# Patient Record
Sex: FEMALE | Race: WHITE | NOT HISPANIC OR LATINO | Employment: UNEMPLOYED | URBAN - NONMETROPOLITAN AREA
[De-identification: names, ages, dates, MRNs, and addresses within clinical notes are randomized per-mention and may not be internally consistent; named-entity substitution may affect disease eponyms.]

---

## 2017-01-07 ENCOUNTER — COMMUNICATION - GICH (OUTPATIENT)
Dept: FAMILY MEDICINE | Facility: OTHER | Age: 47
End: 2017-01-07

## 2017-01-07 DIAGNOSIS — J45.20 MILD INTERMITTENT ASTHMA, UNCOMPLICATED: ICD-10-CM

## 2017-01-11 ENCOUNTER — OFFICE VISIT - GICH (OUTPATIENT)
Dept: FAMILY MEDICINE | Facility: OTHER | Age: 47
End: 2017-01-11

## 2017-01-11 ENCOUNTER — HISTORY (OUTPATIENT)
Dept: FAMILY MEDICINE | Facility: OTHER | Age: 47
End: 2017-01-11

## 2017-01-11 DIAGNOSIS — J02.0 STREPTOCOCCAL PHARYNGITIS: ICD-10-CM

## 2017-01-11 DIAGNOSIS — J32.0 CHRONIC MAXILLARY SINUSITIS: ICD-10-CM

## 2017-01-11 DIAGNOSIS — B37.9 CANDIDIASIS: ICD-10-CM

## 2017-01-11 DIAGNOSIS — J02.9 ACUTE PHARYNGITIS: ICD-10-CM

## 2017-01-11 DIAGNOSIS — T36.95XA ADVERSE EFFECT OF SYSTEMIC ANTIBIOTIC: ICD-10-CM

## 2017-01-11 LAB — STREP A ANTIGEN - HISTORICAL: POSITIVE

## 2017-01-12 ENCOUNTER — AMBULATORY - GICH (OUTPATIENT)
Dept: FAMILY MEDICINE | Facility: OTHER | Age: 47
End: 2017-01-12

## 2017-01-23 ENCOUNTER — AMBULATORY - GICH (OUTPATIENT)
Dept: FAMILY MEDICINE | Facility: OTHER | Age: 47
End: 2017-01-23

## 2017-01-23 DIAGNOSIS — M79.605 PAIN OF LEFT LEG: ICD-10-CM

## 2017-01-23 DIAGNOSIS — G25.81 RESTLESS LEGS SYNDROME: ICD-10-CM

## 2017-01-25 ENCOUNTER — COMMUNICATION - GICH (OUTPATIENT)
Dept: FAMILY MEDICINE | Facility: OTHER | Age: 47
End: 2017-01-25

## 2017-01-25 DIAGNOSIS — J45.20 MILD INTERMITTENT ASTHMA, UNCOMPLICATED: ICD-10-CM

## 2017-02-20 ENCOUNTER — COMMUNICATION - GICH (OUTPATIENT)
Dept: FAMILY MEDICINE | Facility: OTHER | Age: 47
End: 2017-02-20

## 2017-02-20 DIAGNOSIS — F32.89 OTHER SPECIFIED DEPRESSIVE EPISODES: ICD-10-CM

## 2017-02-26 ENCOUNTER — COMMUNICATION - GICH (OUTPATIENT)
Dept: FAMILY MEDICINE | Facility: OTHER | Age: 47
End: 2017-02-26

## 2017-02-26 DIAGNOSIS — G25.81 RESTLESS LEGS SYNDROME: ICD-10-CM

## 2017-02-27 ENCOUNTER — COMMUNICATION - GICH (OUTPATIENT)
Dept: FAMILY MEDICINE | Facility: OTHER | Age: 47
End: 2017-02-27

## 2017-02-27 DIAGNOSIS — J45.20 MILD INTERMITTENT ASTHMA, UNCOMPLICATED: ICD-10-CM

## 2017-03-29 ENCOUNTER — OFFICE VISIT - GICH (OUTPATIENT)
Dept: FAMILY MEDICINE | Facility: OTHER | Age: 47
End: 2017-03-29

## 2017-03-29 ENCOUNTER — HISTORY (OUTPATIENT)
Dept: FAMILY MEDICINE | Facility: OTHER | Age: 47
End: 2017-03-29

## 2017-03-29 DIAGNOSIS — R69 ILLNESS: ICD-10-CM

## 2017-03-29 DIAGNOSIS — J45.40 MODERATE PERSISTENT ASTHMA, UNCOMPLICATED: ICD-10-CM

## 2017-03-29 ASSESSMENT — PATIENT HEALTH QUESTIONNAIRE - PHQ9: SUM OF ALL RESPONSES TO PHQ QUESTIONS 1-9: 6

## 2017-04-04 ENCOUNTER — OFFICE VISIT - GICH (OUTPATIENT)
Dept: FAMILY MEDICINE | Facility: OTHER | Age: 47
End: 2017-04-04

## 2017-04-04 ENCOUNTER — HISTORY (OUTPATIENT)
Dept: FAMILY MEDICINE | Facility: OTHER | Age: 47
End: 2017-04-04

## 2017-04-04 DIAGNOSIS — R06.83 SNORING: ICD-10-CM

## 2017-04-04 DIAGNOSIS — S76.012A STRAIN OF MUSCLE, FASCIA AND TENDON OF LEFT HIP, INITIAL ENCOUNTER: ICD-10-CM

## 2017-04-04 DIAGNOSIS — R53.83 OTHER FATIGUE: ICD-10-CM

## 2017-04-07 ENCOUNTER — COMMUNICATION - GICH (OUTPATIENT)
Dept: FAMILY MEDICINE | Facility: OTHER | Age: 47
End: 2017-04-07

## 2017-04-07 DIAGNOSIS — J45.20 MILD INTERMITTENT ASTHMA, UNCOMPLICATED: ICD-10-CM

## 2017-05-10 ENCOUNTER — COMMUNICATION - GICH (OUTPATIENT)
Dept: FAMILY MEDICINE | Facility: OTHER | Age: 47
End: 2017-05-10

## 2017-05-10 DIAGNOSIS — G25.81 RESTLESS LEGS SYNDROME: ICD-10-CM

## 2017-06-14 ENCOUNTER — COMMUNICATION - GICH (OUTPATIENT)
Dept: FAMILY MEDICINE | Facility: OTHER | Age: 47
End: 2017-06-14

## 2017-06-14 DIAGNOSIS — J45.20 MILD INTERMITTENT ASTHMA, UNCOMPLICATED: ICD-10-CM

## 2017-07-01 ENCOUNTER — HOSPITAL ENCOUNTER (OUTPATIENT)
Dept: RADIOLOGY | Facility: OTHER | Age: 47
End: 2017-07-01
Attending: FAMILY MEDICINE

## 2017-07-01 ENCOUNTER — HISTORY (OUTPATIENT)
Dept: FAMILY MEDICINE | Facility: OTHER | Age: 47
End: 2017-07-01

## 2017-07-01 ENCOUNTER — OFFICE VISIT - GICH (OUTPATIENT)
Dept: FAMILY MEDICINE | Facility: OTHER | Age: 47
End: 2017-07-01

## 2017-07-01 DIAGNOSIS — R10.9 ABDOMINAL PAIN: ICD-10-CM

## 2017-07-01 LAB
A/G RATIO - HISTORICAL: 1.4 (ref 1–2)
ABSOLUTE BASOPHILS - HISTORICAL: 0 THOU/CU MM
ABSOLUTE EOSINOPHILS - HISTORICAL: 0.1 THOU/CU MM
ABSOLUTE IMMATURE GRANULOCYTES(METAS,MYELOS,PROS) - HISTORICAL: 0 THOU/CU MM
ABSOLUTE LYMPHOCYTES - HISTORICAL: 1.2 THOU/CU MM (ref 0.9–2.9)
ABSOLUTE MONOCYTES - HISTORICAL: 0.7 THOU/CU MM
ABSOLUTE NEUTROPHILS - HISTORICAL: 6.2 THOU/CU MM (ref 1.7–7)
ALBUMIN SERPL-MCNC: 4.2 G/DL (ref 3.5–5.7)
ALP SERPL-CCNC: 104 IU/L (ref 34–104)
ALT (SGPT) - HISTORICAL: 83 IU/L (ref 7–52)
ANION GAP - HISTORICAL: 9 (ref 5–18)
AST SERPL-CCNC: 49 IU/L (ref 13–39)
BACTERIA URINE: NORMAL BACTERIA/HPF
BASOPHILS # BLD AUTO: 0.4 %
BILIRUB SERPL-MCNC: 0.4 MG/DL (ref 0.3–1)
BILIRUB UR QL: ABNORMAL
BUN SERPL-MCNC: 12 MG/DL (ref 7–25)
BUN/CREAT RATIO - HISTORICAL: 12
CALCIUM SERPL-MCNC: 9.1 MG/DL (ref 8.6–10.3)
CHLORIDE SERPLBLD-SCNC: 103 MMOL/L (ref 98–107)
CLARITY, URINE: CLEAR CLARITY
CO2 SERPL-SCNC: 22 MMOL/L (ref 21–31)
COLOR UR: YELLOW COLOR
CREAT SERPL-MCNC: 1.04 MG/DL (ref 0.7–1.3)
D-DIMER, QUANTITATIVE NG/ML - HISTORICAL: <200 NG/ML
EOSINOPHIL NFR BLD AUTO: 0.7 %
EPITHELIAL CELLS: NORMAL EPI/HPF
ERYTHROCYTE [DISTWIDTH] IN BLOOD BY AUTOMATED COUNT: 12.4 % (ref 11.5–15.5)
GFR IF NOT AFRICAN AMERICAN - HISTORICAL: 57 ML/MIN/1.73M2
GLOBULIN - HISTORICAL: 3 G/DL (ref 2–3.7)
GLUCOSE SERPL-MCNC: 97 MG/DL (ref 70–105)
GLUCOSE URINE: NEGATIVE MG/DL
HCT VFR BLD AUTO: 43.4 % (ref 33–51)
HEMOGLOBIN: 15.4 G/DL (ref 12–16)
IMMATURE GRANULOCYTES(METAS,MYELOS,PROS) - HISTORICAL: 0.5 %
KETONES UR QL: NEGATIVE MG/DL
LEUKOCYTE ESTERASE URINE: NEGATIVE
LIPASE SERPL-CCNC: 10.9 IU/L (ref 11–82)
LYMPHOCYTES NFR BLD AUTO: 14.3 % (ref 20–44)
MCH RBC QN AUTO: 30.9 PG (ref 26–34)
MCHC RBC AUTO-ENTMCNC: 35.5 G/DL (ref 32–36)
MCV RBC AUTO: 87 FL (ref 80–100)
MONOCYTES NFR BLD AUTO: 8.6 %
NEUTROPHILS NFR BLD AUTO: 75.5 % (ref 42–72)
NITRITE UR QL STRIP: NEGATIVE
OCCULT BLOOD,URINE - HISTORICAL: ABNORMAL
PH UR: 5 [PH]
PLATELET # BLD AUTO: 223 THOU/CU MM (ref 140–440)
PMV BLD: 9.1 FL (ref 6.5–11)
POTASSIUM SERPL-SCNC: 3.9 MMOL/L (ref 3.5–5.1)
PROT SERPL-MCNC: 7.2 G/DL (ref 6.4–8.9)
PROTEIN QUALITATIVE,URINE - HISTORICAL: ABNORMAL MG/DL
RBC - HISTORICAL: NORMAL /HPF
RED BLOOD COUNT - HISTORICAL: 4.98 MIL/CU MM (ref 4–5.2)
SODIUM SERPL-SCNC: 134 MMOL/L (ref 133–143)
SP GR UR STRIP: >=1.03
UROBILINOGEN,QUALITATIVE - HISTORICAL: NORMAL EU/DL
WBC - HISTORICAL: NORMAL /HPF
WHITE BLOOD COUNT - HISTORICAL: 8.3 THOU/CU MM (ref 4.5–11)

## 2017-09-06 ENCOUNTER — COMMUNICATION - GICH (OUTPATIENT)
Dept: FAMILY MEDICINE | Facility: OTHER | Age: 47
End: 2017-09-06

## 2017-09-06 DIAGNOSIS — F32.89 OTHER SPECIFIED DEPRESSIVE EPISODES: ICD-10-CM

## 2017-09-10 ENCOUNTER — COMMUNICATION - GICH (OUTPATIENT)
Dept: FAMILY MEDICINE | Facility: OTHER | Age: 47
End: 2017-09-10

## 2017-09-10 DIAGNOSIS — J45.20 MILD INTERMITTENT ASTHMA, UNCOMPLICATED: ICD-10-CM

## 2017-11-21 ENCOUNTER — HISTORY (OUTPATIENT)
Dept: FAMILY MEDICINE | Facility: OTHER | Age: 47
End: 2017-11-21

## 2017-11-21 ENCOUNTER — OFFICE VISIT - GICH (OUTPATIENT)
Dept: FAMILY MEDICINE | Facility: OTHER | Age: 47
End: 2017-11-21

## 2017-11-21 DIAGNOSIS — T36.95XA ADVERSE EFFECT OF SYSTEMIC ANTIBIOTIC: ICD-10-CM

## 2017-11-21 DIAGNOSIS — R74.8 ABNORMAL LEVELS OF OTHER SERUM ENZYMES: ICD-10-CM

## 2017-11-21 DIAGNOSIS — Z13.29 ENCOUNTER FOR SCREENING FOR OTHER SUSPECTED ENDOCRINE DISORDER: ICD-10-CM

## 2017-11-21 DIAGNOSIS — J32.9 CHRONIC SINUSITIS: ICD-10-CM

## 2017-11-21 DIAGNOSIS — J45.40 MODERATE PERSISTENT ASTHMA, UNCOMPLICATED: ICD-10-CM

## 2017-11-21 DIAGNOSIS — Z13.220 ENCOUNTER FOR SCREENING FOR LIPOID DISORDERS: ICD-10-CM

## 2017-11-21 DIAGNOSIS — F32.89 OTHER SPECIFIED DEPRESSIVE EPISODES: ICD-10-CM

## 2017-11-21 DIAGNOSIS — J45.20 MILD INTERMITTENT ASTHMA, UNCOMPLICATED: ICD-10-CM

## 2017-11-21 DIAGNOSIS — B37.9 CANDIDIASIS: ICD-10-CM

## 2017-11-21 LAB
A/G RATIO - HISTORICAL: 1.6 (ref 1–2)
ALBUMIN SERPL-MCNC: 3.9 G/DL (ref 3.5–5.7)
ALP SERPL-CCNC: 72 IU/L (ref 34–104)
ALT (SGPT) - HISTORICAL: 20 IU/L (ref 7–52)
ANION GAP - HISTORICAL: 7 (ref 5–18)
AST SERPL-CCNC: 16 IU/L (ref 13–39)
BILIRUB SERPL-MCNC: 0.3 MG/DL (ref 0.3–1)
BUN SERPL-MCNC: 10 MG/DL (ref 7–25)
BUN/CREAT RATIO - HISTORICAL: 12
CALCIUM SERPL-MCNC: 8.7 MG/DL (ref 8.6–10.3)
CHLORIDE SERPLBLD-SCNC: 108 MMOL/L (ref 98–107)
CHOL/HDL RATIO - HISTORICAL: 4.38
CHOLESTEROL TOTAL: 175 MG/DL
CO2 SERPL-SCNC: 22 MMOL/L (ref 21–31)
CREAT SERPL-MCNC: 0.85 MG/DL (ref 0.7–1.3)
GFR IF NOT AFRICAN AMERICAN - HISTORICAL: >60 ML/MIN/1.73M2
GLOBULIN - HISTORICAL: 2.5 G/DL (ref 2–3.7)
GLUCOSE SERPL-MCNC: 73 MG/DL (ref 70–105)
HDLC SERPL-MCNC: 40 MG/DL (ref 23–92)
LDLC SERPL CALC-MCNC: 106 MG/DL
NON-HDL CHOLESTEROL - HISTORICAL: 135 MG/DL
POTASSIUM SERPL-SCNC: 3.9 MMOL/L (ref 3.5–5.1)
PROT SERPL-MCNC: 6.4 G/DL (ref 6.4–8.9)
PROVIDER ORDERDED STATUS - HISTORICAL: ABNORMAL
SODIUM SERPL-SCNC: 137 MMOL/L (ref 133–143)
TRIGL SERPL-MCNC: 144 MG/DL
TSH - HISTORICAL: 1.34 UIU/ML (ref 0.34–5.6)

## 2017-11-22 ENCOUNTER — COMMUNICATION - GICH (OUTPATIENT)
Dept: FAMILY MEDICINE | Facility: OTHER | Age: 47
End: 2017-11-22

## 2017-11-29 ENCOUNTER — COMMUNICATION - GICH (OUTPATIENT)
Dept: FAMILY MEDICINE | Facility: OTHER | Age: 47
End: 2017-11-29

## 2017-12-06 ENCOUNTER — COMMUNICATION - GICH (OUTPATIENT)
Dept: FAMILY MEDICINE | Facility: OTHER | Age: 47
End: 2017-12-06

## 2017-12-11 ENCOUNTER — COMMUNICATION - GICH (OUTPATIENT)
Dept: FAMILY MEDICINE | Facility: OTHER | Age: 47
End: 2017-12-11

## 2017-12-21 ENCOUNTER — COMMUNICATION - GICH (OUTPATIENT)
Dept: FAMILY MEDICINE | Facility: OTHER | Age: 47
End: 2017-12-21

## 2017-12-21 DIAGNOSIS — J45.20 MILD INTERMITTENT ASTHMA, UNCOMPLICATED: ICD-10-CM

## 2017-12-28 NOTE — TELEPHONE ENCOUNTER
Patient Information     Patient Name MRN Sex Cielo Rob [id number] Female 1970      Telephone Encounter by Corin Irizarry RN at 2017  2:27 PM     Author:  Corin Irizarry RN Service:  (none) Author Type:  NURS- Registered Nurse     Filed:  2017  2:32 PM Encounter Date:  9/10/2017 Status:  Signed     :  Corin Irizarry RN (NURS- Registered Nurse)            Bronchodilator Inhalers   Office visit in the past 12 months.  Last visit with GILDA ROY was on: 2017 in [facility]  17 OV with Dr. Zazueta addresses this medication  Next visit with GILDA ROY is on: No future appointment listed with this provider  Next visit with Family Practice is on: No future appointment listed in this department  Max refills 12 months from last office visit.  Prescription refilled per RN Medication Refill Policy.................... Corin Irizarry RN ....................  2017   2:29 PM

## 2017-12-28 NOTE — TELEPHONE ENCOUNTER
Patient Information     Patient Name MRN Sex Cielo Rob [id number] Female 1970      Telephone Encounter by Maria Guadalupe Mendez at 2017  3:36 PM     Author:  Maria Guadalupe Mendez Service:  (none) Author Type:  (none)     Filed:  2017  3:38 PM Encounter Date:  2017 Status:  Signed     :  Maria Guadalupe Mendez from [facility] states that Cielo is filling the ventolin (3 inhalers a month) since August which is a lot of for a rescue inhaler. Margot Benavides, DO to be aware of this and change if necessary.   Maria Guadalupe Mendez LPN............................... 2017 3:37 PM

## 2017-12-28 NOTE — TELEPHONE ENCOUNTER
Patient Information     Patient Name MRN Cielo Doherty [id number] Female 1970      Telephone Encounter by Alesha Reynaga at 2017  3:11 PM     Author:  Alesha Reynaga Service:  (none) Author Type:  (none)     Filed:  2017  3:12 PM Encounter Date:  2017 Status:  Signed     :  Alesha Reynaga            Patient states that she was in last week for bronchitis/ sinus infection.  Patient states that she thinks she started a bladder infection and she wants to know if the antibiotic she was put on would cover this or if she needs to come in for this.  Alesha Reynaga LPN........................2017  3:12 PM

## 2017-12-28 NOTE — TELEPHONE ENCOUNTER
Patient Information     Patient Name MRN Sex Cielo Rob [id number] Female 1970      Telephone Encounter by Valencia Roy DO at 2017  4:28 PM     Author:  Valencia Roy DO Service:  (none) Author Type:  PHYS- Osteopathic     Filed:  2017  4:28 PM Encounter Date:  2017 Status:  Signed     :  Valencia Roy DO (PHYS- Osteopathic)            We increased her Advair at last appointment; goal should be ~3 inhalers should last at least 3 months now.  VALENCIA ROY DO

## 2017-12-28 NOTE — PROGRESS NOTES
"Patient Information     Patient Name MRN Sex Cielo Rob [id number] Female 1970      Progress Notes by Arabella Chan MD at 2017  8:30 AM     Author:  Arabella Chan MD Service:  (none) Author Type:  Physician     Filed:  2017  1:32 PM Encounter Date:  2017 Status:  Signed     :  Arabella Chan MD (Physician)            SUBJECTIVE:    Cielo Bahena is a 46 y.o. female who presents about pain in right flank/back area that started the night of 2017 and had a fever to 102 with pleuritic discomfort at times. No cough, dysuria or hematuria. Rates the pain at 7/10 and has been taking tylenol with last dose about 6-8 hours ago. Temp has not been that high again.No association with eating and normal bowel movements.No previous episodes of this type of pain. Still has her gallbladder and has not had fatty food intolerance. No longer drinks alcohol.   Somewhat difficult to localize the pain and seems \"under ribs\".     HPI    No Known Allergies,   Current Outpatient Prescriptions:      amitriptyline (ELAVIL) 10 mg tablet, TAKE 2 TABLETS BY MOUTH AT BEDTIME., Disp: 60 tablet, Rfl: 5     fluticasone-salmeterol (ADVAIR DISKUS) 100-50 mcg/dose diskus inhaler, Inhale 1 Puff by mouth 2 times daily., Disp: 3 Inhaler, Rfl: 4     LORazepam (ATIVAN) 0.5 mg tab, Take 1 tablet by mouth every 8 hours if needed for Anxiety., Disp: 30 tablet, Rfl: 5     montelukast (SINGULAIR) 10 mg tablet, TAKE ONE TABLET BY MOUTH NIGHTLY AT BEDTIME, Disp: 90 tablet, Rfl: 2     pramipexole (MIRAPEX) 0.125 mg tablet, Take 1 tablet by mouth at bedtime if needed., Disp: 90 tablet, Rfl: 2     pramipexole (MIRAPEX) 0.25 mg tablet, TAKE ONE TABLET BY MOUTH NIGHTLY AT BEDTIME, Disp: 90 tablet, Rfl: 2     rx EPINEPHrine (EPIPEN) 0.3 mg/0.3 mL injection ([facility] MED), Inject 0.3 mg intramuscular one time if needed for Allergic Reaction., Disp: 0.6 mL, Rfl: 3     tiotropium (SPIRIVA) 18 mcg inhalation capsule, Inhale " "18 mcg by mouth once daily., Disp: 90 capsule, Rfl: 4     VENTOLIN HFA 90 mcg/actuation inhaler, INHALE 2 PUFFS BY MOUTH EVERY 4 HOURS IF NEEDED., Disp: 3 Inhaler, Rfl: 1  Medications have been reviewed by me and are current to the best of my knowledge and ability. and   Patient Active Problem List       Diagnosis  Date Noted     Cystocele with rectocele       Genuine stress incontinence, female       Family history of colon cancer  01/22/2015     RLS (restless legs syndrome)  06/20/2014     HIP PAIN  04/18/2014     GERD (gastroesophageal reflux disease)  10/25/2012     BREAST HYPERTROPHY  03/14/2012     Moderate persistent asthma without complication       Asthma, generally well controlled, history of respiratory arrest requiring   brief mechanical ventilation.          ANEMIA       mild          DEPRESSION       INSOMNIA         REVIEW OF SYSTEMS:  ROS    OBJECTIVE:  /80  Pulse 100  Temp 98.6  F (37  C) (Tympanic)  Ht 1.638 m (5' 4.5\")  Wt 83.1 kg (183 lb 3.2 oz)  LMP 02/11/2015  BMI 30.96 kg/m2  Results for orders placed or performed in visit on 07/01/17      D-DIMER      Result  Value Ref Range    D-DIMER, QUANTITATIVE  <200 >199 - 230 ng/mL   COMPLETE METABOLIC PANEL      Result  Value Ref Range    SODIUM 134 133 - 143 mmol/L    POTASSIUM 3.9 3.5 - 5.1 mmol/L    CHLORIDE 103 98 - 107 mmol/L    CO2,TOTAL 22 21 - 31 mmol/L    ANION GAP 9 5 - 18                    GLUCOSE 97 70 - 105 mg/dL    CALCIUM 9.1 8.6 - 10.3 mg/dL    BUN 12 7 - 25 mg/dL    CREATININE 1.04 0.70 - 1.30 mg/dL    BUN/CREAT RATIO           12                    GFR if African American >60 >60 ml/min/1.73m2    GFR if not  57 (L) >60 ml/min/1.73m2    ALBUMIN 4.2 3.5 - 5.7 g/dL    PROTEIN,TOTAL 7.2 6.4 - 8.9 g/dL    GLOBULIN                  3.0 2.0 - 3.7 g/dL    A/G RATIO 1.4 1.0 - 2.0                    BILIRUBIN,TOTAL 0.4 0.3 - 1.0 mg/dL    ALK PHOSPHATASE 104 34 - 104 IU/L    ALT (SGPT) 83 (H) 7 - 52 IU/L    AST " (SGOT) 49 (H) 13 - 39 IU/L   LIPASE      Result  Value Ref Range    LIPASE 10.9 (L) 11.0 - 82.0 IU/L   CBC WITH AUTO DIFFERENTIAL      Result  Value Ref Range    WHITE BLOOD COUNT         8.3 4.5 - 11.0 thou/cu mm    RED BLOOD COUNT           4.98 4.00 - 5.20 mil/cu mm    HEMOGLOBIN                15.4 12.0 - 16.0 g/dL    HEMATOCRIT                43.4 33.0 - 51.0 %    MCV                       87 80 - 100 fL    MCH                       30.9 26.0 - 34.0 pg    MCHC                      35.5 32.0 - 36.0 g/dL    RDW                       12.4 11.5 - 15.5 %    PLATELET COUNT            223 140 - 440 thou/cu mm    MPV                       9.1 6.5 - 11.0 fL    NEUTROPHILS               75.5 (H) 42.0 - 72.0 %    LYMPHOCYTES               14.3 (L) 20.0 - 44.0 %    MONOCYTES                 8.6 <12.0 %    EOSINOPHILS               0.7 <8.0 %    BASOPHILS                 0.4 <3.0 %    IMMATURE GRANULOCYTES(METAS,MYELOS,PROS) 0.5 %    ABSOLUTE NEUTROPHILS      6.2 1.7 - 7.0 thou/cu mm    ABSOLUTE LYMPHOCYTES      1.2 0.9 - 2.9 thou/cu mm    ABSOLUTE MONOCYTES        0.7 <0.9 thou/cu mm    ABSOLUTE EOSINOPHILS      0.1 <0.5 thou/cu mm    ABSOLUTE BASOPHILS        0.0 <0.3 thou/cu mm    ABSOLUTE IMMATURE GRANULOCYTES(METAS,MYELOS,PROS) 0.0 <=0.3 thou/cu mm   URINALYSIS W REFLEX MICROSCOPIC IF POSITIVE      Result  Value Ref Range    COLOR                     Yellow Yellow Color    CLARITY                   Clear Clear Clarity    SPECIFIC GRAVITY,URINE    >=1.030 (A) 1.010, 1.015, 1.020, 1.025                    PH,URINE                  5.0 (A) 6.0, 7.0, 8.0, 5.5, 6.5, 7.5, 8.5                    UROBILINOGEN,QUALITATIVE  Normal Normal EU/dl    PROTEIN, URINE Trace (A) Negative mg/dL    GLUCOSE, URINE Negative Negative mg/dL    KETONES,URINE             Negative Negative mg/dL    BILIRUBIN,URINE           Abnormal (A) Negative                    OCCULT BLOOD,URINE        Moderate (A) Negative                    NITRITE                    Negative Negative                    LEUKOCYTE ESTERASE        Negative Negative                   URINALYSIS MICROSCOPIC      Result  Value Ref Range    RBC 0-2 0-2, None Seen /HPF    WBC None Seen 0-2, 3-5, None Seen /HPF    BACTERIA                  Rare None Seen, Rare, Occasional, Few Bacteria/HPF    EPITHELIAL CELLS          Few None Seen, Few Epi/HPF     I have personally reviewed the labs listed above.    EXAM:   Physical Exam   Constitutional: She is well-developed, well-nourished, and in no distress. No distress.   Cardiovascular: Normal rate and regular rhythm.    Abdominal: Soft. Bowel sounds are normal. She exhibits no distension and no mass. There is tenderness. There is no rebound and no guarding.   Tender in right upper quadrant at area of gallbladder   Musculoskeletal:   Ribs do not palpate as tender.    Skin: No rash noted.   Nursing note and vitals reviewed.    Results for orders placed or performed in visit on 07/01/17      D-DIMER      Result  Value Ref Range    D-DIMER, QUANTITATIVE  <200 >199 - 230 ng/mL   COMPLETE METABOLIC PANEL      Result  Value Ref Range    SODIUM 134 133 - 143 mmol/L    POTASSIUM 3.9 3.5 - 5.1 mmol/L    CHLORIDE 103 98 - 107 mmol/L    CO2,TOTAL 22 21 - 31 mmol/L    ANION GAP 9 5 - 18                    GLUCOSE 97 70 - 105 mg/dL    CALCIUM 9.1 8.6 - 10.3 mg/dL    BUN 12 7 - 25 mg/dL    CREATININE 1.04 0.70 - 1.30 mg/dL    BUN/CREAT RATIO           12                    GFR if African American >60 >60 ml/min/1.73m2    GFR if not  57 (L) >60 ml/min/1.73m2    ALBUMIN 4.2 3.5 - 5.7 g/dL    PROTEIN,TOTAL 7.2 6.4 - 8.9 g/dL    GLOBULIN                  3.0 2.0 - 3.7 g/dL    A/G RATIO 1.4 1.0 - 2.0                    BILIRUBIN,TOTAL 0.4 0.3 - 1.0 mg/dL    ALK PHOSPHATASE 104 34 - 104 IU/L    ALT (SGPT) 83 (H) 7 - 52 IU/L    AST (SGOT) 49 (H) 13 - 39 IU/L   LIPASE      Result  Value Ref Range    LIPASE 10.9 (L) 11.0 - 82.0 IU/L   CBC WITH AUTO  DIFFERENTIAL      Result  Value Ref Range    WHITE BLOOD COUNT         8.3 4.5 - 11.0 thou/cu mm    RED BLOOD COUNT           4.98 4.00 - 5.20 mil/cu mm    HEMOGLOBIN                15.4 12.0 - 16.0 g/dL    HEMATOCRIT                43.4 33.0 - 51.0 %    MCV                       87 80 - 100 fL    MCH                       30.9 26.0 - 34.0 pg    MCHC                      35.5 32.0 - 36.0 g/dL    RDW                       12.4 11.5 - 15.5 %    PLATELET COUNT            223 140 - 440 thou/cu mm    MPV                       9.1 6.5 - 11.0 fL    NEUTROPHILS               75.5 (H) 42.0 - 72.0 %    LYMPHOCYTES               14.3 (L) 20.0 - 44.0 %    MONOCYTES                 8.6 <12.0 %    EOSINOPHILS               0.7 <8.0 %    BASOPHILS                 0.4 <3.0 %    IMMATURE GRANULOCYTES(METAS,MYELOS,PROS) 0.5 %    ABSOLUTE NEUTROPHILS      6.2 1.7 - 7.0 thou/cu mm    ABSOLUTE LYMPHOCYTES      1.2 0.9 - 2.9 thou/cu mm    ABSOLUTE MONOCYTES        0.7 <0.9 thou/cu mm    ABSOLUTE EOSINOPHILS      0.1 <0.5 thou/cu mm    ABSOLUTE BASOPHILS        0.0 <0.3 thou/cu mm    ABSOLUTE IMMATURE GRANULOCYTES(METAS,MYELOS,PROS) 0.0 <=0.3 thou/cu mm   URINALYSIS W REFLEX MICROSCOPIC IF POSITIVE      Result  Value Ref Range    COLOR                     Yellow Yellow Color    CLARITY                   Clear Clear Clarity    SPECIFIC GRAVITY,URINE    >=1.030 (A) 1.010, 1.015, 1.020, 1.025                    PH,URINE                  5.0 (A) 6.0, 7.0, 8.0, 5.5, 6.5, 7.5, 8.5                    UROBILINOGEN,QUALITATIVE  Normal Normal EU/dl    PROTEIN, URINE Trace (A) Negative mg/dL    GLUCOSE, URINE Negative Negative mg/dL    KETONES,URINE             Negative Negative mg/dL    BILIRUBIN,URINE           Abnormal (A) Negative                    OCCULT BLOOD,URINE        Moderate (A) Negative                    NITRITE                   Negative Negative                    LEUKOCYTE ESTERASE        Negative Negative                    URINALYSIS MICROSCOPIC      Result  Value Ref Range    RBC 0-2 0-2, None Seen /HPF    WBC None Seen 0-2, 3-5, None Seen /HPF    BACTERIA                  Rare None Seen, Rare, Occasional, Few Bacteria/HPF    EPITHELIAL CELLS          Few None Seen, Few Epi/HPF   Results   US ABDOMEN LIMITED (Accession [id number]) (Order [id number])      Original Order Diagnosis: Right sided abdominal pain [R10.9 (ICD-10-CM)]        Reading Provider(s)   Shun Dooley MD   PACS Images   Show images for US ABDOMEN LIMITED   Results         PROCEDURES: US ABDOMEN LIMITED     HISTORY: Female, age 46 years, Right sided abdominal pain     TECHNIQUE: Ultrasound of the upper abdomen was performed.     COMPARISON: None.      FINDINGS:     LIVER: Normal.     GALLBLADDER: Normal.     Common bile duct diameter: 4.5 mm.     ABDOMINAL AORTA AND IVC: The visualized portions of the abdominal aorta are unremarkable.     PANCREAS:The visualized portions of the pancreas are normal.     RIGHT KIDNEY: The right kidney is normal. The right kidney measures centimeters in length.     OTHER: There is no free fluid in the upper abdomen.     IMPRESSION: Normal examination.     Electronically Signed By: Shun Dooley M.D. on 7/1/2017 10:58 AM       I have personally reviewed the labs and US listed above.      ASSESSMENT/PLAN:    ICD-10-CM    1. Right sided abdominal pain R10.9 CBC WITH DIFFERENTIAL      D-DIMER      COMPLETE METABOLIC PANEL      LIPASE      CBC WITH DIFFERENTIAL      D-DIMER      COMPLETE METABOLIC PANEL      LIPASE      CBC WITH AUTO DIFFERENTIAL      URINALYSIS W REFLEX MICROSCOPIC IF POSITIVE      URINALYSIS W REFLEX MICROSCOPIC IF POSITIVE      URINALYSIS MICROSCOPIC      URINALYSIS MICROSCOPIC      US ABDOMEN LIMITED        Plan:    Mild elevation of LFTs but not that high and discussed.   All other labs and US reassuring.   Follow up with PCP if this is persistent or recurrent.  Arabella Chan MD  1:32 PM 7/1/2017

## 2017-12-28 NOTE — TELEPHONE ENCOUNTER
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 Corin Irizarry RN ....................  2017   10:55 AM

## 2017-12-28 NOTE — TELEPHONE ENCOUNTER
Patient Information     Patient Name MRN Sex Cielo Rob [id number] Female 1970      Telephone Encounter by Valencia Roy DO at 2017  3:18 PM     Author:  Valencia Roy DO Service:  (none) Author Type:  PHYS- Osteopathic     Filed:  2017  3:19 PM Encounter Date:  2017 Status:  Signed     :  Valencia Roy DO (PHYS- Osteopathic)            The antibiotic she is on would cover a lot of the more common causes of bladder infections.  If symptoms persist, would want to culture her urine to monitor sensitivities of medications.  Or possibly yeast infection - mimicking symptoms of a bladder infection.  VALENCIA ROY DO

## 2017-12-28 NOTE — PROGRESS NOTES
Patient Information     Patient Name MRN Cielo Doherty [id number] Female 1970      Progress Notes by Valencia Benavides DO at 2017  4:15 PM     Author:  Valencia Benavides DO Service:  (none) Author Type:  PHYS- Osteopathic     Filed:  2017  7:02 AM Encounter Date:  2017 Status:  Signed     :  Valencia Benavides DO (PHYS- Osteopathic)            SUBJECTIVE:  Cielo Bahena is a 46 y.o. female who presents for cough x 3 weeks.    HPI  Cielo is here for concerns of 3 weeks - ongoing cough, sputum production.  When it started she had fevers, facial pain, headache, but now only the cough/sputum persists.  Has been working without significant difficulties.  Does notice fatigue occasionally.  Has tried OTC cough/cold medicines without relief.     She also needs refills of her medications.  1. Asthma. She has been using her albuterol much more often than she should be, she states.  She has quit using her Advair, and states that is why.  She doesn't like having to go rinse her mouth out after each use.  Doesn't use her Spiriva.  Is taking her Singulair however.  + nighttime symptoms occasionally.  Uses inhaler almost daily.  2. She is also decreased her use of mirapex to 0.25mg nightly; along with her amitriptyline 10mg daily (decreased from 20mg daily). This helps significantly with sleep and mood.      No Known Allergies,   Current Outpatient Prescriptions on File Prior to Visit       Medication  Sig Dispense Refill     fluticasone-salmeterol (ADVAIR DISKUS) 100-50 mcg/dose diskus inhaler Inhale 1 Puff by mouth 2 times daily. 3 Inhaler 4     LORazepam (ATIVAN) 0.5 mg tab Take 1 tablet by mouth every 8 hours if needed for Anxiety. 30 tablet 5     pramipexole (MIRAPEX) 0.25 mg tablet TAKE ONE TABLET BY MOUTH NIGHTLY AT BEDTIME 90 tablet 2     rx EPINEPHrine (EPIPEN) 0.3 mg/0.3 mL injection ([facility] MED) Inject 0.3 mg intramuscular one time if needed for Allergic Reaction. 0.6 mL 3  "    No current facility-administered medications on file prior to visit.     and   Past Medical History:     Diagnosis  Date     Anemia, unspecified      Asthma     generally well controlled, history of respiratory arrest requiring brief mechanical ventilation.      Breast mass 2009    Soft breast mass lump, right breast       Depression 2006    Depression.  PHQ-9 score .      Family history of colon cancer 2015     Hx of abnormal Pap smear     abnormal Pap smear prior to , subsequent yearly Pap smears have been normal      Hx of pregnancy 2011    , 1 SAB      Insomnia, unspecified      Pain in joint, pelvic region and thigh      Pelvic somatic dysfunction 2013     Restless legs syndrome (RLS)        REVIEW OF SYSTEMS:  Review of Systems   All other systems reviewed and are negative.      OBJECTIVE:  /80  Pulse 79  Temp 98.2  F (36.8  C) (Tympanic)  Ht 1.638 m (5' 4.5\")  Wt 82.6 kg (182 lb)  LMP 2015  SpO2 98%  BMI 30.76 kg/m2    EXAM:   Physical Exam   Constitutional: She is well-developed, well-nourished, and in no distress.   HENT:   Head: Normocephalic and atraumatic.   Right Ear: External ear normal.   Left Ear: External ear normal.   Cardiovascular: Normal rate and normal heart sounds.    Pulmonary/Chest: Effort normal. She has wheezes.   Psychiatric: Mood and affect normal.   Nursing note and vitals reviewed.      ASSESSMENT/PLAN:    ICD-10-CM    1. Moderate persistent asthma without complication J45.40 fluticasone-salmeterol (ADVAIR DISKUS) 250-50 mcg/Dose diskus inhaler   2. 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 Sinusitis, unspecified chronicity, unspecified location J32.9 amoxicillin-clavulanate 875-125 mg tablet (AUGMENTIN)   4. Mild intermittent asthma without complication J45.20 albuterol HFA (VENTOLIN HFA) 90 mcg/actuation inhaler      montelukast (SINGULAIR) 10 mg tablet   5. Elevated liver enzymes R74.8 COMPLETE " METABOLIC PANEL      COMPLETE METABOLIC PANEL   6. Lipid screening Z13.220 LIPID PANEL      LIPID PANEL   7. Thyroid disorder screening Z13.29 TSH      TSH   8. Antibiotic-induced yeast infection B37.9 fluconazole (DIFLUCAN) 150 mg tablet     T36.95XA         Plan:   1. Asthma, moderate persistent.  Refilled her Advair at higher dose and encouraged her to use it BID.  This will decrease dependence on her asthma.  If not helpful; consider PFTs.  2. Depression, chronic, improved since last visit as she has lowered doses of Mirapex and Amitriptyline on her own.  Continue with lower doses - these were refilled today.  3. Sinusitis, subacute.  Rx for augmentin x 14 days.  Other supportive cares reviewed - including nasal sprays and better control of asthma symptoms.  4. See #1 - symptoms more consistent with mod persistent at this time.  5. Elevated liver enzymes - at acute visit for illness/fever.  Likely related to that episode, however will recheck today.  If remains elevated, will add other liver tests; and obtain RUQ US for liver appearance.  If normal; nothing further planned.  6. Lipid screening today with collected labs.  7. Thyroid testing added to above labs.  8.  Cielo relates an antibiotic associated yeast infection frequently - therefore doses of Diflucan sent to pharmacy.  Use up to 2 prn and if persist, recommended to be seen.    Follow up in 3-4 months for efficacy with Advair increase; sooner prn.

## 2017-12-28 NOTE — TELEPHONE ENCOUNTER
Patient Information     Patient Name MRN Sex Cielo Rob [id number] Female 1970      Telephone Encounter by Corin Irizarry RN at 2017  2:45 PM     Author:  Corin Irizarry RN Service:  (none) Author Type:  NURS- Registered Nurse     Filed:  2017  3:08 PM Encounter Date:  2017 Status:  Signed     :  Corin Irizarry RN (NURS- Registered Nurse)             Bronchodilator Inhalers   Office visit in the past 12 months.  Last visit with GILDA ROY was on: 2017 in [facility]  Next visit with GILDA ROY is on: No future appointment listed with this provider  Next visit with Family Practice is on: No future appointment listed in this department  Max refills 12 months from last office visit.  Unable to complete prescription refill per RN Medication Refill Policy.................... Corin Irizarry RN ....................  2017   3:04 PM

## 2017-12-28 NOTE — TELEPHONE ENCOUNTER
Patient Information     Patient Name MRN Sex Cielo Rob [id number] Female 1970      Telephone Encounter by Maria Guadalupe Mendez at 2017  4:36 PM     Author:  Maria Guadalupe Mendez Service:  (none) Author Type:  (none)     Filed:  2017  4:36 PM Encounter Date:  2017 Status:  Signed     :  Maria Guadalupe Mendez at [employer] [facility] DO eliz Romero.  Maria Guadalupe Mendez LPN............................... 2017 4:36 PM

## 2017-12-28 NOTE — TELEPHONE ENCOUNTER
Patient Information     Patient Name MRN Sex Cielo Rob [id number] Female 1970      Telephone Encounter by Radha Jerome at 2017  2:59 PM     Author:  Radha Jerome Service:  (none) Author Type:  (none)     Filed:  2017  3:00 PM Encounter Date:  2017 Status:  Signed     :  Radha Jeorme            Patient has questions regarding an antibiotic that's she is currently on.    Radha Jerome ....................  2017   2:59 PM

## 2017-12-29 NOTE — PATIENT INSTRUCTIONS
Patient Information     Patient Name MRN Sex Cielo Rob [id number] Female 1970      Patient Instructions by Arabella Chan MD at 2017  8:30 AM     Author:  Arabella Chan MD Service:  (none) Author Type:  Physician     Filed:  2017 11:05 AM Encounter Date:  2017 Status:  Signed     :  Arabella Chan MD (Physician)            Follow up with primary care is not improving.

## 2017-12-30 NOTE — NURSING NOTE
Patient Information     Patient Name MRN Sex Cielo Rob [id number] Female 1970      Nursing Note by Kimberly Uriarte at 2017  4:15 PM     Author:  Kimberly Uriarte Service:  (none) Author Type:  (none)     Filed:  2017  4:30 PM Encounter Date:  2017 Status:  Signed     :  Kimberly Uriarte            Patient has had a cough for 3 weeks .  Kimberly Uriarte LPN ....................2017  4:23 PM

## 2017-12-30 NOTE — NURSING NOTE
Patient Information     Patient Name MRN Sex Cielo Rob [id number] Female 1970      Nursing Note by Iman Do at 2017  8:30 AM     Author:  Iman Do Service:  (none) Author Type:  (none)     Filed:  2017  8:53 AM Encounter Date:  2017 Status:  Signed     :  Iman Do            Patient presents to the clinic for fever and right side pain under ribs that started 2 days ago. Highest the fever got was 102 degrees. Patient reports the pain seem to have moved to the right side of her back.  Iman BHAT, JULIANA.......2017..8:46 AM

## 2018-01-02 NOTE — TELEPHONE ENCOUNTER
Patient Information     Patient Name MRN Sex Cielo Rob [id number] Female 1970      Telephone Encounter by Corin Irizarry RN at 2017  8:47 AM     Author:  Corin Irizarry RN Service:  (none) Author Type:  NURS- Registered Nurse     Filed:  2017  8:49 AM Encounter Date:  2017 Status:  Signed     :  Corin Irizarry RN (NURS- Registered Nurse)            Refill request inappropriate. Too soon. Filled 3/9/16 90 x 4. Pharmacy alerted. Unable to complete prescription refill per RN Medication Refill Policy.................... Corin Irizarry RN ....................  2017   8:48 AM

## 2018-01-02 NOTE — PROGRESS NOTES
Patient Information     Patient Name MRN Sex Cielo Rob [id number] Female 1970      Progress Notes by Valencia eBnavides DO at 2017  8:15 AM     Author:  Valencia Benavides DO Service:  (none) Author Type:  PHYS- Osteopathic     Filed:  2017  8:44 AM Encounter Date:  2017 Status:  Signed     :  Valencia Benavides DO (PHYS- Osteopathic)            SUBJECTIVE:  Cielo Bahena is a 46 y.o. female who presents for sore throat, fever.    HPI  Cielo has had a sore throat and fever of up to 100.8 for the last 3 days.  Son had strep throat and was treated.  Her symptoms started a few days after that.  Trying to stay away from him; however she thinks he will drink her water or share her food when she is not looking.  Still has some cough and facial pressure from her last sinus infection at the beginning of the month as well; but better than what it was.  Hasn't tried much OTC products.    No Known Allergies,   Current Outpatient Prescriptions on File Prior to Visit       Medication  Sig Dispense Refill     albuterol HFA (VENTOLIN HFA) 90 mcg/actuation inhaler Inhale 2 Puffs by mouth every 4 hours if needed. 3 Inhaler 0     amitriptyline (ELAVIL) 10 mg tablet Take 2 tablets by mouth at bedtime. 60 tablet 5     fluticasone-salmeterol (ADVAIR DISKUS) 100-50 mcg/dose diskus inhaler Inhale 1 Puff by mouth 2 times daily. 3 Inhaler 4     LORazepam (ATIVAN) 0.5 mg tab Take 1 tablet by mouth every 8 hours if needed for Anxiety. 30 tablet 5     montelukast (SINGULAIR) 10 mg tablet Take 1 tablet by mouth at bedtime. 90 tablet 4     pramipexole (MIRAPEX) 0.125 mg tablet Take 1 tablet by mouth at bedtime if needed. 90 tablet 4     pramipexole (MIRAPEX) 0.25 mg tablet Take 1 tablet by mouth at bedtime. 90 tablet 4     rx EPINEPHrine (EPIPEN) 0.3 mg/0.3 mL injection (ED DC MED) Inject 0.3 mg intramuscular one time if needed for Allergic Reaction. 0.6 mL 3     tiotropium (SPIRIVA) 18 mcg inhalation  capsule Inhale 18 mcg by mouth once daily. 90 capsule 4     No current facility-administered medications on file prior to visit.     and   Past Medical History      Diagnosis   Date     Anemia, unspecified       Asthma       generally well controlled, history of respiratory arrest requiring brief mechanical ventilation.      Breast mass  2009     Soft breast mass lump, right breast       Depression  2006     Depression.  PHQ-9 score .      Family history of colon cancer  2015     Hx of abnormal Pap smear       abnormal Pap smear prior to , subsequent yearly Pap smears have been normal      Hx of pregnancy  2011     , 1 SAB      Insomnia, unspecified       Pain in joint, pelvic region and thigh       Pelvic somatic dysfunction  2013     Restless legs syndrome (RLS)         REVIEW OF SYSTEMS:  ROS, see HPI.  Other systems reviewed and negative.    OBJECTIVE:  /86  Pulse 80  Temp 97.4  F (36.3  C) (Tympanic)  Wt 88.1 kg (194 lb 3.2 oz)  LMP 2015  BMI 33.33 kg/m2    EXAM:   Physical Exam   Constitutional: She has a sickly appearance (mild).   HENT:   Head: Normocephalic and atraumatic.   Right Ear: External ear and ear canal normal. A middle ear effusion (clear, no distortion of TM) is present.   Left Ear: Tympanic membrane, external ear and ear canal normal.   Nose: Mucosal edema and rhinorrhea present. Right sinus exhibits maxillary sinus tenderness.   Mouth/Throat: Mucous membranes are normal. Oropharyngeal exudate and posterior oropharyngeal erythema present. No posterior oropharyngeal edema or tonsillar abscesses.   nasally voice     Eyes: EOM are normal. Pupils are equal, round, and reactive to light.   Neck: Normal range of motion.   Cardiovascular: Normal rate and normal heart sounds.    Pulmonary/Chest: Effort normal and breath sounds normal.   Lymphadenopathy:     She has cervical adenopathy (tender with palpation).   Nursing note and vitals reviewed.    Results for orders  placed or performed in visit on 01/11/17      THROAT RAPID STREP A WITH REFLEX      Result  Value Ref Range    STREP A ANTIGEN           Positive (A) Negative        ASSESSMENT/PLAN:    ICD-10-CM    1. Strep pharyngitis J02.0    2. Sore throat J02.9 THROAT RAPID STREP A WITH REFLEX      THROAT RAPID STREP A WITH REFLEX   3. Antibiotic-induced yeast infection B37.9 fluconazole (DIFLUCAN) 150 mg tablet     T36.95XA    4. Chronic maxillary sinusitis J32.0 cefuroxime axetil (CEFTIN) 500 mg tablet        Plan:   1/2.  + rapid strep with exposure and + 3/4 Centor criteria met.  Will treat with Ceftin as she has recently finished a course of Augmentin.  Other supportive measures discussed.  3.  Diflucan sent to pharmacy as well; due to recurrent vaginal yeast infections.  Also warned about GI distress/montior for diarrhea.  4. Sinuses, mild improvement; will extend Ceftin course to 14 days to treat fully for sinuses as well.    RTC in 5-7 days if no improvement; sooner prn.

## 2018-01-03 NOTE — TELEPHONE ENCOUNTER
Patient Information     Patient Name MRN Sex Cielo Rob [id number] Female 1970      Telephone Encounter by Corin Irizarry RN at 2017 10:29 AM     Author:  Corin Irizarry RN Service:  (none) Author Type:  NURS- Registered Nurse     Filed:  2017 10:38 AM Encounter Date:  2017 Status:  Signed     :  Corin Irizarry RN (NURS- Registered Nurse)            Contacted pharmacy regarding need for refill. Per pharmacy the 17 was enough for 50 days however patient is going out of town and is requesting a refill.   2017  Ordered by: GILDA ROY  Medication:VENTOLIN HFA 90 mcg/actuation inhaler  Prescription #:[id number]    Qty:3 Inhaler   Ref:0  Start:2017   End:              Route:Inhalation            JEN:No   Class:eRx    Sig:INHALE 2 PUFFS BY MOUTH EVERY 4 HOURS IF NEEDED.    Pharmacy:[place] [id number] [place] [address]      Bronchodilator Inhalers   Office visit in the past 12 months.  Last visit with GILDA ROY was on: 2017 in [facility]16 OV Medication Management  Next visit with GILDA ROY is on: No future appointment listed with this provider  Max refills 12 months from last office visit.  Prescription refilled per RN Medication Refill Policy.................... Corin Irizarry RN ....................  2017   10:36 AM

## 2018-01-03 NOTE — TELEPHONE ENCOUNTER
Patient Information     Patient Name MRN Sex Cielo Rob [id number] Female 1970      Telephone Encounter by Corin Irizarry RN at 2017 11:57 AM     Author:  Corin Irizarry RN Service:  (none) Author Type:  NURS- Registered Nurse     Filed:  2017 12:00 PM Encounter Date:  2017 Status:  Signed     :  Corin Irizarry RN (NURS- Registered Nurse)            This is a Refill request from: [facility]   Name of Medication: Elavil 10 mg   Quantity requested: 60  X 5   Last fill date: 17  Due for refill: yes  Last visit with GILDA ROY was on: 2017 in [facility]  PCP:  GILDA ROY DO  Controlled Substance Agreement:  n/a  Diagnosis r/t this medication request:  Atypical depressive disorder     Unable to complete prescription refill per RN Medication Refill Policy.................... Corin Irizarry RN ....................  2017   11:58 AM

## 2018-01-03 NOTE — TELEPHONE ENCOUNTER
Patient Information     Patient Name MRN Sex Cielo Rob [id number] Female 1970      Telephone Encounter by Corin Irizarry RN at 2017 11:29 AM     Author:  Corin Irizarry RN Service:  (none) Author Type:  NURS- Registered Nurse     Filed:  2017 11:37 AM Encounter Date:  2017 Status:  Signed     :  Corin Irizarry RN (NURS- Registered Nurse)            Request physician consideration to refill Ventolin Inhaler . Last filled 16- 3 inhalers.  OV on 2016 indicates plan to schedule PFT to monitor asthma status. No mention of PFT's in  or 17 OV.     Bronchodilator Inhalers   Office visit in the past 12 months.  Last visit with GILDA ROY was on: 2017 in [facility]  Next visit with GILDA ROY is on: No future appointment listed with this provider  Next visit with Family Practice is on: No future appointment listed in this department  Max refills 12 months from last office visit.  Unable to complete prescription refill per RN Medication Refill Policy.................... Corin Irizarry RN ....................  2017   11:35 AM

## 2018-01-03 NOTE — TELEPHONE ENCOUNTER
Patient Information     Patient Name MRN Sex Cielo Rob [id number] Female 1970      Telephone Encounter by Corin Irizarry RN at 2017  3:12 PM     Author:  Corin Irizarry RN Service:  (none) Author Type:  NURS- Registered Nurse     Filed:  2017  3:20 PM Encounter Date:  2017 Status:  Signed     :  Corin Irizarry RN (NURS- Registered Nurse)            Parkinsons  Office visit in the past 12 months or per provider note.  Last visit with GILDA ROY was on: 2017 in [facility]-last medication management OV was on 16  Next visit with GILDA ROY is on: No future appointment listed with this provider  Next visit with Family Practice is on: No future appointment listed in this department  Max refill for 12 months from last office visit or per provider note.  Prescription refilled per RN Medication Refill Policy.................... Corin Irizarry RN ....................  2017   3:17 PM

## 2018-01-04 NOTE — TELEPHONE ENCOUNTER
Patient Information     Patient Name MRN Sex Cielo Rob [id number] Female 1970      Telephone Encounter by Angelica Watson RN at 2017  9:01 AM     Author:  Angelica Watson RN Service:  (none) Author Type:  NURS- Registered Nurse     Filed:  2017  9:04 AM Encounter Date:  2017 Status:  Signed     :  Angelica Watson RN (NURS- Registered Nurse)            Asthma/COPD    Office visit in the past 12 months.    Last visit with GILDA ROY was on: 2017 in [facility]  Next visit with GILDA ROY is on: No future appointment listed with this provider  Next visit with Family Practice is on: No future appointment listed in this department    Max refills 12 months from last office visit.    This medication was last reviewed at medication management visit on 2016. Will refill 1 year from that date.    Prescription refilled per RN Medication Refill Policy.................... Angelica Watson RN ....................  2017   9:04 AM

## 2018-01-04 NOTE — PROGRESS NOTES
"Patient Information     Patient Name MRN Sex Cielo Rob [id number] Female 1970      Progress Notes by Valencia Benavides DO at 2017  2:00 PM     Author:  Valencia Benavides DO  Service:  (none) Author Type:  PHYS- Osteopathic     Filed:  2017  8:02 AM  Encounter Date:  2017 Status:  Addendum     :  Valencia Benavides DO (PHYS- Osteopathic)        Related Notes: Original Note by Valencia Benavides DO (PHYS- Osteopathic) filed at 2017  8:00 AM            SUBJECTIVE:  Cielo Bahena is a 46 y.o. female who presents for fatigue and sleep issues; along with hip pain.    HPI  Cielo has always had some degree of sleeping difficulties, but as of the last few months it has worsened.  Primarily because of a lack of sleep.  Someone recommended     Obstructive Sleep Apnea screening  S: Snore -  Do you snore loudly? (louder than talking or loud enough to be heard through closed doors)(YES)  T: Tired - Do you often feel tired, fatigued, or sleepy during the daytime?(YES)  O: Observed - Has anyone ever observed you stop breathing during your sleep?(NO).  But  has told her she makes \"funny noises.\"  Sounding like she stops breathing.  P: Pressure - Do you have or are you being treated for high blood pressure?(NO)  B: BMI - BMI greater than 35kg/m2?(NO), current BMI 33.4  A: Age - Age over 50 years old?(NO)  N: Neck - Neck circumference greater than 40 cm?(NO)  G: Gender - Gender: Male?(NO)  Total number of \"YES\" responses:  2  Scoring: Low risk of TONY 0-2  At Risk of TONY: >3 High Risk of TONY: 5-8    L hip - bothersome for the last few weeks to month.  No injury, fall or trauma.  Worse with walking up stairs.  Has been using some ibuprofen without much relief.  No stretches.  Has chronic issues with the left foot and ankle, which she has had surgery on in the past. She is uncertain if this is contributing to her left hip pain.      REVIEW OF SYSTEMS:  Review of Systems   Constitutional: " Negative for chills and fever.   Gastrointestinal: Negative for abdominal pain, constipation, diarrhea, nausea and vomiting.   Musculoskeletal: Positive for joint pain (L hip; see HPI). Negative for falls.     OBJECTIVE:  /88  Pulse 88  Wt 88.4 kg (194 lb 12.8 oz)  LMP 02/11/2015  BMI 33.44 kg/m2    EXAM:   Physical Exam   Constitutional: She is well-developed, well-nourished, and in no distress.   HENT:   Head: Normocephalic and atraumatic.   Eyes: EOM are normal. Pupils are equal, round, and reactive to light.   Cardiovascular: Normal rate and normal heart sounds.    Pulmonary/Chest: Effort normal and breath sounds normal.   Musculoskeletal:        Left hip: She exhibits tenderness (mid groin and anterior/medial thigh). She exhibits normal range of motion, normal strength, no bony tenderness, no crepitus, no deformity and no laceration.   BEVERLY test with some apprehension; pain with extreme hip flexion and with hip extension   Nursing note and vitals reviewed.      ASSESSMENT/PLAN:    ICD-10-CM    1. Snoring R06.83 AMB CONSULT TO SLEEP STUDIES   2. Other fatigue R53.83 AMB CONSULT TO SLEEP STUDIES   3. Strain of flexor muscle of hip, left, initial encounter S76.012A meloxicam (MOBIC) 7.5 mg tablet        Plan:   1/2. Snoring with ongoing sleeping difficulties and daytime fatigue - will plan to complete a sleep study and determine if sleep apnea or other sleep pathology is contributing to her symptoms.  Referral placed.  In the meantime discussed sleep hygiene.    3. Hip flexor strain, new:  Discussed referred pain if her ankle is causing her difficulty (chronic issue) due to compensation.  Will start treatment with meloxicam NSAID and given written instructions on hip exercises.  Encouraged use of bands/resistance training to help strengthen the L leg muscles.    Follow up prn or pending above results.

## 2018-01-04 NOTE — NURSING NOTE
Patient Information     Patient Name MRN Sex Cielo Rob [id number] Female 1970      Nursing Note by Yennifer Humphrey at 3/29/2017 11:00 AM     Author:  Yennifer Humphrey Service:  (none) Author Type:  (none)     Filed:  3/29/2017 11:10 AM Encounter Date:  3/29/2017 Status:  Signed     :  Yennifer Humphrey            Patient presents for a cough. Fever last night 101.

## 2018-01-04 NOTE — PROGRESS NOTES
Patient Information     Patient Name MRN Sex Cielo Rob [id number] Female 1970      Progress Notes by Blaise Zazueta MD at 3/29/2017 11:00 AM     Author:  Blaise Zazueta MD Service:  (none) Author Type:  Physician     Filed:  3/29/2017 12:52 PM Encounter Date:  3/29/2017 Status:  Signed     :  Blaise Zazueta MD (Physician)            SUBJECTIVE:  Cielo Bahena is a 46 y.o. female here for cough. Patient is a 24 hours of cough and a fever yesterday of 101 . 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 She's tried some Tylenol for her fever.    She does have a history of asthma and has been using her albuterol inhaler slightly more often recently.    Allergies:  No Known Allergies    ROS:    As above otherwise ROS is unremarkable.    OBJECTIVE:  /86  Temp 98.2  F (36.8  C) (Oral)  Wt 87.1 kg (192 lb)  LMP 2015  BMI 32.96 kg/m2    EXAM:  General Appearance: Pleasant, alert, appropriate appearance for age. No acute distress  Head: Normal. Normocephalic, atraumatic.  Eyes: PERRL, EOMI  Ears: Normal TM's bilaterally. Normal auditory canals and external ears.   OroPharynx: Dental hygiene adequate. Normal buccal mucosa. Normal pharynx.  Neck: Supple, no masses or nodes, no lymphadenopathy.  No thyromegaly.  Lungs: Normal chest wall and respirations. Clear to auscultation, no wheezes or crackles.  Cardiovascular: Regular rate and rhythm. S1, S2, no murmurs.  Skin: no concerning or new rashes.    Results for orders placed or performed in visit on 17      [facility] ONLY INFLUENZA A/B PCR      Result  Value Ref Range    INFLUENZA  A  PCR Positive (A)      INFLUENZA B PCR Negative           ASSESSEMENT AND PLAN:    Cielo was seen today for cough.    Diagnoses and all orders for this visit:    Influenza-like illness  -     [facility] ONLY INFLUENZA  A/B PCR; Future  -     [facility] ONLY INFLUENZA A/B PCR    Influenza A positive. We'll treat with Tamiflu for 5 days given her symptoms of been present probably 24 hours. Follow-up if needed.      Brian Zazueta MD    This document was prepared using voice generated softwear.  While every attempt was made for accuracy, grammatical errors may exist.

## 2018-01-05 NOTE — TELEPHONE ENCOUNTER
Patient Information     Patient Name MRN Sex Cielo Rob [id number] Female 1970      Telephone Encounter by Corin Walton RN at 5/10/2017  4:01 PM     Author:  Corin Walton RN Service:  (none) Author Type:  NURS- Registered Nurse     Filed:  5/10/2017  4:06 PM Encounter Date:  5/10/2017 Status:  Signed     :  Corin Walton RN (NURS- Registered Nurse)            Filled 17 #90 x 2 refills. Pharmacy alerted. Unable to complete prescription refill per RN Medication Refill Policy.................... Corin Walton RN ....................  5/10/2017   4:06 PM    Prescribing Provider: Valencia Benavides DO                 Order Date: 2017  Ordered by: CORIN WALTON  Medication:pramipexole (MIRAPEX) 0.125 mg tablet    Qty:90 tablet   Ref:2  Start:2017   End:              Route:Oral                  JEN:No   Class:eRx    Sig:Take 1 tablet by mouth at bedtime if needed.    Pharmacy:[place] [id number] [place] [address]    Cosign accepted by CASSIDY ALATORRE[[id number]] on 2017  7:54 AM

## 2018-01-09 ENCOUNTER — HISTORY (OUTPATIENT)
Dept: FAMILY MEDICINE | Facility: OTHER | Age: 48
End: 2018-01-09

## 2018-01-09 ENCOUNTER — AMBULATORY - GICH (OUTPATIENT)
Dept: FAMILY MEDICINE | Facility: OTHER | Age: 48
End: 2018-01-09

## 2018-01-09 ENCOUNTER — OFFICE VISIT - GICH (OUTPATIENT)
Dept: FAMILY MEDICINE | Facility: OTHER | Age: 48
End: 2018-01-09

## 2018-01-09 DIAGNOSIS — R30.0 DYSURIA: ICD-10-CM

## 2018-01-09 DIAGNOSIS — R19.7 DIARRHEA: ICD-10-CM

## 2018-01-09 DIAGNOSIS — N30.01 ACUTE CYSTITIS WITH HEMATURIA: ICD-10-CM

## 2018-01-09 LAB
BACTERIA URINE: ABNORMAL BACTERIA/HPF
BILIRUB UR QL: NEGATIVE
CLARITY, URINE: ABNORMAL CLARITY
COLOR UR: YELLOW COLOR
EPITHELIAL CELLS: ABNORMAL EPI/HPF
GLUCOSE URINE: NEGATIVE MG/DL
KETONES UR QL: NEGATIVE MG/DL
LEUKOCYTE ESTERASE URINE: ABNORMAL
NITRITE UR QL STRIP: POSITIVE
OCCULT BLOOD,URINE - HISTORICAL: ABNORMAL
OTHER: ABNORMAL
PH UR: 5.5 [PH]
PROTEIN QUALITATIVE,URINE - HISTORICAL: NEGATIVE MG/DL
RBC - HISTORICAL: ABNORMAL /HPF
SP GR UR STRIP: 1.02
UROBILINOGEN,QUALITATIVE - HISTORICAL: NORMAL EU/DL
WBC - HISTORICAL: >100 /HPF

## 2018-01-11 LAB — CULTURE - HISTORICAL: NORMAL

## 2018-01-23 ENCOUNTER — HISTORY (OUTPATIENT)
Dept: FAMILY MEDICINE | Facility: OTHER | Age: 48
End: 2018-01-23

## 2018-01-23 ENCOUNTER — OFFICE VISIT - GICH (OUTPATIENT)
Dept: FAMILY MEDICINE | Facility: OTHER | Age: 48
End: 2018-01-23

## 2018-01-23 DIAGNOSIS — Z09 ENCOUNTER FOR FOLLOW-UP EXAMINATION AFTER COMPLETED TREATMENT FOR CONDITIONS OTHER THAN MALIGNANT NEOPLASM: ICD-10-CM

## 2018-01-23 DIAGNOSIS — R31.21 ASYMPTOMATIC MICROSCOPIC HEMATURIA: ICD-10-CM

## 2018-01-23 LAB
BACTERIA URINE: NORMAL BACTERIA/HPF
BILIRUB UR QL: NEGATIVE
CLARITY, URINE: CLEAR CLARITY
COLOR UR: YELLOW COLOR
EPITHELIAL CELLS: NORMAL EPI/HPF
GLUCOSE URINE: NEGATIVE MG/DL
KETONES UR QL: ABNORMAL MG/DL
LEUKOCYTE ESTERASE URINE: NEGATIVE
NITRITE UR QL STRIP: NEGATIVE
OCCULT BLOOD,URINE - HISTORICAL: ABNORMAL
OTHER: NORMAL
PH UR: 5.5 [PH]
PROTEIN QUALITATIVE,URINE - HISTORICAL: NEGATIVE MG/DL
RBC - HISTORICAL: NORMAL /HPF
SP GR UR STRIP: >=1.03
UROBILINOGEN,QUALITATIVE - HISTORICAL: NORMAL EU/DL
WBC - HISTORICAL: NORMAL /HPF

## 2018-01-26 VITALS
DIASTOLIC BLOOD PRESSURE: 86 MMHG | WEIGHT: 192 LBS | TEMPERATURE: 98.2 F | SYSTOLIC BLOOD PRESSURE: 128 MMHG | BODY MASS INDEX: 31.95 KG/M2

## 2018-01-26 VITALS
WEIGHT: 194.2 LBS | HEIGHT: 65 IN | SYSTOLIC BLOOD PRESSURE: 122 MMHG | SYSTOLIC BLOOD PRESSURE: 122 MMHG | TEMPERATURE: 98.2 F | TEMPERATURE: 97.4 F | OXYGEN SATURATION: 98 % | DIASTOLIC BLOOD PRESSURE: 86 MMHG | BODY MASS INDEX: 33.33 KG/M2 | DIASTOLIC BLOOD PRESSURE: 80 MMHG | WEIGHT: 182 LBS | BODY MASS INDEX: 30.32 KG/M2 | HEART RATE: 79 BPM | HEART RATE: 80 BPM

## 2018-01-26 VITALS
DIASTOLIC BLOOD PRESSURE: 88 MMHG | HEART RATE: 88 BPM | WEIGHT: 194.8 LBS | BODY MASS INDEX: 33.44 KG/M2 | SYSTOLIC BLOOD PRESSURE: 136 MMHG

## 2018-01-26 VITALS
DIASTOLIC BLOOD PRESSURE: 80 MMHG | BODY MASS INDEX: 30.52 KG/M2 | TEMPERATURE: 98.6 F | WEIGHT: 183.2 LBS | SYSTOLIC BLOOD PRESSURE: 128 MMHG | HEIGHT: 65 IN | HEART RATE: 100 BPM

## 2018-01-30 ENCOUNTER — HOSPITAL ENCOUNTER (OUTPATIENT)
Dept: RADIOLOGY | Facility: OTHER | Age: 48
End: 2018-01-30
Attending: FAMILY MEDICINE

## 2018-01-30 DIAGNOSIS — R31.21 ASYMPTOMATIC MICROSCOPIC HEMATURIA: ICD-10-CM

## 2018-01-31 ENCOUNTER — AMBULATORY - GICH (OUTPATIENT)
Dept: FAMILY MEDICINE | Facility: OTHER | Age: 48
End: 2018-01-31

## 2018-02-02 ENCOUNTER — COMMUNICATION - GICH (OUTPATIENT)
Dept: FAMILY MEDICINE | Facility: OTHER | Age: 48
End: 2018-02-02

## 2018-02-02 DIAGNOSIS — G25.81 RESTLESS LEGS SYNDROME: ICD-10-CM

## 2018-02-04 ASSESSMENT — PATIENT HEALTH QUESTIONNAIRE - PHQ9: SUM OF ALL RESPONSES TO PHQ QUESTIONS 1-9: 6

## 2018-02-09 VITALS
BODY MASS INDEX: 30.25 KG/M2 | SYSTOLIC BLOOD PRESSURE: 126 MMHG | DIASTOLIC BLOOD PRESSURE: 88 MMHG | HEART RATE: 84 BPM | WEIGHT: 179 LBS

## 2018-02-09 VITALS
DIASTOLIC BLOOD PRESSURE: 78 MMHG | WEIGHT: 179.2 LBS | SYSTOLIC BLOOD PRESSURE: 118 MMHG | TEMPERATURE: 96.7 F | HEART RATE: 76 BPM | BODY MASS INDEX: 30.28 KG/M2

## 2018-02-12 NOTE — TELEPHONE ENCOUNTER
Patient Information     Patient Name MRN Sex Cielo Rob [id number] Female 1970      Telephone Encounter by Annmarie Roa at 2017  9:50 AM     Author:  Annmarie Roa Service:  (none) Author Type:  (none)     Filed:  2017  9:51 AM Encounter Date:  2017 Status:  Signed     :  Annmarie Roa            SMS - Patient requesting a work in appointment. Patient states they are having problems from taking antibiotic.  Patient is scheduled for 17.  Please call.    Annmarie Roa ....................  2017   9:51 AM

## 2018-02-12 NOTE — TELEPHONE ENCOUNTER
Patient Information     Patient Name MRN Shawna VALENZUELA    JosefCielo [id number] Female 1970      Telephone Encounter by Maria Guadalupe Mendez at 2017 11:36 AM     Author:  Maria Guadalupe Mendez Service:  (none) Author Type:  (none)     Filed:  2017 11:37 AM Encounter Date:  2017 Status:  Signed     :  Maria Guadalupe Mendez of DO and [facility] states that the diarrhea is getting better today.  Maria Guadalupe Mendez LPN............................... 2017 11:37 AM

## 2018-02-12 NOTE — NURSING NOTE
Patient Information     Patient Name MRN Cielo Doherty [id number] Female 1970      Nursing Note by Maria Guadalupe Mendez at 2018 10:30 AM     Author:  Maria Guadalupe Mendez Service:  (none) Author Type:  (none)     Filed:  2018 10:32 AM Encounter Date:  2018 Status:  Signed     :  Maria Guadalupe Mendez            Patient states she has had sinus issues for a couple of weeks.  Maria Guadalupe Mendez LPN............................... 2018 10:20 AM

## 2018-02-12 NOTE — PROGRESS NOTES
Patient Information     Patient Name MRN Cielo Doherty [id number] Female 1970      Progress Notes by Valencia Benavides DO at 2018 10:30 AM     Author:  Valencia Benavides DO Service:  (none) Author Type:  PHYS- Osteopathic     Filed:  1/10/2018  6:21 AM Encounter Date:  2018 Status:  Signed     :  Valencia Benavides DO (PHYS- Osteopathic)            SUBJECTIVE:  Cielo Bahena is a 47 y.o. female who presents for concerns of urine/overall health.    HPI  Cielo is here for concerns of: fatigue, weight loss, and recent bowel difficulties.  Cielo was treated with Augmentin x 14 days in November for a sinus infection.  Since that time, she had ~3 weeks of loose stools without blood.  She would go 1-5 times per day.  This was associated with a L sided pain that persists some to this day.  She now is having normal daily bowel movements.  Her other concern is that she has chronic fatigue and is losing weight - and although she is enjoying the success, she states she isn't really trying that hard to lose it.  She had a recent colonoscopy in 2016 that was normal.  She denies any appetite change.  No fevers/chills - but an occasional hot flash.    She also states that she has noticed hematuria shortly after our last visit; was hopeful this would clear with antibiotic, but still notices occasionally.  Now developing more urgency, frequency and dysuria.  Believes she has another bladder infection.      No Known Allergies,   Current Outpatient Prescriptions on File Prior to Visit       Medication  Sig Dispense Refill     albuterol HFA (VENTOLIN HFA) 90 mcg/actuation inhaler Inhale 2 Puffs by mouth every 4 hours if needed. 3 Inhaler 4     amitriptyline (ELAVIL) 10 mg tablet Take 1 tablet by mouth at bedtime. 90 tablet 4     fluticasone-salmeterol (ADVAIR DISKUS) 100-50 mcg/dose diskus inhaler Inhale 1 Puff by mouth 2 times daily. 3 Inhaler 4     fluticasone-salmeterol (ADVAIR DISKUS) 250-50  mcg/Dose diskus inhaler Inhale 1 Puff by mouth every 12 hours. 3 Inhaler 4     LORazepam (ATIVAN) 0.5 mg tab Take 1 tablet by mouth every 8 hours if needed for Anxiety. 30 tablet 5     montelukast (SINGULAIR) 10 mg tablet Take 1 tablet by mouth at bedtime. 90 tablet 4     pramipexole (MIRAPEX) 0.25 mg tablet TAKE ONE TABLET BY MOUTH NIGHTLY AT BEDTIME 90 tablet 2     rx EPINEPHrine (EPIPEN) 0.3 mg/0.3 mL injection (ED DC MED) Inject 0.3 mg intramuscular one time if needed for Allergic Reaction. 0.6 mL 3     No current facility-administered medications on file prior to visit.     and   Past Medical History:     Diagnosis  Date     Anemia, unspecified      Asthma     generally well controlled, history of respiratory arrest requiring brief mechanical ventilation.      Breast mass     Soft breast mass lump, right breast       Depression     Depression.  PHQ-9 score .      Family history of colon cancer 2015     Hx of abnormal Pap smear     abnormal Pap smear prior to , subsequent yearly Pap smears have been normal      Hx of pregnancy 2011    , 1 SAB      Insomnia, unspecified      Pain in joint, pelvic region and thigh      Pelvic somatic dysfunction 2013     Restless legs syndrome (RLS)        REVIEW OF SYSTEMS:  Review of Systems   HENT: Negative for sore throat.         No globus sensation   Respiratory: Negative for cough and shortness of breath.    Cardiovascular: Negative for chest pain and palpitations.   Gastrointestinal: Negative for heartburn, nausea and vomiting.   Genitourinary: Positive for dysuria, flank pain (L), hematuria and urgency.   Musculoskeletal: Negative for falls and myalgias.   Skin: Negative for rash.       OBJECTIVE:  /78 (Cuff Site: Right Arm, Position: Sitting, Cuff Size: Adult Regular)  Pulse 76  Temp 96.7  F (35.9  C) (Tympanic)  Wt 81.3 kg (179 lb 3.2 oz)  LMP 2015  BMI 30.28 kg/m2    EXAM:   Physical Exam   Constitutional: She is  well-developed, well-nourished, and in no distress.   HENT:   Head: Normocephalic and atraumatic.   Right Ear: External ear normal.   Left Ear: External ear normal.   Eyes: EOM are normal. Pupils are equal, round, and reactive to light.   Neck: Normal range of motion. Neck supple.   Cardiovascular: Normal rate, normal heart sounds and intact distal pulses.    Pulmonary/Chest: Effort normal and breath sounds normal.   Abdominal: Soft. Bowel sounds are normal.   Psychiatric: Mood and affect normal.   Nursing note and vitals reviewed.    Results for orders placed or performed in visit on 01/09/18      URINALYSIS W REFLEX MICROSCOPIC IF POSITIVE      Result  Value Ref Range    COLOR                     Yellow Yellow Color    CLARITY                   Cloudy (A) Clear Clarity    SPECIFIC GRAVITY,URINE    1.025 1.010, 1.015, 1.020, 1.025                    PH,URINE                  5.5 6.0, 7.0, 8.0, 5.5, 6.5, 7.5, 8.5                    UROBILINOGEN,QUALITATIVE  Normal Normal EU/dl    PROTEIN, URINE Negative Negative mg/dL    GLUCOSE, URINE Negative Negative mg/dL    KETONES,URINE             Negative Negative mg/dL    BILIRUBIN,URINE           Negative Negative                    OCCULT BLOOD,URINE        Large (A) Negative                    NITRITE                   Positive (A) Negative                    LEUKOCYTE ESTERASE        Small (A) Negative                   URINALYSIS MICROSCOPIC      Result  Value Ref Range    RBC  (A) 0-2, None Seen /HPF    WBC >100 (A) 0-2, 3-5, None Seen /HPF    BACTERIA                  Few None Seen, Rare, Occasional, Few Bacteria/HPF    EPITHELIAL CELLS          Few None Seen, Few Epi/HPF    OTHER Mucus Present        ASSESSMENT/PLAN:    ICD-10-CM    1. Dysuria R30.0 URINALYSIS W REFLEX MICROSCOPIC IF POSITIVE      URINE CULTURE      URINALYSIS W REFLEX MICROSCOPIC IF POSITIVE      URINE CULTURE      URINALYSIS MICROSCOPIC      URINALYSIS MICROSCOPIC   2. Acute cystitis with  hematuria N30.01 ciprofloxacin HCl (CIPRO) 250 mg tablet        Plan:     Due to current symptoms of dysuria, a UA is checked and found to be + nitrite with blood and LE.  She will be started on cipro (although did discuss possible need for change if resistance is seen on UC).  Increase water intake.  Will return in 2-3 weeks to recheck urine and monitor for clearance.  If hematuria still persisting - would like to order CT urogram, and refer to Urology for further workup.  May be contributing to her other symptoms of fatigue.  At this time, need to treat the acute infection.    Reassurance provided re: stools due to normal Colonoscopy in 2016 - and likely reaction to the antibiotic.  Will continue to monitor at this time and work up the hematuria as above if needed.

## 2018-02-12 NOTE — TELEPHONE ENCOUNTER
Patient Information     Patient Name MRN Sex Cielo Rob [id number] Female 1970      Telephone Encounter by Maria Guadalupe Mendez at 2017  8:46 AM     Author:  Maria Guadalupe Mendez Service:  (none) Author Type:  (none)     Filed:  2017  9:06 AM Encounter Date:  2017 Status:  Signed     :  Maria Guadalupe Mendez            Left message for Cielo to return call.  Maria Guadalupe Mendez LPN............................... 2017 8:46 AM

## 2018-02-12 NOTE — TELEPHONE ENCOUNTER
Patient Information     Patient Name MRN Sex Cielo Rob [id number] Female 1970      Telephone Encounter by Corin Irizarry RN at 2017 11:59 AM     Author:  Corin Irizarry RN Service:  (none) Author Type:  NURS- Registered Nurse     Filed:  2017 12:03 PM Encounter Date:  2017 Status:  Signed     :  Corin Irizarry RN (NURS- Registered Nurse)            Filled 17 #90 x 4. Pharmacy alerted. Unable to complete prescription refill per RN Medication Refill Policy.................... Corin Irizarry RN ....................  2017   12:01 PM    Prescribing Provider: Gilda Roy DO                 Order Date: 2017  Ordered by: GILDA ROY  Medication:montelukast (SINGULAIR) 10 mg tablet    Qty:90 tablet   Ref:4  Start:2017  End:              Route:Oral                  JEN:No   Class:eRx    Sig:Take 1 tablet by mouth at bedtime.    Pharmacy:[facility] [id number] [place] [address]

## 2018-02-12 NOTE — TELEPHONE ENCOUNTER
Patient Information     Patient Name MRN Sex Cielo Rob [id number] Female 1970      Telephone Encounter by Nano Geiger at 2017  9:31 AM     Author:  Nano Geiger Service:  (none) Author Type:  (none)     Filed:  2017  9:32 AM Encounter Date:  2017 Status:  Signed     :  Nano Geiger            Patient currently scheduled for 17 and is requesting a work in for later this week, for stomach/bowel issues.

## 2018-02-12 NOTE — TELEPHONE ENCOUNTER
Patient Information     Patient Name MRN Sex Cielo Rob [id number] Female 1970      Telephone Encounter by Maria Guadalupe Mendez at 2017  3:22 PM     Author:  Maria Guadalupe Mendez Service:  (none) Author Type:  (none)     Filed:  2017  3:23 PM Encounter Date:  2017 Status:  Signed     :  Maria Guadalupe Mendez            Spoke with Cielo and she will be seeing Valencia Benavides DO on 12/15 at 10:45.  Maria Guadalupe Mendez LPN............................... 2017 3:23 PM

## 2018-02-12 NOTE — TELEPHONE ENCOUNTER
Patient Information     Patient Name MRN Sex Cielo Rob [id number] Female 1970      Telephone Encounter by Maria Guadalupe Mendez at 2017 10:04 AM     Author:  Maria Guadalupe Mendez Service:  (none) Author Type:  (none)     Filed:  2017 10:28 AM Encounter Date:  2017 Status:  Signed     :  Maria Guadalupe Mendez            Cielo states that she was being treated for a sinus infection and then developed a bladder infection.  Cielo states that the infections seem to be clearing but now she has had diarrhea for like a week with an stomach upset, she stopped taking the antibiotics 2 days ago thinking that was causing it. Cielo states she didn't start the antibiotics until Thanksgiving weekend because she knew she was going to have a few drinks on .  Suggestions?  Maria Guadalupe Mendez LPN............................... 2017 10:28 AM

## 2018-02-12 NOTE — TELEPHONE ENCOUNTER
Patient Information     Patient Name MRN Sex Cielo Rob [id number] Female 1970      Telephone Encounter by Valencia Roy DO at 2017  4:46 PM     Author:  Valencia Roy DO Service:  (none) Author Type:  PHYS- Osteopathic     Filed:  2017  4:46 PM Encounter Date:  2017 Status:  Signed     :  Valencia Roy DO (PHYS- Osteopathic)            If sinus symptoms improved; would stop antibiotic.  VALENCIA ROY DO

## 2018-02-13 NOTE — TELEPHONE ENCOUNTER
Patient Information     Patient Name MRN Sex Cielo Rob [id number] Female 1970      Telephone Encounter by Valencia Roy DO at 2018 10:29 AM     Author:  Valencia Roy DO Service:  (none) Author Type:  PHYS- Osteopathic     Filed:  2018 10:29 AM Encounter Date:  2018 Status:  Signed     :  Valencia Roy DO (PHYS- Osteopathic)            Waiting to hear back for her Urology order placed last week.  VALENCIA ROY DO

## 2018-02-13 NOTE — PROGRESS NOTES
Patient Information     Patient Name MRN Sex Cielo Rob [id number] Female 1970      Progress Notes by Valencia Benavides DO at 2018  3:45 PM     Author:  Valencia Benavides DO Service:  (none) Author Type:  PHYS- Osteopathic     Filed:  2018  6:52 PM Encounter Date:  2018 Status:  Signed     :  Valencia Benavides DO (PHYS- Osteopathic)            SUBJECTIVE:  Cielo Bahena is a 47 y.o. female who presents for follow up to hematuria.    HPI  Cielo was seen ~2 weeks ago for microscopic hematuria.  She was noted to have an infection at that time and was treated.  No follow up would have been required, but she had some hematuria from ~6 months ago as well, so it was planned to monitor for resolve.    No Known Allergies,   Current Outpatient Prescriptions on File Prior to Visit       Medication  Sig Dispense Refill     albuterol HFA (VENTOLIN HFA) 90 mcg/actuation inhaler Inhale 2 Puffs by mouth every 4 hours if needed. 3 Inhaler 4     amitriptyline (ELAVIL) 10 mg tablet Take 1 tablet by mouth at bedtime. 90 tablet 4     fluticasone-salmeterol (ADVAIR DISKUS) 100-50 mcg/dose diskus inhaler Inhale 1 Puff by mouth 2 times daily. 3 Inhaler 4     fluticasone-salmeterol (ADVAIR DISKUS) 250-50 mcg/Dose diskus inhaler Inhale 1 Puff by mouth every 12 hours. 3 Inhaler 4     LORazepam (ATIVAN) 0.5 mg tab Take 1 tablet by mouth every 8 hours if needed for Anxiety. 30 tablet 5     montelukast (SINGULAIR) 10 mg tablet Take 1 tablet by mouth at bedtime. 90 tablet 4     pramipexole (MIRAPEX) 0.25 mg tablet TAKE ONE TABLET BY MOUTH NIGHTLY AT BEDTIME 90 tablet 2     rx EPINEPHrine (EPIPEN) 0.3 mg/0.3 mL injection (ED DC MED) Inject 0.3 mg intramuscular one time if needed for Allergic Reaction. 0.6 mL 3     No current facility-administered medications on file prior to visit.     and   Past Medical History:     Diagnosis  Date     Anemia, unspecified      Asthma     generally well controlled, history  of respiratory arrest requiring brief mechanical ventilation.      Breast mass 2009    Soft breast mass lump, right breast       Depression 2006    Depression.  PHQ-9 score .      Family history of colon cancer 2015     Hx of abnormal Pap smear     abnormal Pap smear prior to , subsequent yearly Pap smears have been normal      Hx of pregnancy 2011    , 1 SAB      Insomnia, unspecified      Pain in joint, pelvic region and thigh      Pelvic somatic dysfunction 2013     Restless legs syndrome (RLS)        REVIEW OF SYSTEMS:  Review of Systems   All other systems reviewed and are negative.      OBJECTIVE:  /88 (Cuff Site: Right Arm, Position: Sitting, Cuff Size: Adult Regular)  Pulse 84  Wt 81.2 kg (179 lb)  LMP 2015  BMI 30.25 kg/m2    EXAM:   Physical Exam   Constitutional: She is well-developed, well-nourished, and in no distress.   HENT:   Head: Normocephalic and atraumatic.   Eyes: EOM are normal. Pupils are equal, round, and reactive to light.   Pulmonary/Chest: Effort normal.   Abdominal: Soft. Bowel sounds are normal.   Psychiatric: Mood and affect normal.   Nursing note and vitals reviewed.    Results for orders placed or performed in visit on 18      URINALYSIS W REFLEX MICROSCOPIC IF POSITIVE      Result  Value Ref Range    COLOR                     Yellow Yellow Color    CLARITY                   Clear Clear Clarity    SPECIFIC GRAVITY,URINE    >=1.030 (A) 1.010, 1.015, 1.020, 1.025                    PH,URINE                  5.5 6.0, 7.0, 8.0, 5.5, 6.5, 7.5, 8.5                    UROBILINOGEN,QUALITATIVE  Normal Normal EU/dl    PROTEIN, URINE Negative Negative mg/dL    GLUCOSE, URINE Negative Negative mg/dL    KETONES,URINE             Trace (A) Negative mg/dL    BILIRUBIN,URINE           Negative Negative                    OCCULT BLOOD,URINE        Trace (A) Negative                    NITRITE                   Negative Negative                     LEUKOCYTE ESTERASE        Negative Negative                   URINALYSIS MICROSCOPIC      Result  Value Ref Range    RBC None Seen 0-2, None Seen /HPF    WBC None Seen 0-2, 3-5, None Seen /HPF    BACTERIA                  Rare None Seen, Rare, Occasional, Few Bacteria/HPF    EPITHELIAL CELLS          Few None Seen, Few Epi/HPF    OTHER Mucus Present        ASSESSMENT/PLAN:    ICD-10-CM    1. Asymptomatic microscopic hematuria R31.21 CT ABDOMEN PELVIS UROGRAM WWO      AMB CONSULT TO UROLOGY   2. Follow up Z09 URINALYSIS W REFLEX MICROSCOPIC IF POSITIVE      URINALYSIS W REFLEX MICROSCOPIC IF POSITIVE      URINALYSIS MICROSCOPIC      URINALYSIS MICROSCOPIC        Plan:   Persistent hematuria is seen on her UA today, albeit improved.  Discussed continued monitoring vs further workup and she would like to proceed. A CT urogram was ordered and a referral to Urology is placed.  No further antibiotic indicated today.

## 2018-02-13 NOTE — NURSING NOTE
Patient Information     Patient Name MRN Cielo Doherty [id number] Female 1970      Nursing Note by Maria Guadalupe Mendez at 2018  3:45 PM     Author:  Maria Guadalupe Mendez Service:  (none) Author Type:  (none)     Filed:  2018  4:01 PM Encounter Date:  2018 Status:  Signed     :  Maria Guadalupe Mendez            Patient states her UTI symptoms have resolved.  Maria Guadalupe Mendez LPN............................... 2018 3:53 PM

## 2018-02-13 NOTE — PROGRESS NOTES
Patient Information     Patient Name MRN Sex Cielo Rob [id number] Female 1970      Progress Notes by Alesha Byrd at 2018  9:02 AM     Author:  Alesha Byrd Service:  (none) Author Type:  Other Clinical Staff     Filed:  2018  9:02 AM Date of Service:  2018  9:02 AM Status:  Signed     :  Alesha Byrd (Other Clinical Staff)            IV Contrast- Discharge Instructions After Your CT Scan      The IV contrast you received today will be filtered from your bloodstream by your kidneys during the next 24 hours and pass from the body in urine.  You will not be aware of this process and your urine will not change in color.  To help this process you should drink at least 4 additional glasses of water or juice today.  This reduces stress on your kidneys.    Most contrast reactions are immediate.  Should you develop symptoms of concern after discharge, contact the department at the number below.  After hours you should contact your personal physician.  If you develop breathing distress or wheezing, call 911.

## 2018-02-13 NOTE — TELEPHONE ENCOUNTER
Patient Information     Patient Name MRN Cielo Doherty [id number] Female 1970      Telephone Encounter by Gail Kinney at 2018 10:38 AM     Author:  Gail Kinney Service:  (none) Author Type:  (none)     Filed:  2018 10:39 AM Encounter Date:  2018 Status:  Signed     :  Gail Kinney            Pt has been called 2 times and left messages to call and we would get her scheduled to see Dr. Ann.  Gail Kinney LPN  2018  10:39 AM

## 2018-02-13 NOTE — PROGRESS NOTES
Patient Information     Patient Name MRN Sex Cielo Rob [id number] Female 1970      Progress Notes by Alesha Byrd at 2018  9:02 AM     Author:  Alesha Byrd Service:  (none) Author Type:  Other Clinical Staff     Filed:  2018  9:02 AM Date of Service:  2018  9:02 AM Status:  Signed     :  Alesha Byrd (Other Clinical Staff)            Falls Risk Criteria:    Age 65 and older or under age 4        Sensory deficits    Poor vision    Use of ambulatory aides    Impaired judgment    Unable to walk independently    Meets High Risk criteria for falls:  no

## 2018-02-13 NOTE — PROGRESS NOTES
Patient Information     Patient Name MRN Sex Cielo Rob [id number] Female 1970      Progress Notes by Alesha Byrd at 2018  9:02 AM     Author:  Alesha Byrd Service:  (none) Author Type:  Other Clinical Staff     Filed:  2018  9:02 AM Date of Service:  2018  9:02 AM Status:  Signed     :  Alesha Byrd (Other Clinical Staff)            1.  Has the patient had a previous reaction to IV contrast? No    2.  Does the patient have kidney disease? No    3.  Is the patient on dialysis? No    If YES to any of these questions, exam will be reviewed with a Radiologist before administering contrast.

## 2018-02-13 NOTE — TELEPHONE ENCOUNTER
Patient Information     Patient Name MRN Sex Cielo Rob [id number] Female 1970      Telephone Encounter by Corin Irizarry RN at 2018  8:12 AM     Author:  Corin Irizarry RN Service:  (none) Author Type:  NURS- Registered Nurse     Filed:  2018  8:15 AM Encounter Date:  2018 Status:  Signed     :  Corin Irizarry RN (NURS- Registered Nurse)            Parkinsons  Office visit in the past 12 months or per provider note.  Last visit with GILDA ROY was on: 2018 in [facility]  Next visit with GILDA ROY is on: 2018 in [facility]  Max refill for 12 months from last office visit or per provider note.  Prescription refilled per RN Medication Refill Policy.................... Corin Irizarry RN ....................  2018   8:14 AM

## 2018-02-16 ENCOUNTER — DOCUMENTATION ONLY (OUTPATIENT)
Dept: FAMILY MEDICINE | Facility: OTHER | Age: 48
End: 2018-02-16

## 2018-02-16 PROBLEM — F32.A DEPRESSION: Status: ACTIVE | Noted: 2018-02-16

## 2018-02-16 PROBLEM — G47.00 INSOMNIA: Status: ACTIVE | Noted: 2018-02-16

## 2018-02-16 PROBLEM — D64.9 ANEMIA: Status: ACTIVE | Noted: 2018-02-16

## 2018-02-16 PROBLEM — N39.3 GENUINE STRESS INCONTINENCE, FEMALE: Status: ACTIVE | Noted: 2018-02-16

## 2018-02-16 PROBLEM — J45.40 MODERATE PERSISTENT ASTHMA WITHOUT COMPLICATION: Status: ACTIVE | Noted: 2018-02-16

## 2018-02-16 RX ORDER — EPINEPHRINE 0.3 MG/.3ML
0.3 INJECTION SUBCUTANEOUS PRN
COMMUNITY
Start: 2016-12-13 | End: 2024-02-09

## 2018-02-16 RX ORDER — MONTELUKAST SODIUM 10 MG/1
10 TABLET ORAL AT BEDTIME
COMMUNITY
Start: 2017-11-21 | End: 2018-12-28

## 2018-02-16 RX ORDER — AMITRIPTYLINE HYDROCHLORIDE 10 MG/1
10 TABLET ORAL AT BEDTIME
COMMUNITY
Start: 2017-11-21 | End: 2018-10-01

## 2018-02-16 RX ORDER — LORAZEPAM 0.5 MG/1
0.5 TABLET ORAL EVERY 8 HOURS PRN
COMMUNITY
Start: 2016-12-13 | End: 2018-07-25

## 2018-02-16 RX ORDER — PRAMIPEXOLE DIHYDROCHLORIDE 0.25 MG/1
0.25 TABLET ORAL AT BEDTIME
COMMUNITY
Start: 2017-02-27 | End: 2018-12-29

## 2018-02-16 RX ORDER — ALBUTEROL SULFATE 90 UG/1
2 AEROSOL, METERED RESPIRATORY (INHALATION) EVERY 4 HOURS PRN
COMMUNITY
Start: 2017-11-21 | End: 2018-10-01

## 2018-02-19 ENCOUNTER — HEALTH MAINTENANCE LETTER (OUTPATIENT)
Age: 48
End: 2018-02-19

## 2018-02-19 ENCOUNTER — DOCUMENTATION ONLY (OUTPATIENT)
Dept: FAMILY MEDICINE | Facility: OTHER | Age: 48
End: 2018-02-19

## 2018-02-21 ENCOUNTER — DOCUMENTATION ONLY (OUTPATIENT)
Dept: FAMILY MEDICINE | Facility: OTHER | Age: 48
End: 2018-02-21

## 2018-02-27 ENCOUNTER — OFFICE VISIT (OUTPATIENT)
Dept: OBGYN | Facility: OTHER | Age: 48
End: 2018-02-27
Attending: OBSTETRICS & GYNECOLOGY
Payer: COMMERCIAL

## 2018-02-27 VITALS
SYSTOLIC BLOOD PRESSURE: 130 MMHG | HEIGHT: 64 IN | HEART RATE: 84 BPM | BODY MASS INDEX: 30.13 KG/M2 | DIASTOLIC BLOOD PRESSURE: 100 MMHG | WEIGHT: 176.5 LBS

## 2018-02-27 DIAGNOSIS — R31.0 GROSS HEMATURIA: ICD-10-CM

## 2018-02-27 DIAGNOSIS — R10.2 PELVIC PAIN IN FEMALE: Primary | ICD-10-CM

## 2018-02-27 PROCEDURE — 99213 OFFICE O/P EST LOW 20 MIN: CPT | Performed by: OBSTETRICS & GYNECOLOGY

## 2018-02-27 ASSESSMENT — PAIN SCALES - GENERAL: PAINLEVEL: NO PAIN (0)

## 2018-02-27 ASSESSMENT — ANXIETY QUESTIONNAIRES
2. NOT BEING ABLE TO STOP OR CONTROL WORRYING: NOT AT ALL
1. FEELING NERVOUS, ANXIOUS, OR ON EDGE: NOT AT ALL

## 2018-02-27 NOTE — NURSING NOTE
Patient presents to the clinic for a pap smear. Patient states she would like to make sure everything is fine since her surgery.  Yuniel Mckay ..............2/27/2018 2:26 PM

## 2018-02-27 NOTE — LETTER
2018       RE: Cielo Bahena  [address]  [facility] [address]     Dear Colleague,    Thank you for referring your patient, Cielo Bahena, to the [facility] at [facility]. Please see a copy of my visit note below.    SUBJECTIVE:  Cielo Bahena is an 47 year old woman who presents for pelvic pain. No LMP recorded. Patient has had a hysterectomy.   She presents today with c/o bilateral pelvic pain which is worse with intercourse.  She recently underwent a pelvic CT scan   Which found no abnormalities.  She has also had episodes of gross hematuria and is scheduled to see urology for consultation.  Of note, the patient underwent a bladder neck sling 2 years ago. She denies any vaginal spotting, or discharge.      Past Medical History:   Diagnosis Date     Anemia     No Comments Provided     Family history of malignant neoplasm of digestive organ     2015     Insomnia     No Comments Provided     Major depressive disorder, single episode     ,Depression.  PHQ-9 score .     Mass of breast     ,Soft breast mass lump, right breast     Pain in hip     No Comments Provided     Personal history of other medical treatment (CODE)     2011,, 1 SAB     Personal history of other specified conditions (CODE)     abnormal Pap smear prior to , subsequent yearly Pap smears have been normal     Restless legs syndrome     No Comments Provided     Segmental and somatic dysfunction of pelvic region     2013     Uncomplicated asthma     generally well controlled, history of respiratory arrest requiring brief mechanical ventilation.       Family History   Problem Relation Age of Onset     Hypertension Father      Hypertension     HEART DISEASE Father      Heart Disease,CAD with stents     Hyperlipidemia Father      Hyperlipidemia,Hyperlipidemia     Colon Cancer Maternal Grandmother      Cancer-colon     HEART DISEASE Maternal Grandfather   "    Heart Disease,CAD     HEART DISEASE Paternal Grandfather      Heart Disease,CAD     Breast Cancer No family hx of      Cancer-breast       Past Surgical History:   Procedure Laterality Date     DILATION AND CURETTAGE      2006,Dilatation and curettage for blighted ovum.     ENDOSCOPY UPPER, COLONOSCOPY, COMBINED      1/22/2015     EXTRACTION(S) DENTAL      [place] tooth extraction     HYSTERECTOMY VAGINAL      3/5/2015,Dr. Lindsay for DUB; negative for malignancy; ovaries remain     MAMMOPLASTY REDUCTION      2012, Bilateral breast reduction, [facility]     MAMMOPLASTY REDUCTION      2004     OTHER SURGICAL HISTORY      79244.9,[place] SUBTALAR ATHROEREISIS,Left foot       Current Outpatient Prescriptions   Medication     EPINEPHrine (EPIPEN/ADRENACLICK/OR ANY BX GENERIC EQUIV) 0.3 MG/0.3ML injection 2-pack     pramipexole (MIRAPEX) 0.25 MG tablet     montelukast (SINGULAIR) 10 MG tablet     LORazepam (ATIVAN) 0.5 MG tablet     fluticasone-salmeterol (ADVAIR DISKUS) 250-50 MCG/DOSE diskus inhaler     amitriptyline (ELAVIL) 10 MG tablet     albuterol (PROAIR HFA/PROVENTIL HFA/VENTOLIN HFA) 108 (90 BASE) MCG/ACT Inhaler     No current facility-administered medications for this visit.      No Known Allergies    Social History   Substance Use Topics     Smoking status: Former Smoker     Packs/day: 0.50     Years: 25.00     Types: Cigarettes     Quit date: 11/24/2005     Smokeless tobacco: Never Used     Alcohol use 0.5 oz/week      Comment: Alcoholic Drinks/day: occassional       Review Of Systems  Gastrointestinal: negative for, abdominal pain, excessive gas or bloating, constipation and diarrhea  Genitourinary: negative for, frequency and urgency    OBJECTIVE:  BP (!) 130/100 (BP Location: Right arm, Patient Position: Sitting, Cuff Size: Adult Large)  Pulse 84  Ht 1.626 m (5' 4\")  Wt 80.1 kg (176 lb 8 oz)  Breastfeeding? No  BMI 30.3 kg/m2  General appearance: healthy, alert and no distress  Abdomen: Abdomen soft, " non-tender. BS normal. No masses, organomegaly  Pelvic: External genitalia and vagina normal. Vaginal cuff intact and no evidence of mesh erosion into the vaginal vault. Bimanual normal with no palpable uterus, cervix or adnexa.    ASSESSMENT:  Pelvic pain with intermittent hematuria.  S/P bladder neck sling 2 years ago.  Urology consult pending.  Will hold further w/u pending cystoscopy.    PLAN:  Urology consult, already scheduled.    Dx: pelvic pain and intermittent hematuria with unknown etiology      Again, thank you for allowing me to participate in the care of your patient.      Sincerely,    UMM STODDARD MD

## 2018-02-27 NOTE — MR AVS SNAPSHOT
"              After Visit Summary   2/27/2018    Cielo Bahena    MRN: [id number]           Patient Information     Date Of Birth          1970        Visit Information        Provider Department      2/27/2018 2:30 PM Scooby Lindsay MD [facility]        Today's Diagnoses     Pelvic pain in female    -  1    Gross hematuria           Follow-ups after your visit        Follow-up notes from your care team     Return if symptoms worsen or fail to improve.      Your next 10 appointments already scheduled     Mar 02, 2018  8:15 AM CST   New Visit with José Manuel Ann MD   [facility] ([facility])    [address]  [place] [address] [phone]              Who to contact     If you have questions or need follow up information about today's clinic visit or your schedule please contact [facility] directly at [phone].  Normal or non-critical lab and imaging results will be communicated to you by Novinthart, letter or phone within 4 business days after the clinic has received the results. If you do not hear from us within 7 days, please contact the clinic through Novinthart or phone. If you have a critical or abnormal lab result, we will notify you by phone as soon as possible.  Submit refill requests through Dot[employer] or call your pharmacy and they will forward the refill request to us. Please allow 3 business days for your refill to be completed.          Additional Information About Your Visit        MyChart Information     DotProduct lets you send messages to your doctor, view your test results, renew your prescriptions, schedule appointments and more. To sign up, go to [email or web address] . Click on \"Log in\" on the left side of the screen, which will take you to the Welcome page. Then click on \"Sign up Now\" on the right side of the page.     You will be asked to enter the access code listed below, as well as " "some personal information. Please follow the directions to create your username and password.     Your access code is: [email or web address]  Expires: 2018  8:48 AM     Your access code will  in 90 days. If you need help or a new code, please call your [facility] clinic or [phone].        Care EveryWhere ID     This is your Care EveryWhere ID. This could be used by other organizations to access your [facility] medical records  [id number]        Your Vitals Were     Pulse Height Breastfeeding? BMI (Body Mass Index)          84 1.626 m (5' 4\") No 30.3 kg/m2         Blood Pressure from Last 3 Encounters:   18 (!) 130/100   18 126/88   18 118/78    Weight from Last 3 Encounters:   18 80.1 kg (176 lb 8 oz)   18 81.2 kg (179 lb)   18 81.3 kg (179 lb 3.2 oz)              Today, you had the following     No orders found for display       Primary Care Provider Office Phone # Fax #    Valencia Benavides [phone] [address] [facility] [address]        Equal Access to Services     JOHNIE CHINO AH: Hadii jennifer ku hadasho Soomaali, waaxda luqadaha, qaybta kaalmada adeegyada, carlos chambers . So [facility] [phone].    ATENCIÓN: Si habla español, tiene a raza disposición servicios gratuitos de asistencia lingüística. Llame al [phone].    We comply with applicable federal civil rights laws and [place] laws. We do not discriminate on the basis of race, color, national origin, age, disability, sex, sexual orientation, or gender identity.            Thank you!     Thank you for choosing [facility] AND [facility]  for your care. Our goal is always to provide you with excellent care. Hearing back from our patients is one way we can continue to improve our services. Please take a few minutes to complete the written survey that you may receive in the mail after your visit with us. Thank you!             Your Updated Medication List " - Protect others around you: Learn how to safely use, store and throw away your medicines at www.disposemymeds.org.          This list is accurate as of 2/27/18 11:59 PM.  Always use your most recent med list.                   Brand Name Dispense Instructions for use Diagnosis    ADVAIR DISKUS 250-50 MCG/DOSE diskus inhaler   Generic drug:  fluticasone-salmeterol      Inhale 1 puff into the lungs every 12 hours Inhale 1 Puff by mouth every 12 hours.        albuterol 108 (90 BASE) MCG/ACT Inhaler    PROAIR HFA/PROVENTIL HFA/VENTOLIN HFA     Inhale 2 puffs into the lungs every 4 hours as needed Inhale 2 Puffs by mouth every 4 hours if needed.        amitriptyline 10 MG tablet    ELAVIL     Take 10 mg by mouth At Bedtime  Take 1 tablet by mouth at bedtime        EPINEPHrine 0.3 MG/0.3ML injection 2-pack    EPIPEN/ADRENACLICK/or ANY BX GENERIC EQUIV     Inject 0.3 mg into the muscle as needed Inject 0.3 mg intramuscular one time if needed for Allergic Reaction.        LORazepam 0.5 MG tablet    ATIVAN     Take 0.5 mg by mouth every 8 hours as needed Take 1 tablet by mouth every 8 hours if needed for Anxiety        montelukast 10 MG tablet    SINGULAIR     Take 10 mg by mouth At Bedtime Take 1 tablet by mouth at bedtime.        pramipexole 0.25 MG tablet    MIRAPEX     Take 0.25 mg by mouth At Bedtime TAKE ONE TABLET BY MOUTH NIGHTLY AT BEDTIME

## 2018-02-28 NOTE — PROGRESS NOTES
SUBJECTIVE:  Cielo Bahena is an 47 year old woman who presents for pelvic pain. No LMP recorded. Patient has had a hysterectomy.   She presents today with c/o bilateral pelvic pain which is worse with intercourse.  She recently underwent a pelvic CT scan   Which found no abnormalities.  She has also had episodes of gross hematuria and is scheduled to see urology for consultation.  Of note, the patient underwent a bladder neck sling 2 years ago. She denies any vaginal spotting, or discharge.      Past Medical History:   Diagnosis Date     Anemia     No Comments Provided     Family history of malignant neoplasm of digestive organ     2015     Insomnia     No Comments Provided     Major depressive disorder, single episode     ,Depression.  PHQ-9 score .     Mass of breast     ,Soft breast mass lump, right breast     Pain in hip     No Comments Provided     Personal history of other medical treatment (CODE)     2011,, 1 SAB     Personal history of other specified conditions (CODE)     abnormal Pap smear prior to , subsequent yearly Pap smears have been normal     Restless legs syndrome     No Comments Provided     Segmental and somatic dysfunction of pelvic region     2013     Uncomplicated asthma     generally well controlled, history of respiratory arrest requiring brief mechanical ventilation.       Family History   Problem Relation Age of Onset     Hypertension Father      Hypertension     HEART DISEASE Father      Heart Disease,CAD with stents     Hyperlipidemia Father      Hyperlipidemia,Hyperlipidemia     Colon Cancer Maternal Grandmother      Cancer-colon     HEART DISEASE Maternal Grandfather      Heart Disease,CAD     HEART DISEASE Paternal Grandfather      Heart Disease,CAD     Breast Cancer No family hx of      Cancer-breast       Past Surgical History:   Procedure Laterality Date     DILATION AND CURETTAGE      ,Dilatation and curettage for blighted ovum.      "ENDOSCOPY UPPER, COLONOSCOPY, COMBINED      1/22/2015     EXTRACTION(S) DENTAL      [place] tooth extraction     HYSTERECTOMY VAGINAL      3/5/2015,Dr. Lindsay for DUB; negative for malignancy; ovaries remain     MAMMOPLASTY REDUCTION      2012, Bilateral breast reduction, [facility]     MAMMOPLASTY REDUCTION      2004     OTHER SURGICAL HISTORY      [address].9,[place] SUBTALAR ATHROEREISIS,Left foot       Current Outpatient Prescriptions   Medication     EPINEPHrine (EPIPEN/ADRENACLICK/OR ANY BX GENERIC EQUIV) 0.3 MG/0.3ML injection 2-pack     pramipexole (MIRAPEX) 0.25 MG tablet     montelukast (SINGULAIR) 10 MG tablet     LORazepam (ATIVAN) 0.5 MG tablet     fluticasone-salmeterol (ADVAIR DISKUS) 250-50 MCG/DOSE diskus inhaler     amitriptyline (ELAVIL) 10 MG tablet     albuterol (PROAIR HFA/PROVENTIL HFA/VENTOLIN HFA) 108 (90 BASE) MCG/ACT Inhaler     No current facility-administered medications for this visit.      No Known Allergies    Social History   Substance Use Topics     Smoking status: Former Smoker     Packs/day: 0.50     Years: 25.00     Types: Cigarettes     Quit date: 11/24/2005     Smokeless tobacco: Never Used     Alcohol use 0.5 oz/week      Comment: Alcoholic Drinks/day: occassional       Review Of Systems  Gastrointestinal: negative for, abdominal pain, excessive gas or bloating, constipation and diarrhea  Genitourinary: negative for, frequency and urgency    OBJECTIVE:  BP (!) 130/100 (BP Location: Right arm, Patient Position: Sitting, Cuff Size: Adult Large)  Pulse 84  Ht 1.626 m (5' 4\")  Wt 80.1 kg (176 lb 8 oz)  Breastfeeding? No  BMI 30.3 kg/m2  General appearance: healthy, alert and no distress  Abdomen: Abdomen soft, non-tender. BS normal. No masses, organomegaly  Pelvic: External genitalia and vagina normal. Vaginal cuff intact and no evidence of mesh erosion into the vaginal vault. Bimanual normal with no palpable uterus, cervix or adnexa.    ASSESSMENT:  Pelvic pain with intermittent " hematuria.  S/P bladder neck sling 2 years ago.  Urology consult pending.  Will hold further w/u pending cystoscopy.    PLAN:  Urology consult, already scheduled.    Dx: pelvic pain and intermittent hematuria with unknown etiology

## 2018-03-01 RX ORDER — PRAMIPEXOLE DIHYDROCHLORIDE 0.25 MG/1
TABLET ORAL
Qty: 30 TABLET | Refills: 0 | OUTPATIENT
Start: 2018-03-01

## 2018-03-01 NOTE — TELEPHONE ENCOUNTER
Filled 02/05/18 #90 x 2. Pharmacy alerted. Unable to complete prescription refill per RNMedication Refill Policy.................... Corin Walton ....................  3/1/2018   10:11 AM    Prescribing Provider: Gilda Roy, DO    Order Date: 02/05/2018  Ordered by: CORIN WALTON  Medication:pramipexole (MIRAPEX) 0.25 mg tablet  Prescription #:[id number]    Qty:90 tablet   Ref:2  Start:2/5/2018    End:              Route:Oral                  JEN:No   Class:eRx    Sig:TAKE 1 TABLET BY MOUTH EVERY NIGHT AT BEDTIME    Pharmacy:[facility] [address] [place] [address] AT [address] - [phone]    Cosign accepted by GILDA ROY[[id number]] on 2/5/2018  1:54 PM

## 2018-03-05 ENCOUNTER — OFFICE VISIT (OUTPATIENT)
Dept: UROLOGY | Facility: OTHER | Age: 48
End: 2018-03-05
Attending: FAMILY MEDICINE
Payer: COMMERCIAL

## 2018-03-05 ENCOUNTER — HEALTH MAINTENANCE LETTER (OUTPATIENT)
Age: 48
End: 2018-03-05

## 2018-03-05 VITALS
SYSTOLIC BLOOD PRESSURE: 110 MMHG | BODY MASS INDEX: 30.05 KG/M2 | HEIGHT: 64 IN | DIASTOLIC BLOOD PRESSURE: 76 MMHG | WEIGHT: 176 LBS

## 2018-03-05 DIAGNOSIS — R31.0 GROSS HEMATURIA: Primary | ICD-10-CM

## 2018-03-05 DIAGNOSIS — R10.2 PELVIC PAIN IN FEMALE: ICD-10-CM

## 2018-03-05 PROCEDURE — 52000 CYSTOURETHROSCOPY: CPT | Performed by: UROLOGY

## 2018-03-05 PROCEDURE — 51798 US URINE CAPACITY MEASURE: CPT | Performed by: UROLOGY

## 2018-03-05 PROCEDURE — 99243 OFF/OP CNSLTJ NEW/EST LOW 30: CPT | Mod: 25 | Performed by: UROLOGY

## 2018-03-05 ASSESSMENT — PAIN SCALES - GENERAL: PAINLEVEL: MILD PAIN (2)

## 2018-03-05 NOTE — NURSING NOTE
Here for microscopic hematuria and pelvic pain.  Post-Void Residual  A post-void residual was measured by ultrasonic bladder scanner.  0 mL  Review of Systems:    Weight loss:    Yes     Recent fever/chills:  No   Night sweats:   No  Current skin rash:  No   Recent hair loss:  No  Heat intolerance:  No   Cold intolerance:  No  Chest pain:   No   Palpitations:   No  Shortness of breath:  No   Wheezing:   No  Constipation:    No   Diarrhea:   No   Nausea:   No   Vomiting:   No   Kidney/side pain:  Yes   Back pain:   Yes  Frequent headaches:  Yes   Dizziness:     No  Leg swelling:   No   Calf pain:    No    Parents, brothers or sisters with history of kidney cancer:   No  Parents, brothers or sisters with history of bladder cancer: No  Father or brother with history of prostate cancer:  No  Gail Kinney LPN on 3/5/2018 at 8:15 AM

## 2018-03-05 NOTE — MR AVS SNAPSHOT
After Visit Summary   3/5/2018    Cielo Bahena    MRN: [id number]           Patient Information     Date Of Birth          1970        Visit Information        Provider Department      3/5/2018 8:15 AM José Manuel Ann MD [facility]        Today's Diagnoses     Pelvic pain in female    -  1      Care Instructions    Home Care after Cystoscopy  Follow these guidelines for your care after your procedure.    Activity  No limitations    Bathing or showering  No limitations    Symptoms  You may notice some burning with urination but this usually resolves after 1-2 days.  You may also notice small amounts of blood in your urine.  Please increase water intake for the next few days to help with these symptoms.    Contacts  General Questions: [phone]  Appointments:  [phone]  Emergencies:  [phone]    When to call the clinic  If you develop any of the following symptoms please call the clinic immediately.  If the clinic is closed please be seen at an urgent care clinic or the Emergency Department.  - Burning with urination that worsens after 2 days  - Unable to urinate causing severe pelvic pain  - Fevers of greater than 101 degrees F  - Flank pain that is not responding to pain medication    Follow up  Please follow up as discussed at the appointment.          Follow-ups after your visit        Who to contact     If you have questions or need follow up information about today's clinic visit or your schedule please contact [facility] directly at [phone].  Normal or non-critical lab and imaging results will be communicated to you by MyChart, letter or phone within 4 business days after the clinic has received the results. If you do not hear from us within 7 days, please contact the clinic through MyChart or phone. If you have a critical or abnormal lab result, we will notify you by phone as soon as possible.  Submit refill requests through [employer] or  "call your pharmacy and they will forward the refill request to us. Please allow 3 business days for your refill to be completed.          Additional Information About Your Visit        MyChart Information     PagosOnLine lets you send messages to your doctor, view your test results, renew your prescriptions, schedule appointments and more. To sign up, go to [email or web address] . Click on \"Log in\" on the left side of the screen, which will take you to the Welcome page. Then click on \"Sign up Now\" on the right side of the page.     You will be asked to enter the access code listed below, as well as some personal information. Please follow the directions to create your username and password.     Your access code is: [email or web address]  Expires: 2018  8:48 AM     Your access code will  in 90 days. If you need help or a new code, please call your [facility] clinic or [phone].        Care EveryWhere ID     This is your Care EveryWhere ID. This could be used by other organizations to access your [facility] medical records  [id number]        Your Vitals Were     Height BMI (Body Mass Index)                1.626 m (5' 4\") 30.21 kg/m2           Blood Pressure from Last 3 Encounters:   18 110/76   18 (!) 130/100   18 126/88    Weight from Last 3 Encounters:   18 79.8 kg (176 lb)   18 80.1 kg (176 lb 8 oz)   18 81.2 kg (179 lb)              We Performed the Following     POST-VOID RESIDUAL BLADDER SCAN        Primary Care Provider Office Phone # Fax #    Valencia Benavides [phone] [email or web address] [facility] [address]        Equal Access to Services     MARIELENA CHINO : Sarah Herrera, lacy jacobsen, tyson kaalcarlos lam. So [facility] [phone].    ATENCIÓN: Si habla español, tiene a raza disposición servicios gratuitos de asistencia lingüística. Melody al [phone].    We comply with " applicable federal civil rights laws and [place] laws. We do not discriminate on the basis of race, color, national origin, age, disability, sex, sexual orientation, or gender identity.            Thank you!     Thank you for choosing [facility] AND [facility]  for your care. Our goal is always to provide you with excellent care. Hearing back from our patients is one way we can continue to improve our services. Please take a few minutes to complete the written survey that you may receive in the mail after your visit with us. Thank you!             Your Updated Medication List - Protect others around you: Learn how to safely use, store and throw away your medicines at www.disposemymeds.org.          This list is accurate as of 3/5/18  8:27 AM.  Always use your most recent med list.                   Brand Name Dispense Instructions for use Diagnosis    ADVAIR DISKUS 250-50 MCG/DOSE diskus inhaler   Generic drug:  fluticasone-salmeterol      Inhale 1 puff into the lungs every 12 hours Inhale 1 Puff by mouth every 12 hours.        albuterol 108 (90 BASE) MCG/ACT Inhaler    PROAIR HFA/PROVENTIL HFA/VENTOLIN HFA     Inhale 2 puffs into the lungs every 4 hours as needed Inhale 2 Puffs by mouth every 4 hours if needed.        amitriptyline 10 MG tablet    ELAVIL     Take 10 mg by mouth At Bedtime  Take 1 tablet by mouth at bedtime        EPINEPHrine 0.3 MG/0.3ML injection 2-pack    EPIPEN/ADRENACLICK/or ANY BX GENERIC EQUIV     Inject 0.3 mg into the muscle as needed Inject 0.3 mg intramuscular one time if needed for Allergic Reaction.        LORazepam 0.5 MG tablet    ATIVAN     Take 0.5 mg by mouth every 8 hours as needed Take 1 tablet by mouth every 8 hours if needed for Anxiety        montelukast 10 MG tablet    SINGULAIR     Take 10 mg by mouth At Bedtime Take 1 tablet by mouth at bedtime.        pramipexole 0.25 MG tablet    MIRAPEX     Take 0.25 mg by mouth At Bedtime TAKE ONE TABLET BY MOUTH NIGHTLY AT  BEDTIME

## 2018-03-05 NOTE — PATIENT INSTRUCTIONS

## 2018-03-05 NOTE — PROGRESS NOTES
I was asked to see this patient by Valencia Benavides DO and provide my opinion about the following: Hematuria  Type of Visit  Consult    Chief Complaint  Hematuria    HPI  Ms. Bahena is a 47 year old female who presents with hematuria.  Gross hematuria started 4 months ago.  Episode lasted about 1-2 days and then resolved spontaneously.  This has occurred 3-4 times in the last 4 months.  1 or 2 episodes was clearly associated with a UTI.  The other 2 episodes were not.  Patient denies associated dysuria at the time of onset.  Patient denies clots associated with hematuria.    Hematuria-related signs/symptoms  History of smoking?    Yes - remote  History of chemotherapy?   No  History of pelvic radiation?   No  History of kidney or bladder stones?  No  History of frequent urinary tract infections? No      Past Medical History  She  has a past medical history of Anemia; Family history of malignant neoplasm of digestive organ; Insomnia; Major depressive disorder, single episode; Mass of breast; Pain in hip; Personal history of other medical treatment (CODE); Personal history of other specified conditions (CODE); Restless legs syndrome; Segmental and somatic dysfunction of pelvic region; and Uncomplicated asthma.  Patient Active Problem List   Diagnosis     RLS (restless legs syndrome)     Moderate persistent asthma without complication     Insomnia     Hip pain     GERD (gastroesophageal reflux disease)     Genuine stress incontinence, female     Family history of colon cancer     Depression     Breast hypertrophy     Anemia       Past Surgical History  She  has a past surgical history that includes Extraction(s) dental; Dilation and curettage; Mammoplasty reduction; Endoscopy upper, colonoscopy, combined; Hysterectomy vaginal; other surgical history; and Mammoplasty reduction.    Medications  She has a current medication list which includes the following prescription(s): epinephrine, pramipexole, montelukast, lorazepam,  fluticasone-salmeterol, amitriptyline, and albuterol.    Allergies  No Known Allergies    Social History  She  reports that she quit smoking about 12 years ago. Her smoking use included Cigarettes. She has a 12.50 pack-year smoking history. She has never used smokeless tobacco. She reports that she drinks about 0.5 oz of alcohol per week  She reports that she does not use illicit drugs.  No drug abuse.    Family History  Family History   Problem Relation Age of Onset     Hypertension Father      Hypertension     HEART DISEASE Father      Heart Disease,CAD with stents     Hyperlipidemia Father      Hyperlipidemia,Hyperlipidemia     Colon Cancer Maternal Grandmother      Cancer-colon     HEART DISEASE Maternal Grandfather      Heart Disease,CAD     HEART DISEASE Paternal Grandfather      Heart Disease,CAD     Breast Cancer No family hx of      Cancer-breast       Review of Systems  I personally reviewed the ROS with the patient.    Nursing Notes:   Gail Kinney LPN  3/5/2018  8:16 AM  Unsigned  Here for microscopic hematuria and pelvic pain.  Post-Void Residual  A post-void residual was measured by ultrasonic bladder scanner.  0 mL  Review of Systems:    Weight loss:    Yes     Recent fever/chills:  No   Night sweats:   No  Current skin rash:  No   Recent hair loss:  No  Heat intolerance:  No   Cold intolerance:  No  Chest pain:   No   Palpitations:   No  Shortness of breath:  No   Wheezing:   No  Constipation:    No   Diarrhea:   No   Nausea:   No   Vomiting:   No   Kidney/side pain:  Yes   Back pain:   Yes  Frequent headaches:  Yes   Dizziness:     No  Leg swelling:   No   Calf pain:    No    Parents, brothers or sisters with history of kidney cancer:   No  Parents, brothers or sisters with history of bladder cancer: No  Father or brother with history of prostate cancer:  No  Gail Kinney LPN on 3/5/2018 at 8:15 AM      Physical Exam  Vitals:    03/05/18 0816   BP: 110/76   BP Location: Left arm  "  Patient Position: Sitting   Cuff Size: Adult Large   Weight: 79.8 kg (176 lb)   Height: 1.626 m (5' 4\")     Constitutional: NAD, WDWN.   Head: NCAT  Eyes: Conjunctivae normal  Cardiovascular: Regular rate.  Pulmonary/Chest: Respirations are even and non-labored bilaterally.  Abdominal: Soft. No distension, tenderness, masses or guarding. No CVA tenderness.  Neurological: A + O x 3.  Cranial Nerves II-XII grossly intact.  Extremities: AYUSH x 4, Warm. No clubbing.  No cyanosis.    Skin: Pink, warm and dry.  No rashes noted.  Psychiatric:  Normal mood and affect  Genitourinary:  Nonpalpable bladder    ^^^^^^^^^^^^^^^^^^^^^^^^^^^^^^^^^^^^^^^^^^^^^^^^^^^^^^^^^^^^^^^^^^^^^^^^^^^^^^^^^^^  Preprocedure diagnosis  Hematuria    Postprocedure diagnosis  Hematuria    Procedure  Flexible Cystourethroscopy    Surgeon  José Manuel Ann MD    Anesthesia  2% lidocaine jelly intraurethrally    Complications  None    Indications  47 year old female undergoing a flexible cystoscopy for the above mentioned indications.    Findings  Cystoscopic findings included a normal urethra.    The bladder appeared to be normal capacity.    There were no tumors, stones or foreign bodies.    The orifices were slit-shaped and in their normal location.    Procedure  The patient was placed in supine position and prepped and draped in sterile fashion with lidocaine jelly per urethra for anesthesia.    I passed a lubricated 14F flexible cystoscope through the urethra and into the bladder and the bladder was completely visualized.  The cystoscope was retroflexed and the bladder neck visualized.    The cystoscope was slowly withdrawn while visualizing the urethra and the procedure terminated.    The patient tolerated the procedure well.      ^^^^^^^^^^^^^^^^^^^^^^^^^^^^^^^^^^^^^^^^^^^^^^^^^^^^^^^^^^^^^^^^^^^^^^^^^^^^^^^^^^^    Labs & Imaging  Results for orders placed or performed during the hospital encounter of 01/30/18   CT Abdomen Pelvis w/o & w Contrast    " Narrative    PROCEDURE:  CT ABDOMEN PELVIS UROGRAM WWO.    HISTORY:  Asymptomatic microscopic hematuria.    TECHNIQUE:  Helical CT of the abdomen and pelvis was performed before and with a partially delayed split (urogram) injection of intravenous contrast.    COMPARISON:  None.    FINDINGS:    Limited images through the lung bases demonstrate no focal consolidation or mass.  The heart size is normal. No pericardial or pleural effusions are seen.    Precontrast imaging demonstrates no collecting system calculus. Postcontrast imaging demonstrates symmetric nephrograms. Delayed imaging demonstrates normal opacification of the ureters and partial distention of the bladder.    The liver demonstrates no mass or intrahepatic biliary ductal dilatation.  The gallbladder, spleen, pancreas and adrenal glands are unremarkable. The aorta is normal in size with scattered atherosclerotic calcifications. The bowel is normal in caliber.   The appendix is normal.    No free fluid, free air or adenopathy is present.  No suspicious osseous lesions are identified.      Impression    No finding to account for hematuria.    Electronically Signed By: Alpesh Em on 1/30/2018 9:49 AM       Assessment & Plan  Ms. Bahena is a 47 year old female with hematuria who underwent a cystoscopy today to complete her hematuria work up which was negative.  Discussed rationale for work up.  Discussed potential findings of hematuria work up including, but not limited to, kidney stones, bladder tumors and/or kidney tumors.  Also discussed the potential for a normal work up.  Provided reassurance  Follow up as needed

## 2018-03-05 NOTE — NURSING NOTE
Patient positioned in frog leg position prior to José Manuel Ann MD prepping patient with chlorhexidine gluconate cleanser.    [place] Protocol    A. Pre-procedure verification complete YES   1-relevant information / documentation available, reviewed and properly matched to the patient; 2-consent accurate and complete, 3-equipment and supplies available    B. Site marking complete N/A  Site marked if not in continuous attendance with patient    C. TIME OUT completed YES  Time Out was conducted just prior to starting procedure to verify the eight required elements: 1-patient identity, 2-consent accurate and complete, 3-position, 4-correct side/site marked (if applicable), 5-procedure, 6-relevant images / results properly labeled and displayed (if applicable), 7-antibiotics / irrigation fluids (if applicable), 8-safety precautions.    After procedure perineum area rinsed. Discharge instructions reviewed with patient. Patient verbalized understanding of discharge instructions and discharged ambulatory.

## 2018-03-27 ENCOUNTER — OFFICE VISIT (OUTPATIENT)
Dept: FAMILY MEDICINE | Facility: OTHER | Age: 48
End: 2018-03-27
Attending: FAMILY MEDICINE
Payer: COMMERCIAL

## 2018-03-27 VITALS
BODY MASS INDEX: 29.53 KG/M2 | HEIGHT: 64 IN | HEART RATE: 76 BPM | TEMPERATURE: 97.7 F | WEIGHT: 173 LBS | DIASTOLIC BLOOD PRESSURE: 82 MMHG | SYSTOLIC BLOOD PRESSURE: 120 MMHG

## 2018-03-27 DIAGNOSIS — B37.9 ANTIBIOTIC-INDUCED YEAST INFECTION: ICD-10-CM

## 2018-03-27 DIAGNOSIS — J01.01 ACUTE RECURRENT MAXILLARY SINUSITIS: Primary | ICD-10-CM

## 2018-03-27 DIAGNOSIS — T36.95XA ANTIBIOTIC-INDUCED YEAST INFECTION: ICD-10-CM

## 2018-03-27 PROCEDURE — 99213 OFFICE O/P EST LOW 20 MIN: CPT | Performed by: FAMILY MEDICINE

## 2018-03-27 RX ORDER — FLUTICASONE PROPIONATE 50 MCG
1-2 SPRAY, SUSPENSION (ML) NASAL DAILY
Qty: 1 BOTTLE | Refills: 11 | Status: SHIPPED | OUTPATIENT
Start: 2018-03-27 | End: 2018-12-11

## 2018-03-27 RX ORDER — FLUCONAZOLE 150 MG/1
150 TABLET ORAL ONCE
Qty: 2 TABLET | Refills: 0 | Status: SHIPPED | OUTPATIENT
Start: 2018-03-27 | End: 2019-02-06

## 2018-03-27 NOTE — NURSING NOTE
Patient is here for possible sinus infection .  Kimberly Uriarte LPN ....................3/27/2018  11:13 AM

## 2018-03-27 NOTE — PROGRESS NOTES
SUBJECTIVE:   Cielo Bahena is a 47 year old female who presents to clinic today for the following health issues:    HPI  RESPIRATORY SYMPTOMS    Duration: 1 week; worsening.    Description  nasal congestion, sore throat, facial pain/pressure, headache and top teeth hurt on R.    Severity: moderate    Accompanying signs and symptoms: + fever/chills, nausea. Sleeping more upright in a chair.    History (predisposing factors):  History of recurrent sinusitis    Precipitating or alleviating factors: URI    Therapies tried and outcome:  rest and fluids OTC NSAID    Problem list and histories reviewed & adjusted, as indicated.  Additional history: none      Patient Active Problem List   Diagnosis     RLS (restless legs syndrome)     Moderate persistent asthma without complication     Insomnia     Hip pain     GERD (gastroesophageal reflux disease)     Genuine stress incontinence, female     Family history of colon cancer     Depression     Breast hypertrophy     Anemia     Past Surgical History:   Procedure Laterality Date     DILATION AND CURETTAGE      2006,Dilatation and curettage for blighted ovum.     ENDOSCOPY UPPER, COLONOSCOPY, COMBINED      1/22/2015     EXTRACTION(S) DENTAL      [place] tooth extraction     HYSTERECTOMY VAGINAL      3/5/2015,Dr. Lindsay for DUB; negative for malignancy; ovaries remain     MAMMOPLASTY REDUCTION      2012, Bilateral breast reduction, [facility]     MAMMOPLASTY REDUCTION      2004     OTHER SURGICAL HISTORY      46330.9,SC SUBTALAR ATHROEREISIS,Left foot       Social History   Substance Use Topics     Smoking status: Former Smoker     Packs/day: 0.50     Years: 25.00     Types: Cigarettes     Quit date: 11/24/2005     Smokeless tobacco: Never Used     Alcohol use 0.5 oz/week      Comment: Alcoholic Drinks/day: occassional     Family History   Problem Relation Age of Onset     Hypertension Father      Hypertension     HEART DISEASE Father      Heart Disease,CAD with stents      "Hyperlipidemia Father      Hyperlipidemia,Hyperlipidemia     Colon Cancer Maternal Grandmother      Cancer-colon     HEART DISEASE Maternal Grandfather      Heart Disease,CAD     HEART DISEASE Paternal Grandfather      Heart Disease,CAD     Breast Cancer No family hx of      Cancer-breast         Current Outpatient Prescriptions   Medication Sig Dispense Refill     EPINEPHrine (EPIPEN/ADRENACLICK/OR ANY BX GENERIC EQUIV) 0.3 MG/0.3ML injection 2-pack Inject 0.3 mg into the muscle as needed Inject 0.3 mg intramuscular one time if needed for Allergic Reaction.       pramipexole (MIRAPEX) 0.25 MG tablet Take 0.25 mg by mouth At Bedtime TAKE ONE TABLET BY MOUTH NIGHTLY AT BEDTIME       montelukast (SINGULAIR) 10 MG tablet Take 10 mg by mouth At Bedtime Take 1 tablet by mouth at bedtime.       LORazepam (ATIVAN) 0.5 MG tablet Take 0.5 mg by mouth every 8 hours as needed Take 1 tablet by mouth every 8 hours if needed for Anxiety       fluticasone-salmeterol (ADVAIR DISKUS) 250-50 MCG/DOSE diskus inhaler Inhale 1 puff into the lungs every 12 hours Inhale 1 Puff by mouth every 12 hours.       amitriptyline (ELAVIL) 10 MG tablet Take 10 mg by mouth At Bedtime  Take 1 tablet by mouth at bedtime       albuterol (PROAIR HFA/PROVENTIL HFA/VENTOLIN HFA) 108 (90 BASE) MCG/ACT Inhaler Inhale 2 puffs into the lungs every 4 hours as needed Inhale 2 Puffs by mouth every 4 hours if needed.       No Known Allergies    ROS:  CONSTITUTIONAL: NEGATIVE for fever, chills, change in weight  INTEGUMENTARY/SKIN: NEGATIVE for worrisome rashes, moles or lesions  EYES: NEGATIVE for vision changes or irritation  RESP: NEGATIVE for significant cough or SOB  CV: NEGATIVE for chest pain, palpitations or peripheral edema  ROS otherwise negative    OBJECTIVE:     /82 (BP Location: Right arm, Patient Position: Sitting, Cuff Size: Adult Regular)  Pulse 76  Temp 97.7  F (36.5  C) (Tympanic)  Ht 5' 4\" (1.626 m)  Wt 173 lb (78.5 kg)  BMI 29.7 " kg/m2  Body mass index is 29.7 kg/(m^2).  GENERAL: healthy, alert and no distress  EYES: Eyes grossly normal to inspection, PERRL and conjunctivae and sclerae normal  HENT: ear canals and TM's normal, nose and mouth without ulcers or lesions.  + maxillary tenderness.  NECK: no adenopathy, no asymmetry, masses, or scars and thyroid normal to palpation  RESP: lungs clear to auscultation - no rales, rhonchi or wheezes  CV: regular rate and rhythm, normal S1 S2, no S3 or S4, no murmur, click or rub, no peripheral edema and peripheral pulses strong  ABDOMEN: soft, nontender, no hepatosplenomegaly, no masses and bowel sounds normal  MS: no gross musculoskeletal defects noted, no edema    Diagnostic Test Results:  none     ASSESSMENT/PLAN:     1. Acute recurrent maxillary sinusitis  Discussed importance of daily sinus rinses/neti Pot to help avoid symptoms of sinusitis.  Will start; especially in spring/fall.  Rx today for acute sinusitis - BID x 10 days.   - amoxicillin-clavulanate (AUGMENTIN) 875-125 MG per tablet; Take 1 tablet by mouth 2 times daily  Dispense: 20 tablet; Refill: 0  - fluticasone (FLONASE) 50 MCG/ACT spray; Spray 1-2 sprays into both nostrils daily  Dispense: 1 Bottle; Refill: 11    2. Antibiotic-induced yeast infection  - fluconazole (DIFLUCAN) 150 MG tablet; Take 1 tablet (150 mg) by mouth once for 1 dose  Dispense: 2 tablet; Refill: 0    Follow up prn.    Valencia Benavides, [facility]

## 2018-03-27 NOTE — MR AVS SNAPSHOT
"              After Visit Summary   3/27/2018    Cielo Bahena    MRN: [id number]           Patient Information     Date Of Birth          1970        Visit Information        Provider Department      3/27/2018 11:00 AM Valencia Benavides DO [facility]        Today's Diagnoses     Acute recurrent maxillary sinusitis    -  1    Antibiotic-induced yeast infection           Follow-ups after your visit        Who to contact     If you have questions or need follow up information about today's clinic visit or your schedule please contact [facility] directly at [phone].  Normal or non-critical lab and imaging results will be communicated to you by [employer], letter or phone within 4 business days after the clinic has received the results. If you do not hear from us within 7 days, please contact the clinic through [employer] or phone. If you have a critical or abnormal lab result, we will notify you by phone as soon as possible.  Submit refill requests through [employer] or call your pharmacy and they will forward the refill request to us. Please allow 3 business days for your refill to be completed.          Additional Information About Your Visit        MyChart Information     [employer] lets you send messages to your doctor, view your test results, renew your prescriptions, schedule appointments and more. To sign up, go to [email or web address] . Click on \"Log in\" on the left side of the screen, which will take you to the Welcome page. Then click on \"Sign up Now\" on the right side of the page.     You will be asked to enter the access code listed below, as well as some personal information. Please follow the directions to create your username and password.     Your access code is: [email or web address]  Expires: 2018  9:48 AM     Your access code will  in 90 days. If you need help or a new code, please call your [place] clinic or [phone].        Care EveryWhere ID  " "   This is your Care EveryWhere ID. This could be used by other organizations to access your [place] medical records  [id number]        Your Vitals Were     Pulse Temperature Height BMI (Body Mass Index)          76 97.7  F (36.5  C) (Tympanic) 5' 4\" (1.626 m) 29.7 kg/m2         Blood Pressure from Last 3 Encounters:   03/27/18 120/82   03/05/18 110/76   02/27/18 (!) 130/100    Weight from Last 3 Encounters:   03/27/18 173 lb (78.5 kg)   03/05/18 176 lb (79.8 kg)   02/27/18 176 lb 8 oz (80.1 kg)              Today, you had the following     No orders found for display         Today's Medication Changes          These changes are accurate as of 3/27/18 11:51 AM.  If you have any questions, ask your nurse or doctor.               Start taking these medicines.        Dose/Directions    amoxicillin-clavulanate 875-125 MG per tablet   Commonly known as:  AUGMENTIN   Used for:  Acute recurrent maxillary sinusitis   Started by:  Valencia Benavides DO        Dose:  1 tablet   Take 1 tablet by mouth 2 times daily   Quantity:  20 tablet   Refills:  0       fluconazole 150 MG tablet   Commonly known as:  DIFLUCAN   Used for:  Antibiotic-induced yeast infection   Started by:  Valencia Benavides DO        Dose:  150 mg   Take 1 tablet (150 mg) by mouth once for 1 dose   Quantity:  2 tablet   Refills:  0       fluticasone 50 MCG/ACT spray   Commonly known as:  FLONASE   Used for:  Acute recurrent maxillary sinusitis   Started by:  Valencia Benavides DO        Dose:  1-2 spray   Spray 1-2 sprays into both nostrils daily   Quantity:  1 Bottle   Refills:  11            Where to get your medicines      These medications were sent to [id number] - [place], [place] - [address], [facility] [address]     Phone:  [phone]     amoxicillin-clavulanate 875-125 MG per tablet    fluconazole 150 MG tablet    fluticasone 50 MCG/ACT spray                Primary Care Provider " Office Phone # Fax #    Valencia Benavides [phone] [address]  [facility] [address]        Equal Access to Services     JOHNIE CHINO : Hadii aad ku hadrubeno Sopadminiali, waaxda luqadaha, qaybta kaalmada ademalkada, carlos peters kaleighandres lopez laTopherruby little. So [facility] [phone].    ATENCIÓN: Si habla español, tiene a raza disposición servicios gratuitos de asistencia lingüística. Llame al [phone].    We comply with applicable federal civil rights laws and [place] laws. We do not discriminate on the basis of race, color, national origin, age, disability, sex, sexual orientation, or gender identity.            Thank you!     Thank you for choosing [facility] AND [facility]  for your care. Our goal is always to provide you with excellent care. Hearing back from our patients is one way we can continue to improve our services. Please take a few minutes to complete the written survey that you may receive in the mail after your visit with us. Thank you!             Your Updated Medication List - Protect others around you: Learn how to safely use, store and throw away your medicines at www.disposemymeds.org.          This list is accurate as of 3/27/18 11:51 AM.  Always use your most recent med list.                   Brand Name Dispense Instructions for use Diagnosis    ADVAIR DISKUS 250-50 MCG/DOSE diskus inhaler   Generic drug:  fluticasone-salmeterol      Inhale 1 puff into the lungs every 12 hours Inhale 1 Puff by mouth every 12 hours.        albuterol 108 (90 BASE) MCG/ACT Inhaler    PROAIR HFA/PROVENTIL HFA/VENTOLIN HFA     Inhale 2 puffs into the lungs every 4 hours as needed Inhale 2 Puffs by mouth every 4 hours if needed.        amitriptyline 10 MG tablet    ELAVIL     Take 10 mg by mouth At Bedtime  Take 1 tablet by mouth at bedtime        amoxicillin-clavulanate 875-125 MG per tablet    AUGMENTIN    20 tablet    Take 1 tablet by mouth 2 times daily    Acute recurrent  maxillary sinusitis       EPINEPHrine 0.3 MG/0.3ML injection 2-pack    EPIPEN/ADRENACLICK/or ANY BX GENERIC EQUIV     Inject 0.3 mg into the muscle as needed Inject 0.3 mg intramuscular one time if needed for Allergic Reaction.        fluconazole 150 MG tablet    DIFLUCAN    2 tablet    Take 1 tablet (150 mg) by mouth once for 1 dose    Antibiotic-induced yeast infection       fluticasone 50 MCG/ACT spray    FLONASE    1 Bottle    Spray 1-2 sprays into both nostrils daily    Acute recurrent maxillary sinusitis       LORazepam 0.5 MG tablet    ATIVAN     Take 0.5 mg by mouth every 8 hours as needed Take 1 tablet by mouth every 8 hours if needed for Anxiety        montelukast 10 MG tablet    SINGULAIR     Take 10 mg by mouth At Bedtime Take 1 tablet by mouth at bedtime.        pramipexole 0.25 MG tablet    MIRAPEX     Take 0.25 mg by mouth At Bedtime TAKE ONE TABLET BY MOUTH NIGHTLY AT BEDTIME

## 2018-07-25 ENCOUNTER — OFFICE VISIT (OUTPATIENT)
Dept: FAMILY MEDICINE | Facility: OTHER | Age: 48
End: 2018-07-25
Attending: FAMILY MEDICINE
Payer: COMMERCIAL

## 2018-07-25 VITALS
SYSTOLIC BLOOD PRESSURE: 112 MMHG | HEART RATE: 84 BPM | BODY MASS INDEX: 30.93 KG/M2 | WEIGHT: 180.2 LBS | DIASTOLIC BLOOD PRESSURE: 82 MMHG

## 2018-07-25 DIAGNOSIS — F41.1 GAD (GENERALIZED ANXIETY DISORDER): ICD-10-CM

## 2018-07-25 DIAGNOSIS — S66.912A WRIST STRAIN, LEFT, INITIAL ENCOUNTER: Primary | ICD-10-CM

## 2018-07-25 DIAGNOSIS — K59.01 SLOW TRANSIT CONSTIPATION: ICD-10-CM

## 2018-07-25 PROCEDURE — 99213 OFFICE O/P EST LOW 20 MIN: CPT | Performed by: FAMILY MEDICINE

## 2018-07-25 RX ORDER — LORAZEPAM 0.5 MG/1
0.5 TABLET ORAL EVERY 8 HOURS PRN
Qty: 60 TABLET | Refills: 5 | Status: SHIPPED | OUTPATIENT
Start: 2018-07-25 | End: 2019-06-19

## 2018-07-25 ASSESSMENT — PAIN SCALES - GENERAL: PAINLEVEL: MODERATE PAIN (5)

## 2018-07-25 NOTE — MR AVS SNAPSHOT
After Visit Summary   7/25/2018    Cielo Bahena    MRN: [id number]           Patient Information     Date Of Birth          1970        Visit Information        Provider Department      7/25/2018 2:15 PM Valencia Benavides DO [facility]        Today's Diagnoses     Wrist strain, left, initial encounter    -  1    ULICES (generalized anxiety disorder)        Slow transit constipation           Follow-ups after your visit        Your next 10 appointments already scheduled     Aug 01, 2018  2:45 PM CDT   Office Visit with Valencia Benavides DO   [facility] ([facility])    [address]  [place] [id number] [phone]           Bring a current list of meds and any records pertaining to this visit. For Physicals, please bring immunization records and any forms needing to be filled out. Please arrive 10 minutes early to complete paperwork.              Who to contact     If you have questions or need follow up information about today's clinic visit or your schedule please contact [facility] directly at [phone].  Normal or non-critical lab and imaging results will be communicated to you by [employer]hart, letter or phone within 4 business days after the clinic has received the results. If you do not hear from us within 7 days, please contact the clinic through [employer] or phone. If you have a critical or abnormal lab result, we will notify you by phone as soon as possible.  Submit refill requests through [employer] or call your pharmacy and they will forward the refill request to us. Please allow 3 business days for your refill to be completed.          Additional Information About Your Visit        [employer] Information     [employer] gives you secure access to your electronic health record. If you see a primary care provider, you can also send messages to your care team and make appointments. If you have  questions, please call your primary care clinic.  If you do not have a primary care provider, please call [phone] and they will assist you.        Care EveryWhere ID     This is your Care EveryWhere ID. This could be used by other organizations to access your [place] medical records  [id number]        Your Vitals Were     Pulse BMI (Body Mass Index)                84 30.93 kg/m2           Blood Pressure from Last 3 Encounters:   07/25/18 112/82   03/27/18 120/82   03/05/18 110/76    Weight from Last 3 Encounters:   07/25/18 180 lb 3.2 oz (81.7 kg)   03/27/18 173 lb (78.5 kg)   03/05/18 176 lb (79.8 kg)              We Performed the Following     WRIST SPLINT          Where to get your medicines      Some of these will need a paper prescription and others can be bought over the counter.  Ask your nurse if you have questions.     Bring a paper prescription for each of these medications     LORazepam 0.5 MG tablet          Primary Care Provider Office Phone # Fax #    Valencia Benavides [phone] [address] [facility] [address]        Equal Access to Services     [facility]: Hadii aad ginger manuelo Sojulia, waaxda luqadaha, qaybta kaalmada ademalkada, carlos chambers . So [facility] [phone].    ATENCIÓN: Si habla español, tiene a raza disposición servicios gratuitos de asistencia lingüística. Llame al [phone].    We comply with applicable federal civil rights laws and [place] laws. We do not discriminate on the basis of race, color, national origin, age, disability, sex, sexual orientation, or gender identity.            Thank you!     Thank you for choosing [facility] AND \[facility]\""  for your care. Our goal is always to provide you with excellent care. Hearing back from our patients is one way we can continue to improve our services. Please take a few minutes to complete the written survey that you may receive in the mail after your visit  with us. Thank you!             Your Updated Medication List - Protect others around you: Learn how to safely use, store and throw away your medicines at www.disposemymeds.org.          This list is accurate as of 7/25/18 11:59 PM.  Always use your most recent med list.                   Brand Name Dispense Instructions for use Diagnosis    ADVAIR DISKUS 250-50 MCG/DOSE diskus inhaler   Generic drug:  fluticasone-salmeterol      Inhale 1 puff into the lungs every 12 hours Inhale 1 Puff by mouth every 12 hours.        albuterol 108 (90 Base) MCG/ACT Inhaler    PROAIR HFA/PROVENTIL HFA/VENTOLIN HFA     Inhale 2 puffs into the lungs every 4 hours as needed Inhale 2 Puffs by mouth every 4 hours if needed.        amitriptyline 10 MG tablet    ELAVIL     Take 10 mg by mouth At Bedtime  Take 1 tablet by mouth at bedtime        EPINEPHrine 0.3 MG/0.3ML injection 2-pack    EPIPEN/ADRENACLICK/or ANY BX GENERIC EQUIV     Inject 0.3 mg into the muscle as needed Inject 0.3 mg intramuscular one time if needed for Allergic Reaction.        fluticasone 50 MCG/ACT spray    FLONASE    1 Bottle    Spray 1-2 sprays into both nostrils daily    Acute recurrent maxillary sinusitis       LORazepam 0.5 MG tablet    ATIVAN    60 tablet    Take 1 tablet (0.5 mg) by mouth every 8 hours as needed Take 1 tablet by mouth every 8 hours if needed for Anxiety    ULICES (generalized anxiety disorder)       montelukast 10 MG tablet    SINGULAIR     Take 10 mg by mouth At Bedtime Take 1 tablet by mouth at bedtime.        pramipexole 0.25 MG tablet    MIRAPEX     Take 0.25 mg by mouth At Bedtime TAKE ONE TABLET BY MOUTH NIGHTLY AT BEDTIME

## 2018-07-25 NOTE — PROGRESS NOTES
Nursing Notes:   Maria Guadalupe Mendez LPN  7/25/2018  2:24 PM  Signed  Patient here for left wrist pain she has had for 3 days.  Maria Guadalupe Mendez LPN............................... 7/25/2018 2:16 PM    SUBJECTIVE:   Cielo Bahena is a 47 year old female who presents to clinic today for the following health issues:    HPI  Cielo is here for L wrist pain. X 3 days.  Worse by the end of the day at work.  Aches; and radiates up into L arm.  Staying on the ulnar side.  No N/T.  No known injury.    Change in stools x a couple months.  Thinner caliber, but somewhat constipated.  Has had blood in stools off and on related to hemorrhoids; nothing new/different.  No bloating, weight loss, fever/chills.    Needs Lorazepam refilled for flying.      Patient Active Problem List    Diagnosis Date Noted     Moderate persistent asthma without complication 02/16/2018     Priority: Medium     Overview:   Asthma, generally well controlled, history of respiratory arrest requiring   brief mechanical ventilation.       Insomnia 02/16/2018     Priority: Medium     Genuine stress incontinence, female 02/16/2018     Priority: Medium     Depression 02/16/2018     Priority: Medium     Anemia 02/16/2018     Priority: Medium     Overview:   mild       Family history of colon cancer 01/22/2015     Priority: Medium     RLS (restless legs syndrome) 06/20/2014     Priority: Medium     Hip pain 04/18/2014     Priority: Medium     GERD (gastroesophageal reflux disease) 10/25/2012     Priority: Medium     Breast hypertrophy 03/14/2012     Priority: Medium     Past Medical History:   Diagnosis Date     Anemia     No Comments Provided     Family history of malignant neoplasm of digestive organ     1/22/2015     Insomnia     No Comments Provided     Major depressive disorder, single episode     2006,Depression.  PHQ-9 score 7/1.     Mass of breast     2009,Soft breast mass lump, right breast     Pain in hip     No Comments Provided     Personal history  of other medical treatment (CODE)     2011,, 1 SAB     Personal history of other specified conditions (CODE)     abnormal Pap smear prior to , subsequent yearly Pap smears have been normal     Restless legs syndrome     No Comments Provided     Segmental and somatic dysfunction of pelvic region     2013     Uncomplicated asthma     generally well controlled, history of respiratory arrest requiring brief mechanical ventilation.      Past Surgical History:   Procedure Laterality Date     DILATION AND CURETTAGE      ,Dilatation and curettage for blighted ovum.     ENDOSCOPY UPPER, COLONOSCOPY, COMBINED      2015     EXTRACTION(S) DENTAL      [place] tooth extraction     HYSTERECTOMY VAGINAL      3/5/2015,Dr. Lindsay for DUB; negative for malignancy; ovaries remain     MAMMOPLASTY REDUCTION      , Bilateral breast reduction, [facility]     MAMMOPLASTY REDUCTION           OTHER SURGICAL HISTORY      28286.9,[place] SUBTALAR ATHROEREISIS,Left foot     Family History   Problem Relation Age of Onset     Hypertension Father      Hypertension     HEART DISEASE Father      Heart Disease,CAD with stents     Hyperlipidemia Father      Hyperlipidemia,Hyperlipidemia     Colon Cancer Maternal Grandmother      Cancer-colon     HEART DISEASE Maternal Grandfather      Heart Disease,CAD     HEART DISEASE Paternal Grandfather      Heart Disease,CAD     Breast Cancer No family hx of      Cancer-breast     Social History   Substance Use Topics     Smoking status: Former Smoker     Packs/day: 0.50     Years: 25.00     Types: Cigarettes     Quit date: 2005     Smokeless tobacco: Never Used     Alcohol use 0.5 oz/week      Comment: Alcoholic Drinks/day: occassional     Social History     Social History Narrative    Single, moved back to [place] from [place], does have family in town.   Has significant other.     Works at [employer] as a CNA.   Shannan Galvan-mother  2011   Inder      Current Outpatient Prescriptions   Medication Sig Dispense Refill     albuterol (PROAIR HFA/PROVENTIL HFA/VENTOLIN HFA) 108 (90 BASE) MCG/ACT Inhaler Inhale 2 puffs into the lungs every 4 hours as needed Inhale 2 Puffs by mouth every 4 hours if needed.       amitriptyline (ELAVIL) 10 MG tablet Take 10 mg by mouth At Bedtime  Take 1 tablet by mouth at bedtime       EPINEPHrine (EPIPEN/ADRENACLICK/OR ANY BX GENERIC EQUIV) 0.3 MG/0.3ML injection 2-pack Inject 0.3 mg into the muscle as needed Inject 0.3 mg intramuscular one time if needed for Allergic Reaction.       fluticasone (FLONASE) 50 MCG/ACT spray Spray 1-2 sprays into both nostrils daily 1 Bottle 11     fluticasone-salmeterol (ADVAIR DISKUS) 250-50 MCG/DOSE diskus inhaler Inhale 1 puff into the lungs every 12 hours Inhale 1 Puff by mouth every 12 hours.       LORazepam (ATIVAN) 0.5 MG tablet Take 0.5 mg by mouth every 8 hours as needed Take 1 tablet by mouth every 8 hours if needed for Anxiety       montelukast (SINGULAIR) 10 MG tablet Take 10 mg by mouth At Bedtime Take 1 tablet by mouth at bedtime.       pramipexole (MIRAPEX) 0.25 MG tablet Take 0.25 mg by mouth At Bedtime TAKE ONE TABLET BY MOUTH NIGHTLY AT BEDTIME       No Known Allergies    Review of Systems   Constitutional: Negative for activity change.   Respiratory: Negative for shortness of breath.    Cardiovascular: Negative for chest pain.     OBJECTIVE:     /82 (BP Location: Right arm, Patient Position: Sitting, Cuff Size: Adult Regular)  Pulse 84  Wt 180 lb 3.2 oz (81.7 kg)  BMI 30.93 kg/m2  Body mass index is 30.93 kg/(m^2).  Physical Exam   Constitutional: She appears well-developed and well-nourished.   HENT:   Head: Normocephalic and atraumatic.   Cardiovascular: Normal rate and normal heart sounds.    Pulmonary/Chest: Effort normal and breath sounds normal.   Abdominal: There is no tenderness. There is no rebound and no CVA tenderness.   Musculoskeletal:        Left wrist: She  exhibits tenderness (over ulnar styloid process, ventrally). She exhibits normal range of motion, no swelling, no effusion, no crepitus, no deformity and no laceration.   Psychiatric: She has a normal mood and affect. Judgment normal.   Nursing note and vitals reviewed.    Diagnostic Test Results:  none     ASSESSMENT/PLAN:     1. ULICES (generalized anxiety disorder)  Controlled, but has upcoming flight.  Will Refill Lorazepam for prn use.  - LORazepam (ATIVAN) 0.5 MG tablet; Take 1 tablet (0.5 mg) by mouth every 8 hours as needed Take 1 tablet by mouth every 8 hours if needed for Anxiety  Dispense: 60 tablet; Refill: 5    2. Wrist strain, left, initial encounter  New.  Encouraged wrist splint while at work and ibuprofen/NSAID x 10 days.  Continue with ROM exercises.  - WRIST SPLINT    3. Slow transit constipation  OTC miralax x 2-4 weeks; with goal of daily soft bowel movement.  If not improving; may need to consider diagnostic colonoscopy.    Follow up prn.      Valencia Benavides, [facility]

## 2018-07-25 NOTE — NURSING NOTE
Patient here for left wrist pain she has had for 3 days.  Maria Guadalupe Borja............................... 7/25/2018 2:16 PM

## 2018-07-27 ASSESSMENT — ENCOUNTER SYMPTOMS
SHORTNESS OF BREATH: 0
ACTIVITY CHANGE: 0

## 2018-08-09 ENCOUNTER — OFFICE VISIT (OUTPATIENT)
Dept: FAMILY MEDICINE | Facility: OTHER | Age: 48
End: 2018-08-09
Attending: FAMILY MEDICINE
Payer: COMMERCIAL

## 2018-08-09 VITALS
SYSTOLIC BLOOD PRESSURE: 128 MMHG | DIASTOLIC BLOOD PRESSURE: 88 MMHG | BODY MASS INDEX: 30.9 KG/M2 | WEIGHT: 180 LBS | HEART RATE: 88 BPM

## 2018-08-09 DIAGNOSIS — R10.12 ABDOMINAL DISCOMFORT IN LEFT UPPER QUADRANT: Primary | ICD-10-CM

## 2018-08-09 PROCEDURE — 99213 OFFICE O/P EST LOW 20 MIN: CPT | Performed by: FAMILY MEDICINE

## 2018-08-09 NOTE — MR AVS SNAPSHOT
After Visit Summary   8/9/2018    Cielo Bahena    MRN: [id number]           Patient Information     Date Of Birth          1970        Visit Information        Provider Department      8/9/2018 3:45 PM Valencia Benavides DO [facility]        Today's Diagnoses     Abdominal discomfort in left upper quadrant    -  1       Follow-ups after your visit        Who to contact     If you have questions or need follow up information about today's clinic visit or your schedule please contact [facility] directly at [phone].  Normal or non-critical lab and imaging results will be communicated to you by [email or web address], letter or phone within 4 business days after the clinic has received the results. If you do not hear from us within 7 days, please contact the clinic through [employer] or phone. If you have a critical or abnormal lab result, we will notify you by phone as soon as possible.  Submit refill requests through [employer] or call your pharmacy and they will forward the refill request to us. Please allow 3 business days for your refill to be completed.          Additional Information About Your Visit        MyChart Information     [employer] gives you secure access to your electronic health record. If you see a primary care provider, you can also send messages to your care team and make appointments. If you have questions, please call your primary care clinic.  If you do not have a primary care provider, please call [phone] and they will assist you.        Care EveryWhere ID     This is your Care EveryWhere ID. This could be used by other organizations to access your [place] medical records  [id number]        Your Vitals Were     Pulse BMI (Body Mass Index)                88 30.9 kg/m2           Blood Pressure from Last 3 Encounters:   08/09/18 128/88   07/25/18 112/82   03/27/18 120/82    Weight from Last 3 Encounters:   08/09/18 180 lb (81.6 kg)   07/25/18  180 lb 3.2 oz (81.7 kg)   03/27/18 173 lb (78.5 kg)              Today, you had the following     No orders found for display       Primary Care Provider Office Phone # Fax #    Valencia Benavides [phone]       [address]   [facility] [address]        Equal Access to Services     [facility]: Hadii aad ku hadasho Soomaali, waaxda luqadaha, qaybta kaalmada adeegyada, waxay idiin hayaan adeeg kharash laTopheraan . So [facility] [phone].    ATENCIÓN: Si habla español, tiene a raza disposición servicios gratuitos de asistencia lingüística. Llame al [phone].    We comply with applicable federal civil rights laws and [place] laws. We do not discriminate on the basis of race, color, national origin, age, disability, sex, sexual orientation, or gender identity.            Thank you!     Thank you for choosing [facility] AND [facility]  for your care. Our goal is always to provide you with excellent care. Hearing back from our patients is one way we can continue to improve our services. Please take a few minutes to complete the written survey that you may receive in the mail after your visit with us. Thank you!             Your Updated Medication List - Protect others around you: Learn how to safely use, store and throw away your medicines at www.disposemymeds.org.          This list is accurate as of 8/9/18 11:59 PM.  Always use your most recent med list.                   Brand Name Dispense Instructions for use Diagnosis    ADVAIR DISKUS 250-50 MCG/DOSE diskus inhaler   Generic drug:  fluticasone-salmeterol      Inhale 1 puff into the lungs every 12 hours Inhale 1 Puff by mouth every 12 hours.        albuterol 108 (90 Base) MCG/ACT inhaler    PROAIR HFA/PROVENTIL HFA/VENTOLIN HFA     Inhale 2 puffs into the lungs every 4 hours as needed Inhale 2 Puffs by mouth every 4 hours if needed.        amitriptyline 10 MG tablet    ELAVIL     Take 10 mg by mouth At Bedtime  Take 1 tablet by  mouth at bedtime        EPINEPHrine 0.3 MG/0.3ML injection 2-pack    EPIPEN/ADRENACLICK/or ANY BX GENERIC EQUIV     Inject 0.3 mg into the muscle as needed Inject 0.3 mg intramuscular one time if needed for Allergic Reaction.        fluticasone 50 MCG/ACT spray    FLONASE    1 Bottle    Spray 1-2 sprays into both nostrils daily    Acute recurrent maxillary sinusitis       LORazepam 0.5 MG tablet    ATIVAN    60 tablet    Take 1 tablet (0.5 mg) by mouth every 8 hours as needed Take 1 tablet by mouth every 8 hours if needed for Anxiety    ULICES (generalized anxiety disorder)       montelukast 10 MG tablet    SINGULAIR     Take 10 mg by mouth At Bedtime Take 1 tablet by mouth at bedtime.        pramipexole 0.25 MG tablet    MIRAPEX     Take 0.25 mg by mouth At Bedtime TAKE ONE TABLET BY MOUTH NIGHTLY AT BEDTIME

## 2018-08-09 NOTE — NURSING NOTE
"Chief Complaint   Patient presents with     Abdominal Pain       Initial /88 (BP Location: Right arm, Patient Position: Sitting, Cuff Size: Adult Regular)  Pulse 88  Wt 180 lb (81.6 kg)  BMI 30.9 kg/m2 Estimated body mass index is 30.9 kg/(m^2) as calculated from the following:    Height as of 3/27/18: 5' 4\" (1.626 m).    Weight as of this encounter: 180 lb (81.6 kg).  Medication Reconciliation: complete    Maria Guadalupe Mendez LPN  "

## 2018-08-12 ASSESSMENT — ENCOUNTER SYMPTOMS
CHILLS: 0
DIFFICULTY URINATING: 0
UNEXPECTED WEIGHT CHANGE: 0
FEVER: 0
FLANK PAIN: 0

## 2018-08-12 NOTE — PROGRESS NOTES
SUBJECTIVE:   Cielo Bahena is a 47 year old female who presents to clinic today for the following health issues:    HPI  Cielo is here for stomach concerns.  She is having some L sided/LUQ abdominal discomfort off and on.  Stools tend to be more constipated, but will occasionally have loose stools.    Symptoms have been off and on x 1 year.  NO weight changes.  Some BRBPR, but related to a hemorrhoid and not mixed in with stool.  NO hematemesis or epigastric burning/pain.    Patient Active Problem List    Diagnosis Date Noted     Moderate persistent asthma without complication 2018     Priority: Medium     Overview:   Asthma, generally well controlled, history of respiratory arrest requiring   brief mechanical ventilation.       Insomnia 2018     Priority: Medium     Genuine stress incontinence, female 2018     Priority: Medium     Depression 2018     Priority: Medium     Anemia 2018     Priority: Medium     Overview:   mild       Family history of colon cancer 2015     Priority: Medium     RLS (restless legs syndrome) 2014     Priority: Medium     Hip pain 2014     Priority: Medium     GERD (gastroesophageal reflux disease) 10/25/2012     Priority: Medium     Breast hypertrophy 2012     Priority: Medium     Past Medical History:   Diagnosis Date     Anemia     No Comments Provided     Family history of malignant neoplasm of digestive organ     2015     Insomnia     No Comments Provided     Major depressive disorder, single episode     ,Depression.  PHQ-9 score 7/1.     Mass of breast     ,Soft breast mass lump, right breast     Pain in hip     No Comments Provided     Personal history of other medical treatment (CODE)     2011,, 1 SAB     Personal history of other specified conditions (CODE)     abnormal Pap smear prior to , subsequent yearly Pap smears have been normal     Restless legs syndrome     No Comments Provided      Segmental and somatic dysfunction of pelvic region     7/30/2013     Uncomplicated asthma     generally well controlled, history of respiratory arrest requiring brief mechanical ventilation.      Past Surgical History:   Procedure Laterality Date     DILATION AND CURETTAGE      2006,Dilatation and curettage for blighted ovum.     ENDOSCOPY UPPER, COLONOSCOPY, COMBINED      1/22/2015     EXTRACTION(S) DENTAL      [place] tooth extraction     HYSTERECTOMY VAGINAL      3/5/2015,Dr. Lindsay for DUB; negative for malignancy; ovaries remain     MAMMOPLASTY REDUCTION      2012, Bilateral breast reduction, [facility]     MAMMOPLASTY REDUCTION      2004     OTHER SURGICAL HISTORY      [address].9,[place] SUBTALAR ATHROEREISIS,Left foot     Family History   Problem Relation Age of Onset     Hypertension Father      Hypertension     HEART DISEASE Father      Heart Disease,CAD with stents     Hyperlipidemia Father      Hyperlipidemia,Hyperlipidemia     Colon Cancer Maternal Grandmother      Cancer-colon     HEART DISEASE Maternal Grandfather      Heart Disease,CAD     HEART DISEASE Paternal Grandfather      Heart Disease,CAD     Breast Cancer No family hx of      Cancer-breast     Social History   Substance Use Topics     Smoking status: Former Smoker     Packs/day: 0.50     Years: 25.00     Types: Cigarettes     Quit date: 11/24/2005     Smokeless tobacco: Never Used     Alcohol use 0.5 oz/week      Comment: Alcoholic Drinks/day: occassional     Social History     Social History Narrative    Single, moved back to [place] from [place], does have family in town.   Has significant other.     Works at [employer] as a CNA.   Shannan Galvan-mother  4/13/2011   Inder     Current Outpatient Prescriptions   Medication Sig Dispense Refill     albuterol (PROAIR HFA/PROVENTIL HFA/VENTOLIN HFA) 108 (90 BASE) MCG/ACT Inhaler Inhale 2 puffs into the lungs every 4 hours as needed Inhale 2 Puffs by mouth every 4 hours if needed.        amitriptyline (ELAVIL) 10 MG tablet Take 10 mg by mouth At Bedtime  Take 1 tablet by mouth at bedtime       EPINEPHrine (EPIPEN/ADRENACLICK/OR ANY BX GENERIC EQUIV) 0.3 MG/0.3ML injection 2-pack Inject 0.3 mg into the muscle as needed Inject 0.3 mg intramuscular one time if needed for Allergic Reaction.       fluticasone (FLONASE) 50 MCG/ACT spray Spray 1-2 sprays into both nostrils daily 1 Bottle 11     fluticasone-salmeterol (ADVAIR DISKUS) 250-50 MCG/DOSE diskus inhaler Inhale 1 puff into the lungs every 12 hours Inhale 1 Puff by mouth every 12 hours.       LORazepam (ATIVAN) 0.5 MG tablet Take 1 tablet (0.5 mg) by mouth every 8 hours as needed Take 1 tablet by mouth every 8 hours if needed for Anxiety 60 tablet 5     montelukast (SINGULAIR) 10 MG tablet Take 10 mg by mouth At Bedtime Take 1 tablet by mouth at bedtime.       pramipexole (MIRAPEX) 0.25 MG tablet Take 0.25 mg by mouth At Bedtime TAKE ONE TABLET BY MOUTH NIGHTLY AT BEDTIME       No Known Allergies      Review of Systems   Constitutional: Negative for chills, fever and unexpected weight change.   Genitourinary: Negative for difficulty urinating, flank pain and menstrual problem.   All other systems reviewed and are negative.       OBJECTIVE:     /88 (BP Location: Right arm, Patient Position: Sitting, Cuff Size: Adult Regular)  Pulse 88  Wt 180 lb (81.6 kg)  BMI 30.9 kg/m2  Body mass index is 30.9 kg/(m^2).  Physical Exam   Constitutional: She appears well-developed and well-nourished. No distress.   HENT:   Head: Normocephalic and atraumatic.   Right Ear: External ear normal.   Left Ear: External ear normal.   Cardiovascular: Normal rate and normal heart sounds.    Pulmonary/Chest: Effort normal and breath sounds normal.   Skin: She is not diaphoretic.   Psychiatric: She has a normal mood and affect. Judgment normal.   Nursing note and vitals reviewed.    Diagnostic Test Results:  Reviewed CT scan from Jan 2018 that was normal.    Results  for orders placed or performed during the hospital encounter of 01/30/18   CT Abdomen Pelvis w/o & w Contrast    Narrative    PROCEDURE:  CT ABDOMEN PELVIS UROGRAM WWO.    HISTORY:  Asymptomatic microscopic hematuria.    TECHNIQUE:  Helical CT of the abdomen and pelvis was performed before and with a partially delayed split (urogram) injection of intravenous contrast.    COMPARISON:  None.    FINDINGS:    Limited images through the lung bases demonstrate no focal consolidation or mass.  The heart size is normal. No pericardial or pleural effusions are seen.    Precontrast imaging demonstrates no collecting system calculus. Postcontrast imaging demonstrates symmetric nephrograms. Delayed imaging demonstrates normal opacification of the ureters and partial distention of the bladder.    The liver demonstrates no mass or intrahepatic biliary ductal dilatation.  The gallbladder, spleen, pancreas and adrenal glands are unremarkable. The aorta is normal in size with scattered atherosclerotic calcifications. The bowel is normal in caliber.   The appendix is normal.    No free fluid, free air or adenopathy is present.  No suspicious osseous lesions are identified.      Impression    No finding to account for hematuria.    Electronically Signed By: Alpesh Em on 1/30/2018 9:49 AM       ASSESSMENT/PLAN:     1. Abdominal discomfort in left upper quadrant  Chronic, unchanging.  Reassuring CT scan performed in Jan 2018.  Discussed bowel regimen to include: miralax titrated to soft daily bowel movements x 1 months.  If no changes; consider Colonoscopy - due @ 49 years of age otherwise.  Concern for IBS due to back and forth of symptoms.    Valencia Benavides, [facility] \[facility]\""

## 2018-08-28 ENCOUNTER — OFFICE VISIT (OUTPATIENT)
Dept: FAMILY MEDICINE | Facility: OTHER | Age: 48
End: 2018-08-28
Attending: NURSE PRACTITIONER
Payer: COMMERCIAL

## 2018-08-28 VITALS
OXYGEN SATURATION: 94 % | DIASTOLIC BLOOD PRESSURE: 92 MMHG | HEART RATE: 74 BPM | WEIGHT: 180.5 LBS | SYSTOLIC BLOOD PRESSURE: 132 MMHG | TEMPERATURE: 97.6 F | BODY MASS INDEX: 30.98 KG/M2

## 2018-08-28 DIAGNOSIS — J20.9 ACUTE BRONCHITIS, UNSPECIFIED ORGANISM: Primary | ICD-10-CM

## 2018-08-28 PROCEDURE — 99213 OFFICE O/P EST LOW 20 MIN: CPT | Performed by: NURSE PRACTITIONER

## 2018-08-28 RX ORDER — PREDNISONE 20 MG/1
40 TABLET ORAL DAILY
Qty: 10 TABLET | Refills: 0 | Status: SHIPPED | OUTPATIENT
Start: 2018-08-28 | End: 2019-02-06

## 2018-08-28 RX ORDER — CODEINE PHOSPHATE AND GUAIFENESIN 10; 100 MG/5ML; MG/5ML
1-2 SOLUTION ORAL EVERY 4 HOURS PRN
Qty: 180 ML | Refills: 0 | Status: SHIPPED | OUTPATIENT
Start: 2018-08-28 | End: 2018-12-11

## 2018-08-28 RX ORDER — BENZONATATE 100 MG/1
100 CAPSULE ORAL 3 TIMES DAILY PRN
Qty: 30 CAPSULE | Refills: 0 | Status: SHIPPED | OUTPATIENT
Start: 2018-08-28 | End: 2018-12-11

## 2018-08-28 NOTE — PROGRESS NOTES
Nursing Notes:   Kimberly Burch CMA  8/28/2018  4:16 PM  Unsigned  Patient presents to clinic today for a URI. Started out as sinus. Other than a headache patient states her head is better. The symptoms have moved into her chest. Productive cough, congestion. Some wheezing, chest tightness. Symptoms x 9 days.  Kimberly Burch CMA..............8/28/2018........4:13 PM        SUBJECTIVE:   Cielo Bahena is a 47 year old female who presents to clinic today for the following health issues:    RESPIRATORY SYMPTOMS      Duration: Started 8/19/18    Description  cough, wheezing, headache and nausea    Severity: moderate    Accompanying signs and symptoms: Denies fevers, chills, head congestion and nasal congestion did get some better.     History (predisposing factors):  Asthma, does not smoke, no other lung disease    Precipitating or alleviating factors: None    Therapies tried and outcome:  rest and fluids, Sudafed        Problem list and histories reviewed & adjusted, as indicated.  Additional history: as documented    Current Outpatient Prescriptions   Medication Sig Dispense Refill     albuterol (PROAIR HFA/PROVENTIL HFA/VENTOLIN HFA) 108 (90 BASE) MCG/ACT Inhaler Inhale 2 puffs into the lungs every 4 hours as needed Inhale 2 Puffs by mouth every 4 hours if needed.       amitriptyline (ELAVIL) 10 MG tablet Take 10 mg by mouth At Bedtime  Take 1 tablet by mouth at bedtime       EPINEPHrine (EPIPEN/ADRENACLICK/OR ANY BX GENERIC EQUIV) 0.3 MG/0.3ML injection 2-pack Inject 0.3 mg into the muscle as needed Inject 0.3 mg intramuscular one time if needed for Allergic Reaction.       fluticasone (FLONASE) 50 MCG/ACT spray Spray 1-2 sprays into both nostrils daily 1 Bottle 11     fluticasone-salmeterol (ADVAIR DISKUS) 250-50 MCG/DOSE diskus inhaler Inhale 1 puff into the lungs every 12 hours Inhale 1 Puff by mouth every 12 hours.       LORazepam (ATIVAN) 0.5 MG tablet Take 1 tablet (0.5 mg) by mouth every 8 hours as  needed Take 1 tablet by mouth every 8 hours if needed for Anxiety 60 tablet 5     montelukast (SINGULAIR) 10 MG tablet Take 10 mg by mouth At Bedtime Take 1 tablet by mouth at bedtime.       pramipexole (MIRAPEX) 0.25 MG tablet Take 0.25 mg by mouth At Bedtime TAKE ONE TABLET BY MOUTH NIGHTLY AT BEDTIME       No Known Allergies    ROS:  Notable findings in the HPI.       OBJECTIVE:     BP (!) 132/92  Pulse 74  Temp 97.6  F (36.4  C) (Tympanic)  Wt 180 lb 8 oz (81.9 kg)  SpO2 94%  Breastfeeding? No  BMI 30.98 kg/m2  Body mass index is 30.98 kg/(m^2).  GENERAL: healthy, alert and no distress  EYES: Eyes grossly normal to inspection, PERRL and conjunctivae and sclerae normal  HENT: normal cephalic/atraumatic, right ear: normal: no effusions, no erythema, normal landmarks, left ear: normal: no effusions, no erythema, normal landmarks, nose and mouth without ulcers or lesions, oropharynx clear, oral mucous membranes moist and sinuses: not tender  NECK: no adenopathy  RESP: no rales , no rhonchi and expiratory wheezes R upper posterior, R mid posterior, L upper anterior and L mid posterior  CV: regular rate and rhythm, normal S1 S2, no S3 or S4, no murmur, click or rub, no peripheral edema and peripheral pulses strong  SKIN: no suspicious lesions or rashes    Diagnostic Test Results:  none     ASSESSMENT/PLAN:     1. Acute bronchitis, unspecified organism  - predniSONE (DELTASONE) 20 MG tablet; Take 2 tablets (40 mg) by mouth daily for 5 days  Dispense: 10 tablet; Refill: 0  - guaiFENesin-codeine (ROBITUSSIN AC) 100-10 MG/5ML SOLN solution; Take 5-10 mLs by mouth every 4 hours as needed for cough  Dispense: 180 mL; Refill: 0  - benzonatate (TESSALON) 100 MG capsule; Take 1 capsule (100 mg) by mouth 3 times daily as needed for cough  Dispense: 30 capsule; Refill: 0    Discussed that Bronchitis is viral and coughs in adults can last 14+ weeks.     PLAN:    URI Adult:  Tylenol, Ibuprofen, Fluids, Rest, OTC cough  suppressant/expectorant, OTC decongestant/antihistamine, Saline gargles, Saline nasal spray, Rx bronchitis Prednisone, cough medicaitons and Vaporizer    Followup:    If not improving or if condition worsens, follow up with your Primary Care Provider    I explained my diagnostic considerations and recommendations to the patient, who voiced understanding and agreement with the treatment plan. All questions were answered. We discussed potential side effects of any prescribed or recommended therapies, as well as expectations for response to treatments. She was advised to contact PCP office if there is no improvement or worsening of conditions or symptoms.  If s/s worsen or persist, patient will either come back or follow up with PCP.    Disclaimer:  This note consists of words and symbols derived from keyboarding, dictation, or using voice recognition software. As a result, there may be errors in the script that have gone undetected. Please consider this when interpreting information found in this note.      Marva Matias NP, 8/28/2018 4:26 PM

## 2018-08-28 NOTE — PATIENT INSTRUCTIONS
Viral Bronchitis (Adult)  You have a viral bronchitis. Bronchitis is inflammation and swelling of the lining of the lungs. This is often caused by an infection. Symptoms include a dry, hacking cough that is worse at night. The cough may bring up yellow-green mucus. You may also feel short of breath or wheeze. Other symptoms may include tiredness, chest discomfort, and chills.  Bronchitis that is caused by a virus is not treated with antibiotics. Instead, medicines may be given to help relieve symptoms. Symptoms can last up to 2 weeks, although the cough may last much longer.  This illness is contagious during the first few days and is spread through the air by coughing and sneezing, or by direct contact (touching the sick person and then touching your own eyes, nose, or mouth).  Most viral illnesses resolve within 10 to 14 days with rest and simple home remedies, although they may sometimes last for several weeks.  Home care    If symptoms are severe, rest at home for the first 2 to 3 days. When you go back to your usual activities, don't let yourself get too tired.    Do not smoke. Also avoid being exposed to secondhand smoke.    You may use over-the-counter medicine to control fever or pain, unless another pain medicine was prescribed. If you have chronic liver or kidney disease or have ever had a stomach ulcer or gastrointestinal bleeding, talk with your healthcare provider before using these medicines. Also talk to your provider if you are taking medicine to prevent blood clots. Aspirin should never be given to anyone younger than 18 years of age who is ill with a viral infection or fever. It may cause severe liver or brain damage.    Your appetite may be poor, so a light diet is fine. Avoid dehydration by drinking 6 to 8 glasses of fluids per day (such as water, soft drinks, sports drinks, juices, tea, or soup). Extra fluids will help loosen secretions in the nose and lungs.    Over-the-counter cough, cold,  and sore-throat medicines will not shorten the length of the illness, but they may help to reduce symptoms. Don't use decongestants if you have high blood pressure.  Follow-up care  Follow up with your healthcare provider, or as advised. If you had an X-ray or ECG (electrocardiogram), a specialist will review it. You will be notified of any new findings that may affect your care.  If you are age 65 or older, or if you have a chronic lung disease or condition that affects your immune system, or you smoke, ask your healthcare provider about getting a pneumococcal vaccine and a yearly flu shot (influenza vaccine).  When to seek medical advice  Call your healthcare provider right away if any of these occur:    Fever of 100.4 F (38 C) or higher, or as directed by your healthcare provider    Coughing up increased amounts of colored sputum    Weakness, drowsiness, headache, facial pain, ear pain, or a stiff neck  Call 911  Call 911 if any of these occur:    Coughing up blood    Worsening weakness, drowsiness, headache, or stiff neck    Trouble breathing, wheezing, or pain with breathing

## 2018-08-28 NOTE — MR AVS SNAPSHOT
After Visit Summary   8/28/2018    Cielo Bahena    MRN: [id number]           Patient Information     Date Of Birth          1970        Visit Information        Provider Department      8/28/2018 4:00 PM Marva Matias NP [facility]        Today's Diagnoses     Acute bronchitis, unspecified organism    -  1      Care Instructions      Viral Bronchitis (Adult)  You have a viral bronchitis. Bronchitis is inflammation and swelling of the lining of the lungs. This is often caused by an infection. Symptoms include a dry, hacking cough that is worse at night. The cough may bring up yellow-green mucus. You may also feel short of breath or wheeze. Other symptoms may include tiredness, chest discomfort, and chills.  Bronchitis that is caused by a virus is not treated with antibiotics. Instead, medicines may be given to help relieve symptoms. Symptoms can last up to 2 weeks, although the cough may last much longer.  This illness is contagious during the first few days and is spread through the air by coughing and sneezing, or by direct contact (touching the sick person and then touching your own eyes, nose, or mouth).  Most viral illnesses resolve within 10 to 14 days with rest and simple home remedies, although they may sometimes last for several weeks.  Home care    If symptoms are severe, rest at home for the first 2 to 3 days. When you go back to your usual activities, don't let yourself get too tired.    Do not smoke. Also avoid being exposed to secondhand smoke.    You may use over-the-counter medicine to control fever or pain, unless another pain medicine was prescribed. If you have chronic liver or kidney disease or have ever had a stomach ulcer or gastrointestinal bleeding, talk with your healthcare provider before using these medicines. Also talk to your provider if you are taking medicine to prevent blood clots. Aspirin should never be given to anyone younger than 18  years of age who is ill with a viral infection or fever. It may cause severe liver or brain damage.    Your appetite may be poor, so a light diet is fine. Avoid dehydration by drinking 6 to 8 glasses of fluids per day (such as water, soft drinks, sports drinks, juices, tea, or soup). Extra fluids will help loosen secretions in the nose and lungs.    Over-the-counter cough, cold, and sore-throat medicines will not shorten the length of the illness, but they may help to reduce symptoms. Don't use decongestants if you have high blood pressure.  Follow-up care  Follow up with your healthcare provider, or as advised. If you had an X-ray or ECG (electrocardiogram), a specialist will review it. You will be notified of any new findings that may affect your care.  If you are age 65 or older, or if you have a chronic lung disease or condition that affects your immune system, or you smoke, ask your healthcare provider about getting a pneumococcal vaccine and a yearly flu shot (influenza vaccine).  When to seek medical advice  Call your healthcare provider right away if any of these occur:    Fever of 100.4 F (38 C) or higher, or as directed by your healthcare provider    Coughing up increased amounts of colored sputum    Weakness, drowsiness, headache, facial pain, ear pain, or a stiff neck  Call 911  Call 911 if any of these occur:    Coughing up blood    Worsening weakness, drowsiness, headache, or stiff neck    Trouble breathing, wheezing, or pain with breathing                  Follow-ups after your visit        Who to contact     If you have questions or need follow up information about today's clinic visit or your schedule please contact [facility] directly at [phone].  Normal or non-critical lab and imaging results will be communicated to you by MyChart, letter or phone within 4 business days after the clinic has received the results. If you do not hear from us within 7 days, please contact  the clinic through [employer] or phone. If you have a critical or abnormal lab result, we will notify you by phone as soon as possible.  Submit refill requests through [employer] or call your pharmacy and they will forward the refill request to us. Please allow 3 business days for your refill to be completed.          Additional Information About Your Visit        [employer] Information     [employer] gives you secure access to your electronic health record. If you see a primary care provider, you can also send messages to your care team and make appointments. If you have questions, please call your primary care clinic.  If you do not have a primary care provider, please call [phone] and they will assist you.        Care EveryWhere ID     This is your Care EveryWhere ID. This could be used by other organizations to access your [place] medical records  [id number]        Your Vitals Were     Pulse Temperature Pulse Oximetry Breastfeeding? BMI (Body Mass Index)       74 97.6  F (36.4  C) (Tympanic) 94% No 30.98 kg/m2        Blood Pressure from Last 3 Encounters:   08/28/18 (!) 132/92   08/09/18 128/88   07/25/18 112/82    Weight from Last 3 Encounters:   08/28/18 180 lb 8 oz (81.9 kg)   08/09/18 180 lb (81.6 kg)   07/25/18 180 lb 3.2 oz (81.7 kg)              Today, you had the following     No orders found for display         Today's Medication Changes          These changes are accurate as of 8/28/18  4:39 PM.  If you have any questions, ask your nurse or doctor.               Start taking these medicines.        Dose/Directions    benzonatate 100 MG capsule   Commonly known as:  TESSALON   Used for:  Acute bronchitis, unspecified organism   Started by:  Marva Matias NP        Dose:  100 mg   Take 1 capsule (100 mg) by mouth 3 times daily as needed for cough   Quantity:  30 capsule   Refills:  0       guaiFENesin-codeine 100-10 MG/5ML Soln solution   Commonly known as:  ROBITUSSIN AC   Used for:  Acute bronchitis,  unspecified organism   Started by:  Marva Matias NP        Dose:  1-2 tsp.   Take 5-10 mLs by mouth every 4 hours as needed for cough   Quantity:  180 mL   Refills:  0       predniSONE 20 MG tablet   Commonly known as:  DELTASONE   Used for:  Acute bronchitis, unspecified organism   Started by:  Marva Matias NP        Dose:  40 mg   Take 2 tablets (40 mg) by mouth daily for 5 days   Quantity:  10 tablet   Refills:  0            Where to get your medicines      These medications were sent to [id number] - [place], [place] - [address]  [address], [facility] [address]     Phone:  [phone]     benzonatate 100 MG capsule    predniSONE 20 MG tablet         Some of these will need a paper prescription and others can be bought over the counter.  Ask your nurse if you have questions.     Bring a paper prescription for each of these medications     guaiFENesin-codeine 100-10 MG/5ML Soln solution                Primary Care Provider Office Phone # Fax #    Valencia ADALBERTO Benavides [phone]       [address] [facility] [address]        Equal Access to Services     [facility]: Hadii aad ku hadasho Sopadminiali, waaxda luqadaha, qaybta kaalmada adeegyada, carlos chambers . So [facility] [phone].    ATENCIÓN: Si habla español, tiene a raza disposición servicios gratuitos de asistencia lingüística. Llame al [phone].    We comply with applicable federal civil rights laws and [place] laws. We do not discriminate on the basis of race, color, national origin, age, disability, sex, sexual orientation, or gender identity.            Thank you!     Thank you for choosing [facility] AND [facility]  for your care. Our goal is always to provide you with excellent care. Hearing back from our patients is one way we can continue to improve our services. Please take a few minutes to complete the written survey that you may  receive in the mail after your visit with us. Thank you!             Your Updated Medication List - Protect others around you: Learn how to safely use, store and throw away your medicines at www.disposemymeds.org.          This list is accurate as of 8/28/18  4:39 PM.  Always use your most recent med list.                   Brand Name Dispense Instructions for use Diagnosis    ADVAIR DISKUS 250-50 MCG/DOSE diskus inhaler   Generic drug:  fluticasone-salmeterol      Inhale 1 puff into the lungs every 12 hours Inhale 1 Puff by mouth every 12 hours.        albuterol 108 (90 Base) MCG/ACT inhaler    PROAIR HFA/PROVENTIL HFA/VENTOLIN HFA     Inhale 2 puffs into the lungs every 4 hours as needed Inhale 2 Puffs by mouth every 4 hours if needed.        amitriptyline 10 MG tablet    ELAVIL     Take 10 mg by mouth At Bedtime  Take 1 tablet by mouth at bedtime        benzonatate 100 MG capsule    TESSALON    30 capsule    Take 1 capsule (100 mg) by mouth 3 times daily as needed for cough    Acute bronchitis, unspecified organism       EPINEPHrine 0.3 MG/0.3ML injection 2-pack    EPIPEN/ADRENACLICK/or ANY BX GENERIC EQUIV     Inject 0.3 mg into the muscle as needed Inject 0.3 mg intramuscular one time if needed for Allergic Reaction.        fluticasone 50 MCG/ACT spray    FLONASE    1 Bottle    Spray 1-2 sprays into both nostrils daily    Acute recurrent maxillary sinusitis       guaiFENesin-codeine 100-10 MG/5ML Soln solution    ROBITUSSIN AC    180 mL    Take 5-10 mLs by mouth every 4 hours as needed for cough    Acute bronchitis, unspecified organism       LORazepam 0.5 MG tablet    ATIVAN    60 tablet    Take 1 tablet (0.5 mg) by mouth every 8 hours as needed Take 1 tablet by mouth every 8 hours if needed for Anxiety    ULICES (generalized anxiety disorder)       montelukast 10 MG tablet    SINGULAIR     Take 10 mg by mouth At Bedtime Take 1 tablet by mouth at bedtime.        pramipexole 0.25 MG tablet    MIRAPEX     Take 0.25  mg by mouth At Bedtime TAKE ONE TABLET BY MOUTH NIGHTLY AT BEDTIME        predniSONE 20 MG tablet    DELTASONE    10 tablet    Take 2 tablets (40 mg) by mouth daily for 5 days    Acute bronchitis, unspecified organism

## 2018-08-28 NOTE — NURSING NOTE
Patient presents to clinic today for a URI. Started out as sinus. Other than a headache patient states her head is better. The symptoms have moved into her chest. Productive cough, congestion. Some wheezing, chest tightness. Symptoms x 9 days.  Kimberly Burch CMA..............8/28/2018........4:13 PM

## 2018-10-01 DIAGNOSIS — F32.89 ATYPICAL DEPRESSIVE DISEASE: ICD-10-CM

## 2018-10-01 DIAGNOSIS — J45.40 MODERATE PERSISTENT ASTHMA WITHOUT COMPLICATION: Primary | ICD-10-CM

## 2018-10-04 RX ORDER — AMITRIPTYLINE HYDROCHLORIDE 10 MG/1
TABLET ORAL
Qty: 180 TABLET | Refills: 4 | Status: SHIPPED | OUTPATIENT
Start: 2018-10-04 | End: 2020-01-17

## 2018-10-04 RX ORDER — ALBUTEROL SULFATE 90 UG/1
AEROSOL, METERED RESPIRATORY (INHALATION)
Qty: 54 G | Refills: 1 | Status: SHIPPED | OUTPATIENT
Start: 2018-10-04 | End: 2019-02-02

## 2018-10-04 NOTE — TELEPHONE ENCOUNTER
Routing refill request to provider for review/approval because:  Difference in dose of Elavil. Chart and medication list show take 10mg 1 tablet daily. Pharmacy says Rx transferred from [facility] showed 20mg daily.     Attempted to reach patient, not available. To PCP for clarification of dose.    Adelaide Servin, RN on 10/4/2018 at 12:07 PM

## 2018-12-11 ENCOUNTER — HOSPITAL ENCOUNTER (EMERGENCY)
Facility: OTHER | Age: 48
Discharge: HOME | End: 2018-12-11
Attending: PHYSICIAN ASSISTANT | Admitting: PHYSICIAN ASSISTANT
Payer: COMMERCIAL

## 2018-12-11 ENCOUNTER — NURSE TRIAGE (OUTPATIENT)
Dept: FAMILY MEDICINE | Facility: OTHER | Age: 48
End: 2018-12-11

## 2018-12-11 ENCOUNTER — APPOINTMENT (OUTPATIENT)
Dept: GENERAL RADIOLOGY | Facility: OTHER | Age: 48
End: 2018-12-11
Attending: PHYSICIAN ASSISTANT
Payer: COMMERCIAL

## 2018-12-11 VITALS
RESPIRATION RATE: 16 BRPM | WEIGHT: 175 LBS | BODY MASS INDEX: 29.88 KG/M2 | DIASTOLIC BLOOD PRESSURE: 87 MMHG | HEIGHT: 64 IN | SYSTOLIC BLOOD PRESSURE: 143 MMHG | TEMPERATURE: 97.7 F

## 2018-12-11 DIAGNOSIS — J06.9 VIRAL UPPER RESPIRATORY INFECTION: ICD-10-CM

## 2018-12-11 DIAGNOSIS — R07.81 PLEURITIC CHEST PAIN: ICD-10-CM

## 2018-12-11 LAB
ALBUMIN SERPL-MCNC: 4.2 G/DL (ref 3.5–5.7)
ALBUMIN UR-MCNC: NEGATIVE MG/DL
ALP SERPL-CCNC: 63 U/L (ref 34–104)
ALT SERPL W P-5'-P-CCNC: 45 U/L (ref 7–52)
ANION GAP SERPL CALCULATED.3IONS-SCNC: 6 MMOL/L (ref 3–14)
APPEARANCE UR: CLEAR
AST SERPL W P-5'-P-CCNC: 20 U/L (ref 13–39)
BASOPHILS # BLD AUTO: 0 10E9/L (ref 0–0.2)
BASOPHILS NFR BLD AUTO: 0.6 %
BILIRUB SERPL-MCNC: 0.5 MG/DL (ref 0.3–1)
BILIRUB UR QL STRIP: NEGATIVE
BUN SERPL-MCNC: 13 MG/DL (ref 7–25)
CALCIUM SERPL-MCNC: 8.9 MG/DL (ref 8.6–10.3)
CHLORIDE SERPL-SCNC: 108 MMOL/L (ref 98–107)
CO2 SERPL-SCNC: 23 MMOL/L (ref 21–31)
COLOR UR AUTO: YELLOW
CREAT SERPL-MCNC: 0.89 MG/DL (ref 0.6–1.2)
D DIMER PPP DDU-MCNC: <200 NG/ML D-DU (ref 0–230)
DIFFERENTIAL METHOD BLD: ABNORMAL
EOSINOPHIL # BLD AUTO: 0.1 10E9/L (ref 0–0.7)
EOSINOPHIL NFR BLD AUTO: 1.5 %
ERYTHROCYTE [DISTWIDTH] IN BLOOD BY AUTOMATED COUNT: 11.5 % (ref 10–15)
GFR SERPL CREATININE-BSD FRML MDRD: 67 ML/MIN/1.7M2
GLUCOSE SERPL-MCNC: 89 MG/DL (ref 70–105)
GLUCOSE UR STRIP-MCNC: NEGATIVE MG/DL
HCT VFR BLD AUTO: 46.7 % (ref 35–47)
HGB BLD-MCNC: 15.9 G/DL (ref 11.7–15.7)
HGB UR QL STRIP: ABNORMAL
IMM GRANULOCYTES # BLD: 0 10E9/L (ref 0–0.4)
IMM GRANULOCYTES NFR BLD: 0.4 %
KETONES UR STRIP-MCNC: NEGATIVE MG/DL
LACTATE SERPL-SCNC: 0.9 MMOL/L (ref 0.5–2.2)
LEUKOCYTE ESTERASE UR QL STRIP: NEGATIVE
LYMPHOCYTES # BLD AUTO: 1.6 10E9/L (ref 0.8–5.3)
LYMPHOCYTES NFR BLD AUTO: 22.7 %
MCH RBC QN AUTO: 30.7 PG (ref 26.5–33)
MCHC RBC AUTO-ENTMCNC: 34 G/DL (ref 31.5–36.5)
MCV RBC AUTO: 90 FL (ref 78–100)
MONOCYTES # BLD AUTO: 0.4 10E9/L (ref 0–1.3)
MONOCYTES NFR BLD AUTO: 5.7 %
NEUTROPHILS # BLD AUTO: 5 10E9/L (ref 1.6–8.3)
NEUTROPHILS NFR BLD AUTO: 69.1 %
NITRATE UR QL: NEGATIVE
PH UR STRIP: 6 PH (ref 5–9)
PLATELET # BLD AUTO: 231 10E9/L (ref 150–450)
POTASSIUM SERPL-SCNC: 4.1 MMOL/L (ref 3.5–5.1)
PROT SERPL-MCNC: 7.2 G/DL (ref 6.4–8.9)
RBC # BLD AUTO: 5.18 10E12/L (ref 3.8–5.2)
RBC #/AREA URNS AUTO: NORMAL /HPF
SODIUM SERPL-SCNC: 137 MMOL/L (ref 134–144)
SOURCE: ABNORMAL
SP GR UR STRIP: 1.01 (ref 1–1.03)
TROPONIN I SERPL-MCNC: <0.03 UG/L (ref 0–0.03)
UROBILINOGEN UR STRIP-ACNC: 0.2 EU/DL (ref 0.2–1)
WBC # BLD AUTO: 7.2 10E9/L (ref 4–11)
WBC #/AREA URNS AUTO: NORMAL /HPF

## 2018-12-11 PROCEDURE — 84484 ASSAY OF TROPONIN QUANT: CPT | Performed by: PHYSICIAN ASSISTANT

## 2018-12-11 PROCEDURE — 93005 ELECTROCARDIOGRAM TRACING: CPT | Performed by: PHYSICIAN ASSISTANT

## 2018-12-11 PROCEDURE — 99283 EMERGENCY DEPT VISIT LOW MDM: CPT | Mod: Z6 | Performed by: PHYSICIAN ASSISTANT

## 2018-12-11 PROCEDURE — 87040 BLOOD CULTURE FOR BACTERIA: CPT | Mod: 91 | Performed by: PHYSICIAN ASSISTANT

## 2018-12-11 PROCEDURE — 93010 ELECTROCARDIOGRAM REPORT: CPT | Performed by: INTERNAL MEDICINE

## 2018-12-11 PROCEDURE — 96360 HYDRATION IV INFUSION INIT: CPT | Performed by: PHYSICIAN ASSISTANT

## 2018-12-11 PROCEDURE — 85379 FIBRIN DEGRADATION QUANT: CPT | Performed by: PHYSICIAN ASSISTANT

## 2018-12-11 PROCEDURE — 25000128 H RX IP 250 OP 636: Performed by: PHYSICIAN ASSISTANT

## 2018-12-11 PROCEDURE — 99285 EMERGENCY DEPT VISIT HI MDM: CPT | Mod: 25 | Performed by: PHYSICIAN ASSISTANT

## 2018-12-11 PROCEDURE — 87040 BLOOD CULTURE FOR BACTERIA: CPT | Performed by: PHYSICIAN ASSISTANT

## 2018-12-11 PROCEDURE — 85025 COMPLETE CBC W/AUTO DIFF WBC: CPT | Performed by: PHYSICIAN ASSISTANT

## 2018-12-11 PROCEDURE — 83605 ASSAY OF LACTIC ACID: CPT | Performed by: PHYSICIAN ASSISTANT

## 2018-12-11 PROCEDURE — 36415 COLL VENOUS BLD VENIPUNCTURE: CPT | Performed by: PHYSICIAN ASSISTANT

## 2018-12-11 PROCEDURE — 80053 COMPREHEN METABOLIC PANEL: CPT | Performed by: PHYSICIAN ASSISTANT

## 2018-12-11 PROCEDURE — 81001 URINALYSIS AUTO W/SCOPE: CPT | Performed by: PHYSICIAN ASSISTANT

## 2018-12-11 PROCEDURE — 71046 X-RAY EXAM CHEST 2 VIEWS: CPT

## 2018-12-11 RX ORDER — IBUPROFEN 800 MG/1
800 TABLET, FILM COATED ORAL EVERY 8 HOURS PRN
Qty: 60 TABLET | Refills: 0 | Status: SHIPPED | OUTPATIENT
Start: 2018-12-11 | End: 2019-06-17

## 2018-12-11 RX ORDER — AZITHROMYCIN 250 MG/1
TABLET, FILM COATED ORAL
Qty: 6 TABLET | Refills: 0 | Status: SHIPPED | OUTPATIENT
Start: 2018-12-11 | End: 2019-02-06

## 2018-12-11 RX ORDER — SODIUM CHLORIDE 9 MG/ML
1000 INJECTION, SOLUTION INTRAVENOUS CONTINUOUS
Status: DISCONTINUED | OUTPATIENT
Start: 2018-12-11 | End: 2018-12-11 | Stop reason: HOSPADM

## 2018-12-11 RX ADMIN — SODIUM CHLORIDE 1000 ML: 900 INJECTION, SOLUTION INTRAVENOUS at 10:48

## 2018-12-11 ASSESSMENT — ENCOUNTER SYMPTOMS
CHEST TIGHTNESS: 1
SHORTNESS OF BREATH: 0
WHEEZING: 0
HEADACHES: 0
CHILLS: 0
BACK PAIN: 0
ADENOPATHY: 0
FEVER: 0
WOUND: 0
WEAKNESS: 0
CONFUSION: 0
COUGH: 0
FATIGUE: 0
HEMATURIA: 0
ABDOMINAL PAIN: 0
BRUISES/BLEEDS EASILY: 0

## 2018-12-11 ASSESSMENT — MIFFLIN-ST. JEOR: SCORE: 1403.79

## 2018-12-11 NOTE — TELEPHONE ENCOUNTER
"Soft transfer from call center.   Patient verbalized understanding and intent to comply. Corin Irizarry RN on 12/11/2018 at 9:18 AM      Reason for Disposition    [1] BP  >= 160 / 100 AND [2] cardiac or neurologic symptoms    (e.g., chest pain, difficulty breathing, unsteady gait, blurred vision)    Additional Information    Negative: Difficult to awaken or acting confused  (e.g., disoriented, slurred speech)    Negative: Severe difficulty breathing (e.g., struggling for each breath, speaks in single words)    Negative: [1] Weakness of the face, arm or leg on one side of the body AND [2] new onset    Negative: [1] Numbness (i.e., loss of sensation) of the face, arm or leg on one side of the body AND [2] new onset    Negative: [1] Chest pain lasts > 5 minutes AND [2] history of heart disease  (i.e., heart attack, bypass surgery, angina, angioplasty, CHF)    Negative: [1] Chest pain AND [2] took nitrogylcerin AND [3] pain was not relieved    Negative: Sounds like a life-threatening emergency to the triager    Negative: Symptom is main concern  (e.g., headache, chest pain)    Negative: Low blood pressure is main concern    Answer Assessment - Initial Assessment Questions  1. BLOOD PRESSURE: \"What is the blood pressure?\" \"Did you take at least two measurements 5 minutes apart?\"      154/102 about 15 minute. Don't feel right. Feel kind of shakey. Maybe I am just nervous  2. ONSET: \"When did you take your blood pressure?\"      See above  3. HOW: \"How did you obtain the blood pressure?\" (e.g., visiting nurse, automatic home BP monitor)      Hospice nurse took it   4. HISTORY: \"Do you have a history of high blood pressure?\"      no  5. MEDICATIONS: \"Are you taking any medications for blood pressure?\" \"Have you missed any doses recently?\"      No   6. OTHER SYMPTOMS: \"Do you have any symptoms?\" (e.g., headache, chest pain, blurred vision, difficulty breathing, weakness)      Chest pain right in the middle  7. PREGNANCY: \"Is " "there any chance you are pregnant?\" \"When was your last menstrual period?\"      no    Protocols used: HIGH BLOOD PRESSURE-ADULT-AH      "

## 2018-12-11 NOTE — ED TRIAGE NOTES
"ED Nursing Triage Note (General)   ________________________________    Cielo Bahena is a 48 year old Female that presents to triage private car  With history of  chest tightness that started last night in the middle of the night.  Today she went to work and felt tired and dizzy so she asked coworkers to check her blood pressure (154/102).  They recommended her coming to ED. reported by patient   Significant symptoms had onset 8 hour(s) ago.  BP (!) 149/96   Temp 97.7  F (36.5  C) (Tympanic)   Resp 16   Ht 1.626 m (5' 4\")   Wt 79.4 kg (175 lb)   Breastfeeding? No   BMI 30.04 kg/m  t  Patient appears alert , in mild distress., and cooperative behavior.    GCS Total = 15  Airway: intact  Breathing noted as Normal.  Circulation Normal  Skin normal  Action taken:  Triage order initiated      PRE HOSPITAL PRIOR LIVING SITUATION Spouse and Children  "

## 2018-12-11 NOTE — ED AVS SNAPSHOT
[facility]  [address]  [place] [address]  Phone:  [phone]  Fax:  [phone]                                    Cielo Bahena   MRN: [id number]    Department:  [facility]   Date of Visit:  12/11/2018           After Visit Summary Signature Page    I have received my discharge instructions, and my questions have been answered. I have discussed any challenges I see with this plan with the nurse or doctor.    ..........................................................................................................................................  Patient/Patient Representative Signature      ..........................................................................................................................................  Patient Representative Print Name and Relationship to Patient    ..................................................               ................................................  Date                                   Time    ..........................................................................................................................................  Reviewed by Signature/Title    ...................................................              ..............................................  Date                                               Time          22EPIC Rev 08/18

## 2018-12-11 NOTE — ED PROVIDER NOTES
History     Chief Complaint   Patient presents with     chest tightness     This is a 48-year-old female who started having some increased chest tightness last night.  She reports it woke her up from her sleep.  She describes the pain is increased with deep inspiration.  He does have a history of asthma, but denies any cough no fever or chills.  No sore throat.  No nausea or vomiting.  She currently uses Advair Diskus, as well as Ventolin MDI.  She takes Singulair daily.  Past medical history significant for moderate persistent asthma which is generally well controlled.  She does have a history of respiratory arrest requiring brief mechanical ventilation.  Insomnia, stress incontinence, depression and anemia.              Problem List:    Patient Active Problem List    Diagnosis Date Noted     Moderate persistent asthma without complication 02/16/2018     Priority: Medium     Overview:   Asthma, generally well controlled, history of respiratory arrest requiring   brief mechanical ventilation.       Insomnia 02/16/2018     Priority: Medium     Genuine stress incontinence, female 02/16/2018     Priority: Medium     Depression 02/16/2018     Priority: Medium     Anemia 02/16/2018     Priority: Medium     Overview:   mild       Family history of colon cancer 01/22/2015     Priority: Medium     RLS (restless legs syndrome) 06/20/2014     Priority: Medium     Hip pain 04/18/2014     Priority: Medium     GERD (gastroesophageal reflux disease) 10/25/2012     Priority: Medium     Breast hypertrophy 03/14/2012     Priority: Medium        Past Medical History:    Past Medical History:   Diagnosis Date     Anemia      Family history of malignant neoplasm of digestive organ      Insomnia      Major depressive disorder, single episode      Mass of breast      Pain in hip      Personal history of other medical treatment (CODE)      Personal history of other specified conditions (CODE)      Restless legs syndrome      Segmental  and somatic dysfunction of pelvic region      Uncomplicated asthma        Past Surgical History:    Past Surgical History:   Procedure Laterality Date     DILATION AND CURETTAGE      2006,Dilatation and curettage for blighted ovum.     ENDOSCOPY UPPER, COLONOSCOPY, COMBINED      2015     EXTRACTION(S) DENTAL      [place] tooth extraction     HYSTERECTOMY VAGINAL      3/5/2015,Dr. Lindsay for DUB; negative for malignancy; ovaries remain     MAMMOPLASTY REDUCTION      , Bilateral breast reduction, [facility]     MAMMOPLASTY REDUCTION           OTHER SURGICAL HISTORY      99818.9,[place] SUBTALAR ATHROEREISIS,Left foot       Family History:    Family History   Problem Relation Age of Onset     Hypertension Father         Hypertension     Heart Disease Father         Heart Disease,CAD with stents     Hyperlipidemia Father         Hyperlipidemia,Hyperlipidemia     Colon Cancer Maternal Grandmother         Cancer-colon     Heart Disease Maternal Grandfather         Heart Disease,CAD     Heart Disease Paternal Grandfather         Heart Disease,CAD     Breast Cancer No family hx of         Cancer-breast       Social History:  Marital Status:   [2]  Social History     Tobacco Use     Smoking status: Former Smoker     Packs/day: 0.50     Years: 25.00     Pack years: 12.50     Types: Cigarettes     Last attempt to quit: 2005     Years since quittin.0     Smokeless tobacco: Never Used     Tobacco comment: on rare occasions   Substance Use Topics     Alcohol use: Yes     Alcohol/week: 0.5 oz     Comment: Alcoholic Drinks/day: occassional     Drug use: No     Comment: Drug use: No        Medications:      amitriptyline (ELAVIL) 10 MG tablet   montelukast (SINGULAIR) 10 MG tablet   pramipexole (MIRAPEX) 0.25 MG tablet   VENTOLIN  (90 Base) MCG/ACT inhaler   EPINEPHrine (EPIPEN/ADRENACLICK/OR ANY BX GENERIC EQUIV) 0.3 MG/0.3ML injection 2-pack   fluticasone-salmeterol (ADVAIR DISKUS) 250-50 MCG/DOSE  "diskus inhaler   LORazepam (ATIVAN) 0.5 MG tablet         Review of Systems   Constitutional: Negative for chills, fatigue and fever.   HENT: Negative for congestion.    Eyes: Negative for visual disturbance.   Respiratory: Positive for chest tightness. Negative for cough, shortness of breath and wheezing.    Cardiovascular: Negative for chest pain.   Gastrointestinal: Negative for abdominal pain.   Genitourinary: Negative for hematuria.   Musculoskeletal: Negative for back pain.   Skin: Negative for rash and wound.   Neurological: Negative for syncope, weakness and headaches.   Hematological: Negative for adenopathy. Does not bruise/bleed easily.   Psychiatric/Behavioral: Negative for confusion.       Physical Exam   BP: (!) 149/96  Heart Rate: 75  Temp: 97.7  F (36.5  C)  Resp: 16  Height: 162.6 cm (5' 4\")  Weight: 79.4 kg (175 lb)      Physical Exam   Constitutional: She appears well-developed and well-nourished. No distress.   HENT:   Head: Normocephalic and atraumatic.   Mouth/Throat: Oropharynx is clear and moist.   Eyes: Conjunctivae and EOM are normal. Pupils are equal, round, and reactive to light. No scleral icterus.   Neck: Normal range of motion. Neck supple.   Cardiovascular: Normal rate, regular rhythm and normal heart sounds. Exam reveals no gallop and no friction rub.   No murmur heard.  Pulmonary/Chest: Effort normal.   Lung sounds clear but decreased.  SaO2 is 97% on room air.  She appears in no respiratory distress.   Abdominal: Soft. Bowel sounds are normal. She exhibits no distension. There is no tenderness. There is no rebound.   Musculoskeletal: She exhibits no deformity.   Lymphadenopathy:     She has no cervical adenopathy.   Neurological: She is alert.   Skin: Skin is warm and dry. Capillary refill takes less than 2 seconds. No rash noted. She is not diaphoretic.   Psychiatric: She has a normal mood and affect. Her behavior is normal. Thought content normal.       ED Course      "   Procedures          EKG shows normal sinus rhythm.  Moderate voltage criteria for LVH, may be normal variant.  Heart rate is 61.  This is unchanged from 8/25/2014.             Results for orders placed or performed during the hospital encounter of 12/11/18 (from the past 24 hour(s))   *UA reflex to Microscopic   Result Value Ref Range    Color Urine Yellow     Appearance Urine Clear     Glucose Urine Negative NEG^Negative mg/dL    Bilirubin Urine Negative NEG^Negative    Ketones Urine Negative NEG^Negative mg/dL    Specific Gravity Urine 1.010 1.000 - 1.030    Blood Urine Trace (A) NEG^Negative    pH Urine 6.0 5.0 - 9.0 pH    Protein Albumin Urine Negative NEG^Negative mg/dL    Urobilinogen Urine 0.2 0.2 - 1.0 EU/dL    Nitrite Urine Negative NEG^Negative    Leukocyte Esterase Urine Negative NEG^Negative    Source Midstream Urine    Urine Microscopic   Result Value Ref Range    WBC Urine 0 - 5 OTO5^0 - 5 /HPF    RBC Urine O - 2 OTO2^O - 2 /HPF   CBC with platelets differential   Result Value Ref Range    WBC 7.2 4.0 - 11.0 10e9/L    RBC Count 5.18 3.8 - 5.2 10e12/L    Hemoglobin 15.9 (H) 11.7 - 15.7 g/dL    Hematocrit 46.7 35.0 - 47.0 %    MCV 90 78 - 100 fl    MCH 30.7 26.5 - 33.0 pg    MCHC 34.0 31.5 - 36.5 g/dL    RDW 11.5 10.0 - 15.0 %    Platelet Count 231 150 - 450 10e9/L    Diff Method Automated Method     % Neutrophils 69.1 %    % Lymphocytes 22.7 %    % Monocytes 5.7 %    % Eosinophils 1.5 %    % Basophils 0.6 %    % Immature Granulocytes 0.4 %    Absolute Neutrophil 5.0 1.6 - 8.3 10e9/L    Absolute Lymphocytes 1.6 0.8 - 5.3 10e9/L    Absolute Monocytes 0.4 0.0 - 1.3 10e9/L    Absolute Eosinophils 0.1 0.0 - 0.7 10e9/L    Absolute Basophils 0.0 0.0 - 0.2 10e9/L    Abs Immature Granulocytes 0.0 0 - 0.4 10e9/L   D-Dimer (HI,GH)   Result Value Ref Range    D-Dimer ng/mL <200 0 - 230 ng/ml D-DU   Comprehensive metabolic panel   Result Value Ref Range    Sodium 137 134 - 144 mmol/L    Potassium 4.1 3.5 - 5.1  mmol/L    Chloride 108 (H) 98 - 107 mmol/L    Carbon Dioxide 23 21 - 31 mmol/L    Anion Gap 6 3 - 14 mmol/L    Glucose 89 70 - 105 mg/dL    Urea Nitrogen 13 7 - 25 mg/dL    Creatinine 0.89 0.60 - 1.20 mg/dL    GFR Estimate 67 >60 mL/min/1.7m2    GFR Estimate If Black 82 >60 mL/min/1.7m2    Calcium 8.9 8.6 - 10.3 mg/dL    Bilirubin Total 0.5 0.3 - 1.0 mg/dL    Albumin 4.2 3.5 - 5.7 g/dL    Protein Total 7.2 6.4 - 8.9 g/dL    Alkaline Phosphatase 63 34 - 104 U/L    ALT 45 7 - 52 U/L    AST 20 13 - 39 U/L   Lactic acid   Result Value Ref Range    Lactic Acid 0.9 0.5 - 2.2 mmol/L   Troponin I   Result Value Ref Range    Troponin I ES <0.030 0.000 - 0.034 ug/L   XR Chest 2 Views    Narrative    PROCEDURE:  XR CHEST 2 VW    HISTORY: chest pain; chest pain, .    COMPARISON:  11/3/2015    FINDINGS:  The cardiomediastinal contours are normal.  The trachea is midline.  No focal consolidation, effusion or pneumothorax.    No suspicious osseous lesion or subdiaphragmatic free air.      Impression    IMPRESSION:      Negative stable chest    BONITA AGRAWAL MD       Medications   0.9% sodium chloride BOLUS (1,000 mLs Intravenous New Bag 12/11/18 1048)     Followed by   sodium chloride 0.9% infusion (not administered)       Assessments & Plan (with Medical Decision Making)     I have reviewed the nursing notes.    I have reviewed the findings, diagnosis, plan and need for follow up with the patient.      Current Discharge Medication List          Final diagnoses:   Viral upper respiratory infection   Pleuritic chest pain   Afebrile.  Vital signs stable.  Atypical chest pain that started last night pleuritic in nature.  Appears in no distress.  EKG shows normal sinus rhythm.  Moderate voltage criteria for LVH, may be normal variant.  Heart rate is 61.  This is unchanged from 8/25/2014.  Troponin is normal.  D-dimer is normal.  CBC shows normal white blood cells no left shift.  Lactate is normal.  CMP is normal UA is  unremarkable.  Chest x-ray shows no obvious infiltrate.  I discussed with the patient that most of her symptoms are pleuritic in nature and is possible that she has a early viral upper respiratory infection.  She does currently have asthma as well.  I discussed using her MDIs more frequently as needed.  Discussed continued monitoring and return if there is any concerns for further evaluation.  Rx for Motrin for her pleuritic chest pain as well as on Rx for azithromycin which she will only fill if her cough worsens and she develops a fever.  Sooner if there is any other concerns problems or questions    12/11/2018   [facility]     Juanjo Leslie PA-C  12/11/18 2682

## 2018-12-17 LAB
BACTERIA SPEC CULT: NORMAL
BACTERIA SPEC CULT: NORMAL
SPECIMEN SOURCE: NORMAL
SPECIMEN SOURCE: NORMAL

## 2018-12-17 NOTE — RESULT ENCOUNTER NOTE
Final blood culture report is NEGATIVE.    No treatment or change in treatment per [facility] ED Lab Result protocol.

## 2018-12-28 DIAGNOSIS — J45.20 MILD INTERMITTENT ASTHMA WITHOUT COMPLICATION: Primary | ICD-10-CM

## 2018-12-29 DIAGNOSIS — G25.81 RESTLESS LEGS SYNDROME: Primary | ICD-10-CM

## 2019-01-02 RX ORDER — MONTELUKAST SODIUM 10 MG/1
TABLET ORAL
Qty: 90 TABLET | Refills: 3 | Status: SHIPPED | OUTPATIENT
Start: 2019-01-02 | End: 2019-11-07

## 2019-01-02 NOTE — TELEPHONE ENCOUNTER
Connie TREJO-08/28/2018      Future Office visit:    Next 5 appointments (look out 90 days)    Jan 08, 2019  7:45 AM CST  SHORT with Valencia Benavides DO  [facility] ([facility]) [address]  [place] [address]  [phone]           Routing refill request to provider for review/approval because: No asthma control assessment score within normal limits in last 6 months         Unable to complete prescription refill per RNMedication Refill Policy.................... Corin Irizarry ....................  1/2/2019   2:57 PM

## 2019-01-03 RX ORDER — PRAMIPEXOLE DIHYDROCHLORIDE 0.25 MG/1
TABLET ORAL
Qty: 90 TABLET | Refills: 3 | Status: SHIPPED | OUTPATIENT
Start: 2019-01-03 | End: 2019-11-07

## 2019-01-03 NOTE — TELEPHONE ENCOUNTER
Jas TREJO-08/28/2018    Future Office visit:    Next 5 appointments (look out 90 days)    Jan 08, 2019  7:45 AM CST  SHORT with Valencia Benavides DO  [facility] ([facility] [address]  [place] [address]  [phone]           Routing refill request to provider for review/approval.    Unable to complete prescription refill per RNMedication Refill Policy.................... Corin Irizarry ....................  1/3/2019   8:50 AM

## 2019-02-02 DIAGNOSIS — J45.40 MODERATE PERSISTENT ASTHMA WITHOUT COMPLICATION: ICD-10-CM

## 2019-02-04 RX ORDER — ALBUTEROL SULFATE 90 UG/1
AEROSOL, METERED RESPIRATORY (INHALATION)
Qty: 54 G | Refills: 1 | Status: SHIPPED | OUTPATIENT
Start: 2019-02-04 | End: 2019-05-16

## 2019-02-04 NOTE — TELEPHONE ENCOUNTER
"Requested Prescriptions   Pending Prescriptions Disp Refills     VENTOLIN  (90 Base) MCG/ACT inhaler [Pharmacy Med Name: VENTOLIN HFA INH W/DOS CTR 200PUFFS] 54 g 0     Sig: INHALE 2 PUFFS IN THE LUNGS EVERY 4 HOURS AS NEEDED    Asthma Maintenance Inhalers - Anticholinergics Failed - 2/2/2019  4:24 PM       Failed - Asthma control assessment score within normal limits in last 6 months    Please review ACT score.          Passed - Patient is age 12 years or older       Passed - Medication is active on med list       Passed - Recent (6 mo) or future (30 days) visit within the authorizing provider's specialty    Patient had office visit in the last 6 months or has a visit in the next 30 days with authorizing provider or within the authorizing provider's specialty.  See \"Patient Info\" tab in inbasket, or \"Choose Columns\" in Meds & Orders section of the refill encounter.            LOV-08/09/2018  Future Office visit:    Next 5 appointments (look out 90 days)    Feb 06, 2019  9:00 AM CST  MyChart Short with Valencia Benavides, DO  [facility] ([facility]) [address]  [facility] [id number] [phone]           Due for 6 month exam.  Limited refill per protocol.   Corin Irizarry ........   2/4/2019    9:43 AM        "

## 2019-02-06 ENCOUNTER — OFFICE VISIT (OUTPATIENT)
Dept: FAMILY MEDICINE | Facility: OTHER | Age: 49
End: 2019-02-06
Attending: FAMILY MEDICINE
Payer: COMMERCIAL

## 2019-02-06 VITALS
BODY MASS INDEX: 30.93 KG/M2 | SYSTOLIC BLOOD PRESSURE: 126 MMHG | TEMPERATURE: 97 F | DIASTOLIC BLOOD PRESSURE: 86 MMHG | HEART RATE: 88 BPM | WEIGHT: 180.2 LBS

## 2019-02-06 DIAGNOSIS — G89.29 CHRONIC NONINTRACTABLE HEADACHE, UNSPECIFIED HEADACHE TYPE: Primary | ICD-10-CM

## 2019-02-06 DIAGNOSIS — R51.9 CHRONIC NONINTRACTABLE HEADACHE, UNSPECIFIED HEADACHE TYPE: Primary | ICD-10-CM

## 2019-02-06 PROCEDURE — 99213 OFFICE O/P EST LOW 20 MIN: CPT | Performed by: FAMILY MEDICINE

## 2019-02-06 RX ORDER — SUMATRIPTAN 50 MG/1
50 TABLET, FILM COATED ORAL
Qty: 20 TABLET | Refills: 2 | Status: SHIPPED | OUTPATIENT
Start: 2019-02-06 | End: 2019-06-17

## 2019-02-06 ASSESSMENT — PAIN SCALES - GENERAL: PAINLEVEL: MODERATE PAIN (4)

## 2019-02-06 NOTE — NURSING NOTE
Patient here for headaches the last couple of weeks.   Medication Reconciliation: complete    Maria Guadalupe Mendez LPN

## 2019-02-06 NOTE — PROGRESS NOTES
Nursing Notes:   Maria Guadalupe Mendez LPN  2/6/2019  9:07 AM  Signed  Patient here for headaches the last couple of weeks.   Medication Reconciliation: complete    Maria Guadalupe Mendez LPN      SUBJECTIVE:   Cielo Bahena is a 48 year old female who presents to clinic today for the following health issues:    HPI  Increasing headaches the last 2-3 weeks.  Comes on throughout the day; but can also persist for longer periods of time.  Has woken up in the middle of the night with headaches.  Has been increasing use of headaches.  Typically occurs on the top of her head, wrapping from forehead to top of head.  She can become nauseated with HAs.  She denies diet changes, fasting, dehydration, etc.  No new supplements or other new medications.  No known triggers.    Patient Active Problem List    Diagnosis Date Noted     Moderate persistent asthma without complication 02/16/2018     Priority: Medium     Overview:   Asthma, generally well controlled, history of respiratory arrest requiring   brief mechanical ventilation.       Insomnia 02/16/2018     Priority: Medium     Genuine stress incontinence, female 02/16/2018     Priority: Medium     Depression 02/16/2018     Priority: Medium     Anemia 02/16/2018     Priority: Medium     Overview:   mild       Family history of colon cancer 01/22/2015     Priority: Medium     RLS (restless legs syndrome) 06/20/2014     Priority: Medium     Hip pain 04/18/2014     Priority: Medium     GERD (gastroesophageal reflux disease) 10/25/2012     Priority: Medium     Breast hypertrophy 03/14/2012     Priority: Medium     Past Medical History:   Diagnosis Date     Anemia     No Comments Provided     Family history of malignant neoplasm of digestive organ     1/22/2015     Insomnia     No Comments Provided     Major depressive disorder, single episode     2006,Depression.  PHQ-9 score 7/1.     Mass of breast     2009,Soft breast mass lump, right breast     Pain in hip     No Comments Provided      Personal history of other medical treatment (CODE)     2011,, 1 SAB     Personal history of other specified conditions (CODE)     abnormal Pap smear prior to , subsequent yearly Pap smears have been normal     Restless legs syndrome     No Comments Provided     Segmental and somatic dysfunction of pelvic region     2013     Uncomplicated asthma     generally well controlled, history of respiratory arrest requiring brief mechanical ventilation.      Past Surgical History:   Procedure Laterality Date     DILATION AND CURETTAGE      ,Dilatation and curettage for blighted ovum.     ENDOSCOPY UPPER, COLONOSCOPY, COMBINED      2015     EXTRACTION(S) DENTAL      [place] tooth extraction     HYSTERECTOMY VAGINAL      3/5/2015,Dr. Lindsay for DUB; negative for malignancy; ovaries remain     MAMMOPLASTY REDUCTION      , Bilateral breast reduction, [facility]     MAMMOPLASTY REDUCTION           OTHER SURGICAL HISTORY      32073.9,[place] SUBTALAR ATHROEREISIS,Left foot     Family History   Problem Relation Age of Onset     Hypertension Father         Hypertension     Heart Disease Father         Heart Disease,CAD with stents     Hyperlipidemia Father         Hyperlipidemia,Hyperlipidemia     Colon Cancer Maternal Grandmother         Cancer-colon     Heart Disease Maternal Grandfather         Heart Disease,CAD     Heart Disease Paternal Grandfather         Heart Disease,CAD     Breast Cancer No family hx of         Cancer-breast     Social History     Tobacco Use     Smoking status: Former Smoker     Packs/day: 0.50     Years: 25.00     Pack years: 12.50     Types: Cigarettes     Last attempt to quit: 2005     Years since quittin.2     Smokeless tobacco: Never Used     Tobacco comment: on rare occasions   Substance Use Topics     Alcohol use: Yes     Alcohol/week: 0.5 oz     Comment: Alcoholic Drinks/day: occassional     Social History     Social History Narrative    Single, moved  back to [place] from [place], does have family in town.   Has significant other.     Works at [employer] as a CNA.   Shannan Galvan-mother  4/13/2011   Inder     Current Outpatient Medications   Medication Sig Dispense Refill     SUMAtriptan (IMITREX) 50 MG tablet Take 1 tablet (50 mg) by mouth at onset of headache for migraine 20 tablet 2     amitriptyline (ELAVIL) 10 MG tablet TAKE 2 TABLETS BY MOUTH EVERY AT BEDTIME 180 tablet 4     EPINEPHrine (EPIPEN/ADRENACLICK/OR ANY BX GENERIC EQUIV) 0.3 MG/0.3ML injection 2-pack Inject 0.3 mg into the muscle as needed Inject 0.3 mg intramuscular one time if needed for Allergic Reaction.       fluticasone-salmeterol (ADVAIR DISKUS) 250-50 MCG/DOSE diskus inhaler Inhale 1 puff into the lungs every 12 hours Inhale 1 Puff by mouth every 12 hours.       LORazepam (ATIVAN) 0.5 MG tablet Take 1 tablet (0.5 mg) by mouth every 8 hours as needed Take 1 tablet by mouth every 8 hours if needed for Anxiety 60 tablet 5     montelukast (SINGULAIR) 10 MG tablet TAKE 1 TABLET BY MOUTH AT BEDTIME 90 tablet 3     pramipexole (MIRAPEX) 0.25 MG tablet TAKE 1 TABLET BY MOUTH EVERY NIGHT AT BEDTIME 90 tablet 3     VENTOLIN  (90 Base) MCG/ACT inhaler INHALE 2 PUFFS IN THE LUNGS EVERY 4 HOURS AS NEEDED 54 g 1     No Known Allergies    Review of Systems   All other systems reviewed and are negative.       OBJECTIVE:     /86 (BP Location: Right arm, Patient Position: Sitting, Cuff Size: Adult Large)   Pulse 88   Temp 97  F (36.1  C) (Tympanic)   Wt 81.7 kg (180 lb 3.2 oz)   BMI 30.93 kg/m    Body mass index is 30.93 kg/m .  Physical Exam   Constitutional: She is oriented to person, place, and time. She appears well-developed and well-nourished. No distress.   HENT:   Right Ear: Tympanic membrane and external ear normal.   Left Ear: Tympanic membrane and external ear normal.   Nose: Nose normal.   Mouth/Throat: Oropharynx is clear and moist. No oropharyngeal exudate.    Eyes: Conjunctivae are normal. Pupils are equal, round, and reactive to light. Right eye exhibits no discharge. Left eye exhibits no discharge.   Neck: Neck supple. No tracheal deviation present. No thyromegaly present.   Cardiovascular: Normal rate, regular rhythm, S1 normal, S2 normal, normal heart sounds and normal pulses. Exam reveals no S3, no S4 and no friction rub.   No murmur heard.  Pulmonary/Chest: Effort normal and breath sounds normal. No respiratory distress. She has no wheezes. She has no rales.   Abdominal: Soft. Bowel sounds are normal. She exhibits no mass. There is no hepatosplenomegaly. There is no tenderness.   Musculoskeletal: Normal range of motion. She exhibits no edema.   Lymphadenopathy:     She has no cervical adenopathy.   Neurological: She is alert and oriented to person, place, and time. She has normal strength and normal reflexes. She exhibits normal muscle tone.   Skin: Skin is warm and dry. No rash noted.   Psychiatric: She has a normal mood and affect. Judgment and thought content normal. Cognition and memory are normal.   Nursing note and vitals reviewed.    Diagnostic Test Results:  None    ASSESSMENT/PLAN:     1. Chronic nonintractable headache, unspecified headache type  Discussed rebound headaches with overuse of ibuprofen recently.  Will decrease x 1-2 weeks and if no improvement will start use of Imitrex prn.  Headache diary to track diet and possible other environmental triggers.    - SUMAtriptan (IMITREX) 50 MG tablet; Take 1 tablet (50 mg) by mouth at onset of headache for migraine  Dispense: 20 tablet; Refill: 2    Follow up in 4 weeks; sooner prn.    Valencia Benavides, [facility]

## 2019-02-06 NOTE — PATIENT INSTRUCTIONS
Patient Education     Rebound Headache (Chronic Daily Headache)     Overuse of pain medications can lead to rebound headaches.   You use pain medicines called analgesics to treat your headaches. You are now having more frequent or intense headaches (rebound headaches). This is your body s response to too much pain medicine. Prescription pain medicines can cause these headaches. But so can over-the-counter medicines like acetaminophen or ibuprofen. A drug that contains caffeine or butalbital is most likely to cause rebound headache.  Symptoms of rebound headache include:    Mild to moderate headache for 15 or more days each month in a person with pre-existing headache disorder    Headache when you wake up that continues most of the day    Headaches getting worse over time    Need for more and more medicine to treat headaches  Your healthcare provider can usually diagnose rebound headaches by your symptoms and medicine history. You may need tests to rule out other causes of your headaches. In the emergency room, you may be given a non-analgesic pain medicine to treat the pain or stop future headaches.  Home care  Treatment involves stopping use of your pain medicines. Your healthcare provider can tell you how to safely do this. You may be able to stop right away. Or you may need to take less and less over time (taper off). This will depend on the medicines you have been taking. To do this, follow the schedule that your provider gives you. If you are taking pain medicines for other types of pain at the same time, your healthcare provider may need other specialists to participate in your care.    For the first week or so after stopping, the headaches will likely get worse. You may also have withdrawal symptoms. These often include nausea, vomiting, and trouble sleeping. You may be given a medicine to help relieve pain and withdrawal symptoms. Take this exactly as you have been told. It is vital to avoid taking daily  pain medicine. If you do so, rebound headaches will continue.    Caffeine can make rebound headaches worse. If you have caffeinated drinks every day, slowly cut your intake.    Keep a written log of your headaches. This can help you and your healthcare provider track your progress.    Be patient. It can take about 2 to 6 months to stop having rebound headaches.    Once you have broken the headache cycle, be careful not to start it again. Work with your provider to make a treatment plan for headache pain that has low risk of causing rebound headaches.    Relaxation can help lower tension and relieve pain. Try a massage, meditation, yoga, or other relaxation techniques. Or make time for a relaxing hobby that you enjoy.  Follow-up care  Follow up with your healthcare provider, or as advised.  When to seek medical advice  Call your healthcare provider right away if any of these occur:    Fever of 100.4 F (38 C) or higher    Headaches that wake you from sleep    Repeated vomiting or visual problems that don't go away    Headache with a stiff neck, rash, confusion, weakness, numbness, seizure (convulsion), or trouble talking    Headache that starts after a head injury or fall    A type of headache you have never had before    Headache that gets worse despite rest and medicine   Date Last Reviewed: 4/1/2018 2000-2018 [employer]. [address] [place] [address], [place] [address]. All rights reserved. This information is not intended as a substitute for professional medical care. Always follow your healthcare professional's instructions.

## 2019-02-08 ASSESSMENT — ASTHMA QUESTIONNAIRES: ACT_TOTALSCORE: 19

## 2019-05-16 DIAGNOSIS — J45.40 MODERATE PERSISTENT ASTHMA WITHOUT COMPLICATION: ICD-10-CM

## 2019-05-20 RX ORDER — ALBUTEROL SULFATE 90 UG/1
AEROSOL, METERED RESPIRATORY (INHALATION)
Qty: 54 G | Refills: 11 | Status: SHIPPED | OUTPATIENT
Start: 2019-05-20 | End: 2020-05-21

## 2019-05-20 NOTE — TELEPHONE ENCOUNTER
Albuterol HFA (VENTOLIN HFA) 90 mcg/actuation inhaler    Last Office Visit: 02/06/2019-Follow up in 4 weeks; sooner prn. 03/19/19 appointment cancelled by patient via my chart.     Future Office visit:       Routing refill request to provider for review/approval because: No ACT on file past 6 months per protocol     Unable to complete prescription refill per RNMedication Refill Policy.................... Corin Irizarry ....................  5/20/2019   9:40 AM

## 2019-06-17 ENCOUNTER — HOSPITAL ENCOUNTER (OUTPATIENT)
Dept: GENERAL RADIOLOGY | Facility: OTHER | Age: 49
Discharge: HOME | End: 2019-06-17
Attending: PHYSICIAN ASSISTANT | Admitting: PHYSICIAN ASSISTANT
Payer: COMMERCIAL

## 2019-06-17 ENCOUNTER — OFFICE VISIT (OUTPATIENT)
Dept: FAMILY MEDICINE | Facility: OTHER | Age: 49
End: 2019-06-17
Attending: PHYSICIAN ASSISTANT
Payer: COMMERCIAL

## 2019-06-17 VITALS
RESPIRATION RATE: 16 BRPM | SYSTOLIC BLOOD PRESSURE: 122 MMHG | HEART RATE: 86 BPM | HEIGHT: 65 IN | BODY MASS INDEX: 29.32 KG/M2 | DIASTOLIC BLOOD PRESSURE: 86 MMHG | WEIGHT: 176 LBS | OXYGEN SATURATION: 97 % | TEMPERATURE: 98.1 F

## 2019-06-17 DIAGNOSIS — M54.42 ACUTE LEFT-SIDED LOW BACK PAIN WITH LEFT-SIDED SCIATICA: Primary | ICD-10-CM

## 2019-06-17 DIAGNOSIS — M54.42 ACUTE LEFT-SIDED LOW BACK PAIN WITH LEFT-SIDED SCIATICA: ICD-10-CM

## 2019-06-17 PROCEDURE — 72100 X-RAY EXAM L-S SPINE 2/3 VWS: CPT

## 2019-06-17 PROCEDURE — 99213 OFFICE O/P EST LOW 20 MIN: CPT | Performed by: PHYSICIAN ASSISTANT

## 2019-06-17 RX ORDER — CYCLOBENZAPRINE HCL 10 MG
5-10 TABLET ORAL 3 TIMES DAILY PRN
Qty: 30 TABLET | Refills: 0 | Status: SHIPPED | OUTPATIENT
Start: 2019-06-17 | End: 2019-09-03

## 2019-06-17 RX ORDER — METHYLPREDNISOLONE 4 MG
TABLET, DOSE PACK ORAL
Qty: 21 TABLET | Refills: 0 | Status: SHIPPED | OUTPATIENT
Start: 2019-06-17 | End: 2019-11-07

## 2019-06-17 RX ORDER — IBUPROFEN 800 MG/1
800 TABLET, FILM COATED ORAL EVERY 6 HOURS PRN
Qty: 50 TABLET | Refills: 2 | Status: SHIPPED | OUTPATIENT
Start: 2019-06-17 | End: 2019-07-18

## 2019-06-17 ASSESSMENT — PATIENT HEALTH QUESTIONNAIRE - PHQ9: SUM OF ALL RESPONSES TO PHQ QUESTIONS 1-9: 0

## 2019-06-17 ASSESSMENT — MIFFLIN-ST. JEOR: SCORE: 1423.59

## 2019-06-17 ASSESSMENT — PAIN SCALES - GENERAL: PAINLEVEL: MODERATE PAIN (5)

## 2019-06-17 NOTE — PATIENT INSTRUCTIONS
Encouraged to take ibuprofen (400-800mg) for relief up to 4 times per day.  Encouraged rest and elevation.  Encouraged to use ice or heat 15 minutes at a time several times per day to decrease pain. Return to clinic in 1-2 weeks as necessary for persistent pain. Return to clinic with any change or worsening of symptoms.   Referred to physical therapy.   Completed low back x-ray.  Started on Flexeril for muscle relaxant.  Given Medrol Dosepak for inflammation.

## 2019-06-17 NOTE — NURSING NOTE
"Patient presents to the clinic today for complaints of low back pain for a month.  Patients states it is usually an 8/10.  She states sometimes her left leg gets numb.  Adeola Dunlap LPN 6/17/2019   1:14 PM    Chief Complaint   Patient presents with     Back Pain       Initial /86 (BP Location: Right arm, Patient Position: Sitting, Cuff Size: Adult Regular)   Pulse 86   Temp 98.1  F (36.7  C) (Tympanic)   Resp 16   Ht 1.65 m (5' 4.96\")   Wt 79.8 kg (176 lb)   SpO2 97%   Breastfeeding? No   BMI 29.32 kg/m   Estimated body mass index is 29.32 kg/m  as calculated from the following:    Height as of this encounter: 1.65 m (5' 4.96\").    Weight as of this encounter: 79.8 kg (176 lb).  Medication Reconciliation: complete        "

## 2019-06-17 NOTE — PROGRESS NOTES
"Nursing Notes:   Adeola Dunlap LPN  6/17/2019  1:23 PM  Signed  Patient presents to the clinic today for complaints of low back pain for a month.  Patients states it is usually an 8/10.  She states sometimes her left leg gets numb.  Adeola Dunlap LPN 6/17/2019   1:14 PM    Chief Complaint   Patient presents with     Back Pain       Initial /86 (BP Location: Right arm, Patient Position: Sitting, Cuff Size: Adult Regular)   Pulse 86   Temp 98.1  F (36.7  C) (Tympanic)   Resp 16   Ht 1.65 m (5' 4.96\")   Wt 79.8 kg (176 lb)   SpO2 97%   Breastfeeding? No   BMI 29.32 kg/m    Estimated body mass index is 29.32 kg/m  as calculated from the following:    Height as of this encounter: 1.65 m (5' 4.96\").    Weight as of this encounter: 79.8 kg (176 lb).  Medication Reconciliation: complete        HPI:    Cielo Bahena is a 48 year old female who presents for low back pain. States this has been going on for many years but has gotten worse within the past month. Associated numbness and tingling down to the back of her left calf, sometimes down to left toes. 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 Has tried ice, heat, tylenol, and ibuprofen with minimal relief. Went to the chiropractor for the past few weeks but that has seemed to make it worse. Chiropractor did x-rays last week. Worse with sitting, laying, lifting, and rotating her back. Hard to sleep now. No numbness or tingling in right leg, no urinary or bowel incontinence.     Past Medical History:   Diagnosis Date     Anemia     No Comments Provided     Family history of malignant neoplasm of digestive organ     1/22/2015     Insomnia     No Comments Provided     Major depressive disorder, single episode     2006,Depression.  PHQ-9 score 7/1.     Mass of breast     2009,Soft breast mass lump, right breast     Pain in hip     No Comments Provided     Personal history of other medical treatment (CODE)     " 2011,, 1 SAB     Personal history of other specified conditions (CODE)     abnormal Pap smear prior to , subsequent yearly Pap smears have been normal     Restless legs syndrome     No Comments Provided     Segmental and somatic dysfunction of pelvic region     2013     Uncomplicated asthma     generally well controlled, history of respiratory arrest requiring brief mechanical ventilation.       Past Surgical History:   Procedure Laterality Date     DILATION AND CURETTAGE      ,Dilatation and curettage for blighted ovum.     ENDOSCOPY UPPER, COLONOSCOPY, COMBINED      2015     EXTRACTION(S) DENTAL      Coulters tooth extraction     HYSTERECTOMY VAGINAL      3/5/2015,Dr. Lindsay for DUB; negative for malignancy; ovaries remain     MAMMOPLASTY REDUCTION      , Bilateral breast reduction, [facility]     MAMMOPLASTY REDUCTION           OTHER SURGICAL HISTORY      17811.9,MI SUBTALAR ATHROEREISIS,Left foot       Family History   Problem Relation Age of Onset     Hypertension Father         Hypertension     Heart Disease Father         Heart Disease,CAD with stents     Hyperlipidemia Father         Hyperlipidemia,Hyperlipidemia     Colon Cancer Maternal Grandmother         Cancer-colon     Heart Disease Maternal Grandfather         Heart Disease,CAD     Heart Disease Paternal Grandfather         Heart Disease,CAD     Breast Cancer No family hx of         Cancer-breast       Social History     Tobacco Use     Smoking status: Former Smoker     Packs/day: 0.50     Years: 25.00     Pack years: 12.50     Types: Cigarettes     Last attempt to quit: 2005     Years since quittin.5     Smokeless tobacco: Never Used     Tobacco comment: on rare occasions   Substance Use Topics     Alcohol use: Yes     Alcohol/week: 0.5 oz     Comment: Alcoholic Drinks/day: occassional       Current Outpatient Medications   Medication Sig Dispense Refill     amitriptyline (ELAVIL) 10 MG tablet TAKE 2  "TABLETS BY MOUTH EVERY AT BEDTIME 180 tablet 4     cyclobenzaprine (FLEXERIL) 10 MG tablet Take 0.5-1 tablets (5-10 mg) by mouth 3 times daily as needed for muscle spasms 30 tablet 0     EPINEPHrine (EPIPEN/ADRENACLICK/OR ANY BX GENERIC EQUIV) 0.3 MG/0.3ML injection 2-pack Inject 0.3 mg into the muscle as needed Inject 0.3 mg intramuscular one time if needed for Allergic Reaction.       fluticasone-salmeterol (ADVAIR DISKUS) 250-50 MCG/DOSE diskus inhaler Inhale 1 puff into the lungs every 12 hours Inhale 1 Puff by mouth every 12 hours.       ibuprofen (ADVIL/MOTRIN) 800 MG tablet Take 1 tablet (800 mg) by mouth every 6 hours as needed for moderate pain 50 tablet 2     LORazepam (ATIVAN) 0.5 MG tablet Take 1 tablet (0.5 mg) by mouth every 8 hours as needed Take 1 tablet by mouth every 8 hours if needed for Anxiety 60 tablet 5     methylPREDNISolone (MEDROL DOSEPAK) 4 MG tablet therapy pack Follow Package Directions 21 tablet 0     montelukast (SINGULAIR) 10 MG tablet TAKE 1 TABLET BY MOUTH AT BEDTIME 90 tablet 3     pramipexole (MIRAPEX) 0.25 MG tablet TAKE 1 TABLET BY MOUTH EVERY NIGHT AT BEDTIME 90 tablet 3     VENTOLIN  (90 Base) MCG/ACT inhaler INHALE 2 PUFFS IN THE LUNGS EVERY 4 HOURS AS NEEDED 54 g 11       No Known Allergies    REVIEW OF SYSTEMS:  Refer to HPI.    EXAM:   Vitals:    /86 (BP Location: Right arm, Patient Position: Sitting, Cuff Size: Adult Regular)   Pulse 86   Temp 98.1  F (36.7  C) (Tympanic)   Resp 16   Ht 1.65 m (5' 4.96\")   Wt 79.8 kg (176 lb)   SpO2 97%   Breastfeeding? No   BMI 29.32 kg/m      General Appearance: Pleasant, alert, appropriate appearance for age. No acute distress  Musculoskeletal Exam: Back is straight and tender to palpation near SI joint on left and over L5-S1 and sacral region. No tenderness to paraspinous or paravertebral muscles bilaterally. Pain with flexion, extension and lateral bending of back. Negative straight leg raise bilaterally. Full " ROM of upper and lower extremities.  Skin: no rash or abnormalities  Neurologic Exam: Nonfocal, symmetric DTRs, normal gross motor, tone coordination and no tremor.  Psychiatric Exam: Alert and oriented -appropriate affect.    X-ray Results from 06/17/2019:  PROCEDURE: XR LUMBAR SPINE 2-3 VIEWS 6/17/2019 2:18 PM     HISTORY: Acute left-sided low back pain with left-sided sciatica     COMPARISONS: 3/9/2016.     TECHNIQUE: 3 views.     FINDINGS: There is a moderate left convex scoliosis of the lower  thoracic and lumbar spine. There is accentuation of the lumbar  lordosis. There is grade 1 anterolisthesis of L4 on L5 with mild  narrowing of the L4-5 disc space. There is mild degenerative change at  the L1-2 level. This has progressed.     There is atherosclerotic calcification without definite aneurysm.                                                                        IMPRESSION: Degenerative change in the spine, slightly worse at the  L1-2 level than on the prior exam.     ALDO DENSON MD      PHQ Depression Screen  PHQ-9 SCORE 12/13/2016 3/29/2017 6/17/2019   PHQ-9 Total Score 12 6 0       ASSESSMENT AND PLAN:      ICD-10-CM    1. Acute left-sided low back pain with left-sided sciatica M54.42 XR Lumbar Spine 2/3 Views     cyclobenzaprine (FLEXERIL) 10 MG tablet     methylPREDNISolone (MEDROL DOSEPAK) 4 MG tablet therapy pack     PHYSICAL THERAPY REFERRAL     ibuprofen (ADVIL/MOTRIN) 800 MG tablet     Completed lumbar xray.  I personally reviewed the xray. I found no fracture appreciated upon initial read of xray.  Final read pending by radiology.    Prescribed cyclobenzaprine (FLEXERIL) 10 MG tablet to help with low back muscle spasms, methylPREDNISolone (MEDROL DOSEPAK) 4 MG tablet therapy pack to help decrease inflammation, and ibuprofen (ADVIL/MOTRIN) 800 MG tablet to help alleviate low back pain. Educated patient on medications and side effects.     Referral placed to PT. Patient will schedule PT  appointments at her convenience. Encouraged patient to use ice or heat for 15 minutes at a time several times per day to help alleviate low back pain. Discussed options for the future including getting an MRI of the lumbar spine and a cortisone injection into the joint if her symptoms are not resolving or if they are worsening. Call or return to the clinic if symptoms persist, worsen, or new symptoms arise.             Patient Instructions   Encouraged to take ibuprofen (400-800mg) for relief up to 4 times per day.  Encouraged rest and elevation.  Encouraged to use ice or heat 15 minutes at a time several times per day to decrease pain. Return to clinic in 1-2 weeks as necessary for persistent pain. Return to clinic with any change or worsening of symptoms.   Referred to physical therapy.   Completed low back x-ray.  Started on Flexeril for muscle relaxant.  Given Medrol Dosepak for inflammation.      Ariadna Pollard PA-C..................6/17/2019 1:01 PM

## 2019-06-18 ASSESSMENT — ASTHMA QUESTIONNAIRES: ACT_TOTALSCORE: 19

## 2019-06-19 ENCOUNTER — OFFICE VISIT (OUTPATIENT)
Dept: FAMILY MEDICINE | Facility: OTHER | Age: 49
End: 2019-06-19
Attending: CHIROPRACTOR
Payer: COMMERCIAL

## 2019-06-19 VITALS
TEMPERATURE: 98.1 F | BODY MASS INDEX: 29.06 KG/M2 | DIASTOLIC BLOOD PRESSURE: 82 MMHG | WEIGHT: 174.4 LBS | SYSTOLIC BLOOD PRESSURE: 126 MMHG | RESPIRATION RATE: 16 BRPM | HEIGHT: 65 IN | HEART RATE: 92 BPM

## 2019-06-19 DIAGNOSIS — M99.05 SEGMENTAL DYSFUNCTION OF PELVIC REGION: ICD-10-CM

## 2019-06-19 DIAGNOSIS — F41.1 GAD (GENERALIZED ANXIETY DISORDER): ICD-10-CM

## 2019-06-19 DIAGNOSIS — M43.10 ANTEROLISTHESIS: ICD-10-CM

## 2019-06-19 DIAGNOSIS — M54.42 ACUTE LEFT-SIDED LOW BACK PAIN WITH LEFT-SIDED SCIATICA: Primary | ICD-10-CM

## 2019-06-19 PROCEDURE — 99214 OFFICE O/P EST MOD 30 MIN: CPT | Performed by: CHIROPRACTOR

## 2019-06-19 ASSESSMENT — PAIN SCALES - GENERAL: PAINLEVEL: NO PAIN (1)

## 2019-06-19 ASSESSMENT — MIFFLIN-ST. JEOR: SCORE: 1416.31

## 2019-06-19 NOTE — NURSING NOTE
Patient presenting with low back pain. Pain has been going on for 1 month and pain has been traveling down her left leg and sometimes to her toes.  No LMP recorded. Patient has had a hysterectomy.  Medication Reconciliation: complete     Review Of Systems  Skin: negative  Eyes: negative  Ears/Nose/Throat: negative  Respiratory: No shortness of breath, dyspnea on exertion, cough, or hemoptysis  Cardiovascular: negative  Gastrointestinal: negative  Genitourinary: negative  Musculoskeletal: positive for back pain  Neurologic: positive for Numbness and tingling down left leg  Psychiatric: negative  Hematologic/Lymphatic/Immunologic: negative  Endocrine: negative      Kari Kirby LPN  6/19/2019 4:42 PM

## 2019-06-20 NOTE — PROGRESS NOTES
CHIEF COMPLAINT: Cielo Bahena is a 48 year old  female  Chief Complaint   Patient presents with     Back Pain     low back pain       HISTORY OF PRESENTING WORK INJURY     Onset and description:  chronic.  Description of pain: Sharp, now dull since having prednisone  Pain scale: 6/10  Radiation of pain: Yes, left leg to the knee, and tingling to the foot.  Pain course: Gradually getting worse  Aggravated by: Movement  Improved by: medications     This is a new patient presenting for consultation of lower back pain with radiculopathy.  She has chronic LBP and recently went through an acute flare-up of pain.  She attempted chiropractic care twice and finally found relief with prednisone prescribed by Ariadna Pollard.  X-rays were taken at that visit and are available for my review.  X-rays also taken at her chiropractic office are available for my review via disc. She is here to discuss possible management options going forward.  She is accompanied by her  today.    Oswestry (JACQUE) Questionnaire    OSWESTRY DISABILITY INDEX 2019   Count 10   Sum 6   Oswestry Score (%) 12   Some recent data might be hidden          PAST MEDICAL HISTORY:  Past Medical History:   Diagnosis Date     Anemia     No Comments Provided     Family history of malignant neoplasm of digestive organ     2015     Insomnia     No Comments Provided     Major depressive disorder, single episode     ,Depression.  PHQ-9 score .     Mass of breast     ,Soft breast mass lump, right breast     Pain in hip     No Comments Provided     Personal history of other medical treatment (CODE)     2011,, 1 SAB     Personal history of other specified conditions (CODE)     abnormal Pap smear prior to , subsequent yearly Pap smears have been normal     Restless legs syndrome     No Comments Provided     Segmental and somatic dysfunction of pelvic region     2013     Uncomplicated asthma     generally well controlled, history  of respiratory arrest requiring brief mechanical ventilation.       PAST SURGICAL HISTORY:  Past Surgical History:   Procedure Laterality Date     DILATION AND CURETTAGE      2006,Dilatation and curettage for blighted ovum.     ENDOSCOPY UPPER, COLONOSCOPY, COMBINED      1/22/2015     EXTRACTION(S) DENTAL      [place] tooth extraction     HYSTERECTOMY VAGINAL      3/5/2015,Dr. Lindsay for DUB; negative for malignancy; ovaries remain     MAMMOPLASTY REDUCTION      2012, Bilateral breast reduction, [facility]     MAMMOPLASTY REDUCTION      2004     OTHER SURGICAL HISTORY      82421.9,MI SUBTALAR ATHROEREISIS,Left foot       ALLERGIES:  No Known Allergies    CURRENT MEDICATIONS:  Current Outpatient Medications   Medication Sig Dispense Refill     amitriptyline (ELAVIL) 10 MG tablet TAKE 2 TABLETS BY MOUTH EVERY AT BEDTIME (Patient taking differently: TAKE 1 TABLETS BY MOUTH EVERY AT BEDTIME) 180 tablet 4     cyclobenzaprine (FLEXERIL) 10 MG tablet Take 0.5-1 tablets (5-10 mg) by mouth 3 times daily as needed for muscle spasms 30 tablet 0     EPINEPHrine (EPIPEN/ADRENACLICK/OR ANY BX GENERIC EQUIV) 0.3 MG/0.3ML injection 2-pack Inject 0.3 mg into the muscle as needed Inject 0.3 mg intramuscular one time if needed for Allergic Reaction.       fluticasone-salmeterol (ADVAIR DISKUS) 250-50 MCG/DOSE diskus inhaler Inhale 1 puff into the lungs every 12 hours Inhale 1 Puff by mouth every 12 hours.       ibuprofen (ADVIL/MOTRIN) 800 MG tablet Take 1 tablet (800 mg) by mouth every 6 hours as needed for moderate pain 50 tablet 2     LORazepam (ATIVAN) 0.5 MG tablet Take 1 tablet (0.5 mg) by mouth every 8 hours as needed Take 1 tablet by mouth every 8 hours if needed for Anxiety 60 tablet 5     methylPREDNISolone (MEDROL DOSEPAK) 4 MG tablet therapy pack Follow Package Directions 21 tablet 0     montelukast (SINGULAIR) 10 MG tablet TAKE 1 TABLET BY MOUTH AT BEDTIME 90 tablet 3     pramipexole (MIRAPEX) 0.25 MG tablet TAKE 1  TABLET BY MOUTH EVERY NIGHT AT BEDTIME 90 tablet 3     VENTOLIN  (90 Base) MCG/ACT inhaler INHALE 2 PUFFS IN THE LUNGS EVERY 4 HOURS AS NEEDED 54 g 11       SOCIAL HISTORY:  Social History     Socioeconomic History     Marital status:      Spouse name: Not on file     Number of children: Not on file     Years of education: Not on file     Highest education level: Not on file   Occupational History     Not on file   Social Needs     Financial resource strain: Not on file     Food insecurity:     Worry: Not on file     Inability: Not on file     Transportation needs:     Medical: Not on file     Non-medical: Not on file   Tobacco Use     Smoking status: Former Smoker     Packs/day: 0.50     Years: 25.00     Pack years: 12.50     Types: Cigarettes     Last attempt to quit: 2005     Years since quittin.5     Smokeless tobacco: Never Used     Tobacco comment: on rare occasions   Substance and Sexual Activity     Alcohol use: Yes     Alcohol/week: 0.5 oz     Comment: Alcoholic Drinks/day: occassional     Drug use: No     Sexual activity: Not Currently     Partners: Male     Birth control/protection: Surgical   Lifestyle     Physical activity:     Days per week: Not on file     Minutes per session: Not on file     Stress: Not on file   Relationships     Social connections:     Talks on phone: Not on file     Gets together: Not on file     Attends Sabianism service: Not on file     Active member of club or organization: Not on file     Attends meetings of clubs or organizations: Not on file     Relationship status: Not on file     Intimate partner violence:     Fear of current or ex partner: Not on file     Emotionally abused: Not on file     Physically abused: Not on file     Forced sexual activity: Not on file   Other Topics Concern     Parent/sibling w/ CABG, MI or angioplasty before 65F 55M? Not Asked   Social History Narrative    Single, moved back to [place] from [place], does have  "family in town.   Has significant other.     Works at [employer] as a CNA.   Shannan Galvan-mother  4/13/2011   Inder       FAMILY HISTORY:  Family History   Problem Relation Age of Onset     Hypertension Father         Hypertension     Heart Disease Father         Heart Disease,CAD with stents     Hyperlipidemia Father         Hyperlipidemia,Hyperlipidemia     Colon Cancer Maternal Grandmother         Cancer-colon     Heart Disease Maternal Grandfather         Heart Disease,CAD     Heart Disease Paternal Grandfather         Heart Disease,CAD     Breast Cancer No family hx of         Cancer-breast       REVIEW OF SYSTEMS:    Nursing Notes:   Kari Kirby LPN  6/19/2019  4:57 PM  Signed  Patient presenting with low back pain. Pain has been going on for 1 month and pain has been traveling down her left leg and sometimes to her toes.  No LMP recorded. Patient has had a hysterectomy.  Medication Reconciliation: complete     Review Of Systems  Skin: negative  Eyes: negative  Ears/Nose/Throat: negative  Respiratory: No shortness of breath, dyspnea on exertion, cough, or hemoptysis  Cardiovascular: negative  Gastrointestinal: negative  Genitourinary: negative  Musculoskeletal: positive for back pain  Neurologic: positive for Numbness and tingling down left leg  Psychiatric: negative  Hematologic/Lymphatic/Immunologic: negative  Endocrine: negative      Kari Kirby LPN  6/19/2019 4:42 PM         PHYSICAL EXAM:   /82   Pulse 92   Temp 98.1  F (36.7  C) (Tympanic)   Resp 16   Ht 1.65 m (5' 4.96\")   Wt 79.1 kg (174 lb 6.4 oz)   Breastfeeding? No   BMI 29.06 kg/m   Body mass index is 29.06 kg/m .    General Appearance: Alert, appropriate appearance for age.  Patient is ambulatory without assistance.    Lumbar Spine range of motion is essentially normal though she states this wasn't the case with the flare-up last weekend.    She has excellent lower extremity strength with no impairment.  Patellar " reflexes are brisk, symmetrical, and 2+    Straight Leg Raiser Test: negative to 75 degrees bilaterally  Kemps Test: positive extension to the left  Ely's Test: negative bilaterally  Nachlas Test: negative bilaterally  Yeoman's Test:  Negative bilaterally  Hibb's Test: negative bilaterally  Sensory is: Intact  Circulatory: Normal capillary flow    Radiographic images were independently reviewed and discussed with the patient. There is grade 1 anterolisthesis of L4.  She has marked pelvic segmental joint dysfunction occurring with left sided externally rotated ilium.  Compensation convexity curvature to the left is noted.      IMPRESSION/PLAN:    Risks, benefits, conservative, surgical and alternatives to treatment were discussed.    Options at the point would be transforaminal epidural injection, specific chiropractic treatment, physical therapy, or surgical consult should she not respond favorably to the conservative measures.  I suspect disc involvement but would like to suggest conservative treatment first.  Patient agrees and we will proceed with four weeks of combination chiropractic and physical therapy.  I will follow up with her for re-evaluation in 4 weeks.    Post Encounter: No further questions and all concerns answered to their satisfaction. Greater than 50% of this encounter was spent in counseling and coordination of care regarding the above condition.        José Delgado DC  Director - Occupational [place] [address]  Phone [phone]  Fax [phone]    Disclaimer:  This note consists of words and symbols derived from keyboarding, dictation, or using voice recognition software. As a result, there may be errors in the script that have gone undetected. Please consider this when interpreting information found in this note.    8:08 AM 6/20/2019

## 2019-06-21 RX ORDER — LORAZEPAM 0.5 MG/1
TABLET ORAL
Qty: 60 TABLET | Refills: 5 | Status: SHIPPED | OUTPATIENT
Start: 2019-06-21 | End: 2020-03-23

## 2019-06-21 NOTE — TELEPHONE ENCOUNTER
Chart review shows that Rx as requested was last filled as follows:    Outpatient Medication Detail      Disp Refills Start End JEN   LORazepam (ATIVAN) 0.5 MG tablet 60 tablet 5 7/25/2018  No   Sig - Route: Take 1 tablet (0.5 mg) by mouth every 8 hours as needed Take 1 tablet by mouth every 8 hours if needed for Anxiety - Oral   Class: Local Print   Order: [id number]   Printout Tracking     External Result Report   Medication Administration Instructions     Take 1 tablet by mouth every 8 hours if needed for Anxiety   Pharmacy     [facility] - [place], [place] - [address]     And is due for a refill. LOV with a provider was on 6/17/19 with DEANDRE Bray. Rx as requested is not on RN refill protocol. Writer will mirella up and route Rx request to PCP for her consideration/approval.    Unable to complete prescription refill per RN Medication Refill Policy. Darnell Kevin 6/21/2019 11:17 AM

## 2019-07-18 DIAGNOSIS — M54.42 ACUTE LEFT-SIDED LOW BACK PAIN WITH LEFT-SIDED SCIATICA: ICD-10-CM

## 2019-07-23 RX ORDER — IBUPROFEN 800 MG/1
TABLET, FILM COATED ORAL
Qty: 50 TABLET | Refills: 11 | Status: SHIPPED | OUTPATIENT
Start: 2019-07-23 | End: 2020-12-14

## 2019-07-23 NOTE — TELEPHONE ENCOUNTER
Ibuprofen (ADVIL/MOTRIN) 800 MG tablet  Last Written Prescription Date:  06/17/2019  Last Fill Quantity: 50,   # refills: 2  Last Office Visit: 06/17/2019  Future Office visit:       Routing refill request to provider for review/approval because:   Failed - Abnormal CBC on file in past 12 months   Recent Labs   Lab Test 12/11/18  1050 07/01/17  0917   WBC 7.2  --    GICHWBC  --  8.3   RBC 5.18  --    GICHRBC  --  4.98   HGB 15.9* 15.4   HCT 46.7 43.4    223          Unable to complete prescription refill per RNMedication Refill Policy.................... Corin Irizarry ....................  7/23/2019   8:48 AM

## 2019-07-24 ENCOUNTER — THERAPY VISIT (OUTPATIENT)
Dept: CHIROPRACTIC MEDICINE | Facility: OTHER | Age: 49
End: 2019-07-24
Attending: CHIROPRACTOR
Payer: COMMERCIAL

## 2019-07-24 DIAGNOSIS — M99.05 SEGMENTAL DYSFUNCTION OF PELVIC REGION: ICD-10-CM

## 2019-07-24 DIAGNOSIS — M99.03 SEGMENTAL AND SOMATIC DYSFUNCTION OF LUMBAR REGION: Primary | ICD-10-CM

## 2019-07-24 DIAGNOSIS — M43.10 ANTEROLISTHESIS: ICD-10-CM

## 2019-07-24 DIAGNOSIS — M54.42 ACUTE LEFT-SIDED LOW BACK PAIN WITH LEFT-SIDED SCIATICA: ICD-10-CM

## 2019-07-24 PROCEDURE — 98940 CHIROPRACT MANJ 1-2 REGIONS: CPT | Mod: AT | Performed by: CHIROPRACTOR

## 2019-07-24 PROCEDURE — 99213 OFFICE O/P EST LOW 20 MIN: CPT | Mod: 25 | Performed by: CHIROPRACTOR
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
## 2019-07-29 ENCOUNTER — THERAPY VISIT (OUTPATIENT)
Dept: CHIROPRACTIC MEDICINE | Facility: OTHER | Age: 49
End: 2019-07-29
Attending: CHIROPRACTOR
Payer: COMMERCIAL

## 2019-07-29 ENCOUNTER — HOSPITAL ENCOUNTER (OUTPATIENT)
Dept: PHYSICAL THERAPY | Facility: OTHER | Age: 49
Setting detail: THERAPIES SERIES
End: 2019-07-29
Attending: CHIROPRACTOR
Payer: COMMERCIAL

## 2019-07-29 DIAGNOSIS — M99.03 SEGMENTAL AND SOMATIC DYSFUNCTION OF LUMBAR REGION: ICD-10-CM

## 2019-07-29 DIAGNOSIS — M43.10 ANTEROLISTHESIS: ICD-10-CM

## 2019-07-29 DIAGNOSIS — M99.05 SEGMENTAL DYSFUNCTION OF PELVIC REGION: ICD-10-CM

## 2019-07-29 DIAGNOSIS — M54.50 ACUTE LEFT-SIDED LOW BACK PAIN WITHOUT SCIATICA: Primary | ICD-10-CM

## 2019-07-29 DIAGNOSIS — M54.42 ACUTE LEFT-SIDED LOW BACK PAIN WITH LEFT-SIDED SCIATICA: ICD-10-CM

## 2019-07-29 PROCEDURE — 97161 PT EVAL LOW COMPLEX 20 MIN: CPT | Mod: GP

## 2019-07-29 PROCEDURE — 98940 CHIROPRACT MANJ 1-2 REGIONS: CPT | Mod: AT | Performed by: CHIROPRACTOR

## 2019-07-29 PROCEDURE — 97110 THERAPEUTIC EXERCISES: CPT | Mod: GP

## 2019-07-30 NOTE — PROGRESS NOTES
07/29/19 1500   General Information   Type of Visit Initial OP Ortho PT Evaluation   Start of Care Date 07/29/19   Referring Physician José Delgado DC   Patient/Family Goals Statement To be able to work without pain   Orders Evaluate and Treat   Date of Order 06/20/19   Certification Required? No   Medical Diagnosis Acute left-sided low back pain with left-sided sciatica M54.42    Surgical/Medical history reviewed Yes   General Information Comments Patient is recieving chiropractic treatment as well    Body Part(s)   Body Part(s) Lumbar Spine/SI   Presentation and Etiology   Pertinent history of current problem (include personal factors and/or comorbidities that impact the POC) Patient is a 47 y/o female whom presents to PT with c/o an insidious onset acute on chronic left sided LBP. She reports this new episode started around 6/10/2019.  She initially experienced  left leg radicular symptoms however this resolved over time and after a course of prednisone. She has recieved two chiropractic treatments over the last two weeks. She reports she has no pain at this time.  Reports she completed a 5 day course of prednisome.  She feels this has been very helpful. Her main concern at this time is how to prevent future recurence of LBP and how to improve her core strength. Her past medical history is remarkable for past episodes of low level LBP.   Impairments A. Pain;D. Decreased ROM;E. Decreased flexibility   Functional Limitations perform desired leisure / sports activities   Symptom Location Left lumbat at  beltline.    How/Where did it occur From insidious onset   Onset date of current episode/exacerbation 06/15/19   Chronicity Chronic   Pain rating (0-10 point scale) Best (/10);Worst (/10)   Best (/10) 0   Worst (/10) 8-9 when it was worse.    Pain quality C. Aching;G. Cramping;E. Shooting   Pain/symptoms are: Worse in the morning   Pain/symptoms exacerbated by A. Sitting;G. Certain positions   Pain/symptoms eased by  B. Walking   Current / Previous Interventions   Diagnostic Tests: X-ray   X-ray Results Results   X-ray results Grade 1 Anterolisthesis at L4 on L5   Prior Level of Function   Prior Level of Function-Mobility NML except nuisance LBP   Prior Level of Function-ADLs NML   Current Level of Function   Current Community Support Family/friend caregiver   Patient role/employment history A. Employed   Employment Comments [employer]   Living environment House/townhome   Home/community accessibility NML   Fall Risk Screen   Fall screen completed by PT   Have you fallen 2 or more times in the past year? No   Have you fallen and had an injury in the past year? No   Is patient a fall risk? No   Lumbar Spine/SI Objective Findings   Observation Scoliosis   Integumentary NML   Posture Hyper lordosis   Gait/Locomotion NML   Flexion ROM to ankles   Extension ROM NML , slight pain   Right Side Bending ROM NML   Left Side Bending ROM NML   Repeated Extension-Standing ROM Not indicated   Repeated Flexion-Standing ROM POS for recreation of back pain   Lumbar ROM Comment Generally functional   Pelvic Screen Left anterior rotation with noted scoliosis   Hip Screen NML   Transversus Abdominus Strength (Rambo Leg Lowering-deg) 3-/5   Lumbar/Hip/Knee/Foot Strength Comments NML strength all B LE   Hamstring Flexibility Left slight limitation compared to right   Hip Flexor Flexibility NML B   Quadricep Flexibility Slight left restriction   Piriformis Flexibility Slight limitation left   SLR NEG B   Mario Test NEG B   Prone Instability Test POS   Crossover SLR NEG B   Repeated Extension Prone POS   Slump Test NEG   Segmental Mobility Hypomobile upper lumbar levels L2-3, L3-4   Sensation Testing NML   Neurological Testing Comments Intact   Palpation 2/4 tenderness of central L4. 2/4 tenderness of left lumbar paraspinals. Minimal left SI joint tenderness   Planned Therapy Interventions   Planned Therapy Interventions joint mobilization;manual  therapy;ROM;strengthening;stretching   Planned Modality Interventions   Planned Modality Interventions Ultrasound;Traction;Electrical stimulation;Cryotherapy;Hot packs   Clinical Impression   Criteria for Skilled Therapeutic Interventions Met yes, treatment indicated   PT Diagnosis Lumbar instability. Resolving Left LBP due to disk derangement   Influenced by the following impairments Pain, intermittent limited mobility, core weakness   Functional limitations due to impairments Difficulty with work tasks requiring lifting, sitting   Clinical Presentation Stable/Uncomplicated   Clinical Decision Making (Complexity) Low complexity   Therapy Frequency   (1-2 X per week)   Predicted Duration of Therapy Intervention (days/wks) 8 weeks   Risk & Benefits of therapy have been explained Yes   Patient, Family & other staff in agreement with plan of care Yes   Clinical Impression Comments Poor core strength leading to chronic lumbar instability with lumbar disk involvement   Education Assessment   Preferred Learning Style Listening;Reading;Demonstration;Pictures/video   Barriers to Learning No barriers   ORTHO GOALS   PT Ortho Eval Goals 1;2;3;4   Ortho Goal 1   Goal Identifier Pain   Goal Description Patient will report she is able to complete 90% of all work tasks 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 due to back pain in excess of 2/10 in 8-12 weeks   Target Date 10/18/19   Ortho Goal 2   Goal Identifier Mobility   Goal Description Patient will demo full lumbar mobility allowing all nml tasks including working in squat and knkeeling positions, ability to complete work tasks in forward flexed posiitons without limit due to soft tissue restriction in 8-12 weeks   Target Date 10/18/19   Ortho Goal 3   Goal Identifier Strength   Goal Description Patient will demo good grade core strength supporting tasks requiring lifting at work including transferring patients in 12 weeks   Target Date 10/30/19   Ortho Goal 4   Goal  Identifier Posture   Goal Description Patient will demonstrate that she is aware of the need to maintain good upper quarter posture and work mechanics to protect her back from further injury and decrease back strain in 8 to 10 weeks   Target Date 10/18/19   Total Evaluation Time   PT Glenn, Low Complexity Minutes (81669) 35

## 2019-07-30 NOTE — PROGRESS NOTES
Visit #:  2    Subjective:  Cielo Bahena is a 48 year old female who is seen in f/u up for:        Acute left-sided low back pain without sciatica  Anterolisthesis  Segmental and somatic dysfunction of lumbar region.     Since last visit on 7/24/2019,  Cielo Bahena reports:    Area of chief complaint:  Lumbar :  Symptoms are graded at 4/10.  Patient notes following last treatment not experiencing radicular complaints into her left leg.  Patient does feel that she is making improvement.  Patient notes that following last treatment she has not been working either and therefore did not notice if symptoms affected her ability to bend, or lift.      Patient is following up with physical therapy today after our treatment.         Objective:  The following was observed:    P: palpatory tendernessElicited along the left side of L5.:    A: static palpation demonstrates intersegmental asymmetry , lumbar  R: motion palpation notes restricted motion, L5   T: mild hypertonicity noted of the left quadratus lumborum and left piriformis    Segmental spinal dysfunction/restrictions found at:  :  L5 Right rotation restricted, Left lateral flexion restricted and Extension restriction.      Assessment: Patient is making progress.  Palpatory tenderness is significantly decreased compared to last patient visit.  Muscle tone does appear to be normalizing.  Patient's radicular complaints are not present on today's visit.  Patient is beginning physical therapy following today's treatment.  Patient has not been working which seems to be the triggering factor for her symptoms.     Diagnoses:      1. Acute left-sided low back pain without sciatica    2. Anterolisthesis    3. Segmental and somatic dysfunction of lumbar region        Patient's condition:  Patient had restrictions pre-manipulation and Patient is noticing a decrease in their symptoms    Treatment effectiveness:  Post manipulation there is better intersegmental movement,  Patient claims to feel looser post manipulation and Patients symptoms are getting better      Procedures:  CMT:  63783 Chiropractic manipulative treatment 1-2 regions performed   Lumbar: Diversified, L5, Side posture    Modalities:  None performed this visit    Therapeutic procedures:  None    Response to Treatment  Reduction in symptoms as reported by patient    Prognosis: Good    Progress towards Goals: Patient is making progress towards the goal.     Recommendations:    Instructions:Continue to monitor your symptoms closely    Follow-up:  Return to care in 3 days.

## 2019-08-01 ENCOUNTER — THERAPY VISIT (OUTPATIENT)
Dept: CHIROPRACTIC MEDICINE | Facility: OTHER | Age: 49
End: 2019-08-01
Attending: CHIROPRACTOR
Payer: COMMERCIAL

## 2019-08-01 DIAGNOSIS — M43.10 ANTEROLISTHESIS: ICD-10-CM

## 2019-08-01 DIAGNOSIS — M54.50 ACUTE LEFT-SIDED LOW BACK PAIN WITHOUT SCIATICA: Primary | ICD-10-CM

## 2019-08-01 DIAGNOSIS — M99.03 SEGMENTAL AND SOMATIC DYSFUNCTION OF LUMBAR REGION: ICD-10-CM

## 2019-08-01 PROCEDURE — 98940 CHIROPRACT MANJ 1-2 REGIONS: CPT | Mod: AT | Performed by: CHIROPRACTOR

## 2019-08-01 NOTE — PROGRESS NOTES
Visit #:  3    Subjective:  Cielo Bahena is a 48 year old female who is seen in f/u up for:        Acute left-sided low back pain without sciatica  Anterolisthesis  Segmental and somatic dysfunction of lumbar region.     Since last visit on 7/29/2019,  Cielo Bahena reports:    Area of chief complaint:  Lumbar :  Symptoms are graded at 6/10. The quality is described as stiff, sore.  Symptoms were worse following initial evaluation and treatment with physical therapy after last treatment.  Patient notes that she has returned to her job and that her job duties do cause symptoms to increase.  Motion has increased, but is still not normal. Patient feels that they are improved due to a reduction in symptoms.            Objective:  The following was observed:    P: palpatory tendernessmildly present along the left side of L5:    A: static palpation demonstrates intersegmental asymmetry , lumbar  R: motion palpation notes restricted motion, L5   T: muscle spasm at level(s): left lumbar paraspinals:      Segmental spinal dysfunction/restrictions found at:  :  L5 Left rotation restricted, Right lateral flexion restricted and Extension restriction.      Assessment: Patient does appear to be exhibiting progress with relationship to her symptoms.  I do believe much of the patient's symptoms are due to muscle soreness due to underutilization prior to undergoing physical therapy treatment and evaluation.  Barring acute exacerbation of symptoms I do anticipate tapering of patient care with chiropractic.    Diagnoses:      1. Acute left-sided low back pain without sciatica    2. Anterolisthesis    3. Segmental and somatic dysfunction of lumbar region        Patient's condition:  Patient had restrictions pre-manipulation, Patient symptoms are gradually improving and Symptoms come and go    Treatment effectiveness:  Post manipulation there is better intersegmental movement and Patient claims to feel looser post  manipulation      Procedures:  CMT:  [id number] Chiropractic manipulative treatment 1-2 regions performed   Lumbar: Diversified, L5, Side posture    Modalities:  None performed this visit    Therapeutic procedures:  None    Response to Treatment  Reduction in symptoms as reported by patient    Prognosis: Good    Progress towards Goals: Patient is making progress towards the goal.     Recommendations:    Instructions:continue to perform physical core strengthening exercises and monitor your symptoms    Follow-up:  Return to care in 1 week.

## 2019-08-08 ENCOUNTER — THERAPY VISIT (OUTPATIENT)
Dept: CHIROPRACTIC MEDICINE | Facility: OTHER | Age: 49
End: 2019-08-08
Attending: CHIROPRACTOR
Payer: COMMERCIAL

## 2019-08-08 DIAGNOSIS — M54.50 ACUTE LEFT-SIDED LOW BACK PAIN WITHOUT SCIATICA: Primary | ICD-10-CM

## 2019-08-08 DIAGNOSIS — M99.03 SEGMENTAL AND SOMATIC DYSFUNCTION OF LUMBAR REGION: ICD-10-CM

## 2019-08-08 DIAGNOSIS — M43.10 ANTEROLISTHESIS: ICD-10-CM

## 2019-08-08 PROCEDURE — 98940 CHIROPRACT MANJ 1-2 REGIONS: CPT | Performed by: CHIROPRACTOR

## 2019-08-08 NOTE — PROGRESS NOTES
Visit #:  4    Subjective:  Cielo Bahena is a 48 year old female who is seen in f/u up for:        Acute left-sided low back pain without sciatica  Anterolisthesis  Segmental and somatic dysfunction of lumbar region.     Since last visit on 8/1/2019,  Cielo Bahena reports:    Area of chief complaint:  Lumbar :  Symptoms are graded at 1-5/10.  Patient states she began experiencing some pain into her left buttock but not beyond.  Patient states that she has been somewhat compliant with home exercise recommendations from physical therapy at [facility].  Patient states symptoms continue to show improvement stating today that she noticed symptoms began to return a couple of days ago.  Patient denied any other radicular complaints into her left leg.    Patient is unable to follow-up with physical therapy until August 22 due to schedule conflicts.       Objective:  The following was observed:    P: palpatory tendernessMild over the left side of L5:    A: static palpation demonstrates intersegmental asymmetry , lumbar  R: motion palpation notes restricted motion, L5   T: Taut and tender fibers are apparent of the left gluteus medius.    Segmental spinal dysfunction/restrictions found at:  :  L5 Left lateral flexion restricted and Extension restriction.      Assessment: Patient does appear to be showing signs of progress.  Pain into the upper buttock as reported by the patient I believe to be a positive sign in the fact that her body is more able to utilize gluteal musculature with restoration of normal pelvic mechanics.  On today's visit patient continues to state that job tasks seem to be triggering factor for increasing her pain.  I believe this partially due to improper hip hinging mechanics as well as under active abdominal musculature.  Patient is currently under the care of physical therapy for core exercises.  While this is being addressed we will focus on patient's hip hinging ability.    Diagnoses:      1.  Acute left-sided low back pain without sciatica    2. Anterolisthesis    3. Segmental and somatic dysfunction of lumbar region        Patient's condition:  Patient had restrictions pre-manipulation, Patient symptoms are gradually improving and Symptoms come and go    Treatment effectiveness:  Post manipulation there is better intersegmental movement and Patient states that they feel much better post manipulation but they gradually tighten up over time      Procedures:  CMT:  [id number] Chiropractic manipulative treatment 1-2 regions performed   Lumbar: Diversified, L5, Side posture    Modalities:  None performed this visit    Therapeutic procedures:  Total time: 2 minutes  Patient was educated in proper hip hinging motion.  To help facilitate this motion patient was encouraged to utilize a wider stance much like that of a .  Patient was instructed to widen her stance slightly and to push her hips backwards.  This done to promote activation of the posterior muscular chain primarily of the glutes and hamstrings.    Response to Treatment  Reduction in symptoms as reported by patient    Prognosis: Good    Progress towards Goals: Patient is making progress towards the goal.     Recommendations:    Instructions:Continue to monitor symptoms and perform hip hinging motion as instructed today    Follow-up:  Return to care in 1 week.

## 2019-08-14 ENCOUNTER — THERAPY VISIT (OUTPATIENT)
Dept: CHIROPRACTIC MEDICINE | Facility: OTHER | Age: 49
End: 2019-08-14
Attending: CHIROPRACTOR
Payer: COMMERCIAL

## 2019-08-14 DIAGNOSIS — M99.03 SEGMENTAL AND SOMATIC DYSFUNCTION OF LUMBAR REGION: ICD-10-CM

## 2019-08-14 DIAGNOSIS — M43.10 ANTEROLISTHESIS: ICD-10-CM

## 2019-08-14 DIAGNOSIS — M54.42 ACUTE LEFT-SIDED LOW BACK PAIN WITH LEFT-SIDED SCIATICA: Primary | ICD-10-CM

## 2019-08-14 PROCEDURE — 98940 CHIROPRACT MANJ 1-2 REGIONS: CPT | Mod: AT | Performed by: CHIROPRACTOR

## 2019-08-14 NOTE — PROGRESS NOTES
Visit #:  5    Subjective:  Cielo Bahena is a 48 year old female who is seen in f/u up for:        Acute left-sided low back pain with left-sided sciatica  Segmental and somatic dysfunction of lumbar region  Anterolisthesis.     Since last visit on 8/8/2019,  Cielo Bahena reports: She presents for an unscheduled visit after waking this morning with aggravation of low back pain and radicular complaints into the left leg.  Symptoms flared this morning.  Patient denied any contributing factors that she was aware of.  Patient did trip up the stairs at home this weekend.  Patient states that she was not aware of any worsening of symptoms initially following this incident however she feels that this did not help back pain either.  As symptoms are now radiating down her left leg again patient contact her office for treatment today.    Area of chief complaint:  Lumbar :  Symptoms are graded at 3-6/10. The quality is described as going down her left leg. Patient states symptoms are most notable with sitting to standing motions.         Objective:  The following was observed:    P: palpatory tenderness present along the left lumbosacral junction from L4-S1:    A: static palpation demonstrates intersegmental asymmetry , lumbar  R: motion palpation notes restricted motion, L5   T: Spasms present of the left quadratus lumborum, left gluteus medius, left piriformis.    Segmental spinal dysfunction/restrictions found at:  :  L5 Right rotation restricted, Left lateral flexion restricted and Extension restriction.      Assessment: She appears to be experiencing flare of symptoms.  I do believe that patient's recent slip and fall accident at home to be a contributing factor in agitation of symptoms.  I was unable to follow-up with the patient regarding hip hinging mechanics that we provided on her last visit.  Patient is unable to follow-up with physical therapy until August 22.  We will follow-up with patient in 1 week barring  acute exacerbation of symptoms    Diagnoses:      1. Acute left-sided low back pain with left-sided sciatica    2. Segmental and somatic dysfunction of lumbar region    3. Anterolisthesis        Patient's condition:  Patient had restrictions pre-manipulation and Patient symptoms are worsed due to tripping up the stairs this weekend.    Treatment effectiveness:  Post manipulation there is better intersegmental movement, Patient claims to feel looser post manipulation and post adjustment patient stated less radiating pain into her left leg.      Procedures:  CMT:  [id number] Chiropractic manipulative treatment 1-2 regions performed   Lumbar: Diversified, L5, Side posture    Modalities:  None performed this visit    Therapeutic procedures:  None    Response to Treatment  Reduction in symptoms as reported by patient    Prognosis: Good    Progress towards Goals: Patient is making progress towards the goal.     Recommendations:    Instructions:ice 20 minutes every other hour as needed and heat 15 minutes every other hour as needed    Follow-up:  Return to care in 1 week.

## 2019-09-03 DIAGNOSIS — M54.42 ACUTE LEFT-SIDED LOW BACK PAIN WITH LEFT-SIDED SCIATICA: Primary | ICD-10-CM

## 2019-09-08 RX ORDER — CYCLOBENZAPRINE HCL 10 MG
TABLET ORAL
Qty: 30 TABLET | Refills: 1 | Status: SHIPPED | OUTPATIENT
Start: 2019-09-08 | End: 2019-11-07

## 2019-09-12 ENCOUNTER — HOSPITAL ENCOUNTER (OUTPATIENT)
Dept: PHYSICAL THERAPY | Facility: OTHER | Age: 49
Setting detail: THERAPIES SERIES
End: 2019-09-12
Attending: CHIROPRACTOR
Payer: COMMERCIAL

## 2019-09-12 PROCEDURE — 97140 MANUAL THERAPY 1/> REGIONS: CPT | Mod: GP

## 2019-09-12 PROCEDURE — 97110 THERAPEUTIC EXERCISES: CPT | Mod: GP

## 2019-09-19 ENCOUNTER — HOSPITAL ENCOUNTER (OUTPATIENT)
Dept: PHYSICAL THERAPY | Facility: OTHER | Age: 49
Setting detail: THERAPIES SERIES
End: 2019-09-19
Attending: CHIROPRACTOR
Payer: COMMERCIAL

## 2019-09-19 PROCEDURE — 97140 MANUAL THERAPY 1/> REGIONS: CPT | Mod: GP

## 2019-09-19 PROCEDURE — 97110 THERAPEUTIC EXERCISES: CPT | Mod: GP

## 2019-09-25 ENCOUNTER — THERAPY VISIT (OUTPATIENT)
Dept: CHIROPRACTIC MEDICINE | Facility: OTHER | Age: 49
End: 2019-09-25
Attending: CHIROPRACTOR
Payer: COMMERCIAL

## 2019-09-25 DIAGNOSIS — M99.03 SEGMENTAL AND SOMATIC DYSFUNCTION OF LUMBAR REGION: ICD-10-CM

## 2019-09-25 DIAGNOSIS — M54.50 LUMBAGO: Primary | ICD-10-CM

## 2019-09-25 DIAGNOSIS — M43.10 ANTEROLISTHESIS: ICD-10-CM

## 2019-09-25 PROCEDURE — 98940 CHIROPRACT MANJ 1-2 REGIONS: CPT | Mod: AT | Performed by: CHIROPRACTOR

## 2019-09-25 NOTE — PROGRESS NOTES
Visit #:  6    Subjective:  Cielo Bahena is a 48 year old female who is seen in f/u up for:        Lumbago  Segmental and somatic dysfunction of lumbar region  Anterolisthesis.     Since last visit on 8/14/2019,  Cielo Bahena reports: Experiencing recurrence of low back pain which began approximately 2 weeks ago.  Patient continues to note that symptoms seem to be most agitated from job related duties.  Patient reports not trying any treatments at home to help with symptoms.  Patient recently restarted physical therapy at [facility] and has found it to be beneficial.  Patient denies any radicular complaints into the left leg currently.    Following last treatment patient noted symptoms of low back pain and radicular complaints into the left leg improved until aggravation of symptoms 2 weeks ago.  Due to increased levels of pain patient contacted our office for further evaluation and treatment of symptoms    Patient reports that she is searching for a new job currently as she believes current job tasks will continue to cause pain symptoms to worsen.    Area of chief complaint:  Lumbar :  Symptoms are graded at 3/10.          Objective:  The following was observed:  Oswestry (JACQUE) Questionnaire    OSWESTRY DISABILITY INDEX 9/25/2019   Count 9   Sum 8   Oswestry Score (%) 17.78   Some recent data might be hidden      Lumbar AROM: Mild restriction present with flexion and pain at end range. Right lateral flexion is mildly restricted and causing pain as well.    Negative Ely's test    P: palpatory tendernessLeft side L5:    A: static palpation demonstrates intersegmental asymmetry , lumbar  R: motion palpation notes restricted motion, L5   T: muscle spasm at level(s): Quadratus lumborum and gluteus medius left side.  Taut tender fibers noted of the lumbar paraspinals bilaterally left side predominant:      Segmental spinal dysfunction/restrictions found at:  :  L5 Left lateral flexion restricted and Extension  restriction.      Assessment: Patient appears to have experienced flareup of lower back pain symptoms.  Patient works as a patient care aide which I do believe to be contributing factor to patient's symptoms as this job often requires physically demanding tasks primarily lifting of patients.  Patient does have history of anterior listhesis in her lumbar spine.  Patient did exhibit some noncompliance with prior episode of care for similar complaint which I do believe to be contributing factor for flareup of pain.  I am quite pleased to see that the patient is returning to physical therapy and do believe that this will serve to benefit patient.  Plan of care to include between 4-6 chiropractic visits over the next 4 weeks barring acute exacerbation at this time.    Diagnoses:      1. Lumbago    2. Segmental and somatic dysfunction of lumbar region    3. Anterolisthesis        Patient's condition:  Patient had restrictions pre-manipulation and Patient symptoms are worsed due to job related tasks.    Treatment effectiveness:  Post manipulation there is better intersegmental movement and Patient claims to feel looser post manipulation      Procedures:  CMT:  [id number] Chiropractic manipulative treatment 1-2 regions performed   Lumbar: Diversified, L5, Side posture    Modalities:  None performed this visit    Therapeutic procedures:  None    Response to Treatment  Reduction in symptoms as reported by patient    Prognosis: Good    Progress towards Goals: Goal: Reduce lower back pain     Recommendations:    Instructions:ice 20 minutes every other hour as needed and heat 15 minutes every other hour as needed    Follow-up:  Return to care in 5 days.

## 2019-10-02 ENCOUNTER — THERAPY VISIT (OUTPATIENT)
Dept: CHIROPRACTIC MEDICINE | Facility: OTHER | Age: 49
End: 2019-10-02
Attending: CHIROPRACTOR
Payer: COMMERCIAL

## 2019-10-02 DIAGNOSIS — M99.03 SEGMENTAL AND SOMATIC DYSFUNCTION OF LUMBAR REGION: ICD-10-CM

## 2019-10-02 DIAGNOSIS — M43.10 ANTEROLISTHESIS: ICD-10-CM

## 2019-10-02 DIAGNOSIS — M54.50 LUMBAGO: Primary | ICD-10-CM

## 2019-10-02 PROCEDURE — 98940 CHIROPRACT MANJ 1-2 REGIONS: CPT | Performed by: CHIROPRACTOR

## 2019-10-02 NOTE — PROGRESS NOTES
"Visit #:  7    Subjective:  Cielo Bahena is a 48 year old female who is seen in f/u up for:        Lumbago  Segmental and somatic dysfunction of lumbar region  Anterolisthesis.     Since last visit on 9/25/2019,  Cielo Bahena reports:    Area of chief complaint:  Lumbar :  Symptoms are graded at 2-7/10.  Following last treatment patient noted symptoms showed tremendous improvement.  Upon waking this morning patient began experiencing 7 out of 10 levels of pain as well as radicular symptoms into her left leg as far as the calf and toes.  Patient notes that throughout the day symptoms have been decreasing however pain still is radiating into the left leg.  Patient states that she did not experience overuse injuries, traumas, or other precipitating factors preceding this current flareup of symptoms.  Patient states that she has not been able to follow-up with her physical therapist due to scheduling conflicts.  Patient also reports that she has not been compliant with performing home exercises as prescribed by her physical therapist.         Objective:  The following was observed:    P: palpatory tendernessLeft side L5:    A: static palpation demonstrates intersegmental asymmetry , lumbar  R: motion palpation notes restricted motion, L5   T: muscle spasm at level(s): Left quadratus lumborum, paraspinal muscles lumbar region bilaterally, left piriformis:      Segmental spinal dysfunction/restrictions found at:  :  L5 Left lateral flexion restricted and Extension restriction.      Assessment: Patient symptoms subjectively appear aggravated, radicular complaints were not present on last patient visit.  Patient notes that her low back \"goes out\" quite easily which she is very concerned about.  Patient is currently working with physical therapy for core strengthening exercises.  Patient did admit to being noncompliant with performing these home exercises.  Today the patient denied did discuss why these exercises are so " important for her recovery.  Patient reports she will be more consistent with performing home exercises.  We will follow-up with patient in 1 week for continued chiropractic care.    Diagnoses:      1. Lumbago    2. Segmental and somatic dysfunction of lumbar region    3. Anterolisthesis        Patient's condition:  Patient had restrictions pre-manipulation and Symptoms come and go    Treatment effectiveness:  Post manipulation there is better intersegmental movement and Patient states that they feel much better post manipulation but they gradually tighten up over time      Procedures:  CMT:  05605 Chiropractic manipulative treatment 1-2 regions performed   Lumbar: Diversified, L5, Side posture    Modalities:  None performed this visit    Therapeutic procedures:  None  Encourage patient to be more consistent with her core strengthening exercises.  We also did discuss the importance of these exercises for recovery of her symptoms as well as managing her spondylolisthesis of the lumbar spine.    Response to Treatment  Reduction in symptoms as reported by patient    Prognosis: Good    Progress towards Goals: Patient is making progress towards the goal.     Recommendations:    Instructions:ice 20 minutes every other hour as needed, heat 15 minutes every other hour as needed and core exercises    Follow-up:  Return to care in 1 week.

## 2019-10-03 NOTE — PATIENT INSTRUCTIONS
ice 20 minutes every other hour as needed, heat 15 minutes every other hour as needed and core exercises

## 2019-10-10 ENCOUNTER — THERAPY VISIT (OUTPATIENT)
Dept: CHIROPRACTIC MEDICINE | Facility: OTHER | Age: 49
End: 2019-10-10
Attending: CHIROPRACTOR
Payer: COMMERCIAL

## 2019-10-10 DIAGNOSIS — M54.50 LUMBAGO: Primary | ICD-10-CM

## 2019-10-10 DIAGNOSIS — M99.02 SEGMENTAL AND SOMATIC DYSFUNCTION OF THORACIC REGION: ICD-10-CM

## 2019-10-10 DIAGNOSIS — M43.10 ANTEROLISTHESIS: ICD-10-CM

## 2019-10-10 DIAGNOSIS — M99.03 SEGMENTAL AND SOMATIC DYSFUNCTION OF LUMBAR REGION: ICD-10-CM

## 2019-10-10 DIAGNOSIS — M54.6 PAIN IN THORACIC SPINE: ICD-10-CM

## 2019-10-10 PROCEDURE — 98940 CHIROPRACT MANJ 1-2 REGIONS: CPT | Mod: AT | Performed by: CHIROPRACTOR

## 2019-10-10 NOTE — PROGRESS NOTES
Visit #:  8    Subjective:  Cielo Bahena is a 48 year old female who is seen in f/u up for:        Lumbago  Anterolisthesis  Segmental and somatic dysfunction of lumbar region  Segmental and somatic dysfunction of thoracic region  Pain in thoracic spine.     Since last visit on 10/2/2019,  Cielo Bahena reports:     Area of chief complaint:  Lumbar :  Symptoms are graded at 1-4/10. No radiating pain in the left leg since last visit. Achey, stiff, sore are the qualities of the patients pain. Pain noticed into the midback left side today and requests evaluation of this spinal region today.     Objective:  The following was observed:    P: palpatory tendernessleft side of T5 and L5:    A: static palpation demonstrates intersegmental asymmetry , thoracic, lumbar  R: motion palpation notes restricted motion, T5  and L5   T: Taut/tender fibers present of the T-spine paraspinals of the intrascapular region L>>R, mild hypertonicity noted of the left quadratus lumborum    Segmental spinal dysfunction/restrictions found at:  :  T5 Left lateral flexion restricted and Extension restriction  L5 Right rotation restricted, Left lateral flexion restricted and Extension restriction.      Assessment: Patient appears to be improving. No radicular symptoms are present, palpatory tenderness is decreasing.I did find evidence to support segmental/somatic dysfunction of the thoracic region. I believe this is compensatory to restoration of lumbar spinal mechanics. Patient is improving with her compliance for home exercises for core strength. Patient is following up with her physical therapist next week. We will continue seeing patient at 1x week. We will reassess patient next week to determine further course of care.    Diagnoses:      1. Lumbago    2. Anterolisthesis    3. Segmental and somatic dysfunction of lumbar region    4. Segmental and somatic dysfunction of thoracic region    5. Pain in thoracic spine        Patient's  condition:  Patient had restrictions pre-manipulation and Patient symptoms are gradually improving    Treatment effectiveness:  Post manipulation there is better intersegmental movement, Patient claims to feel looser post manipulation and Symptoms appear to be decreasing      Procedures:  CMT:  35683 Chiropractic manipulative treatment 1-2 regions performed   Thoracic: Diversified, T5, Prone  Lumbar: Diversified, L5, Side posture    Modalities:  None performed this visit    Therapeutic procedures:  None  Encouraged compliance with core exercises from physical therapy    Response to Treatment  Reduction in symptoms as reported by patient    Prognosis: Good    Progress towards Goals: Patient is making progress towards the goal.     Recommendations:    Instructions:ice 20 minutes every other hour as needed, heat 15 minutes every other hour as needed and core exercises    Follow-up:  Return to care in 1 week.

## 2019-11-07 ENCOUNTER — THERAPY VISIT (OUTPATIENT)
Dept: CHIROPRACTIC MEDICINE | Facility: OTHER | Age: 49
End: 2019-11-07
Attending: CHIROPRACTOR
Payer: COMMERCIAL

## 2019-11-07 ENCOUNTER — OFFICE VISIT (OUTPATIENT)
Dept: FAMILY MEDICINE | Facility: OTHER | Age: 49
End: 2019-11-07
Attending: FAMILY MEDICINE
Payer: COMMERCIAL

## 2019-11-07 VITALS
SYSTOLIC BLOOD PRESSURE: 130 MMHG | BODY MASS INDEX: 28.99 KG/M2 | TEMPERATURE: 96.8 F | WEIGHT: 174 LBS | HEIGHT: 65 IN | OXYGEN SATURATION: 98 % | DIASTOLIC BLOOD PRESSURE: 80 MMHG | HEART RATE: 88 BPM

## 2019-11-07 DIAGNOSIS — F32.89 ATYPICAL DEPRESSIVE DISEASE: ICD-10-CM

## 2019-11-07 DIAGNOSIS — M54.6 PAIN IN THORACIC SPINE: ICD-10-CM

## 2019-11-07 DIAGNOSIS — M43.10 ANTEROLISTHESIS: ICD-10-CM

## 2019-11-07 DIAGNOSIS — M99.02 SEGMENTAL AND SOMATIC DYSFUNCTION OF THORACIC REGION: ICD-10-CM

## 2019-11-07 DIAGNOSIS — G25.81 RESTLESS LEGS SYNDROME: ICD-10-CM

## 2019-11-07 DIAGNOSIS — Z80.0 FAMILY HISTORY OF COLON CANCER: ICD-10-CM

## 2019-11-07 DIAGNOSIS — Z13.1 SCREENING FOR DIABETES MELLITUS: ICD-10-CM

## 2019-11-07 DIAGNOSIS — M99.04 SEGMENTAL AND SOMATIC DYSFUNCTION OF SACRAL REGION: Primary | ICD-10-CM

## 2019-11-07 DIAGNOSIS — Z13.220 LIPID SCREENING: ICD-10-CM

## 2019-11-07 DIAGNOSIS — M54.42 LEFT-SIDED LOW BACK PAIN WITH LEFT-SIDED SCIATICA, UNSPECIFIED CHRONICITY: ICD-10-CM

## 2019-11-07 DIAGNOSIS — J45.20 MILD INTERMITTENT ASTHMA WITHOUT COMPLICATION: ICD-10-CM

## 2019-11-07 DIAGNOSIS — M54.42 ACUTE LEFT-SIDED LOW BACK PAIN WITH LEFT-SIDED SCIATICA: ICD-10-CM

## 2019-11-07 DIAGNOSIS — Z12.11 SPECIAL SCREENING FOR MALIGNANT NEOPLASMS, COLON: ICD-10-CM

## 2019-11-07 DIAGNOSIS — Z12.31 VISIT FOR SCREENING MAMMOGRAM: Primary | ICD-10-CM

## 2019-11-07 LAB
ANION GAP SERPL CALCULATED.3IONS-SCNC: 8 MMOL/L (ref 3–14)
BUN SERPL-MCNC: 13 MG/DL (ref 7–25)
CALCIUM SERPL-MCNC: 8.6 MG/DL (ref 8.6–10.3)
CHLORIDE SERPL-SCNC: 105 MMOL/L (ref 98–107)
CHOLEST SERPL-MCNC: 206 MG/DL
CO2 SERPL-SCNC: 26 MMOL/L (ref 21–31)
CREAT SERPL-MCNC: 0.85 MG/DL (ref 0.6–1.2)
GFR SERPL CREATININE-BSD FRML MDRD: 71 ML/MIN/{1.73_M2}
GLUCOSE SERPL-MCNC: 100 MG/DL (ref 70–105)
HDLC SERPL-MCNC: 44 MG/DL (ref 23–92)
LDLC SERPL CALC-MCNC: 148 MG/DL
NONHDLC SERPL-MCNC: 162 MG/DL
POTASSIUM SERPL-SCNC: 4 MMOL/L (ref 3.5–5.1)
SODIUM SERPL-SCNC: 139 MMOL/L (ref 134–144)
TRIGL SERPL-MCNC: 72 MG/DL

## 2019-11-07 PROCEDURE — 80048 BASIC METABOLIC PNL TOTAL CA: CPT | Mod: ZL | Performed by: FAMILY MEDICINE

## 2019-11-07 PROCEDURE — 36415 COLL VENOUS BLD VENIPUNCTURE: CPT | Mod: ZL | Performed by: FAMILY MEDICINE

## 2019-11-07 PROCEDURE — 98940 CHIROPRACT MANJ 1-2 REGIONS: CPT | Mod: AT | Performed by: CHIROPRACTOR

## 2019-11-07 PROCEDURE — 99396 PREV VISIT EST AGE 40-64: CPT | Performed by: FAMILY MEDICINE

## 2019-11-07 PROCEDURE — 80061 LIPID PANEL: CPT | Mod: ZL | Performed by: FAMILY MEDICINE

## 2019-11-07 RX ORDER — PRAMIPEXOLE DIHYDROCHLORIDE 0.25 MG/1
0.25 TABLET ORAL AT BEDTIME
Qty: 90 TABLET | Refills: 4 | Status: SHIPPED | OUTPATIENT
Start: 2019-11-07 | End: 2020-12-14

## 2019-11-07 RX ORDER — MONTELUKAST SODIUM 10 MG/1
1 TABLET ORAL AT BEDTIME
Qty: 90 TABLET | Refills: 3 | Status: SHIPPED | OUTPATIENT
Start: 2019-11-07 | End: 2020-09-21

## 2019-11-07 RX ORDER — CYCLOBENZAPRINE HCL 10 MG
TABLET ORAL
Qty: 30 TABLET | Refills: 1 | Status: SHIPPED | OUTPATIENT
Start: 2019-11-07 | End: 2020-01-09

## 2019-11-07 ASSESSMENT — PATIENT HEALTH QUESTIONNAIRE - PHQ9: SUM OF ALL RESPONSES TO PHQ QUESTIONS 1-9: 4

## 2019-11-07 ASSESSMENT — MIFFLIN-ST. JEOR: SCORE: 1411.17

## 2019-11-07 ASSESSMENT — PAIN SCALES - GENERAL: PAINLEVEL: NO PAIN (0)

## 2019-11-07 NOTE — LETTER
My Asthma Action Plan    Name: Cielo Bahena   YOB: 1970  Date: 11/7/2019   My doctor: Valencia Benavides, DO   My clinic: [facility]        My Control Medicine: Advair 250-50 mcg  My Rescue Medicine: Albuterol (Proair/Ventolin/Proventil HFA) 2-4 puffs EVERY 4 HOURS as needed. Use a spacer if recommended by your provider.   My Asthma Severity:   Moderate Persistent  Know your asthma triggers: .               GREEN ZONE   Good Control    I feel good    No cough or wheeze    Can work, sleep and play without asthma symptoms       Take your asthma control medicine every day.     1. If exercise triggers your asthma, take your rescue medication    15 minutes before exercise or sports, and    During exercise if you have asthma symptoms  2. Spacer to use with inhaler: If you have a spacer, make sure to use it with your inhaler             YELLOW ZONE Getting Worse  I have ANY of these:    I do not feel good    Cough or wheeze    Chest feels tight    Wake up at night   1. Keep taking your Green Zone medications  2. Start taking your rescue medicine:    every 20 minutes for up to 1 hour. Then every 4 hours for 24-48 hours.  3. If you stay in the Yellow Zone for more than 12-24 hours, contact your doctor.  4. If you do not return to the Green Zone in 12-24 hours or you get worse, start taking your oral steroid medicine if prescribed by your provider.           RED ZONE Medical Alert - Get Help  I have ANY of these:    I feel awful    Medicine is not helping    Breathing getting harder    Trouble walking or talking    Nose opens wide to breathe       1. Take your rescue medicine NOW  2. If your provider has prescribed an oral steroid medicine, start taking it NOW  3. Call your doctor NOW  4. If you are still in the Red Zone after 20 minutes and you have not reached your doctor:    Take your rescue medicine again and    Call 911 or go to the emergency room right away    See your regular  doctor within 2 weeks of an Emergency Room or Urgent Care visit for follow-up treatment.          Annual Reminders:  Meet with Asthma Educator,  Flu Shot in the Fall, consider Pneumonia Vaccination for patients with asthma (aged 19 and older).    Pharmacy: [id number] - [place], [address] AT [address]                          Asthma Triggers  How To Control Things That Make Your Asthma Worse    Triggers are things that make your asthma worse.  Look at the list below to help you find your triggers and what you can do about them.  You can help prevent asthma flare-ups by staying away from your triggers.      Trigger                                                          What you can do   Cigarette Smoke  Tobacco smoke can make asthma worse. Do not allow smoking in your home, car or around you.  Be sure no one smokes at a child s day care or school.  If you smoke, ask your health care provider for ways to help you quit.  Ask family members to quit too.  Ask your health care provider for a referral to Quit Plan to help you quit smoking, or call 5-966-195-PLAN.     Colds, Flu, Bronchitis  These are common triggers of asthma. Wash your hands often.  Don t touch your eyes, nose or mouth.  Get a flu shot every year.     Dust Mites  These are tiny bugs that live in cloth or carpet. They are too small to see. Wash sheets and blankets in hot water every week.   Encase pillows and mattress in dust mite proof covers.  Avoid having carpet if you can. If you have carpet, vacuum weekly.   Use a dust mask and HEPA vacuum.   Pollen and Outdoor Mold  Some people are allergic to trees, grass, or weed pollen, or molds. Try to keep your windows closed.  Limit time out doors when pollen count is high.   Ask you health care provider about taking medicine during allergy season.     Animal Dander  Some people are allergic to skin flakes, urine or saliva from pets with fur or feathers. Keep pets  with fur or feathers out of your home.    If you can t keep the pet outdoors, then keep the pet out of your bedroom.  Keep the bedroom door closed.  Keep pets off cloth furniture and away from stuffed toys.     Mice, Rats, and Cockroaches   Some people are allergic to the waste from these pests.   Cover food and garbage.  Clean up spills and food crumbs.  Store grease in the refrigerator.   Keep food out of the bedroom.   Indoor Mold  This can be a trigger if your home has high moisture. Fix leaking faucets, pipes, or other sources of water.   Clean moldy surfaces.  Dehumidify basement if it is damp and smelly.   Smoke, Strong Odors, and Sprays  These can reduce air quality. Stay away from strong odors and sprays, such as perfume, powder, hair spray, paints, smoke incense, paint, cleaning products, candles and new carpet.   Exercise or Sports  Some people with asthma have this trigger. Be active!  Ask your doctor about taking medicine before sports or exercise to prevent symptoms.    Warm up for 5-10 minutes before and after sports or exercise.     Other Triggers of Asthma  Cold air:  Cover your nose and mouth with a scarf.  Sometimes laughing or crying can be a trigger.  Some medicines and food can trigger asthma.

## 2019-11-07 NOTE — PROGRESS NOTES
"Nursing Notes:   Masha Lopez LPN  11/7/2019  8:33 AM  Signed  Chief Complaint   Patient presents with     Physical       Initial /80   Pulse 88   Temp 96.8  F (36  C) (Tympanic)   Ht 1.645 m (5' 4.75\")   Wt 78.9 kg (174 lb)   SpO2 98%   BMI 29.18 kg/m    Estimated body mass index is 29.18 kg/m  as calculated from the following:    Height as of this encounter: 1.645 m (5' 4.75\").    Weight as of this encounter: 78.9 kg (174 lb).  Medication Reconciliation: complete    Masha Lopez LPN    ANNUAL PHYSICAL - FEMALE    HPI: Cielo presents for a yearly exam.  Concerns include:  1. Mood.  Would like to increase amitriptyline to 20mg nightly - which is what we have discussed in the past.  Up until now, she was only using 10mg nightly.   PHQ-9 SCORE 3/29/2017 6/17/2019 11/7/2019   PHQ-9 Total Score 6 0 4     2. Asthma.  Stable.  Needing refills of meds.  No visits or nighttime symptoms.        No LMP recorded. Patient has had a hysterectomy.   Contraception: NA  Risk for STI?: No  Last pap: NA - no further needed  Any hx of abnormal paps:  No  FH of early CA?: No  Cholesterol/DM concerns/screening: Due  Tobacco?: Former smoker; 20-25 pack year history  Calcium intake: No  DEXA: NA  Last mammo: 8/2/2006  Colonoscopy: Due by the end of the month - discussed colonoscopy  Immunizations: Tdap 6/20/2014, declines flu.    Patient Active Problem List   Diagnosis     RLS (restless legs syndrome)     Moderate persistent asthma without complication     Insomnia     Hip pain     GERD (gastroesophageal reflux disease)     Genuine stress incontinence, female     Family history of colon cancer     Depression     Breast hypertrophy     Anemia     Past Medical History:   Diagnosis Date     Anemia     No Comments Provided     Family history of malignant neoplasm of digestive organ     1/22/2015     Insomnia     No Comments Provided     Major depressive disorder, single episode     2006,Depression.  PHQ-9 score 7/1.     " Mass of breast     ,Soft breast mass lump, right breast     Pain in hip     No Comments Provided     Personal history of other medical treatment (CODE)     2011,, 1 SAB     Personal history of other specified conditions (CODE)     abnormal Pap smear prior to , subsequent yearly Pap smears have been normal     Restless legs syndrome     No Comments Provided     Segmental and somatic dysfunction of pelvic region     2013     Uncomplicated asthma     generally well controlled, history of respiratory arrest requiring brief mechanical ventilation.     Past Surgical History:   Procedure Laterality Date     DILATION AND CURETTAGE      ,Dilatation and curettage for blighted ovum.     ENDOSCOPY UPPER, COLONOSCOPY, COMBINED      Normal exam, 5 year follow up due to FH     EXTRACTION(S) DENTAL      [place] tooth extraction     HYSTERECTOMY VAGINAL      3/5/2015,Dr. Lindsay for DUB; negative for malignancy; ovaries remain     MAMMOPLASTY REDUCTION      , Bilateral breast reduction, [facility]     MAMMOPLASTY REDUCTION           OTHER SURGICAL HISTORY      [address].9,[place] SUBTALAR ATHROEREISIS,Left foot     Social History     Socioeconomic History     Marital status:      Spouse name: Not on file     Number of children: Not on file     Years of education: Not on file     Highest education level: Not on file   Occupational History     Not on file   Social Needs     Financial resource strain: Not on file     Food insecurity:     Worry: Not on file     Inability: Not on file     Transportation needs:     Medical: Not on file     Non-medical: Not on file   Tobacco Use     Smoking status: Former Smoker     Packs/day: 0.50     Years: 25.00     Pack years: 12.50     Types: Cigarettes     Last attempt to quit: 2005     Years since quittin.9     Smokeless tobacco: Never Used     Tobacco comment: on rare occasions   Substance and Sexual Activity     Alcohol use: Yes     Alcohol/week: 0.8  standard drinks     Comment: Alcoholic Drinks/day: occassional     Drug use: No     Sexual activity: Not Currently     Partners: Male     Birth control/protection: Surgical   Lifestyle     Physical activity:     Days per week: Not on file     Minutes per session: Not on file     Stress: Not on file   Relationships     Social connections:     Talks on phone: Not on file     Gets together: Not on file     Attends Voodoo service: Not on file     Active member of club or organization: Not on file     Attends meetings of clubs or organizations: Not on file     Relationship status: Not on file     Intimate partner violence:     Fear of current or ex partner: Not on file     Emotionally abused: Not on file     Physically abused: Not on file     Forced sexual activity: Not on file   Other Topics Concern     Parent/sibling w/ CABG, MI or angioplasty before 65F 55M? Not Asked   Social History Narrative    Single, moved back to [place] from [place], does have family in town.   Has significant other.     Works at [employer] as a CNA.   Shannan Galvan-mother  4/13/2011   Inder       Family History   Problem Relation Age of Onset     Hypertension Father      Heart Disease Father         CAD with stents     Hyperlipidemia Father      Colon Cancer Maternal Grandmother      Heart Disease Maternal Grandfather         CAD     Heart Disease Paternal Grandfather         CAD     Breast Cancer No family hx of         Cancer-breast       Current Outpatient Medications   Medication Sig Dispense Refill     amitriptyline (ELAVIL) 10 MG tablet TAKE 2 TABLETS BY MOUTH EVERY AT BEDTIME (Patient taking differently: TAKE 1 TABLETS BY MOUTH EVERY AT BEDTIME) 180 tablet 4     cyclobenzaprine (FLEXERIL) 10 MG tablet TAKE 1/2 TO 1 TABLET(5 TO 10 MG) BY MOUTH THREE TIMES DAILY AS NEEDED FOR MUSCLE SPASMS 30 tablet 1     EPINEPHrine (EPIPEN/ADRENACLICK/OR ANY BX GENERIC EQUIV) 0.3 MG/0.3ML injection 2-pack Inject 0.3 mg into the muscle as  "needed Inject 0.3 mg intramuscular one time if needed for Allergic Reaction.       fluticasone-salmeterol (ADVAIR DISKUS) 250-50 MCG/DOSE inhaler Inhale 1 puff into the lungs every 12 hours Inhale 1 Puff by mouth every 12 hours. 3 Inhaler 4     ibuprofen (ADVIL/MOTRIN) 800 MG tablet TAKE 1 TABLET(800 MG) BY MOUTH EVERY 6 HOURS AS NEEDED FOR MODERATE PAIN 50 tablet 11     LORazepam (ATIVAN) 0.5 MG tablet TAKE 1 TABLET BY MOUTH EVERY 8 HOURS AS NEEDED FOR ANXIETY 60 tablet 5     montelukast (SINGULAIR) 10 MG tablet Take 1 tablet (10 mg) by mouth At Bedtime 90 tablet 3     pramipexole (MIRAPEX) 0.25 MG tablet Take 1 tablet (0.25 mg) by mouth At Bedtime 90 tablet 4     VENTOLIN  (90 Base) MCG/ACT inhaler INHALE 2 PUFFS IN THE LUNGS EVERY 4 HOURS AS NEEDED 54 g 11        REVIEW OF SYSTEMS:  Refer to HPI; all other systems reviewed and negative.    PHYSICAL EXAM:  /80   Pulse 88   Temp 96.8  F (36  C) (Tympanic)   Ht 1.645 m (5' 4.75\")   Wt 78.9 kg (174 lb)   SpO2 98%   BMI 29.18 kg/m    CONSTITUTIONAL:  Alert, cooperative, NAD.  EYES: No scleral icterus.  PERRLA.  Conjunctiva clear.  ENT/MOUTH: External ears and nose normal.  TMs normal.  Moist mucous membranes. Oropharynx clear.    ENDO: No thyromegaly or thyroid nodules.  LYMPH:  No cervical or supraclavicular LA.    BREASTS: Declines; mammogram ordered.   CARDIOVASCULAR: Regular, S1, S2.  No S3 or S4.  No murmur/gallop/rub.  No peripheral edema.  RESPIRATORY: CTA bilaterally, no wheezes, rhonchi or rales.  GI: Bowel sounds wnl.  Soft, nontender, non-distended.  No masses or HSM.  No rebound or guarding.  : Declines  Pap smear obtained: No.  MSKEL: Grossly normal ROM.  No clubbing.  INTEGUMENTARY:Warm, dry.  No rash noted on exposed skin.  NEUROLOGIC: Facies symmetric.  Grossly normal movement and tone.  No tremor.  PSYCHIATRIC: Affect normal.  Speech fluent.      PHQ-9 SCORE 3/29/2017 6/17/2019 11/7/2019   PHQ-9 Total Score 6 0 4     Results for " orders placed or performed in visit on 11/07/19   Lipid Panel     Status: Abnormal   Result Value Ref Range    Cholesterol 206 (H) <200 mg/dL    Triglycerides 72 <150 mg/dL    HDL Cholesterol 44 23 - 92 mg/dL    LDL Cholesterol Calculated 148 (H) <100 mg/dL    Non HDL Cholesterol 162 (H) <130 mg/dL   Basic Metabolic Panel     Status: None   Result Value Ref Range    Sodium 139 134 - 144 mmol/L    Potassium 4.0 3.5 - 5.1 mmol/L    Chloride 105 98 - 107 mmol/L    Carbon Dioxide 26 21 - 31 mmol/L    Anion Gap 8 3 - 14 mmol/L    Glucose 100 70 - 105 mg/dL    Urea Nitrogen 13 7 - 25 mg/dL    Creatinine 0.85 0.60 - 1.20 mg/dL    GFR Estimate 71 >60 mL/min/[1.73_m2]    GFR Estimate If Black 86 >60 mL/min/[1.73_m2]    Calcium 8.6 8.6 - 10.3 mg/dL       ASSESSMENT/PLAN:  1. Atypical depressive disease    2. Restless legs syndrome  Slight worsening; increase mirapex to 0.25mg nightly.   - pramipexole (MIRAPEX) 0.25 MG tablet; Take 1 tablet (0.25 mg) by mouth At Bedtime  Dispense: 90 tablet; Refill: 4    3. Mild intermittent asthma without complication  Chronic, stable.  Refilled current medications.   - fluticasone-salmeterol (ADVAIR DISKUS) 250-50 MCG/DOSE inhaler; Inhale 1 puff into the lungs every 12 hours Inhale 1 Puff by mouth every 12 hours.  Dispense: 3 Inhaler; Refill: 4  - montelukast (SINGULAIR) 10 MG tablet; Take 1 tablet (10 mg) by mouth At Bedtime  Dispense: 90 tablet; Refill: 3    4. Acute left-sided low back pain with left-sided sciatica  Chronic, stable.   Waxes and wanes with activity level.  Refilled flexeril for prn use.  Encouraged activity and regular stretching/movement.  - cyclobenzaprine (FLEXERIL) 10 MG tablet; TAKE 1/2 TO 1 TABLET(5 TO 10 MG) BY MOUTH THREE TIMES DAILY AS NEEDED FOR MUSCLE SPASMS  Dispense: 30 tablet; Refill: 1    5. Visit for screening mammogram  - MA Screening Digital Bilateral; Future    6. Lipid screening  - Lipid Panel; Future  - Lipid Panel    7. Screening for diabetes  mellitus  - Basic Metabolic Panel; Future  - Basic Metabolic Panel    8. Special screening for malignant neoplasms, colon  Due by the end of the month; plan to schedule after 11/29/19.  - GASTROENTEROLOGY ADULT REF PROCEDURE ONLY    9. Family history of colon cancer  - GASTROENTEROLOGY ADULT REF PROCEDURE ONLY      Relevant cancer screening discussed.    Counseled on healthy diet, Calcium and vitamin D intake, and exercise.    Valencia Benavides, DO  Family Practice

## 2019-11-07 NOTE — PROGRESS NOTES
Visit #:  1/3  New Episode 11/7/19    Subjective:  Cielo Bahena is a 48 year old female who is seen in f/u up for:        Segmental and somatic dysfunction of sacral region  Anterolisthesis  Left-sided low back pain with left-sided sciatica, unspecified chronicity  Segmental and somatic dysfunction of thoracic region  Pain in thoracic spine.     Since last visit on 10/10/2019,  Cielo Bahena reports: Proximal currently 1 week ago patient began noticing increased left-sided low back pain.  Patient reports she has been attempting to treat symptoms on her home with ibuprofen.  While this does provide some temporary relief patient noted symptoms have been worsening.  Approximately 2 days ago patient began experiencing radicular complaints into the left hamstring approximately knee level.  No traumatic events were noted preceding flareup.  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    Patient reports noncompliance with home exercise recommendations from our office as well as from physical therapy.    Due to increase of symptoms as well as the patient getting ready to go out for [employer] opener this weekend patient contacted her office for follow-up care and evaluation.      Area of chief complaint:  Lumbar :  Symptoms are graded at 3-6/10. The quality is described as occasionally sharp.  She is also begun noticing a knotted sensation in her mid back between her shoulder blades on the left side.  Patient reports this feels quite similar to the last time that she was in for treatment     Objective:  The following was observed:  OSWESTRY DISABILITY INDEX 11/7/2019   Count 9   Sum 7   Oswestry Score (%) 15.56   Some recent data might be hidden     This is better than last evaluation, traveling is less limited. Sitting continues to be problematic  -Jamie Prasad    P: palpatory tendernessMildly present over the left PSIS as well as  left-sided T5:    A: static palpation demonstrates intersegmental asymmetry , thoracic, pelvis  R: motion palpation notes restricted motion, T5  and Sacrum   T: muscle spasm at level(s): Left piriformis, left quadratus lumborum, left rhomboids:      Segmental spinal dysfunction/restrictions found at:  :  T5 Left lateral flexion restricted and Extension restriction  Sacrum Left lateral flexion restricted and Extension restriction.      Assessment: Patient appears to be experiencing mild flareup of low back symptoms.  Patient does have history of spondylolisthesis at L5 which I do believe to be a major contributing factor for her symptoms.  Patient has been under our care with similar complaints in the past and does respond favorably with short duration of care.  I do anticipate that to be the case currently.  Current plan of care to include up to 3 chiropractic visits over the next 4 weeks.  My assessment is of course barring acute exacerbation of symptoms at this time.      Patient is also attempted to treat low back pain with physical therapy.  Patient does admit noncompliance following recommendations of both in office care with physical therapist and also home exercise recommendations provided by both her physical therapist and our office.  I do believe noncompliance to be a major contributing factor for frequency of flareups as noted by the patient.  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  This is also another contributing factor.  Patient is currently looking for new work however she has been unsuccessful in this venture.    Diagnoses:      1. Segmental and somatic dysfunction of sacral region    2. Anterolisthesis    3. Left-sided low back pain with left-sided sciatica, unspecified chronicity    4. Segmental and somatic dysfunction of thoracic region    5. Pain in thoracic spine        Patient's condition:  Patient had restrictions pre-manipulation and Patient symptoms  are worsed due to performing daily tasks.    Treatment effectiveness:  Post manipulation there is better intersegmental movement and Patient claims to feel looser post manipulation      Procedures:  CMT:  88938 Chiropractic manipulative treatment 1-2 regions performed   Thoracic: Diversified, T5, Prone  Pelvis: Diversified, Sacrum , Side posture    Modalities:  None performed this visit    Therapeutic procedures:  None  Encourage patient to perform resisted knee abduction exercise.  Patient did admit she does not do home exercises when recommended.    Response to Treatment  Reduction in symptoms as reported by patient    Prognosis: Good    Progress towards Goals: Goal: Reduce low back pain  Patient will report being able to go deer hunting without painful limitations     Recommendations:    Instructions:ice 20 minutes every other hour as needed, heat 15 minutes every other hour as needed and Perform knee squeeze exercise as recommended    Follow-up:  Return to care in 1 week.

## 2019-11-07 NOTE — NURSING NOTE
"Chief Complaint   Patient presents with     Physical       Initial /80   Pulse 88   Temp 96.8  F (36  C) (Tympanic)   Ht 1.645 m (5' 4.75\")   Wt 78.9 kg (174 lb)   SpO2 98%   BMI 29.18 kg/m   Estimated body mass index is 29.18 kg/m  as calculated from the following:    Height as of this encounter: 1.645 m (5' 4.75\").    Weight as of this encounter: 78.9 kg (174 lb).  Medication Reconciliation: complete    Masha Lopez LPN  "

## 2019-11-07 NOTE — LETTER
My Depression Action Plan  Name: Cielo Bahena   Date of Birth 1970  Date: 11/7/2019    My doctor: Valencia Benavides   My clinic: [facility]  [address]  [place] [id number] [phone]          GREEN    ZONE   Good Control    What it looks like:     Things are going generally well. You have normal up s and down s. You may even feel depressed from time to time, but bad moods usually last less than a day.   What you need to do:  1. Continue to care for yourself (see self care plan)  2. Check your depression survival kit and update it as needed  3. Follow your physician s recommendations including any medication.  4. Do not stop taking medication unless you consult with your physician first.           YELLOW         ZONE Getting Worse    What it looks like:     Depression is starting to interfere with your life.     It may be hard to get out of bed; you may be starting to isolate yourself from others.    Symptoms of depression are starting to last most all day and this has happened for several days.     You may have suicidal thoughts but they are not constant.   What you need to do:     1. Call your care team, your response to treatment will improve if you keep your care team informed of your progress. Yellow periods are signs an adjustment may need to be made.     2. Continue your self-care, even if you have to fake it!    3. Talk to someone in your support network    4. Open up your depression survival kit           RED    ZONE Medical Alert - Get Help    What it looks like:     Depression is seriously interfering with your life.     You may experience these or other symptoms: You can t get out of bed most days, can t work or engage in other necessary activities, you have trouble taking care of basic hygiene, or basic responsibilities, thoughts of suicide or death that will not go away, self-injurious behavior.     What you need to do:  1. Call your care  team and request a same-day appointment. If they are not available (weekends or after hours) call your local crisis line, emergency room or 911.            Depression Self Care Plan / Survival Kit    Self-Care for Depression  Here s the deal. Your body and mind are really not as separate as most people think.  What you do and think affects how you feel and how you feel influences what you do and think. This means if you do things that people who feel good do, it will help you feel better.  Sometimes this is all it takes.  There is also a place for medication and therapy depending on how severe your depression is, so be sure to consult with your medical provider and/ or Behavioral Health Consultant if your symptoms are worsening or not improving.     In order to better manage my stress, I will:    Exercise  Get some form of exercise, every day. This will help reduce pain and release endorphins, the  feel good  chemicals in your brain. This is almost as good as taking antidepressants!  This is not the same as joining a gym and then never going! (they count on that by the way ) It can be as simple as just going for a walk or doing some gardening, anything that will get you moving.      Hygiene   Maintain good hygiene (Get out of bed in the morning, Make your bed, Brush your teeth, Take a shower, and Get dressed like you were going to work, even if you are unemployed).  If your clothes don't fit try to get ones that do.    Diet  I will strive to eat foods that are good for me, drink plenty of water, and avoid excessive sugar, caffeine, alcohol, and other mood-altering substances.  Some foods that are helpful in depression are: complex carbohydrates, B vitamins, flaxseed, fish or fish oil, fresh fruits and vegetables.    Psychotherapy  I agree to participate in Individual Therapy (if recommended).    Medication  If prescribed medications, I agree to take them.  Missing doses can result in serious side effects.  I  understand that drinking alcohol, or other illicit drug use, may cause potential side effects.  I will not stop my medication abruptly without first discussing it with my provider.    Staying Connected With Others  I will stay in touch with my friends, family members, and my primary care provider/team.    Use your imagination  Be creative.  We all have a creative side; it doesn t matter if it s oil painting, sand castles, or mud pies! This will also kick up the endorphins.    Witness Beauty  (AKA stop and smell the roses) Take a look outside, even in mid-winter. Notice colors, textures. Watch the squirrels and birds.     Service to others  Be of service to others.  There is always someone else in need.  By helping others we can  get out of ourselves  and remember the really important things.  This also provides opportunities for practicing all the other parts of the program.    Humor  Laugh and be silly!  Adjust your TV habits for less news and crime-drama and more comedy.    Control your stress  Try breathing deep, massage therapy, biofeedback, and meditation. Find time to relax each day.     My support system    Clinic Contact:  Phone number:    Contact 1:  Phone number:    Contact 2:  Phone number:    Jainism/:  Phone number:    Therapist:  Phone number:    Local crisis center:    Phone number:    Other community support:  Phone number:

## 2019-11-07 NOTE — PATIENT INSTRUCTIONS
ice 20 minutes every other hour as needed, heat 15 minutes every other hour as needed and Perform knee squeeze exercise as recommended

## 2019-11-08 ASSESSMENT — ASTHMA QUESTIONNAIRES: ACT_TOTALSCORE: 20

## 2019-11-13 ENCOUNTER — THERAPY VISIT (OUTPATIENT)
Dept: CHIROPRACTIC MEDICINE | Facility: OTHER | Age: 49
End: 2019-11-13
Attending: CHIROPRACTOR
Payer: COMMERCIAL

## 2019-11-13 ENCOUNTER — OFFICE VISIT (OUTPATIENT)
Dept: FAMILY MEDICINE | Facility: OTHER | Age: 49
End: 2019-11-13
Attending: FAMILY MEDICINE
Payer: COMMERCIAL

## 2019-11-13 ENCOUNTER — TELEPHONE (OUTPATIENT)
Dept: FAMILY MEDICINE | Facility: OTHER | Age: 49
End: 2019-11-13

## 2019-11-13 VITALS
BODY MASS INDEX: 29.62 KG/M2 | HEART RATE: 82 BPM | WEIGHT: 176.6 LBS | OXYGEN SATURATION: 99 % | RESPIRATION RATE: 18 BRPM | SYSTOLIC BLOOD PRESSURE: 128 MMHG | DIASTOLIC BLOOD PRESSURE: 84 MMHG | TEMPERATURE: 97.3 F

## 2019-11-13 DIAGNOSIS — M43.10 ANTEROLISTHESIS: ICD-10-CM

## 2019-11-13 DIAGNOSIS — M54.42 LEFT-SIDED LOW BACK PAIN WITH LEFT-SIDED SCIATICA, UNSPECIFIED CHRONICITY: ICD-10-CM

## 2019-11-13 DIAGNOSIS — J32.9 RHINOSINUSITIS: Primary | ICD-10-CM

## 2019-11-13 DIAGNOSIS — M99.04 SEGMENTAL AND SOMATIC DYSFUNCTION OF SACRAL REGION: Primary | ICD-10-CM

## 2019-11-13 PROCEDURE — 99213 OFFICE O/P EST LOW 20 MIN: CPT | Performed by: FAMILY MEDICINE

## 2019-11-13 PROCEDURE — 98940 CHIROPRACT MANJ 1-2 REGIONS: CPT | Mod: AT | Performed by: CHIROPRACTOR

## 2019-11-13 RX ORDER — AMOXICILLIN 875 MG
875 TABLET ORAL 2 TIMES DAILY
Qty: 10 TABLET | Refills: 0 | Status: SHIPPED | OUTPATIENT
Start: 2019-11-16 | End: 2020-01-13

## 2019-11-13 ASSESSMENT — PAIN SCALES - GENERAL: PAINLEVEL: MILD PAIN (2)

## 2019-11-13 ASSESSMENT — PATIENT HEALTH QUESTIONNAIRE - PHQ9: SUM OF ALL RESPONSES TO PHQ QUESTIONS 1-9: 4

## 2019-11-13 NOTE — TELEPHONE ENCOUNTER
Patient has been called and messages left on 1/08, 11/11 and 11/12 to schedule a screening colonoscopy . Letter has been sent out today for her to call and schedule.      Silvia Polanco on 11/13/2019 at 9:52 AM

## 2019-11-13 NOTE — PROGRESS NOTES
Nursing Notes:   Soraida Zeng LPN  11/13/2019 10:23 AM  Signed  Chief Complaint   Patient presents with     Sinus Problem     Here today due to possible sinus infection. Has had a headaches, productive cough of yellow and brown phlegm x1 week. Chest congestion and more frequent use of inhalers due to asthma complications.     Medication Reconciliation: complete    Soraida Zeng LPN     SUBJECTIVE:  Cielo Bahena is a 48 year old female here for the above issues. She also notes maxillary pressure. She is using her rescue inhalers 2-3x/day. IBU, sudafed, and benadryl were only moderately helpful. Is also using Flonase. She is sleeping propped up with pillows.    Allergies:  No Known Allergies    ROS:  Patient denies shortness of breath and chest pain.    OBJECTIVE:  /84 (BP Location: Right arm, Patient Position: Sitting, Cuff Size: Adult Regular)   Pulse 82   Temp 97.3  F (36.3  C) (Tympanic)   Resp 18   Wt 80.1 kg (176 lb 9.6 oz)   SpO2 99%   Breastfeeding No   BMI 29.62 kg/m      EXAM:  General Appearance: Pleasant, alert, appropriate appearance for age. No acute distress.  Head: Normal. Normocephalic, atraumatic.  Eyes: PERRL, EOMI  Ears: Normal TM's bilaterally. Normal auditory canals and external ears.   NOSE: nasal discharge present- voice sounds like nasal passages blocked  OroPharynx: Dental hygiene adequate. Normal buccal mucosa. Normal pharynx.  Neck: Supple, no masses or nodes, no lymphadenopathy.  No thyromegaly.  Lungs: Normal chest wall and respirations. Clear to auscultation, no wheezes or crackles.  Cardiovascular: Regular rate and rhythm. S1, S2, no murmurs.  Abdominal: Soft, nontender, no abnormal masses or organomegaly.  Skin: no concerning or new rashes.  Neurologic Exam: CN 2-12 grossly intact.  Normal gait.  Psychiatric Exam: Alert and oriented, appropriate affect.    ASSESSEMENT AND PLAN:    1. Rhinosinusitis  - Amoxicillin (AMOXIL) 875 MG tablet; Take 1 tablet (875 mg) by  mouth 2 times daily for 5 days  Dispense: 10 tablet; Refill: 0  - Counseled regarding antibiotic side effects  - Continue IBU, Flonase, tylenol  - Monitor asthma symptoms       HCM: TRAE Donaldson MD  [facility]

## 2019-11-13 NOTE — LETTER
[facility]  [address]  [place] [address]  [phone]          November 13, 2019    RE:  Cielo Bahena                                                                                                                                                       [id number] [address] [facility] [address]  [place] [facility] [address]            To whom it may concern:    Cielo Bahena is under my professional care for improving Rhinosinusitis She  may return to work with the following: The employee is ABLE to return to work today.      Sincerely,        Marly Donaldson MD

## 2019-11-13 NOTE — PROGRESS NOTES
Visit #:  2/3    Subjective:  Cielo Bahena is a 48 year old female who is seen in f/u up for:        Segmental and somatic dysfunction of sacral region  Anterolisthesis  Left-sided low back pain with left-sided sciatica, unspecified chronicity.     Since last visit on 11/7/2019,  Cielo Bahena reports: Over the weekend going deer hunting.  Patient states that she was able to go deer hunting without limitations due to left-sided low back pain.  Following last treatment patient noted resolution of left-sided sciatica symptoms.  On Sunday patient reports waking up feeling quite ill.  [facility] of possible [facility] infection.  Because of sinus infection patient states she has been laying in bed resting quite a bit more.  Patient believes under activity over the past couple of days cause symptoms to flare to their current levels.  Patient denied any traumatic events or other overuse type injuries preceding current flareup.  Patient is noticing return of left-sided sciatica since Sunday.    Area of chief complaint:  Lumbar :  Symptoms are graded at 7-10/10. The quality is described as sharp.  .       Objective:  The following was observed:  Oswestry (JACQUE) Questionnaire    OSWESTRY DISABILITY INDEX 11/13/2019   Count 9   Sum 26   Oswestry Score (%) 57.78   Some recent data might be hidden   Sitting and sleeping have worsened since weekend.  Patient is sat mildly antalgic to the right side.    P: palpatory tendernessLeft PSIS:    A: static palpation demonstrates intersegmental asymmetry , pelvis  R: motion palpation notes restricted motion, Sacrum   T: muscle spasm at level(s): Left quadratus lumborum, left gluteus medius, left piriformis:      Segmental spinal dysfunction/restrictions found at:  :  Sacrum Left lateral flexion restricted and Extension restriction.      Assessment: Patient appears to be experiencing flareup of low back symptoms as well as  left-sided sciatica symptoms.  Patient does find relief of symptoms with chiropractic adjustments however I do believe that with recent underactivity this likely cause symptoms to increase to their current levels.  I do also believe that patient's current sinus infection contributing to aggravation of sensation of pain levels.  Recommend continue follow-up care in 1 week.  Patient may require an additional visit this week as symptoms appear quite elevated.    Diagnoses:      1. Segmental and somatic dysfunction of sacral region    2. Anterolisthesis    3. Left-sided low back pain with left-sided sciatica, unspecified chronicity        Patient's condition:  Patient had restrictions pre-manipulation, Patient symptoms were gradually improving and Patient symptoms are worsed due to laying around due to recent sinus infection.    Treatment effectiveness:  Post manipulation there is better intersegmental movement and Patient claims to feel looser post manipulation      Procedures:  CMT:  53343 Chiropractic manipulative treatment 1-2 regions performed   Pelvis: Diversified, Sacrum , Side posture    Modalities:  None performed this visit    Therapeutic procedures:  None    Response to Treatment  Reduction in symptoms as reported by patient    Prognosis: Good    Progress towards Goals: Patient has not made progress towards goal.     Recommendations:    Instructions:ice 20 minutes every other hour as needed, heat 15 minutes every other hour as needed and walk 10 minutes    Follow-up:  Return to care in 1 week.

## 2019-11-13 NOTE — NURSING NOTE
Chief Complaint   Patient presents with     Sinus Problem     Here today due to possible sinus infection. Has had a headaches, productive cough of yellow and brown phlegm x1 week. Chest congestion and more frequent use of inhalers due to asthma complications.     Medication Reconciliation: complete    Soraida Zeng, LPN

## 2019-11-13 NOTE — PATIENT INSTRUCTIONS
Patient Education     Sinusitis (No Antibiotics)    The sinuses are air-filled spaces within the bones of the face. They connect to the inside of the nose. Sinusitis is an inflammation of the tissue that lines the sinuses. Sinusitis can occur during a cold. It can also happen due to allergies to pollens and other particles in the air. It can cause symptoms such as sinus congestion, headache, sore throat, facial swelling, and a feeling of fullness. It may also cause a low-grade fever. Your sinusitis does not include an infection with bacteria. Because of this, antibiotics are not used to treat this problem.  Home care    Drink plenty of water, hot tea, and other liquids. This may help thin nasal mucus. It also may help your sinuses drain fluids.    Heat may help soothe painful areas of your face. Use a towel soaked in hot water. Or,  the shower and direct the warm spray onto your face. Using a vaporizer along with a menthol rub at night may also help soothe symptoms.     An expectorant with guaifenesin may help thin nasal mucus and help your sinuses drain fluids.    You can use an over-the-counter decongestant, unless a similar medicine was prescribed to you. Nasal sprays work the fastest. Use one that contains phenylephrine or oxymetazoline. First blow your nose gently. Then use the spray. Do not use these medicines more often than directed on the label. If you do, your symptoms may get worse. You may also take pills that contain pseudoephedrine. Don t use products that combine multiple medicines. This is because side effects may be increased. Read all medicine labels. You can also ask the pharmacist for help. (People with high blood pressure should not use decongestants. They can raise blood pressure.)    Over-the-counter antihistamines may help if allergies contributed to your sinusitis.      Use acetaminophen or ibuprofen to control pain, unless another pain medicine was prescribed to you. If you have  chronic liver or kidney disease or ever had a stomach ulcer, talk with your healthcare provider before using these medicines. (Aspirin should never be taken by anyone under age 18 who is ill with a fever. It may cause severe liver damage.)    Use nasal rinses or irrigation as instructed by your healthcare provider.    Don't smoke. This can make symptoms worse.  Follow-up care  Follow up with your healthcare provider or our staff if you are NOT better in 1 week.  When to seek medical advice  Call your healthcare provider if any of these occur:    Green or yellow fluid draining from your nose or into your throat    Facial pain or headache that gets worse    Stiff neck    Unusual drowsiness or confusion    Swelling of your forehead or eyelids    Vision problems, such as blurred or double vision    Fever of 100.4 F (38 C) or higher, or as directed by your healthcare provider    Seizure    Breathing problems    Symptoms that don't go away in 10 days  Date Last Reviewed: 11/1/2017 2000-2018 [employer]. [address] [place] [address], [place] [address]. All rights reserved. This information is not intended as a substitute for professional medical care. Always follow your healthcare professional's instructions.

## 2019-11-14 ASSESSMENT — ASTHMA QUESTIONNAIRES: ACT_TOTALSCORE: 15

## 2019-12-05 DIAGNOSIS — Z12.11 ENCOUNTER FOR SCREENING COLONOSCOPY: Primary | ICD-10-CM

## 2019-12-05 NOTE — TELEPHONE ENCOUNTER
Screening Questions for the Scheduling of Screening Colonoscopies   (If Colonoscopy is diagnostic, Provider should review the chart before scheduling.)  Are you younger than 50 or older than 80?  NO   Do you take aspirin or fish oil?  NO  (if yes, tell patient to stop 1 week prior to Colonoscopy)  Do you take warfarin (Coumadin), clopidogrel (Plavix), apixaban (Eliquis), dabigatram (Pradaxa), rivaroxaban (Xarelto) or any blood thinner? NO   Do you use oxygen at home?  NO   Do you have kidney disease? NO   Are you on dialysis? NO   Have you had a stroke or heart attack in the last year? NO   Have you had a stent in your heart or any blood vessel in the last year? NO   Have you had a transplant of any organ? NO   Have you had a colonoscopy or upper endoscopy (EGD) before? YES          When?  5 YRS AGO   Date of scheduled Colonoscopy. 01/16/2020  Provider [facility]   Pharmacy [facility]

## 2019-12-06 ENCOUNTER — THERAPY VISIT (OUTPATIENT)
Dept: CHIROPRACTIC MEDICINE | Facility: OTHER | Age: 49
End: 2019-12-06
Attending: CHIROPRACTOR
Payer: COMMERCIAL

## 2019-12-06 ENCOUNTER — HOSPITAL ENCOUNTER (OUTPATIENT)
Facility: OTHER | Age: 49
End: 2019-12-06
Attending: SURGERY | Admitting: SURGERY
Payer: COMMERCIAL

## 2019-12-06 DIAGNOSIS — M43.10 ANTEROLISTHESIS: ICD-10-CM

## 2019-12-06 DIAGNOSIS — M99.04 SEGMENTAL AND SOMATIC DYSFUNCTION OF SACRAL REGION: Primary | ICD-10-CM

## 2019-12-06 DIAGNOSIS — M54.42 LEFT-SIDED LOW BACK PAIN WITH LEFT-SIDED SCIATICA, UNSPECIFIED CHRONICITY: ICD-10-CM

## 2019-12-06 DIAGNOSIS — M99.02 SEGMENTAL AND SOMATIC DYSFUNCTION OF THORACIC REGION: ICD-10-CM

## 2019-12-06 DIAGNOSIS — M54.6 PAIN IN THORACIC SPINE: ICD-10-CM

## 2019-12-06 PROCEDURE — 98940 CHIROPRACT MANJ 1-2 REGIONS: CPT | Mod: AT | Performed by: CHIROPRACTOR

## 2019-12-06 NOTE — PROGRESS NOTES
Visit #:  11 total    Subjective:  Cielo Bahena is a 49 year old female who is seen in f/u up for:        Segmental and somatic dysfunction of sacral region  Anterolisthesis  Left-sided low back pain with left-sided sciatica, unspecified chronicity  Segmental and somatic dysfunction of thoracic region  Pain in thoracic spine.     Since last visit on 11/13/2019,  Cielo Bahena reports: noticing a flare of her lower back pain.  Earlier this week patient reports bending to  a tote and twisting which caused sharp pain in her low back into the TL junction.  Patient states she has been having some sciatica in the left leg just below posterior knee. patient states non-compliance with home exercises. Patient has been attempting to manage symptoms with ibuprofen which does help.     Area of chief complaint:  Lumbar :  Symptoms are graded at 1-4/10. The quality is described as sharp.         Objective:  The following was observed:  Oswestry (JACQUE) Questionnaire    OSWESTRY DISABILITY INDEX 12/6/2019   Count 9   Sum 10   Oswestry Score (%) 22.22   Some recent data might be hidden      This has been improving     P: palpatory tendernessLeft PSIS, right side T11:    A: static palpation demonstrates intersegmental asymmetry , thoracic, pelvis  R: motion palpation notes restricted motion, T11  and Sacrum   T: muscle spasm at level(s): Quadratus lumborum bilaterally, lumbar paraspinals bilaterally:      Segmental spinal dysfunction/restrictions found at:  :  T11 Right lateral flexion restricted and Extension restriction  Sacrum Left lateral flexion restricted and Extension restriction.      Assessment: Patient appears to be expensing flareup of ongoing low back pain.  Patient does have chronic condition which does increase propensity for flareups.  Patient also works as a CNA.  I do believe that the physical tasks of this job likely are also a contributing factor for flareup of back pain and left sciatica symptoms as  noted.  Patient has been under our care with similar complaints in the past and responded favorably with chiropractic care and I do anticipate the case currently.    Diagnoses:      1. Segmental and somatic dysfunction of sacral region    2. Anterolisthesis    3. Left-sided low back pain with left-sided sciatica, unspecified chronicity    4. Segmental and somatic dysfunction of thoracic region    5. Pain in thoracic spine        Patient's condition:  Patient had restrictions pre-manipulation, Symptoms come and go and Patient symptoms are worsed due to bending and picking up object.    Treatment effectiveness:  Post manipulation there is better intersegmental movement and Patient claims to feel looser post manipulation      Procedures:  CMT:  44839 Chiropractic manipulative treatment 1-2 regions performed   Thoracic: Diversified, T11, Prone  Pelvis: Diversified, Sacrum , Side posture    Modalities:  None performed this visit    Therapeutic procedures:  None  Encourage patient to continue with home core exercises    Response to Treatment  Reduction in symptoms as reported by patient    Prognosis: Good    Progress towards Goals: Patient is making progress towards the goal.     Recommendations:    Instructions:ice 20 minutes every other hour as needed, heat 15 minutes every other hour as needed and core exercises    Follow-up:  Continue treatment PRN.

## 2019-12-12 ENCOUNTER — HOSPITAL ENCOUNTER (OUTPATIENT)
Dept: MAMMOGRAPHY | Facility: OTHER | Age: 49
Discharge: HOME | End: 2019-12-12
Attending: FAMILY MEDICINE | Admitting: FAMILY MEDICINE
Payer: COMMERCIAL

## 2019-12-12 DIAGNOSIS — Z12.31 VISIT FOR SCREENING MAMMOGRAM: ICD-10-CM

## 2019-12-12 PROCEDURE — 77067 SCR MAMMO BI INCL CAD: CPT

## 2020-01-07 ENCOUNTER — THERAPY VISIT (OUTPATIENT)
Dept: CHIROPRACTIC MEDICINE | Facility: OTHER | Age: 50
End: 2020-01-07
Attending: CHIROPRACTOR
Payer: COMMERCIAL

## 2020-01-07 DIAGNOSIS — M43.10 ANTEROLISTHESIS: ICD-10-CM

## 2020-01-07 DIAGNOSIS — M99.04 SEGMENTAL AND SOMATIC DYSFUNCTION OF SACRAL REGION: Primary | ICD-10-CM

## 2020-01-07 DIAGNOSIS — J45.20 MILD INTERMITTENT ASTHMA WITHOUT COMPLICATION: ICD-10-CM

## 2020-01-07 DIAGNOSIS — M54.42 LEFT-SIDED LOW BACK PAIN WITH LEFT-SIDED SCIATICA, UNSPECIFIED CHRONICITY: ICD-10-CM

## 2020-01-07 PROCEDURE — 98940 CHIROPRACT MANJ 1-2 REGIONS: CPT | Performed by: CHIROPRACTOR

## 2020-01-07 NOTE — PROGRESS NOTES
Visit #:  1 total  New Episode 1/7/2020 (1/3)    Subjective:  Cielo Bahena is a 49 year old female who is seen in f/u up for:        Segmental and somatic dysfunction of sacral region  Anterolisthesis  Left-sided low back pain with left-sided sciatica, unspecified chronicity.     Since last visit on 12/6/2019,  Cielo Bahena reports: About 1 week ago patient began having left-sided low back pain symptoms.  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  Patient reports sitting is caused back pain to increase.  No traumatic events noted prior to flareup of low back pain.  Patient had attempted to manage symptoms with home exercises.  Patient states that she is only semi-compliant with this exercise however when she does perform them they do seem to help low back symptoms.  Patient reports she began experiencing left-sided sciatica just below the left knee which is why she is presenting to our office at this time for evaluation and treatment.    Area of chief complaint:  Lumbar :  Symptoms are graded at 5-8/10. The quality is described as achey, sharp.      Following last treatment patient's symptoms did improve which seemed to last for over a week       Objective:  The following was observed:  Oswestry (JACQUE) Questionnaire    OSWESTRY DISABILITY INDEX 1/7/2020   Count 9   Sum 8   Oswestry Score (%) 17.78   Some recent data might be hidden      -Ely's  -SLR    P: palpatory tenderness Elicited left side PSIS:    A: static palpation demonstrates intersegmental asymmetry , pelvis  R: motion palpation notes restricted motion, Sacrum   T: muscle spasm at level(s): Left quadratus lumborum, left gluteus medius:      Segmental spinal dysfunction/restrictions found at:  :  Sacrum Left lateral flexion restricted and Extension restriction.      Assessment: Flareup of ongoing low back pain with left-sided sciatica.  Patient has been under our care with  similar complaints in the past and does respond favorably with occasional chiropractic adjustments.  Physical therapy was attempted to help the patient however patient reports only being semi-compliant with home exercises.  Patient has been educated of the importance of performing home exercises and core strengthening routine.  Current plan of care for episode to include up to 3 chiropractic visits over the next 4 weeks.    Diagnoses:      1. Segmental and somatic dysfunction of sacral region    2. Anterolisthesis    3. Left-sided low back pain with left-sided sciatica, unspecified chronicity        Patient's condition:  Patient had restrictions pre-manipulation, Symptoms come and go and Patient symptoms are worsed due to sitting for job-related duties recently.    Treatment effectiveness:  Post manipulation there is better intersegmental movement and Patient claims to feel looser post manipulation      Procedures:  CMT:  16032 Chiropractic manipulative treatment 1-2 regions performed   Pelvis: Diversified, Sacrum , Side posture    Modalities:  None performed this visit    Therapeutic procedures:  None    Response to Treatment  Reduction in symptoms as reported by patient    Prognosis: Good    Progress towards Goals: Goal: Reduce low back pain by 20%  Patient report being able to sit without painful limitations     Recommendations:    Instructions:ice 20 minutes every other hour as needed and Consider lumbar support when sitting    Follow-up:  Return to care in 2 days.

## 2020-01-08 RX ORDER — MONTELUKAST SODIUM 10 MG/1
TABLET ORAL
Qty: 90 TABLET | Refills: 3 | OUTPATIENT
Start: 2020-01-08

## 2020-01-09 ENCOUNTER — THERAPY VISIT (OUTPATIENT)
Dept: CHIROPRACTIC MEDICINE | Facility: OTHER | Age: 50
End: 2020-01-09
Attending: CHIROPRACTOR
Payer: COMMERCIAL

## 2020-01-09 DIAGNOSIS — M54.42 LEFT-SIDED LOW BACK PAIN WITH LEFT-SIDED SCIATICA, UNSPECIFIED CHRONICITY: ICD-10-CM

## 2020-01-09 DIAGNOSIS — M43.10 ANTEROLISTHESIS: ICD-10-CM

## 2020-01-09 DIAGNOSIS — M99.04 SEGMENTAL AND SOMATIC DYSFUNCTION OF SACRAL REGION: Primary | ICD-10-CM

## 2020-01-09 PROCEDURE — 98940 CHIROPRACT MANJ 1-2 REGIONS: CPT | Mod: AT | Performed by: CHIROPRACTOR

## 2020-01-09 RX ORDER — BISACODYL 5 MG/1
TABLET, DELAYED RELEASE ORAL
Qty: 2 TABLET | Refills: 0 | Status: SHIPPED | OUTPATIENT
Start: 2020-01-09 | End: 2020-03-11

## 2020-01-09 RX ORDER — POLYETHYLENE GLYCOL 3350, SODIUM CHLORIDE, SODIUM BICARBONATE, POTASSIUM CHLORIDE 420; 11.2; 5.72; 1.48 G/4L; G/4L; G/4L; G/4L
4000 POWDER, FOR SOLUTION ORAL ONCE
Qty: 4000 ML | Refills: 0 | Status: SHIPPED | OUTPATIENT
Start: 2020-01-09 | End: 2020-03-11

## 2020-01-09 NOTE — PROGRESS NOTES
Visit #:  2 total  2/3 current episode    Subjective:  Cielo Bahena is a 49 year old female who is seen in f/u up for:        Segmental and somatic dysfunction of sacral region  Anterolisthesis  Left-sided low back pain with left-sided sciatica, unspecified chronicity.     Since last visit on 1/7/2020,  Cielo Bahena reports: After last treatment symptoms initially were improved however by that evening pain to become quite elevated to 7/10 levels.  Patient does continue to have left-sided sciatica.  Due to upcoming colonoscopy exam patient has not been able to take ibuprofen to help manage symptoms as she has in the past. Patient reports semi-compliance with home exercises and finds them helpful when she is performing them. Patient continues to perform sitting duties at work which are quite aggravating to low back complaints and left-sided sciatica.  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 The quality is described as achey, sharp.         Objective:  The following was observed:    P: palpatory tendernessLeft PSIS:    A: static palpation demonstrates intersegmental asymmetry , pelvis  R: motion palpation notes restricted motion, Sacrum   T: muscle spasm at level(s):  Left piriformis.  Taut tender fibers apparent left quadratus lumborum and lumbar paraspinals bilaterally:      Segmental spinal dysfunction/restrictions found at:  :  Sacrum Left lateral flexion restricted and Extension restriction.      Assessment: Patient continues to have left-sided low back and sciatica symptoms.  Patient's progress of care different compared to past episodes where patient oftentimes will show improvement after first visit.  I do believe that aggravation of symptoms after treatment likely coming from change in work duties.  Patient's work duties often require her to stand and lift for extended periods of time.  Currently patient is  performing desk duties which require extended periods of sitting.  Patient may require one additional visit next week prior to undergoing colonoscopy studies.  Should symptoms fail to improve or continue to progress in this manner further evaluation of low back, left-sided sciatica symptoms is warranted.    Diagnoses:      1. Segmental and somatic dysfunction of sacral region    2. Anterolisthesis    3. Left-sided low back pain with left-sided sciatica, unspecified chronicity        Patient's condition:  Patient had restrictions pre-manipulation and Patient symptoms are gradually improving    Treatment effectiveness:  Post manipulation there is better intersegmental movement and Patient claims to feel looser post manipulation      Procedures:  CMT:  82332 Chiropractic manipulative treatment 1-2 regions performed   Pelvis: Diversified, Sacrum , Side posture    Modalities:  None performed this visit    Therapeutic procedures:  None    Response to Treatment  Reduction in symptoms as reported by patient    Prognosis: Good    Progress towards Goals: Patient is making progress towards the goal.     Recommendations:    Instructions:ice 20 minutes every other hour as needed and heat 15 minutes every other hour as needed    Follow-up:  Continue treatment PRN.

## 2020-01-13 ENCOUNTER — OFFICE VISIT (OUTPATIENT)
Dept: FAMILY MEDICINE | Facility: OTHER | Age: 50
End: 2020-01-13
Attending: FAMILY MEDICINE
Payer: COMMERCIAL

## 2020-01-13 VITALS
TEMPERATURE: 99.9 F | DIASTOLIC BLOOD PRESSURE: 90 MMHG | BODY MASS INDEX: 29.62 KG/M2 | WEIGHT: 177.8 LBS | OXYGEN SATURATION: 99 % | HEART RATE: 104 BPM | HEIGHT: 65 IN | RESPIRATION RATE: 20 BRPM | SYSTOLIC BLOOD PRESSURE: 142 MMHG

## 2020-01-13 DIAGNOSIS — M54.42 CHRONIC LEFT-SIDED LOW BACK PAIN WITH LEFT-SIDED SCIATICA: Primary | ICD-10-CM

## 2020-01-13 DIAGNOSIS — G89.29 CHRONIC LEFT-SIDED LOW BACK PAIN WITH LEFT-SIDED SCIATICA: Primary | ICD-10-CM

## 2020-01-13 PROCEDURE — 99214 OFFICE O/P EST MOD 30 MIN: CPT | Performed by: FAMILY MEDICINE

## 2020-01-13 RX ORDER — CYCLOBENZAPRINE HCL 10 MG
1 TABLET ORAL 3 TIMES DAILY PRN
Refills: 1 | COMMUNITY
Start: 2019-12-02 | End: 2020-02-21

## 2020-01-13 RX ORDER — TRAMADOL HYDROCHLORIDE 50 MG/1
50 TABLET ORAL EVERY 6 HOURS PRN
Qty: 15 TABLET | Refills: 0 | Status: SHIPPED | OUTPATIENT
Start: 2020-01-13 | End: 2020-05-11

## 2020-01-13 RX ORDER — PREDNISONE 20 MG/1
TABLET ORAL
Qty: 20 TABLET | Refills: 0 | Status: SHIPPED | OUTPATIENT
Start: 2020-01-13 | End: 2020-02-19

## 2020-01-13 ASSESSMENT — PATIENT HEALTH QUESTIONNAIRE - PHQ9: SUM OF ALL RESPONSES TO PHQ QUESTIONS 1-9: 0

## 2020-01-13 ASSESSMENT — PAIN SCALES - GENERAL: PAINLEVEL: SEVERE PAIN (6)

## 2020-01-13 ASSESSMENT — MIFFLIN-ST. JEOR: SCORE: 1423.41

## 2020-01-13 NOTE — NURSING NOTE
Patient here for back pain that is running down the left leg. Continuous for the last 2 weeks more severe the last 2 days. Medication Reconciliation: complete.    Kari Kaufman LPN  1/13/2020 2:30 PM

## 2020-01-14 ASSESSMENT — ENCOUNTER SYMPTOMS
DIFFICULTY URINATING: 0
FEVER: 0
RESPIRATORY NEGATIVE: 1
CHILLS: 0
DIZZINESS: 0
PSYCHIATRIC NEGATIVE: 1
EYES NEGATIVE: 1

## 2020-01-14 NOTE — PROGRESS NOTES
SUBJECTIVE:   Cielo Bahena is a 49 year old female who presents to clinic today for the following health issues: Back pain    Patient arrives here for back pain left sided leg pain.  She states is been going on for months.  In the past is gotten worse and better.  But now is more consistent over the last month.  She reports quite a bit of leg pain.  She rates the leg pain is a 6 out of 10.  In the past she was given prednisone that did help for short period of time.  She has been going to chiropractic care and physical therapy without any improvement.  Patient reports that she does have a history of spondylolysis.  Diagnosis summer.  There is no problems with incontinence of bowel or bladder.  She did have x-rays back in 619 showing degenerative changes.  No history of known trauma.  No foot drop.        Patient Active Problem List    Diagnosis Date Noted     Moderate persistent asthma without complication 02/16/2018     Priority: Medium     Overview:   Asthma, generally well controlled, history of respiratory arrest requiring   brief mechanical ventilation.       Insomnia 02/16/2018     Priority: Medium     Genuine stress incontinence, female 02/16/2018     Priority: Medium     Depression 02/16/2018     Priority: Medium     Anemia 02/16/2018     Priority: Medium     Overview:   mild       Family history of colon cancer 01/22/2015     Priority: Medium     RLS (restless legs syndrome) 06/20/2014     Priority: Medium     Hip pain 04/18/2014     Priority: Medium     GERD (gastroesophageal reflux disease) 10/25/2012     Priority: Medium     Breast hypertrophy 03/14/2012     Priority: Medium     Past Medical History:   Diagnosis Date     Anemia     No Comments Provided     Family history of malignant neoplasm of digestive organ     1/22/2015     Insomnia     No Comments Provided     Major depressive disorder, single episode     2006,Depression.  PHQ-9 score 7/1.     Mass of breast     2009,Soft breast mass lump,  right breast     Pain in hip     No Comments Provided     Personal history of other medical treatment (CODE)     2011,, 1 SAB     Personal history of other specified conditions (CODE)     abnormal Pap smear prior to , subsequent yearly Pap smears have been normal     Restless legs syndrome     No Comments Provided     Segmental and somatic dysfunction of pelvic region     2013     Uncomplicated asthma     generally well controlled, history of respiratory arrest requiring brief mechanical ventilation.      Past Surgical History:   Procedure Laterality Date     DILATION AND CURETTAGE      ,Dilatation and curettage for blighted ovum.     ENDOSCOPY UPPER, COLONOSCOPY, COMBINED      Normal exam, 5 year follow up due to FH     EXTRACTION(S) DENTAL      [place] tooth extraction     HYSTERECTOMY VAGINAL      3/5/2015,Dr. Lindsay for DUB; negative for malignancy; ovaries remain     MAMMOPLASTY REDUCTION      , Bilateral breast reduction, [facility]     MAMMOPLASTY REDUCTION           OTHER SURGICAL HISTORY      94007.9,[place] SUBTALAR ATHROEREISIS,Left foot     Social History     Social History Narrative    Single, moved back to [place] from [place], does have family in town.   Has significant other.     Works at [employer] as a CNA.   Shannan Galvan-mother  2011   Inder     Current Outpatient Medications   Medication Sig Dispense Refill     cyclobenzaprine (FLEXERIL) 10 MG tablet Take 1 tablet by mouth 3 times daily as needed  1     EPINEPHrine (EPIPEN/ADRENACLICK/OR ANY BX GENERIC EQUIV) 0.3 MG/0.3ML injection 2-pack Inject 0.3 mg into the muscle as needed Inject 0.3 mg intramuscular one time if needed for Allergic Reaction.       esomeprazole (NEXIUM) 20 MG DR capsule Take 20 mg by mouth every morning (before breakfast) Take 30-60 minutes before eating.       fluticasone-salmeterol (ADVAIR DISKUS) 250-50 MCG/DOSE inhaler Inhale 1 puff into the lungs every 12 hours Inhale 1 Puff  "by mouth every 12 hours. 3 Inhaler 4     ibuprofen (ADVIL/MOTRIN) 800 MG tablet TAKE 1 TABLET(800 MG) BY MOUTH EVERY 6 HOURS AS NEEDED FOR MODERATE PAIN 50 tablet 11     LORazepam (ATIVAN) 0.5 MG tablet TAKE 1 TABLET BY MOUTH EVERY 8 HOURS AS NEEDED FOR ANXIETY 60 tablet 5     montelukast (SINGULAIR) 10 MG tablet Take 1 tablet (10 mg) by mouth At Bedtime 90 tablet 3     pramipexole (MIRAPEX) 0.25 MG tablet Take 1 tablet (0.25 mg) by mouth At Bedtime 90 tablet 4     predniSONE (DELTASONE) 20 MG tablet Take 3 tabs by mouth daily x 3 days, then 2 tabs daily x 3 days, then 1 tab daily x 3 days, then 1/2 tab daily x 3 days. 20 tablet 0     traMADol (ULTRAM) 50 MG tablet Take 1 tablet (50 mg) by mouth every 6 hours as needed for severe pain 15 tablet 0     VENTOLIN  (90 Base) MCG/ACT inhaler INHALE 2 PUFFS IN THE LUNGS EVERY 4 HOURS AS NEEDED 54 g 11     amitriptyline (ELAVIL) 10 MG tablet TAKE 2 TABLETS BY MOUTH EVERY AT BEDTIME (Patient not taking: Reported on 1/13/2020) 180 tablet 4     bisacodyl (DULCOLAX) 5 MG EC tablet Take as directed by colonoscopy prep instructions (Patient not taking: Reported on 1/13/2020) 2 tablet 0     No Known Allergies    Review of Systems   Constitutional: Negative for chills and fever.   Eyes: Negative.    Respiratory: Negative.    Gastrointestinal:        Negative for any rectal or bowel problems   Genitourinary: Negative.  Negative for difficulty urinating and enuresis.   Musculoskeletal:        Leg pain   Neurological: Negative for dizziness.   Psychiatric/Behavioral: Negative.         OBJECTIVE:     BP (!) 142/90   Pulse 104   Temp 99.9  F (37.7  C)   Resp 20   Ht 1.645 m (5' 4.75\")   Wt 80.6 kg (177 lb 12.8 oz)   SpO2 99%   BMI 29.82 kg/m    Body mass index is 29.82 kg/m .  Physical Exam  Constitutional:       Comments: She appears to be in mild discomfort.   Cardiovascular:      Rate and Rhythm: Normal rate and regular rhythm.   Pulmonary:      Effort: Pulmonary effort " is normal.      Breath sounds: Normal breath sounds.   Skin:     General: Skin is warm.   Neurological:      Mental Status: She is alert.      Comments: Patient is able to walk on toes and heels.  Squats with some difficulty.  But no weakness.  Deep tendon reflexes equal bilateral at about 1+.  Negative straight leg raise.         Diagnostic Test Results:  none     ASSESSMENT/PLAN:         1. Chronic left-sided low back pain with left-sided sciatica  Exam and history very consistent with nerve impingement.  Discussed options with her and her  including short course of prednisone.  She indicates that helped only temporarily.  She reports is getting worse and is very interested in pursuing either injections if indicated or surgery.  We will proceed with an MRI.  Temporarily she is given prednisone taper along with Ultram for pain control.  Patient will call later in the week if she has not heard from [facility]  - MR Lumbar Spine w/o Contrast; Future  - traMADol (ULTRAM) 50 MG tablet; Take 1 tablet (50 mg) by mouth every 6 hours as needed for severe pain  Dispense: 15 tablet; Refill: 0  - predniSONE (DELTASONE) 20 MG tablet; Take 3 tabs by mouth daily x 3 days, then 2 tabs daily x 3 days, then 1 tab daily x 3 days, then 1/2 tab daily x 3 days.  Dispense: 20 tablet; Refill: 0      Shun Jama MD  [facility]

## 2020-01-16 DIAGNOSIS — F32.89 ATYPICAL DEPRESSIVE DISEASE: ICD-10-CM

## 2020-01-17 ENCOUNTER — MYC REFILL (OUTPATIENT)
Dept: FAMILY MEDICINE | Facility: OTHER | Age: 50
End: 2020-01-17

## 2020-01-17 DIAGNOSIS — F32.89 ATYPICAL DEPRESSIVE DISEASE: ICD-10-CM

## 2020-01-17 RX ORDER — AMITRIPTYLINE HYDROCHLORIDE 10 MG/1
TABLET ORAL
Qty: 180 TABLET | Refills: 1 | Status: SHIPPED | OUTPATIENT
Start: 2020-01-17 | End: 2020-03-25

## 2020-01-17 RX ORDER — AMITRIPTYLINE HYDROCHLORIDE 10 MG/1
TABLET ORAL
Qty: 180 TABLET | Refills: 4 | Status: CANCELLED | OUTPATIENT
Start: 2020-01-17

## 2020-01-17 NOTE — TELEPHONE ENCOUNTER
Routing refill request to provider for review/approval because:  Pressure under 140/90 in past 12 mos          BP Readings from Last 3 Encounters:   01/13/20 (!) 142/90   11/13/19 128/84   11/07/19 130/80        LOV: 1/13/2020    Maggie Kirby RN on 1/17/2020 at 12:14 PM

## 2020-01-29 ENCOUNTER — HOSPITAL ENCOUNTER (OUTPATIENT)
Dept: MRI IMAGING | Facility: OTHER | Age: 50
Discharge: HOME | End: 2020-01-29
Attending: FAMILY MEDICINE | Admitting: FAMILY MEDICINE
Payer: COMMERCIAL

## 2020-01-29 DIAGNOSIS — M54.42 CHRONIC LEFT-SIDED LOW BACK PAIN WITH LEFT-SIDED SCIATICA: ICD-10-CM

## 2020-01-29 DIAGNOSIS — G89.29 CHRONIC LEFT-SIDED LOW BACK PAIN WITH LEFT-SIDED SCIATICA: ICD-10-CM

## 2020-01-29 PROCEDURE — 72148 MRI LUMBAR SPINE W/O DYE: CPT

## 2020-01-31 ENCOUNTER — TELEPHONE (OUTPATIENT)
Dept: FAMILY MEDICINE | Facility: OTHER | Age: 50
End: 2020-01-31

## 2020-01-31 DIAGNOSIS — M48.061 SPINAL STENOSIS OF LUMBAR REGION, UNSPECIFIED WHETHER NEUROGENIC CLAUDICATION PRESENT: Primary | ICD-10-CM

## 2020-01-31 NOTE — TELEPHONE ENCOUNTER
Patient was given MR results and [place]. Please sign if appropriate. Patient was notified that MD was out until Monday and was ok to wait.  Yuniel Mckay LPN,..............1/31/2020 9:11 AM

## 2020-02-19 ENCOUNTER — OFFICE VISIT (OUTPATIENT)
Dept: FAMILY MEDICINE | Facility: OTHER | Age: 50
End: 2020-02-19
Attending: NURSE PRACTITIONER
Payer: COMMERCIAL

## 2020-02-19 VITALS
HEART RATE: 84 BPM | TEMPERATURE: 97.6 F | HEIGHT: 64 IN | SYSTOLIC BLOOD PRESSURE: 144 MMHG | OXYGEN SATURATION: 98 % | DIASTOLIC BLOOD PRESSURE: 92 MMHG | BODY MASS INDEX: 30.13 KG/M2 | WEIGHT: 176.5 LBS | RESPIRATION RATE: 16 BRPM

## 2020-02-19 DIAGNOSIS — R31.9 HEMATURIA, UNSPECIFIED TYPE: ICD-10-CM

## 2020-02-19 DIAGNOSIS — R39.89 URINARY PROBLEM: ICD-10-CM

## 2020-02-19 DIAGNOSIS — R39.9 UTI SYMPTOMS: Primary | ICD-10-CM

## 2020-02-19 LAB
ALBUMIN UR-MCNC: NEGATIVE MG/DL
APPEARANCE UR: CLEAR
BILIRUB UR QL STRIP: NEGATIVE
COLOR UR AUTO: ABNORMAL
GLUCOSE UR STRIP-MCNC: NEGATIVE MG/DL
HGB UR QL STRIP: ABNORMAL
KETONES UR STRIP-MCNC: NEGATIVE MG/DL
LEUKOCYTE ESTERASE UR QL STRIP: NEGATIVE
MUCOUS THREADS #/AREA URNS LPF: PRESENT /LPF
NITRATE UR QL: NEGATIVE
PH UR STRIP: 6 PH (ref 5–7)
RBC #/AREA URNS AUTO: 6 /HPF (ref 0–2)
RENAL EPI CELLS #/AREA URNS HPF: 1 /HPF
SOURCE: ABNORMAL
SP GR UR STRIP: 1.02 (ref 1–1.03)
SQUAMOUS #/AREA URNS AUTO: 1 /HPF (ref 0–1)
TRANS CELLS #/AREA URNS HPF: 1 /HPF
UROBILINOGEN UR STRIP-MCNC: NORMAL MG/DL (ref 0–2)
WBC #/AREA URNS AUTO: 11 /HPF (ref 0–5)

## 2020-02-19 PROCEDURE — 81001 URINALYSIS AUTO W/SCOPE: CPT | Mod: ZL | Performed by: NURSE PRACTITIONER

## 2020-02-19 PROCEDURE — 99214 OFFICE O/P EST MOD 30 MIN: CPT | Performed by: NURSE PRACTITIONER

## 2020-02-19 PROCEDURE — 87086 URINE CULTURE/COLONY COUNT: CPT | Mod: ZL | Performed by: NURSE PRACTITIONER

## 2020-02-19 RX ORDER — SULFAMETHOXAZOLE/TRIMETHOPRIM 800-160 MG
1 TABLET ORAL 2 TIMES DAILY
Qty: 6 TABLET | Refills: 0 | Status: SHIPPED | OUTPATIENT
Start: 2020-02-19 | End: 2020-03-11

## 2020-02-19 ASSESSMENT — MIFFLIN-ST. JEOR: SCORE: 1405.6

## 2020-02-19 NOTE — PATIENT INSTRUCTIONS
Urine culture pending    Will start antibiotics today while awaiting culture  Bactrim twice daily x 3 days    So if culture is negative for infection then will need to follow up with family practice for a scan for evaluation of blood in urine.

## 2020-02-19 NOTE — PROGRESS NOTES
HPI:    Cielo Bahena is a 49 year old female  who presents to clinic today for UTI.    Frequency, urgency, and dysuria for the past 2 days.  Hematuria started yesterday, less today.  Urine seems slightly darker.  Denies any abdominal pain, pressure or cramps.  No nausea or vomiting.  No change in appetite.  Drinking fluids well, only drinks water.  No diarrhea or constipation.  Chills last night.  No known fevers.  Chronic left back pain, no change, sees spine specialist next week.  No vaginal itching or discharge.  Returned from vacation 4 days ago from [place].  No hx of frequent or recurrent UTIs.  No hx of kidney stones.  Hx of hysterectomy, still has ovaries.  No STD concerns.  Hx of hematuria in the past, she has seen urologist, Dr. Ann for evaluation 3/5/18 - 2 episodes were noted to have been associated with an UTI and 2 episodes were unrelated to an UTI, she had a flexible cystourethroscopy completed at that visit with normal findings of the bladder and CT of the abdomen pelvis was negative for cause of hematuria.  Took 2 doses of left over Amoxicillin yesterday.  Taking Ibuprofen.      Past Medical History:   Diagnosis Date     Anemia     No Comments Provided     Family history of malignant neoplasm of digestive organ     2015     Insomnia     No Comments Provided     Major depressive disorder, single episode     ,Depression.  PHQ-9 score .     Mass of breast     ,Soft breast mass lump, right breast     Pain in hip     No Comments Provided     Personal history of other medical treatment (CODE)     2011,, 1 SAB     Personal history of other specified conditions (CODE)     abnormal Pap smear prior to , subsequent yearly Pap smears have been normal     Restless legs syndrome     No Comments Provided     Segmental and somatic dysfunction of pelvic region     2013     Uncomplicated asthma     generally well controlled, history of respiratory arrest requiring brief mechanical  ventilation.     Past Surgical History:   Procedure Laterality Date     DILATION AND CURETTAGE      2006,Dilatation and curettage for blighted ovum.     ENDOSCOPY UPPER, COLONOSCOPY, COMBINED      Normal exam, 5 year follow up due to FH     EXTRACTION(S) DENTAL      [place] tooth extraction     HYSTERECTOMY VAGINAL      3/5/2015,Dr. Lindsay for DUB; negative for malignancy; ovaries remain     MAMMOPLASTY REDUCTION      , Bilateral breast reduction, [facility]     MAMMOPLASTY REDUCTION           OTHER SURGICAL HISTORY      57453.9,[place] SUBTALAR ATHROEREISIS,Left foot     Social History     Tobacco Use     Smoking status: Former Smoker     Packs/day: 0.50     Years: 25.00     Pack years: 12.50     Types: Cigarettes     Last attempt to quit: 2005     Years since quittin.2     Smokeless tobacco: Never Used     Tobacco comment: on rare occasions   Substance Use Topics     Alcohol use: Yes     Alcohol/week: 0.8 standard drinks     Comment: Alcoholic Drinks/day: occassional     Current Outpatient Medications   Medication Sig Dispense Refill     amitriptyline (ELAVIL) 10 MG tablet TAKE 2 TABLETS BY MOUTH AT BEDTIME 180 tablet 1     bisacodyl (DULCOLAX) 5 MG EC tablet Take as directed by colonoscopy prep instructions (Patient not taking: Reported on 2020) 2 tablet 0     cyclobenzaprine (FLEXERIL) 10 MG tablet Take 1 tablet by mouth 3 times daily as needed  1     EPINEPHrine (EPIPEN/ADRENACLICK/OR ANY BX GENERIC EQUIV) 0.3 MG/0.3ML injection 2-pack Inject 0.3 mg into the muscle as needed Inject 0.3 mg intramuscular one time if needed for Allergic Reaction.       esomeprazole (NEXIUM) 20 MG DR capsule Take 20 mg by mouth every morning (before breakfast) Take 30-60 minutes before eating.       fluticasone-salmeterol (ADVAIR DISKUS) 250-50 MCG/DOSE inhaler Inhale 1 puff into the lungs every 12 hours Inhale 1 Puff by mouth every 12 hours. 3 Inhaler 4     ibuprofen (ADVIL/MOTRIN) 800 MG tablet TAKE 1  "TABLET(800 MG) BY MOUTH EVERY 6 HOURS AS NEEDED FOR MODERATE PAIN 50 tablet 11     LORazepam (ATIVAN) 0.5 MG tablet TAKE 1 TABLET BY MOUTH EVERY 8 HOURS AS NEEDED FOR ANXIETY 60 tablet 5     montelukast (SINGULAIR) 10 MG tablet Take 1 tablet (10 mg) by mouth At Bedtime 90 tablet 3     pramipexole (MIRAPEX) 0.25 MG tablet Take 1 tablet (0.25 mg) by mouth At Bedtime 90 tablet 4     VENTOLIN  (90 Base) MCG/ACT inhaler INHALE 2 PUFFS IN THE LUNGS EVERY 4 HOURS AS NEEDED 54 g 11     No Known Allergies      Past medical history, past surgical history, current medications and allergies reviewed and accurate to the best of my knowledge.        ROS:  Refer to HPI    BP (!) 144/92 (BP Location: Left arm, Patient Position: Sitting, Cuff Size: Adult Regular)   Pulse 84   Temp 97.6  F (36.4  C) (Tympanic)   Resp 16   Ht 1.626 m (5' 4\")   Wt 80.1 kg (176 lb 8 oz)   SpO2 98%   Breastfeeding No   BMI 30.30 kg/m      EXAM:  General Appearance: Well appearing adult female, non ill appearance, appropriate appearance for age. No acute distress  Eyes: conjunctivae normal without erythema or irritation, corneas clear, no drainage or crusting, no eyelid swelling, pupils equal   Orophayrnx: voice clear  Respiratory: normal chest wall and respirations.  Normal effort.  Clear to auscultation bilaterally, no wheezing, crackles or rhonchi.  No increased work of breathing.  No cough appreciated, oxygen saturation 98%  Cardiac: RRR with no murmurs  Abdomen: soft, nontender, no masses or organomegally, no rebound tenderness or guarding, normal bowel sounds present  :  No suprapubic tenderness to palpation.  No CVA tenderness to palpation.     female:  Exam deferred   Musculoskeletal:  Equal movement of bilateral upper extremities.  Equal movement of bilateral lower extremities.  Normal gait.    Psychological: normal affect, alert, oriented, and pleasant.       Labs:  Results for orders placed or performed in visit on 02/19/20 "   UA reflex to Microscopic     Status: Abnormal   Result Value Ref Range    Color Urine Light Yellow     Appearance Urine Clear     Glucose Urine Negative NEG^Negative mg/dL    Bilirubin Urine Negative NEG^Negative    Ketones Urine Negative NEG^Negative mg/dL    Specific Gravity Urine 1.024 1.003 - 1.035    Blood Urine Small (A) NEG^Negative    pH Urine 6.0 5.0 - 7.0 pH    Protein Albumin Urine Negative NEG^Negative mg/dL    Urobilinogen mg/dL Normal 0.0 - 2.0 mg/dL    Nitrite Urine Negative NEG^Negative    Leukocyte Esterase Urine Negative NEG^Negative    Source Midstream Urine     RBC Urine 6 (H) 0 - 2 /HPF    WBC Urine 11 (H) 0 - 5 /HPF    Squamous Epithelial /HPF Urine 1 0 - 1 /HPF    Transitional Epi 1 (A) FEW^Few /HPF    Renal Tub Epi 1 (A) NEG^Negative /HPF    Mucous Urine Present (A) NEG^Negative /LPF             ASSESSMENT/PLAN:    ICD-10-CM    1. UTI symptoms R39.9 sulfamethoxazole-trimethoprim 800-160 MG PO tablet   2. Urinary problem R39.89 UA reflex to Microscopic   3. Hematuria, unspecified type R31.9 Urine Culture Aerobic Bacterial         Urinalysis - clear light yellow urine, small blood, negative nitrite, negative leukocyte estrace  Urine microscopic - RBC 6, WBC 11, transitional epi 1, renal tubi epi 1, mucous present, no bacteria    Urine culture pending  Will call if culture warrants change of abx.     May use Pyridium OTC PRN.     Encouraged fluids and frequent bladder emptying.    Discussed with patient that her urinalysis is not indicated of an infection, however she does have the classic symptoms of UTI with dysuria, frequency and urgency so will treat with a 3 day course of Bactrim DS while awaiting her urine culture.  Also discussed with patient other causes of hematuria including renal or ureteral stone (which patient is not having symptoms of such as abdominal or flank pain), bladder tumor/bladder stone/bladder lesion, or renal tumor.  Discussed with patient that it is reassuring that  she has seen urology in the past (March 2018) for evaluation of hematuria with normal    flexible cystourethroscopy and negative CT of the abdomen pelvis for cause of hematuria.    Patient was instructed if her urine culture is negative, her hematuria persists or returns then she would need to follow up with her PCP and urology for further evaluation for above mentioned differential concerns.  Patient was in agreement with this plan.        I explained my diagnostic considerations and recommendations to the patient, who voiced understanding and agreement with the treatment plan. All questions were answered. We discussed potential side effects of any prescribed or recommended therapies, as well as expectations for response to treatments.    Disclaimer:  This note consists of words and symbols derived from keyboarding, dictation, or using voice recognition software. As a result, there may be errors in the script that have gone undetected. Please consider this when interpreting information found in this note.

## 2020-02-19 NOTE — NURSING NOTE
Patient presents to the clinic for urinary frequency and hematuria x 2 days. She reports taking 2 doses of an old amoxicillin prescription for treatment.  Medication Reconciliation: complete    Iman Lindsey, CMA

## 2020-02-20 DIAGNOSIS — G89.29 CHRONIC LEFT-SIDED LOW BACK PAIN WITH LEFT-SIDED SCIATICA: ICD-10-CM

## 2020-02-20 DIAGNOSIS — M47.816 FACET ARTHROPATHY, LUMBAR: ICD-10-CM

## 2020-02-20 DIAGNOSIS — M54.42 CHRONIC LEFT-SIDED LOW BACK PAIN WITH LEFT-SIDED SCIATICA: ICD-10-CM

## 2020-02-20 DIAGNOSIS — M48.061 SPINAL STENOSIS OF LUMBAR REGION, UNSPECIFIED WHETHER NEUROGENIC CLAUDICATION PRESENT: Primary | ICD-10-CM

## 2020-02-21 RX ORDER — TRAMADOL HYDROCHLORIDE 50 MG/1
TABLET ORAL
Qty: 15 TABLET | OUTPATIENT
Start: 2020-02-21

## 2020-02-21 RX ORDER — PREDNISONE 20 MG/1
TABLET ORAL
Qty: 20 TABLET | Refills: 0 | Status: SHIPPED | OUTPATIENT
Start: 2020-02-21 | End: 2020-04-16

## 2020-02-21 RX ORDER — CYCLOBENZAPRINE HCL 10 MG
TABLET ORAL
Qty: 30 TABLET | Refills: 1 | Status: SHIPPED | OUTPATIENT
Start: 2020-02-21 | End: 2020-06-18

## 2020-02-21 NOTE — TELEPHONE ENCOUNTER
"Contacted patient to verify refill requests. Patient states, \" I don't need the Tramadol. It doesn't work. It gives me a headache. I would like a refill of the Prednisone if I could\". Informed patient that Dr. Jama is out of clinic. \"Could you send it Dr. Benavides then\"?    Prednisone   Last Written Prescription Date:  01/13/2020-02/19/2020  Last Fill Quantity: 20,   # refills: 0    Tramadol- writer refused Tramadol as patient states she does not need- see above.  Last Written Prescription Date:  01/13/2020-01/16/2020  Last Fill Quantity: 15,   # refills: 0    Last Office Visit: 01/13/2020 with Dr. Jama-Temporarily she is given prednisone taper along with Ultram for pain control.  Patient will call later in the week if she has not heard from [facility]  Future Office visit:       Routing refill request to provider for review/approval as requested by patient.     Unable to complete prescription refill per RNMedication Refill Policy.................... Corin Irizarry RN ....................  2/21/2020   2:20 PM          "

## 2020-02-21 NOTE — TELEPHONE ENCOUNTER
Call and notify - Will refill x 1 additional; but will need to be seen/discuss other options based on recent MRI.  Steroids are NOT safe long term.  SMS

## 2020-02-21 NOTE — TELEPHONE ENCOUNTER
Cyclobenzaprine (FLEXERIL) 10 MG tablet-historical sig of take 1 tab 3 times as needed does not match pharmacy request of take 1/2-1 tab 3 times a day as needed    Last Office Visit: 01/13/2020 with Dr. Jama  Future Office visit:       Routing refill request to provider for review/approval because:  Medication is reported/historical & Drug not on the [facility], [facility] or [facility] refill protocol or controlled substance    Unable to complete prescription refill per RNMedication Refill Policy.................... Corin Irizarry RN ....................  2/21/2020   1:58 PM

## 2020-02-21 NOTE — TELEPHONE ENCOUNTER
"Contacted patient to verify sig. Patient states, \" I take 1 tab of the 10 mg 3 times a day as needed\". Please note mirella'd up Flexeril 10 mg as patient states is taking.   Unable to complete prescription refill per RNMedication Refill Policy.................... Corin Irizarry RN ....................  2/21/2020   2:15 PM          "

## 2020-02-22 LAB
BACTERIA SPEC CULT: NORMAL
SPECIMEN SOURCE: NORMAL

## 2020-03-11 ENCOUNTER — HEALTH MAINTENANCE LETTER (OUTPATIENT)
Age: 50
End: 2020-03-11

## 2020-03-11 ENCOUNTER — OFFICE VISIT (OUTPATIENT)
Dept: FAMILY MEDICINE | Facility: OTHER | Age: 50
End: 2020-03-11
Attending: FAMILY MEDICINE
Payer: COMMERCIAL

## 2020-03-11 VITALS
RESPIRATION RATE: 20 BRPM | HEART RATE: 80 BPM | DIASTOLIC BLOOD PRESSURE: 76 MMHG | BODY MASS INDEX: 30.73 KG/M2 | SYSTOLIC BLOOD PRESSURE: 112 MMHG | OXYGEN SATURATION: 97 % | WEIGHT: 179 LBS | TEMPERATURE: 97.6 F

## 2020-03-11 DIAGNOSIS — M48.061 SPINAL STENOSIS OF LUMBAR REGION, UNSPECIFIED WHETHER NEUROGENIC CLAUDICATION PRESENT: ICD-10-CM

## 2020-03-11 DIAGNOSIS — R31.9 HEMATURIA, UNSPECIFIED TYPE: Primary | ICD-10-CM

## 2020-03-11 PROCEDURE — 99213 OFFICE O/P EST LOW 20 MIN: CPT | Performed by: FAMILY MEDICINE

## 2020-03-11 ASSESSMENT — PATIENT HEALTH QUESTIONNAIRE - PHQ9: SUM OF ALL RESPONSES TO PHQ QUESTIONS 1-9: 0

## 2020-03-11 ASSESSMENT — PAIN SCALES - GENERAL: PAINLEVEL: NO PAIN (0)

## 2020-03-11 NOTE — PROGRESS NOTES
"Nursing Notes:   Masha Lopez LPN  3/11/2020  3:21 PM  Signed  Chief Complaint   Patient presents with     RECHECK     blood in urine     Initial /76   Pulse 80   Temp 97.6  F (36.4  C) (Tympanic)   Resp 20   Wt 81.2 kg (179 lb)   SpO2 97%   BMI 30.73 kg/m   Estimated body mass index is 30.73 kg/m  as calculated from the following:    Height as of 2/19/20: 1.626 m (5' 4\").    Weight as of this encounter: 81.2 kg (179 lb).  Medication Reconciliation: complete  Masha Lopez LPN    SUBJECTIVE:   Cielo Bahena is a 49 year old female who presents to clinic today for the following health issues:    HPI  Cielo is here in follow up to recent [facility] visit, which she was being seen for UTI symptoms.  At that time no UTI was found, but persistent hematuria present.  She has had this for years; with a work up done by Urology, including a cystoscopy, CT urogram approximately 2 years ago.  She will have occasional UAs without blood.  No fever/chills.  No abnormal vaginal bleeding; is s/p TVH (ovaries remain).  No genital sores.  No recent STI exposure/risk/Dx.    She is also having continued low back pain; known spinal stenosis and planning for surgery in ~June.  She can not tolerate the pain medication she was given by her surgeon, but doesn't get enough relief with just tylenol or ibuprofen.  Wondering if there is something in between.    Patient Active Problem List    Diagnosis Date Noted     Moderate persistent asthma without complication 02/16/2018     Priority: Medium     Overview:   Asthma, generally well controlled, history of respiratory arrest requiring   brief mechanical ventilation.       Insomnia 02/16/2018     Priority: Medium     Genuine stress incontinence, female 02/16/2018     Priority: Medium     Depression 02/16/2018     Priority: Medium     Anemia 02/16/2018     Priority: Medium     Overview:   mild       Family history of colon cancer 01/22/2015     Priority: Medium     RLS (restless legs " syndrome) 2014     Priority: Medium     Hip pain 2014     Priority: Medium     GERD (gastroesophageal reflux disease) 10/25/2012     Priority: Medium     Breast hypertrophy 2012     Priority: Medium     Past Medical History:   Diagnosis Date     Anemia     No Comments Provided     Family history of malignant neoplasm of digestive organ     2015     Insomnia     No Comments Provided     Major depressive disorder, single episode     ,Depression.  PHQ-9 score .     Mass of breast     ,Soft breast mass lump, right breast     Pain in hip     No Comments Provided     Personal history of other medical treatment (CODE)     2011,, 1 SAB     Personal history of other specified conditions (CODE)     abnormal Pap smear prior to , subsequent yearly Pap smears have been normal     Restless legs syndrome     No Comments Provided     Segmental and somatic dysfunction of pelvic region     2013     Uncomplicated asthma     generally well controlled, history of respiratory arrest requiring brief mechanical ventilation.      Past Surgical History:   Procedure Laterality Date     DILATION AND CURETTAGE      ,Dilatation and curettage for blighted ovum.     ENDOSCOPY UPPER, COLONOSCOPY, COMBINED      Normal exam, 5 year follow up due to FH     EXTRACTION(S) DENTAL      [place] tooth extraction     HYSTERECTOMY VAGINAL      3/5/2015,Dr. Lindsay for DUB; negative for malignancy; ovaries remain     MAMMOPLASTY REDUCTION      , Bilateral breast reduction, [facility]     MAMMOPLASTY REDUCTION           OTHER SURGICAL HISTORY      75082.9,[place] SUBTALAR ATHROEREISIS,Left foot     Family History   Problem Relation Age of Onset     Hypertension Father      Heart Disease Father         CAD with stents     Hyperlipidemia Father      Colon Cancer Maternal Grandmother      Heart Disease Maternal Grandfather         CAD     Heart Disease Paternal Grandfather         CAD     Breast Cancer No  family hx of         Cancer-breast     Social History     Tobacco Use     Smoking status: Former Smoker     Packs/day: 0.50     Years: 25.00     Pack years: 12.50     Types: Cigarettes     Last attempt to quit: 2005     Years since quittin.3     Smokeless tobacco: Never Used     Tobacco comment: on rare occasions   Substance Use Topics     Alcohol use: Yes     Alcohol/week: 0.8 standard drinks     Comment: Alcoholic Drinks/day: occassional     Social History     Social History Narrative    Single, moved back to [place] from [place], does have family in town.   Has significant other.     Works at [employer] as a CNA.   Shannan Galvan-mother  2011   Inder     Current Outpatient Medications   Medication Sig Dispense Refill     acetaminophen-codeine (TYLENOL #3) 300-30 MG tablet Take 1-2 tablets by mouth every 4 hours as needed for severe pain 30 tablet 0     amitriptyline (ELAVIL) 10 MG tablet TAKE 2 TABLETS BY MOUTH AT BEDTIME 180 tablet 1     cyclobenzaprine (FLEXERIL) 10 MG tablet Take 1 tablet by mouth three times daily as needed for muscle spasms. 30 tablet 1     EPINEPHrine (EPIPEN/ADRENACLICK/OR ANY BX GENERIC EQUIV) 0.3 MG/0.3ML injection 2-pack Inject 0.3 mg into the muscle as needed Inject 0.3 mg intramuscular one time if needed for Allergic Reaction.       fluticasone-salmeterol (ADVAIR DISKUS) 250-50 MCG/DOSE inhaler Inhale 1 puff into the lungs every 12 hours Inhale 1 Puff by mouth every 12 hours. 3 Inhaler 4     ibuprofen (ADVIL/MOTRIN) 800 MG tablet TAKE 1 TABLET(800 MG) BY MOUTH EVERY 6 HOURS AS NEEDED FOR MODERATE PAIN 50 tablet 11     LORazepam (ATIVAN) 0.5 MG tablet TAKE 1 TABLET BY MOUTH EVERY 8 HOURS AS NEEDED FOR ANXIETY 60 tablet 5     montelukast (SINGULAIR) 10 MG tablet Take 1 tablet (10 mg) by mouth At Bedtime 90 tablet 3     pramipexole (MIRAPEX) 0.25 MG tablet Take 1 tablet (0.25 mg) by mouth At Bedtime 90 tablet 4     predniSONE (DELTASONE) 20 MG tablet TAKE 3  TABLETS BY MOUTH DAILY FOR 3 DAYS, 2 TABLETS DAILY FOR 3 DAYS, 1 TABLET DAILY FOR 3 DAYS, THEN 1/2 TABLET DAILY FOR 3 DAYS 20 tablet 0     VENTOLIN  (90 Base) MCG/ACT inhaler INHALE 2 PUFFS IN THE LUNGS EVERY 4 HOURS AS NEEDED 54 g 11     No Known Allergies    Review of Systems   Constitutional: Negative for activity change, appetite change, chills, fatigue and fever.   Gastrointestinal: Negative for constipation, diarrhea and hematochezia.   Genitourinary: Negative for difficulty urinating.   Skin: Negative for rash and wound.      OBJECTIVE:     /76   Pulse 80   Temp 97.6  F (36.4  C) (Tympanic)   Resp 20   Wt 81.2 kg (179 lb)   SpO2 97%   BMI 30.73 kg/m    Body mass index is 30.73 kg/m .  Physical Exam  Vitals signs and nursing note reviewed.   Constitutional:       Appearance: Normal appearance.   HENT:      Head: Normocephalic and atraumatic.      Right Ear: Tympanic membrane and ear canal normal.      Left Ear: Tympanic membrane and ear canal normal.   Neck:      Musculoskeletal: Normal range of motion and neck supple.   Cardiovascular:      Rate and Rhythm: Normal rate and regular rhythm.   Neurological:      Mental Status: She is alert.     Diagnostic Test Results:  No results found for any visits on 03/11/20.    ASSESSMENT/PLAN:     1. Hematuria, unspecified type  Reassuring work up 2 years ago.  Will plan to re-collect UA at next appointment (preop in mid-May) to allow resolve of current episode.  Consider future repeat work up in ~3-5 more years if persisting; sooner prn.    2. Spinal stenosis of lumbar region, unspecified whether neurogenic claudication present  With upcoming surgery planned.  Trial of Tylenol #3 (also discussed ultram, but patient prefers this).  Will Rx #30 for use between now and next appointment/surgery to be taken prn.  Can cause some somnolence, therefore not recommended to take if needing to drive, work, other until Cielo is aware of how it affects her.  Other  side effects of constipation, etc were also discussed.  - acetaminophen-codeine (TYLENOL #3) 300-30 MG tablet; Take 1-2 tablets by mouth every 4 hours as needed for severe pain  Dispense: 30 tablet; Refill: 0    Follow up in May for preop.    Valencia Benavides, [facility]

## 2020-03-11 NOTE — NURSING NOTE
"Chief Complaint   Patient presents with     RECHECK     blood in urine       Initial /76   Pulse 80   Temp 97.6  F (36.4  C) (Tympanic)   Resp 20   Wt 81.2 kg (179 lb)   SpO2 97%   BMI 30.73 kg/m   Estimated body mass index is 30.73 kg/m  as calculated from the following:    Height as of 2/19/20: 1.626 m (5' 4\").    Weight as of this encounter: 81.2 kg (179 lb).  Medication Reconciliation: complete    Masha Lopez LPN  "

## 2020-03-12 ASSESSMENT — ASTHMA QUESTIONNAIRES: ACT_TOTALSCORE: 15

## 2020-03-14 ASSESSMENT — ENCOUNTER SYMPTOMS
DIFFICULTY URINATING: 0
APPETITE CHANGE: 0
CONSTIPATION: 0
WOUND: 0
FEVER: 0
DIARRHEA: 0
FATIGUE: 0
CHILLS: 0
ACTIVITY CHANGE: 0
HEMATOCHEZIA: 0

## 2020-03-21 DIAGNOSIS — F41.1 GAD (GENERALIZED ANXIETY DISORDER): ICD-10-CM

## 2020-03-23 RX ORDER — LORAZEPAM 0.5 MG/1
TABLET ORAL
Qty: 60 TABLET | Refills: 2 | Status: SHIPPED | OUTPATIENT
Start: 2020-03-23 | End: 2020-09-28

## 2020-03-23 NOTE — TELEPHONE ENCOUNTER
LORAZEPAM 0.5MG TABLETS   Last Written Prescription Date:  6/21/2019  Last Fill Quantity: 30,   # refills: 5  Last Office Visit: 3/11/2020  Future Office visit:    Next 5 appointments (look out 90 days)    May 14, 2020  7:40 AM CDT  Pre-Op physical with Valencia Benavides DO  [facility] ([facility]) [address]  [place] [id number] [phone]           Routing refill request to provider for review/approval because:  Drug not on the [facility], [facility] or [facility] refill protocol or controlled substance, will forward to provider for consideration.  Unable to complete prescription refill per RNMedication Refill Policy.................... Shraddha Escamilla RN ....................  3/23/2020   12:52 PM

## 2020-03-25 ENCOUNTER — MYC MEDICAL ADVICE (OUTPATIENT)
Dept: FAMILY MEDICINE | Facility: OTHER | Age: 50
End: 2020-03-25

## 2020-03-25 ENCOUNTER — MYC MEDICAL ADVICE (OUTPATIENT)
Dept: FAMILY MEDICINE | Facility: OTHER | Age: 50
End: 2020-03-25
Payer: COMMERCIAL

## 2020-03-25 DIAGNOSIS — M48.061 SPINAL STENOSIS OF LUMBAR REGION, UNSPECIFIED WHETHER NEUROGENIC CLAUDICATION PRESENT: ICD-10-CM

## 2020-03-25 DIAGNOSIS — F32.89 ATYPICAL DEPRESSIVE DISEASE: ICD-10-CM

## 2020-03-25 PROCEDURE — 99213 OFFICE O/P EST LOW 20 MIN: CPT | Performed by: FAMILY MEDICINE

## 2020-03-25 NOTE — TELEPHONE ENCOUNTER
"Called and discussed with patient.    Cielo Bahena is a 49 year old female who is being evaluated via a billable telephone visit.      The patient has been notified of following:     \"This telephone visit will be conducted via a call between you and your physician/provider. We have found that certain health care needs can be provided without the need for a physical exam.  This service lets us provide the care you need with a short phone conversation.  If a prescription is necessary we can send it directly to your pharmacy.  If lab work is needed we can place an order for that and you can then stop by our lab to have the test done at a later time.  If during the course of the call the physician/provider feels a telephone visit is not appropriate, you will not be charged for this service.\"     Cielo Bahena complains of: back pain.    I have reviewed and updated the patient's Past Medical History, Social History, Family History and Medication List.    ALLERGIES  Patient has no known allergies.    Chronic/Recurring Back Pain Follow Up      Where is your back pain located? (Select all that apply) low back midline    How would you describe your back pain?  dull ache, sharp and stabbing    Where does your back pain spread? nowhere    Since your last clinic visit for back pain, how has your pain changed? gradually worsening    Does your back pain interfere with your job? YES    Since your last visit, have you tried any new treatment? Yes -  acetaminophen (Tylenol), activity or exercise, chiropractor, cold, heat, NSAIDs (Ibuprofen, Naproxen), opioids, rest and stretching    Her surgery was postponed due to Covid-19; and uncertain when she will be able to reschedule.  Steroid taper has been the most effective in the past.  Tramadol caused headache - will add as an intolerance.  Recently tried T#3's which were helpful.      Assessment/Plan:  1. Atypical depressive disease  Increase to help with chronic back pain; " ultimately requiring surgical intervention.  - amitriptyline (ELAVIL) 25 MG tablet; Take 1-2 tablets (25-50 mg) by mouth At Bedtime  Dispense: 90 tablet; Refill: 1    2. Spinal stenosis of lumbar region, unspecified whether neurogenic claudication present  Worsening; Rx for T#3.  Has steroid taper at home.  Will start this and continue follow up prn.  - acetaminophen-codeine (TYLENOL #3) 300-30 MG tablet; Take 2 tablets by mouth every 4 hours as needed for severe pain  Dispense: 50 tablet; Refill: 2    Phone call duration:  6 minutes    Valencia Benavides DO

## 2020-04-10 ENCOUNTER — MYC MEDICAL ADVICE (OUTPATIENT)
Dept: FAMILY MEDICINE | Facility: OTHER | Age: 50
End: 2020-04-10

## 2020-04-15 ENCOUNTER — MYC MEDICAL ADVICE (OUTPATIENT)
Dept: FAMILY MEDICINE | Facility: OTHER | Age: 50
End: 2020-04-15

## 2020-04-15 DIAGNOSIS — M54.42 CHRONIC LEFT-SIDED LOW BACK PAIN WITH LEFT-SIDED SCIATICA: ICD-10-CM

## 2020-04-15 DIAGNOSIS — G89.29 CHRONIC LEFT-SIDED LOW BACK PAIN WITH LEFT-SIDED SCIATICA: ICD-10-CM

## 2020-04-16 RX ORDER — PREDNISONE 20 MG/1
TABLET ORAL
Qty: 20 TABLET | Refills: 0 | Status: SHIPPED | OUTPATIENT
Start: 2020-04-16 | End: 2020-10-09

## 2020-04-16 RX ORDER — GABAPENTIN 100 MG/1
CAPSULE ORAL
Qty: 90 CAPSULE | Refills: 1 | Status: SHIPPED | OUTPATIENT
Start: 2020-04-16 | End: 2020-05-21

## 2020-04-16 NOTE — TELEPHONE ENCOUNTER
Would [facility] like patient to have a telephone or video or walk in appt?  Renee Isaacs LPN .............4/16/2020     8:31 AM

## 2020-04-30 DIAGNOSIS — G89.29 CHRONIC LEFT-SIDED LOW BACK PAIN WITH LEFT-SIDED SCIATICA: ICD-10-CM

## 2020-04-30 DIAGNOSIS — M54.42 CHRONIC LEFT-SIDED LOW BACK PAIN WITH LEFT-SIDED SCIATICA: ICD-10-CM

## 2020-05-01 RX ORDER — PREDNISONE 20 MG/1
TABLET ORAL
Qty: 20 TABLET | Refills: 0 | OUTPATIENT
Start: 2020-05-01

## 2020-05-01 NOTE — TELEPHONE ENCOUNTER
Ema sent Rx request for the following:    predniSONE (DELTASONE) 20 MG tablet   Sig: TAKE 3 TABLETS BY MOUTH DAILY FOR 3 DAYS, TAKE 2 TABLETS FOR 3 DAYS, TAKE 1 TABLET FOR 3 DAYS, THEN TAKE 1/2 TABLET FOR 3 DAYS   Last Prescription Date:   04/16/2020  Last Fill Qty/Refills:         20, R-0    Last Office Visit:              03/11/2020  Future Office visit:           None    Call to patient who states that she does not need this medication at this time and she thinks this was sent in error.     Nandini Zapata RN on 5/1/2020 at 9:36 AM

## 2020-05-11 ENCOUNTER — OFFICE VISIT (OUTPATIENT)
Dept: FAMILY MEDICINE | Facility: OTHER | Age: 50
End: 2020-05-11
Attending: FAMILY MEDICINE
Payer: COMMERCIAL

## 2020-05-11 VITALS
TEMPERATURE: 97.3 F | WEIGHT: 182 LBS | BODY MASS INDEX: 31.07 KG/M2 | OXYGEN SATURATION: 98 % | HEIGHT: 64 IN | SYSTOLIC BLOOD PRESSURE: 138 MMHG | RESPIRATION RATE: 14 BRPM | HEART RATE: 94 BPM | DIASTOLIC BLOOD PRESSURE: 90 MMHG

## 2020-05-11 DIAGNOSIS — M48.061 SPINAL STENOSIS OF LUMBAR REGION, UNSPECIFIED WHETHER NEUROGENIC CLAUDICATION PRESENT: ICD-10-CM

## 2020-05-11 DIAGNOSIS — K21.9 GASTROESOPHAGEAL REFLUX DISEASE, ESOPHAGITIS PRESENCE NOT SPECIFIED: ICD-10-CM

## 2020-05-11 DIAGNOSIS — Z01.818 PREOP GENERAL PHYSICAL EXAM: Primary | ICD-10-CM

## 2020-05-11 DIAGNOSIS — J45.40 MODERATE PERSISTENT ASTHMA WITHOUT COMPLICATION: ICD-10-CM

## 2020-05-11 DIAGNOSIS — R03.0 ELEVATED BP WITHOUT DIAGNOSIS OF HYPERTENSION: ICD-10-CM

## 2020-05-11 DIAGNOSIS — G25.81 RLS (RESTLESS LEGS SYNDROME): ICD-10-CM

## 2020-05-11 LAB
ANION GAP SERPL CALCULATED.3IONS-SCNC: 9 MMOL/L (ref 3–14)
BASOPHILS # BLD AUTO: 0.1 10E9/L (ref 0–0.2)
BASOPHILS NFR BLD AUTO: 0.8 %
BUN SERPL-MCNC: 13 MG/DL (ref 7–25)
CALCIUM SERPL-MCNC: 9.1 MG/DL (ref 8.6–10.3)
CHLORIDE SERPL-SCNC: 104 MMOL/L (ref 98–107)
CO2 SERPL-SCNC: 27 MMOL/L (ref 21–31)
CREAT SERPL-MCNC: 0.94 MG/DL (ref 0.6–1.2)
DIFFERENTIAL METHOD BLD: ABNORMAL
EOSINOPHIL # BLD AUTO: 0.3 10E9/L (ref 0–0.7)
EOSINOPHIL NFR BLD AUTO: 3.8 %
ERYTHROCYTE [DISTWIDTH] IN BLOOD BY AUTOMATED COUNT: 11.7 % (ref 10–15)
GFR SERPL CREATININE-BSD FRML MDRD: 63 ML/MIN/{1.73_M2}
GLUCOSE SERPL-MCNC: 92 MG/DL (ref 70–105)
HCT VFR BLD AUTO: 47.4 % (ref 35–47)
HGB BLD-MCNC: 15.8 G/DL (ref 11.7–15.7)
IMM GRANULOCYTES # BLD: 0 10E9/L (ref 0–0.4)
IMM GRANULOCYTES NFR BLD: 0.5 %
LYMPHOCYTES # BLD AUTO: 1.7 10E9/L (ref 0.8–5.3)
LYMPHOCYTES NFR BLD AUTO: 22.5 %
MCH RBC QN AUTO: 30.7 PG (ref 26.5–33)
MCHC RBC AUTO-ENTMCNC: 33.3 G/DL (ref 31.5–36.5)
MCV RBC AUTO: 92 FL (ref 78–100)
MONOCYTES # BLD AUTO: 0.5 10E9/L (ref 0–1.3)
MONOCYTES NFR BLD AUTO: 6.3 %
NEUTROPHILS # BLD AUTO: 4.9 10E9/L (ref 1.6–8.3)
NEUTROPHILS NFR BLD AUTO: 66.1 %
PLATELET # BLD AUTO: 272 10E9/L (ref 150–450)
POTASSIUM SERPL-SCNC: 3.9 MMOL/L (ref 3.5–5.1)
RBC # BLD AUTO: 5.14 10E12/L (ref 3.8–5.2)
SODIUM SERPL-SCNC: 140 MMOL/L (ref 134–144)
WBC # BLD AUTO: 7.3 10E9/L (ref 4–11)

## 2020-05-11 PROCEDURE — 85025 COMPLETE CBC W/AUTO DIFF WBC: CPT | Mod: ZL | Performed by: FAMILY MEDICINE

## 2020-05-11 PROCEDURE — 87635 SARS-COV-2 COVID-19 AMP PRB: CPT | Mod: ZL | Performed by: FAMILY MEDICINE

## 2020-05-11 PROCEDURE — 93000 ELECTROCARDIOGRAM COMPLETE: CPT | Performed by: INTERNAL MEDICINE

## 2020-05-11 PROCEDURE — 36415 COLL VENOUS BLD VENIPUNCTURE: CPT | Mod: ZL | Performed by: FAMILY MEDICINE

## 2020-05-11 PROCEDURE — 80048 BASIC METABOLIC PNL TOTAL CA: CPT | Mod: ZL | Performed by: FAMILY MEDICINE

## 2020-05-11 PROCEDURE — 99213 OFFICE O/P EST LOW 20 MIN: CPT | Performed by: FAMILY MEDICINE

## 2020-05-11 RX ORDER — AMITRIPTYLINE HYDROCHLORIDE 10 MG/1
TABLET ORAL
COMMUNITY
Start: 2020-03-21 | End: 2020-06-18

## 2020-05-11 ASSESSMENT — MIFFLIN-ST. JEOR: SCORE: 1434.55

## 2020-05-11 ASSESSMENT — PAIN SCALES - GENERAL: PAINLEVEL: MODERATE PAIN (5)

## 2020-05-11 NOTE — PROGRESS NOTES
"Nursing Notes:   Nadia Navarro LPN  2020 11:12 AM  Sign at exiting of workspace  Date of Surgery: 5/15/2020   Type of Surgery: Spinal fusion   Surgeon: Dr. Telles  Hospital:  [facility]     Fever/Chills or other infectious symptoms in past month: no  >10lb weight loss in past two months: no  O2 SAT: 98    Health Care Directive/Code status:  No   Hx of blood transfusions:   no  Td up to date:  yes  History of VRE/MRSA:  no    Preoperative Evaluation: Obstructive Sleep Apnea screening    S: Snore -  Do you snore loudly? (louder than talking or loud enough to be heard through closed doors)no  T: Tired - Do you often feel tired, fatigued, or sleepy during the daytime?yes  O: Observed - Has anyone ever observed you stop breathing during your sleep?no  P: Pressure - Do you have or are you being treated for high blood pressure?no  B: BMI - BMI greater than 35kg/m2?no  A: Age - Age over 50 years old?no  N: Neck - Neck circumference greater than 40 cm?no  G: Gender - Gender: Male?no    Total number of \"YES\" responses:  1    Scoring: Low risk of TONY 0-2    Nadia Navarro LPN 2020 11:07 AM    Medication Reconciliation: complete    Nadia Navarro LPN  2020 11:06 AM        [facility]  [address]  [place] [id number] [phone]  Dept: [phone]    PRE-OP EVALUATION:  Today's date: 2020    Cielo Bahena (: 1970) presents for pre-operative evaluation assessment as requested by Dr. Telles.  She requires evaluation and anesthesia risk assessment prior to undergoing surgery/procedure for treatment of spinal stenosis .    Proposed Surgery/ Procedure: TLIF L4-L5, PSF with Instrumentation L4-L5, Bone marrow aspiration  Date of Surgery/ Procedure: 5/15/2020  Time of Surgery/ Procedure: [facility]  Hospital/Surgical Facility: [facility]  Fax number for surgical facility: [phone]  Primary Physician: Valencia Benavides  Type of " Anesthesia Anticipated: General    Patient has a Health Care Directive or Living Will:  NO    1. NO - Do you have a history of heart attack, stroke, stent, bypass or surgery on an artery in the head, neck, heart or legs?  2. NO - Do you ever have any pain or discomfort in your chest?  3. NO - Do you have a history of  Heart Failure?  4. NO - Are you troubled by shortness of breath when: walking on the level, up a slight hill or at night?  5. NO - Do you currently have a cold, bronchitis or other respiratory infection?  6. NO - Do you have a cough, shortness of breath or wheezing?  7. NO - Do you sometimes get pains in the calves of your legs when you walk?  8. NO - Do you or anyone in your family have previous history of blood clots?  9. NO - Do you or does anyone in your family have a serious bleeding problem such as prolonged bleeding following surgeries or cuts?  10. NO - Have you ever had problems with anemia or been told to take iron pills?  11. NO - Have you had any abnormal blood loss such as black, tarry or bloody stools, or abnormal vaginal bleeding?  12. NO - Have you ever had a blood transfusion?  13. NO - Have you or any of your relatives ever had problems with anesthesia?  14. NO - Do you have sleep apnea, excessive snoring or daytime drowsiness?  15. NO - Do you have any prosthetic heart valves?  16. NO - Do you have prosthetic joints?  17. NO - Is there any chance that you may be pregnant?      HPI:     HPI related to upcoming procedure:   Cielo has experienced increasing lower back pain; and especially pain travelling down her L leg.  Now requiring some degree of sedation to be able to fall asleep, and pain medication intermittently.  Doing better with above intervention; but still having pain - typically worse in evening hours.  Difficult to get comfortable to sleep.  Limiting walking.        ASTHMA - Patient has a longstanding history of mild Asthma . Patient has been doing well overall noting NO  "SYMPTOMS and continues on no regular medication use.  Has treated herself with Advair during winter months, illnesses or times where she feels like she is going to have a prolonged flare.  She has started using this within the last few days in preparation for surgery only.  Denies any current difficulties with breathing.  She essentially has not left the house more than ~4-5 times since shelter in place was initiated; but \"has to leave\" sometimes to get a change of scenery; however, her  is still working.    Borderline elevated BP readings - Not requiring treatment at this time, currently denies any symptoms referable to elevated blood pressure. Specifically denies chest pain, palpitations, dyspnea, orthopnea, PND or peripheral edema. Blood pressure readings have been in normal range.  Slightly elevated today with anxiety of coming to clinic during Covid-19 precautions and back pain.    RLS - stable.  She does not have any concerns; hopeful some of her symptoms will resolve once surgery is completed.      Depression/Anxiety, GERD, Insomnia - all stable.      MEDICAL HISTORY:     Patient Active Problem List    Diagnosis Date Noted     Moderate persistent asthma without complication 02/16/2018     Priority: Medium     Overview:   Asthma, generally well controlled, history of respiratory arrest requiring   brief mechanical ventilation.       Insomnia 02/16/2018     Priority: Medium     Genuine stress incontinence, female 02/16/2018     Priority: Medium     Depression 02/16/2018     Priority: Medium     Anemia 02/16/2018     Priority: Medium     Overview:   mild       Family history of colon cancer 01/22/2015     Priority: Medium     RLS (restless legs syndrome) 06/20/2014     Priority: Medium     Hip pain 04/18/2014     Priority: Medium     GERD (gastroesophageal reflux disease) 10/25/2012     Priority: Medium     Breast hypertrophy 03/14/2012     Priority: Medium      Past Medical History:   Diagnosis Date     " Anemia     No Comments Provided     Family history of malignant neoplasm of digestive organ     2015     Insomnia     No Comments Provided     Major depressive disorder, single episode     ,Depression.  PHQ-9 score .     Mass of breast     ,Soft breast mass lump, right breast     Pain in hip     No Comments Provided     Personal history of other medical treatment (CODE)     2011,, 1 SAB     Personal history of other specified conditions (CODE)     abnormal Pap smear prior to , subsequent yearly Pap smears have been normal     Restless legs syndrome     No Comments Provided     Segmental and somatic dysfunction of pelvic region     2013     Uncomplicated asthma     generally well controlled, history of respiratory arrest requiring brief mechanical ventilation.     Past Surgical History:   Procedure Laterality Date     DILATION AND CURETTAGE      ,Dilatation and curettage for blighted ovum.     ENDOSCOPY UPPER, COLONOSCOPY, COMBINED      Normal exam, 5 year follow up due to FH     EXTRACTION(S) DENTAL      [place] tooth extraction     HYSTERECTOMY VAGINAL      3/5/2015,Dr. Lindsay for DUB; negative for malignancy; ovaries remain     MAMMOPLASTY REDUCTION      , Bilateral breast reduction, [facility]     MAMMOPLASTY REDUCTION           OTHER SURGICAL HISTORY      81388.9,PA SUBTALAR ATHROEREISIS,Left foot     Current Outpatient Medications   Medication Sig Dispense Refill     acetaminophen-codeine (TYLENOL #3) 300-30 MG tablet Take 2 tablets by mouth every 4 hours as needed for severe pain 50 tablet 2     amitriptyline (ELAVIL) 10 MG tablet        amitriptyline (ELAVIL) 25 MG tablet Take 1-2 tablets (25-50 mg) by mouth At Bedtime 90 tablet 1     EPINEPHrine (EPIPEN/ADRENACLICK/OR ANY BX GENERIC EQUIV) 0.3 MG/0.3ML injection 2-pack Inject 0.3 mg into the muscle as needed Inject 0.3 mg intramuscular one time if needed for Allergic Reaction.       fluticasone-salmeterol  (ADVAIR DISKUS) 250-50 MCG/DOSE inhaler Inhale 1 puff into the lungs every 12 hours Inhale 1 Puff by mouth every 12 hours. 3 Inhaler 4     gabapentin (NEURONTIN) 100 MG capsule Use 1-2 tablets by mouth up to three times a day. 90 capsule 1     ibuprofen (ADVIL/MOTRIN) 800 MG tablet TAKE 1 TABLET(800 MG) BY MOUTH EVERY 6 HOURS AS NEEDED FOR MODERATE PAIN 50 tablet 11     LORazepam (ATIVAN) 0.5 MG tablet TAKE 1 TABLET BY MOUTH EVERY 8 HOURS AS NEEDED FOR ANXIETY 60 tablet 2     montelukast (SINGULAIR) 10 MG tablet Take 1 tablet (10 mg) by mouth At Bedtime 90 tablet 3     pramipexole (MIRAPEX) 0.25 MG tablet Take 1 tablet (0.25 mg) by mouth At Bedtime 90 tablet 4     VENTOLIN  (90 Base) MCG/ACT inhaler INHALE 2 PUFFS IN THE LUNGS EVERY 4 HOURS AS NEEDED 54 g 11     cyclobenzaprine (FLEXERIL) 10 MG tablet Take 1 tablet by mouth three times daily as needed for muscle spasms. (Patient not taking: Reported on 2020) 30 tablet 1     predniSONE (DELTASONE) 20 MG tablet TAKE 3 TABLETS BY MOUTH DAILY FOR 3 DAYS, 2 TABLETS DAILY FOR 3 DAYS, 1 TABLET DAILY FOR 3 DAYS, THEN 1/2 TABLET DAILY FOR 3 DAYS (Patient not taking: Reported on 2020) 20 tablet 0     OTC products: Had steroids last ~1 month ago for 12 days.  No ibuprofen/ASA within last one week.    No Known Allergies   Latex Allergy: NO    Social History     Tobacco Use     Smoking status: Former Smoker     Packs/day: 0.50     Years: 25.00     Pack years: 12.50     Types: Cigarettes     Last attempt to quit: 2005     Years since quittin.4     Smokeless tobacco: Never Used     Tobacco comment: on rare occasions   Substance Use Topics     Alcohol use: Yes     Alcohol/week: 0.8 standard drinks     Comment: Alcoholic Drinks/day: occassional     History   Drug Use No       REVIEW OF SYSTEMS:   CONSTITUTIONAL: NEGATIVE for fever, chills, change in weight  INTEGUMENTARY/SKIN: NEGATIVE for worrisome rashes, moles or lesions  EYES: NEGATIVE for vision  "changes or irritation  ENT/MOUTH: NEGATIVE for ear, mouth and throat problems  RESP: NEGATIVE for significant cough or SOB  BREAST: NEGATIVE for masses, tenderness or discharge  CV: NEGATIVE for chest pain, palpitations or peripheral edema  GI: NEGATIVE for nausea, abdominal pain, heartburn, or change in bowel habits  : NEGATIVE for frequency, dysuria, or hematuria  NEURO: NEGATIVE for weakness, dizziness or paresthesias  ENDOCRINE: NEGATIVE for temperature intolerance, skin/hair changes  HEME: NEGATIVE for bleeding problems  PSYCHIATRIC: NEGATIVE for changes in mood or affect    EXAM:   BP (!) 138/90 (BP Location: Right arm, Patient Position: Sitting, Cuff Size: Adult Regular)   Pulse 94   Temp 97.3  F (36.3  C) (Tympanic)   Resp 14   Ht 1.632 m (5' 4.25\")   Wt 82.6 kg (182 lb)   SpO2 98%   BMI 31.00 kg/m      GENERAL APPEARANCE: healthy, alert and no distress     EYES: EOMI, PERRL     HENT: ear canals and TM's normal and nose and mouth without ulcers or lesions     NECK: no adenopathy, no asymmetry, masses, or scars and thyroid normal to palpation     RESP: lungs clear to auscultation - no rales, rhonchi or wheezes     CV: regular rates and rhythm, normal S1 S2, no S3 or S4 and no murmur, click or rub     ABDOMEN:  soft, nontender, no HSM or masses and bowel sounds normal     MS: extremities normal- no gross deformities noted, no evidence of inflammation in joints, FROM in all extremities.     SKIN: no suspicious lesions or rashes     NEURO: Normal strength and tone, sensory exam grossly normal, mentation intact and speech normal     PSYCH: mentation appears normal. and affect normal/bright     LYMPHATICS: No cervical adenopathy    DIAGNOSTICS:     Labs Resulted Today:   Results for orders placed or performed in visit on 05/11/20   CBC and Differential     Status: Abnormal   Result Value Ref Range    WBC 7.3 4.0 - 11.0 10e9/L    RBC Count 5.14 3.8 - 5.2 10e12/L    Hemoglobin 15.8 (H) 11.7 - 15.7 g/dL    " Hematocrit 47.4 (H) 35.0 - 47.0 %    MCV 92 78 - 100 fl    MCH 30.7 26.5 - 33.0 pg    MCHC 33.3 31.5 - 36.5 g/dL    RDW 11.7 10.0 - 15.0 %    Platelet Count 272 150 - 450 10e9/L    Diff Method Automated Method     % Neutrophils 66.1 %    % Lymphocytes 22.5 %    % Monocytes 6.3 %    % Eosinophils 3.8 %    % Basophils 0.8 %    % Immature Granulocytes 0.5 %    Absolute Neutrophil 4.9 1.6 - 8.3 10e9/L    Absolute Lymphocytes 1.7 0.8 - 5.3 10e9/L    Absolute Monocytes 0.5 0.0 - 1.3 10e9/L    Absolute Eosinophils 0.3 0.0 - 0.7 10e9/L    Absolute Basophils 0.1 0.0 - 0.2 10e9/L    Abs Immature Granulocytes 0.0 0 - 0.4 10e9/L   Basic Metabolic Panel     Status: None   Result Value Ref Range    Sodium 140 134 - 144 mmol/L    Potassium 3.9 3.5 - 5.1 mmol/L    Chloride 104 98 - 107 mmol/L    Carbon Dioxide 27 21 - 31 mmol/L    Anion Gap 9 3 - 14 mmol/L    Glucose 92 70 - 105 mg/dL    Urea Nitrogen 13 7 - 25 mg/dL    Creatinine 0.94 0.60 - 1.20 mg/dL    GFR Estimate 63 >60 mL/min/[1.73_m2]    GFR Estimate If Black 76 >60 mL/min/[1.73_m2]    Calcium 9.1 8.6 - 10.3 mg/dL   EKG 12-lead, tracing only     Status: None (Preliminary result)   Result Value Ref Range    Interpretation ECG Click View Image link to view waveform and result      EKG - NSR @ 74 bpm; no concerns.  Improved from last one in 2018.    Covid-19 swab - pending (typically takes ~48 hours for return).    IMPRESSION:   Reason for surgery/procedure:  Spinal stenosis/back pain  Diagnosis/reason for consult: GERD, Asthma    The proposed surgical procedure is considered INTERMEDIATE risk.    REVISED CARDIAC RISK INDEX  The patient has the following serious cardiovascular risks for perioperative complications such as (MI, PE, VFib and 3  AV Block):  No serious cardiac risks  INTERPRETATION: 1 risks: Class II (low risk - 0.9% complication rate)    The patient has the following additional risks for perioperative complications:  No identified additional risks      ICD-10-CM     1. Preop general physical exam  Z01.818 CBC and Differential     EKG 12-lead, tracing only     COVID-19 Virus (Coronavirus) by PCR Nasopharyngeal swab     Basic Metabolic Panel   2. Moderate persistent asthma without complication  J45.40 CBC and Differential     EKG 12-lead, tracing only     COVID-19 Virus (Coronavirus) by PCR Nasopharyngeal swab     Basic Metabolic Panel   3. Gastroesophageal reflux disease, esophagitis presence not specified  K21.9 CBC and Differential     EKG 12-lead, tracing only     COVID-19 Virus (Coronavirus) by PCR Nasopharyngeal swab     Basic Metabolic Panel   4. Spinal stenosis of lumbar region, unspecified whether neurogenic claudication present  M48.061 CBC and Differential     EKG 12-lead, tracing only     COVID-19 Virus (Coronavirus) by PCR Nasopharyngeal swab     Basic Metabolic Panel       RECOMMENDATIONS:     --Patient is to take all scheduled medications on the day of surgery (typically only taking Nexium in the morning) - encouraged to proceed with that.  OK to hold all else until surgery complete.  --Discussed ways to connect with  and children while in the hospital; and plans to utilize [employer] on her phone.  --Further post-op instructions per NSG team    APPROVAL GIVEN to proceed with proposed procedure, without further diagnostic evaluation pending Covid-19 test results.       Signed Electronically by: Valencia Benavides DO    Copy of this evaluation report is provided to requesting physician.    [facility] Preop Guidelines    Revised Cardiac Risk Index

## 2020-05-11 NOTE — Clinical Note
WILL NEED COVID-19 testing sent along with H&P.  SMS  Proposed Surgery/ Procedure: TLIF L4-L5, PSF with Instrumentation L4-L5, Bone marrow aspiration  Date of Surgery/ Procedure: 5/15/2020  Time of Surgery/ Procedure: [facility]/Surgical Facility: [facility]  Fax number for surgical facility: [phone]  Surgeon: Dr. Telles ([facility] [place])

## 2020-05-11 NOTE — NURSING NOTE
"Date of Surgery: 5/15/2020   Type of Surgery: Spinal fusion   Surgeon: Dr. Telles  Hospital:  [facility]     Fever/Chills or other infectious symptoms in past month: no  >10lb weight loss in past two months: no  O2 SAT: 98    Health Care Directive/Code status:  No   Hx of blood transfusions:   no  Td up to date:  yes  History of VRE/MRSA:  no    Preoperative Evaluation: Obstructive Sleep Apnea screening    S: Snore -  Do you snore loudly? (louder than talking or loud enough to be heard through closed doors)no  T: Tired - Do you often feel tired, fatigued, or sleepy during the daytime?yes  O: Observed - Has anyone ever observed you stop breathing during your sleep?no  P: Pressure - Do you have or are you being treated for high blood pressure?no  B: BMI - BMI greater than 35kg/m2?no  A: Age - Age over 50 years old?no  N: Neck - Neck circumference greater than 40 cm?no  G: Gender - Gender: Male?no    Total number of \"YES\" responses:  1    Scoring: Low risk of TONY 0-2  At Risk of TONY: >3 High Risk of TONY: 5-8    Nadia Navarro LPN 5/11/2020 11:07 AM    Medication Reconciliation: complete  "

## 2020-05-12 LAB
INTERPRETATION ECG - MUSE: NORMAL
SARS-COV-2 RNA SPEC QL NAA+PROBE: NOT DETECTED
SPECIMEN SOURCE: NORMAL

## 2020-05-18 ENCOUNTER — TELEPHONE (OUTPATIENT)
Dept: FAMILY MEDICINE | Facility: OTHER | Age: 50
End: 2020-05-18

## 2020-05-18 NOTE — TELEPHONE ENCOUNTER
Patients spouse called and stated that patient had spine surgery yesterday and is home now, but has a temp of 100.6 and was instructed to contact provider. If you could please contact them thank you.

## 2020-05-19 DIAGNOSIS — Z98.1 ARTHRODESIS STATUS: Primary | ICD-10-CM

## 2020-05-20 ENCOUNTER — HOSPITAL ENCOUNTER (OUTPATIENT)
Dept: GENERAL RADIOLOGY | Facility: OTHER | Age: 50
End: 2020-05-20
Attending: ORTHOPAEDIC SURGERY
Payer: COMMERCIAL

## 2020-05-20 ENCOUNTER — OFFICE VISIT (OUTPATIENT)
Dept: FAMILY MEDICINE | Facility: OTHER | Age: 50
End: 2020-05-20
Attending: FAMILY MEDICINE
Payer: COMMERCIAL

## 2020-05-20 VITALS
RESPIRATION RATE: 16 BRPM | WEIGHT: 183 LBS | SYSTOLIC BLOOD PRESSURE: 128 MMHG | HEART RATE: 111 BPM | BODY MASS INDEX: 31.24 KG/M2 | DIASTOLIC BLOOD PRESSURE: 80 MMHG | TEMPERATURE: 99.7 F | OXYGEN SATURATION: 96 % | HEIGHT: 64 IN

## 2020-05-20 DIAGNOSIS — R50.82 FEVER POSTOP: ICD-10-CM

## 2020-05-20 DIAGNOSIS — R50.82 POST-PROCEDURAL FEVER: Primary | ICD-10-CM

## 2020-05-20 DIAGNOSIS — Z98.1 ARTHRODESIS STATUS: ICD-10-CM

## 2020-05-20 DIAGNOSIS — J45.40 MODERATE PERSISTENT ASTHMA WITHOUT COMPLICATION: ICD-10-CM

## 2020-05-20 DIAGNOSIS — G89.29 CHRONIC LEFT-SIDED LOW BACK PAIN WITH LEFT-SIDED SCIATICA: ICD-10-CM

## 2020-05-20 DIAGNOSIS — M54.42 CHRONIC LEFT-SIDED LOW BACK PAIN WITH LEFT-SIDED SCIATICA: ICD-10-CM

## 2020-05-20 LAB
ALBUMIN UR-MCNC: NEGATIVE MG/DL
APPEARANCE UR: CLEAR
BILIRUB UR QL STRIP: NEGATIVE
COLOR UR AUTO: YELLOW
GLUCOSE UR STRIP-MCNC: NEGATIVE MG/DL
HGB UR QL STRIP: NEGATIVE
KETONES UR STRIP-MCNC: NEGATIVE MG/DL
LEUKOCYTE ESTERASE UR QL STRIP: NEGATIVE
NITRATE UR QL: NEGATIVE
PH UR STRIP: 5.5 PH (ref 5–7)
SOURCE: NORMAL
SP GR UR STRIP: 1.02 (ref 1–1.03)
UROBILINOGEN UR STRIP-MCNC: NORMAL MG/DL (ref 0–2)

## 2020-05-20 PROCEDURE — 71046 X-RAY EXAM CHEST 2 VIEWS: CPT

## 2020-05-20 PROCEDURE — 81003 URINALYSIS AUTO W/O SCOPE: CPT | Mod: ZL | Performed by: FAMILY MEDICINE

## 2020-05-20 PROCEDURE — 87635 SARS-COV-2 COVID-19 AMP PRB: CPT | Mod: ZL | Performed by: FAMILY MEDICINE

## 2020-05-20 PROCEDURE — 99214 OFFICE O/P EST MOD 30 MIN: CPT | Performed by: FAMILY MEDICINE

## 2020-05-20 PROCEDURE — 87086 URINE CULTURE/COLONY COUNT: CPT | Mod: ZL | Performed by: FAMILY MEDICINE

## 2020-05-20 ASSESSMENT — ENCOUNTER SYMPTOMS
FEVER: 1
HEMATOCHEZIA: 0
NAUSEA: 0
PARESTHESIAS: 1
ABDOMINAL DISTENTION: 0
SINUS PRESSURE: 0
WEAKNESS: 1
DIFFICULTY URINATING: 0
FATIGUE: 1
MYALGIAS: 0
ABDOMINAL PAIN: 0
CONSTIPATION: 1
COUGH: 0
HEMATURIA: 0
SORE THROAT: 0
DIARRHEA: 0
SHORTNESS OF BREATH: 0
VOMITING: 0
LIGHT-HEADEDNESS: 0
WOUND: 1
SINUS PAIN: 0
DYSURIA: 0
BACK PAIN: 1
DIZZINESS: 0

## 2020-05-20 ASSESSMENT — PAIN SCALES - GENERAL: PAINLEVEL: SEVERE PAIN (6)

## 2020-05-20 ASSESSMENT — MIFFLIN-ST. JEOR: SCORE: 1439.05

## 2020-05-20 NOTE — PROGRESS NOTES
"Nursing Notes:   Gosselin, Norma J., LPN  5/20/2020  4:16 PM  Sign at exiting of workspace  Chief Complaint   Patient presents with     Surgical Followup     Spine surgery 5/15/20     Had spine surgery at [facility] 5/15/20.      Initial /80   Pulse 111   Temp 99.7 (40.8  C) (Tympanic)   Resp 16   Ht 1.632 m (5' 4.25\")   Wt 83 kg (183 lb)   SpO2 96%   BMI 31.17 kg/m   Estimated body mass index is 31.17 kg/m  as calculated from the following:    Height as of this encounter: 1.632 m (5' 4.25\").    Weight as of this encounter: 83 kg (183 lb).    Medication Reconciliation: complete    Norma J. Gosselin, LPN    SUBJECTIVE:   CC:  Cielo Bahena is a 49 year old female who presents to clinic today for the following health issues:  Post-operative fever     HPI  Cielo Bahena is a 49 year old female who presents for fever.    Patient and  are present for exam today. She had an L4-L5 spinal fusion on Friday 05/15 down at [facility] with Dr. Telles of [facility]. She had no immediate post-operative complications and was discharged to home on Sunday. On Monday morning she spiked a temperature of 102. They called their Spine Doc who recommended 4 tests --UA/UC, CXR, and COVID swab. Since Monday, her temperature has been in the 99 (99.1 / 99.7). She has been taking oxycodone and muscle relaxer for pain; she has a lot of back pain but not worse than initial post-op. Her back does not feel warm in comparison to body. No lower extremity weakness, radiculopathy, incontinence, or saddle paresthesia. She last took Acetaminophen at 0620 today. No cough, upper respiratory congestion, sore throat, myalgias, shortness of breath, chest pain. No dysuria or hematuria, but she did have orange colored pee on Sunday.     No Known Allergies  Current Outpatient Medications   Medication     acetaminophen-codeine (TYLENOL #3) 300-30 MG tablet     amitriptyline (ELAVIL) 10 MG tablet     amitriptyline " (ELAVIL) 25 MG tablet     cyclobenzaprine (FLEXERIL) 10 MG tablet     EPINEPHrine (EPIPEN/ADRENACLICK/OR ANY BX GENERIC EQUIV) 0.3 MG/0.3ML injection 2-pack     fluticasone-salmeterol (ADVAIR DISKUS) 250-50 MCG/DOSE inhaler     gabapentin (NEURONTIN) 100 MG capsule     ibuprofen (ADVIL/MOTRIN) 800 MG tablet     LORazepam (ATIVAN) 0.5 MG tablet     montelukast (SINGULAIR) 10 MG tablet     pramipexole (MIRAPEX) 0.25 MG tablet     predniSONE (DELTASONE) 20 MG tablet     VENTOLIN  (90 Base) MCG/ACT inhaler     No current facility-administered medications for this visit.       Patient Active Problem List    Diagnosis Date Noted     Moderate persistent asthma without complication 2018     Priority: Medium     Overview:   Asthma, generally well controlled, history of respiratory arrest requiring   brief mechanical ventilation.       Insomnia 2018     Priority: Medium     Genuine stress incontinence, female 2018     Priority: Medium     Depression 2018     Priority: Medium     Anemia 2018     Priority: Medium     Overview:   mild       Family history of colon cancer 2015     Priority: Medium     RLS (restless legs syndrome) 2014     Priority: Medium     Hip pain 2014     Priority: Medium     GERD (gastroesophageal reflux disease) 10/25/2012     Priority: Medium     Breast hypertrophy 2012     Priority: Medium     Past Medical History:   Diagnosis Date     Anemia     No Comments Provided     Family history of malignant neoplasm of digestive organ     2015     Insomnia     No Comments Provided     Major depressive disorder, single episode     2006,Depression.  PHQ-9 score 7/1.     Mass of breast     ,Soft breast mass lump, right breast     Pain in hip     No Comments Provided     Personal history of other medical treatment (CODE)     2011,, 1 SAB     Personal history of other specified conditions (CODE)     abnormal Pap smear prior to ,  subsequent yearly Pap smears have been normal     Restless legs syndrome     No Comments Provided     Segmental and somatic dysfunction of pelvic region     2013     Uncomplicated asthma     generally well controlled, history of respiratory arrest requiring brief mechanical ventilation.      Past Surgical History:   Procedure Laterality Date     DILATION AND CURETTAGE      ,Dilatation and curettage for blighted ovum.     ENDOSCOPY UPPER, COLONOSCOPY, COMBINED      Normal exam, 5 year follow up due to FH     EXTRACTION(S) DENTAL      [place] tooth extraction     HYSTERECTOMY VAGINAL      3/5/2015,Dr. Lindsay for DUB; negative for malignancy; ovaries remain     MAMMOPLASTY REDUCTION      , Bilateral breast reduction, [facility]     MAMMOPLASTY REDUCTION           OTHER SURGICAL HISTORY      76003.9,[place] SUBTALAR ATHROEREISIS,Left foot     Family History   Problem Relation Age of Onset     Hypertension Father      Heart Disease Father         CAD with stents     Hyperlipidemia Father      Colon Cancer Maternal Grandmother      Heart Disease Maternal Grandfather         CAD     Heart Disease Paternal Grandfather         CAD     Breast Cancer No family hx of         Cancer-breast     Social History     Tobacco Use     Smoking status: Former Smoker     Packs/day: 0.50     Years: 25.00     Pack years: 12.50     Types: Cigarettes     Last attempt to quit: 2005     Years since quittin.4     Smokeless tobacco: Never Used     Tobacco comment: on rare occasions   Substance Use Topics     Alcohol use: Yes     Alcohol/week: 0.8 standard drinks     Comment: Alcoholic Drinks/day: occassional     No Known Allergies    Review of Systems   Constitutional: Positive for fatigue and fever.   HENT: Negative for congestion, postnasal drip, sinus pressure, sinus pain and sore throat.    Respiratory: Negative for cough and shortness of breath.    Gastrointestinal: Positive for constipation. Negative for abdominal  "distention, abdominal pain, diarrhea, hematochezia, nausea and vomiting.   Genitourinary: Negative for difficulty urinating, dysuria, hematuria and urgency.        Orange in color, slightly improved (Sunday)   Musculoskeletal: Positive for back pain. Negative for myalgias.   Skin: Positive for wound. Negative for rash.   Neurological: Positive for weakness (sometimes if standing too long) and paresthesias (on left thigh). Negative for dizziness and light-headedness.      PHQ-2 Score:     PHQ-2 ( 1999 Pf[employer]) 5/20/2020 5/11/2020   Q1: Little interest or pleasure in doing things 0 0   Q2: Feeling down, depressed or hopeless 0 0   PHQ-2 Score 0 0       PHQ-9 SCORE 11/13/2019 1/13/2020 3/11/2020   PHQ-9 Total Score 4 0 0     OBJECTIVE:     /80   Pulse 111   Temp 99.7  F (37.6  C)   Resp 16   Ht 1.632 m (5' 4.25\")   Wt 83 kg (183 lb)   SpO2 96%   BMI 31.17 kg/m    Body mass index is 31.17 kg/m .  Physical Exam  Constitutional:       General: She is not in acute distress.     Appearance: Normal appearance. She is not toxic-appearing.      Comments: Moves gingerly with walker. Sits on edge of chair   HENT:      Head: Normocephalic and atraumatic.      Nose: Nose normal. No congestion or rhinorrhea.      Mouth/Throat:      Mouth: Mucous membranes are moist.   Eyes:      Conjunctiva/sclera: Conjunctivae normal.   Cardiovascular:      Rate and Rhythm: Normal rate and regular rhythm.      Heart sounds: No murmur.   Pulmonary:      Effort: Pulmonary effort is normal. No respiratory distress.      Breath sounds: Normal breath sounds. No wheezing, rhonchi or rales.   Abdominal:      General: Abdomen is flat. Bowel sounds are normal. There is no distension.      Palpations: Abdomen is soft. There is no mass.      Tenderness: There is no abdominal tenderness. There is no guarding.   Musculoskeletal:      Comments: 3 bandages on left and right side of spine. No spinal tenderness to mild-moderate touch. No warmth around " bandages. Peaking behind bandages there is no overt drainage or puss, steri-strips are clean and dry. No tenderness in her butt.    Neurological:      Mental Status: She is alert.        Results for orders placed or performed during the hospital encounter of 05/20/20   XR Chest 2 Views     Status: None    Narrative    Procedure:XR CHEST 2 VW    Clinical history:Female, 49 years, Fever postop; Arthrodesis status    Technique: Two views are submitted.    Comparison: 12/11/2018    Findings: The cardiac silhouette is normal. The pulmonary vasculature  is normal.    The lungs are clear. Bony structures again demonstrate mild  degenerative changes of the thoracic spine..      Impression    Impression:   No acute abnormality. No evidence of acute or active disease.    JACE CLEARY MD   Results for orders placed or performed in visit on 05/20/20   UA reflex to Microscopic and Culture     Status: None    Specimen: Midstream Urine   Result Value Ref Range    Color Urine Yellow     Appearance Urine Clear     Glucose Urine Negative NEG^Negative mg/dL    Bilirubin Urine Negative NEG^Negative    Ketones Urine Negative NEG^Negative mg/dL    Specific Gravity Urine 1.019 1.003 - 1.035    Blood Urine Negative NEG^Negative    pH Urine 5.5 5.0 - 7.0 pH    Protein Albumin Urine Negative NEG^Negative mg/dL    Urobilinogen mg/dL Normal 0.0 - 2.0 mg/dL    Nitrite Urine Negative NEG^Negative    Leukocyte Esterase Urine Negative NEG^Negative    Source Midstream Urine        ASSESSMENT/PLAN:       ICD-10-CM    1. Post-procedural fever  R50.82 UA reflex to Microscopic and Culture     Symptomatic COVID-19 Virus (Coronavirus) by PCR     Urine Culture Aerobic Bacterial     PLAN:    1.  Post-Operative Fever  DDX is broad. Fever seems to have resolved since Monday. All temps since Monday seem to be < 100.4. Today's temp of 99.7. Collected labs requested by Surgeon. CXR and UA negative. Culture sent though unlikely to result in positive.  Surgical site APPEARS C/D/I on brief inspection to respect dressing; no tenderness out of proportion, warmth, discharge on steri-strips. Spine is not overtly tender out of expectation.     --Reassurance at this time.   --Keep in touch with surgeon about results  --Pending Labs: KAITY MORALEZ MD on 5/20/2020 at 5:33 PM  This patient was discussed with and examined by attending physician Dr. Celia Marino. Please see additional comments below.       Above reviewed and discussed with 2nd year FR Resident, Dr. Ashley De Guzman. Patient interviewed and examined by myself. Agree with above plan.        Celia Marino MD  [facility] AND [facility]    This note was created using voice recognition software and was screened for errors in transcription.

## 2020-05-20 NOTE — TELEPHONE ENCOUNTER
Left message to call back....................  5/20/2020   9:06 AM  Masha Lopez LPN, LPN  5/20/2020  9:06 AM

## 2020-05-20 NOTE — NURSING NOTE
"Chief Complaint   Patient presents with     Surgical Followup     Spine surgery 5/15/20     Had spine surgery at [facility] 5/15/20.      Initial /80   Pulse 111   Temp (!) 105.5  F (40.8  C) (Tympanic)   Resp 16   Ht 1.632 m (5' 4.25\")   Wt 83 kg (183 lb)   SpO2 96%   BMI 31.17 kg/m   Estimated body mass index is 31.17 kg/m  as calculated from the following:    Height as of this encounter: 1.632 m (5' 4.25\").    Weight as of this encounter: 83 kg (183 lb).    Medication Reconciliation: complete      Norma J. Gosselin, LPN  "

## 2020-05-21 ASSESSMENT — ASTHMA QUESTIONNAIRES: ACT_TOTALSCORE: 21

## 2020-05-21 NOTE — TELEPHONE ENCOUNTER
Routing refill request to provider for review/approval because:  Drug not on the [facility] refill protocol   LOV: 5/20/2020  Dr. Benavides out of office will route to covering team let for review and consideration  Maggie Kirby RN on 5/21/2020 at 3:19 PM

## 2020-05-22 LAB
BACTERIA SPEC CULT: NORMAL
SARS-COV-2 RNA SPEC QL NAA+PROBE: NOT DETECTED
SPECIMEN SOURCE: NORMAL
SPECIMEN SOURCE: NORMAL

## 2020-05-22 RX ORDER — ALBUTEROL SULFATE 90 UG/1
AEROSOL, METERED RESPIRATORY (INHALATION)
Qty: 3 INHALER | Refills: 3 | Status: SHIPPED | OUTPATIENT
Start: 2020-05-22 | End: 2020-12-15

## 2020-05-22 RX ORDER — GABAPENTIN 100 MG/1
CAPSULE ORAL
Qty: 90 CAPSULE | Refills: 1 | Status: SHIPPED | OUTPATIENT
Start: 2020-05-22 | End: 2020-10-09

## 2020-05-27 NOTE — TELEPHONE ENCOUNTER
Had office visit with Raza Marino 5/20/20.  Norma J. Gosselin, LPN .......  5/27/2020  12:51 PM

## 2020-06-03 ENCOUNTER — OFFICE VISIT (OUTPATIENT)
Dept: FAMILY MEDICINE | Facility: OTHER | Age: 50
End: 2020-06-03
Attending: FAMILY MEDICINE
Payer: COMMERCIAL

## 2020-06-03 VITALS
SYSTOLIC BLOOD PRESSURE: 120 MMHG | TEMPERATURE: 97 F | OXYGEN SATURATION: 96 % | BODY MASS INDEX: 30.89 KG/M2 | HEART RATE: 106 BPM | WEIGHT: 181.38 LBS | RESPIRATION RATE: 12 BRPM | DIASTOLIC BLOOD PRESSURE: 84 MMHG

## 2020-06-03 DIAGNOSIS — R23.2 ABNORMAL FLUSHING AND SWEATING: Primary | ICD-10-CM

## 2020-06-03 DIAGNOSIS — R61 ABNORMAL FLUSHING AND SWEATING: Primary | ICD-10-CM

## 2020-06-03 LAB
ALBUMIN SERPL-MCNC: 4.4 G/DL (ref 3.5–5.7)
ALBUMIN UR-MCNC: 10 MG/DL
ALP SERPL-CCNC: 103 U/L (ref 34–104)
ALT SERPL W P-5'-P-CCNC: 76 U/L (ref 7–52)
ANION GAP SERPL CALCULATED.3IONS-SCNC: 7 MMOL/L (ref 3–14)
APPEARANCE UR: CLEAR
AST SERPL W P-5'-P-CCNC: 34 U/L (ref 13–39)
BASOPHILS # BLD AUTO: 0.1 10E9/L (ref 0–0.2)
BASOPHILS NFR BLD AUTO: 0.9 %
BILIRUB SERPL-MCNC: 0.5 MG/DL (ref 0.3–1)
BILIRUB UR QL STRIP: NEGATIVE
BUN SERPL-MCNC: 13 MG/DL (ref 7–25)
CALCIUM SERPL-MCNC: 9.4 MG/DL (ref 8.6–10.3)
CHLORIDE SERPL-SCNC: 105 MMOL/L (ref 98–107)
CO2 SERPL-SCNC: 27 MMOL/L (ref 21–31)
COLOR UR AUTO: YELLOW
CREAT SERPL-MCNC: 1.04 MG/DL (ref 0.6–1.2)
CRP SERPL-MCNC: 0.1 MG/L
DIFFERENTIAL METHOD BLD: NORMAL
EOSINOPHIL # BLD AUTO: 0.4 10E9/L (ref 0–0.7)
EOSINOPHIL NFR BLD AUTO: 5 %
ERYTHROCYTE [DISTWIDTH] IN BLOOD BY AUTOMATED COUNT: 11.3 % (ref 10–15)
ESTRADIOL SERPL-MCNC: <20 PG/ML
FSH SERPL-ACNC: 141.3 IU/L
GFR SERPL CREATININE-BSD FRML MDRD: 56 ML/MIN/{1.73_M2}
GLUCOSE SERPL-MCNC: 88 MG/DL (ref 70–105)
GLUCOSE UR STRIP-MCNC: NEGATIVE MG/DL
HCT VFR BLD AUTO: 43.8 % (ref 35–47)
HGB BLD-MCNC: 14.6 G/DL (ref 11.7–15.7)
HGB UR QL STRIP: NEGATIVE
HYALINE CASTS #/AREA URNS LPF: 2 /LPF (ref 0–2)
IMM GRANULOCYTES # BLD: 0 10E9/L (ref 0–0.4)
IMM GRANULOCYTES NFR BLD: 0.4 %
KETONES UR STRIP-MCNC: NEGATIVE MG/DL
LEUKOCYTE ESTERASE UR QL STRIP: NEGATIVE
LH SERPL-ACNC: 76.8 IU/L
LYMPHOCYTES # BLD AUTO: 1.6 10E9/L (ref 0.8–5.3)
LYMPHOCYTES NFR BLD AUTO: 20.2 %
MCH RBC QN AUTO: 30 PG (ref 26.5–33)
MCHC RBC AUTO-ENTMCNC: 33.3 G/DL (ref 31.5–36.5)
MCV RBC AUTO: 90 FL (ref 78–100)
MONOCYTES # BLD AUTO: 0.6 10E9/L (ref 0–1.3)
MONOCYTES NFR BLD AUTO: 8 %
MUCOUS THREADS #/AREA URNS LPF: PRESENT /LPF
NEUTROPHILS # BLD AUTO: 5.1 10E9/L (ref 1.6–8.3)
NEUTROPHILS NFR BLD AUTO: 65.5 %
NITRATE UR QL: NEGATIVE
PH UR STRIP: 5 PH (ref 5–7)
PLATELET # BLD AUTO: 345 10E9/L (ref 150–450)
POTASSIUM SERPL-SCNC: 4.3 MMOL/L (ref 3.5–5.1)
PROT SERPL-MCNC: 7.1 G/DL (ref 6.4–8.9)
RBC # BLD AUTO: 4.86 10E12/L (ref 3.8–5.2)
RBC #/AREA URNS AUTO: <1 /HPF (ref 0–2)
SODIUM SERPL-SCNC: 139 MMOL/L (ref 134–144)
SOURCE: ABNORMAL
SP GR UR STRIP: 1.04 (ref 1–1.03)
TSH SERPL DL<=0.05 MIU/L-ACNC: 1.04 IU/ML (ref 0.34–5.6)
UROBILINOGEN UR STRIP-MCNC: NORMAL MG/DL (ref 0–2)
WBC # BLD AUTO: 7.8 10E9/L (ref 4–11)
WBC #/AREA URNS AUTO: 1 /HPF (ref 0–5)

## 2020-06-03 PROCEDURE — 36415 COLL VENOUS BLD VENIPUNCTURE: CPT | Mod: ZL | Performed by: FAMILY MEDICINE

## 2020-06-03 PROCEDURE — 82670 ASSAY OF TOTAL ESTRADIOL: CPT | Mod: ZL | Performed by: FAMILY MEDICINE

## 2020-06-03 PROCEDURE — 80053 COMPREHEN METABOLIC PANEL: CPT | Mod: ZL | Performed by: FAMILY MEDICINE

## 2020-06-03 PROCEDURE — 86140 C-REACTIVE PROTEIN: CPT | Mod: ZL | Performed by: FAMILY MEDICINE

## 2020-06-03 PROCEDURE — 83002 ASSAY OF GONADOTROPIN (LH): CPT | Mod: ZL | Performed by: FAMILY MEDICINE

## 2020-06-03 PROCEDURE — 83001 ASSAY OF GONADOTROPIN (FSH): CPT | Mod: ZL | Performed by: FAMILY MEDICINE

## 2020-06-03 PROCEDURE — 99213 OFFICE O/P EST LOW 20 MIN: CPT | Performed by: FAMILY MEDICINE

## 2020-06-03 PROCEDURE — 81001 URINALYSIS AUTO W/SCOPE: CPT | Mod: ZL | Performed by: FAMILY MEDICINE

## 2020-06-03 PROCEDURE — 85025 COMPLETE CBC W/AUTO DIFF WBC: CPT | Mod: ZL | Performed by: FAMILY MEDICINE

## 2020-06-03 PROCEDURE — 84443 ASSAY THYROID STIM HORMONE: CPT | Mod: ZL | Performed by: FAMILY MEDICINE

## 2020-06-03 ASSESSMENT — ANXIETY QUESTIONNAIRES
IF YOU CHECKED OFF ANY PROBLEMS ON THIS QUESTIONNAIRE, HOW DIFFICULT HAVE THESE PROBLEMS MADE IT FOR YOU TO DO YOUR WORK, TAKE CARE OF THINGS AT HOME, OR GET ALONG WITH OTHER PEOPLE: NOT DIFFICULT AT ALL
2. NOT BEING ABLE TO STOP OR CONTROL WORRYING: NOT AT ALL
1. FEELING NERVOUS, ANXIOUS, OR ON EDGE: NOT AT ALL
GAD7 TOTAL SCORE: 0
7. FEELING AFRAID AS IF SOMETHING AWFUL MIGHT HAPPEN: NOT AT ALL
5. BEING SO RESTLESS THAT IT IS HARD TO SIT STILL: NOT AT ALL
6. BECOMING EASILY ANNOYED OR IRRITABLE: NOT AT ALL
3. WORRYING TOO MUCH ABOUT DIFFERENT THINGS: NOT AT ALL

## 2020-06-03 ASSESSMENT — PAIN SCALES - GENERAL: PAINLEVEL: MODERATE PAIN (4)

## 2020-06-03 ASSESSMENT — PATIENT HEALTH QUESTIONNAIRE - PHQ9
SUM OF ALL RESPONSES TO PHQ QUESTIONS 1-9: 3
5. POOR APPETITE OR OVEREATING: NOT AT ALL

## 2020-06-03 NOTE — NURSING NOTE
"Chief Complaint   Patient presents with     Post Op Complications     hot and sweaty, afebrile        Initial /84   Pulse 106   Temp 97  F (36.1  C) (Tympanic)   Resp 12   Wt 82.3 kg (181 lb 6 oz)   SpO2 96%   BMI 30.89 kg/m   Estimated body mass index is 30.89 kg/m  as calculated from the following:    Height as of 5/20/20: 1.632 m (5' 4.25\").    Weight as of this encounter: 82.3 kg (181 lb 6 oz).  Medication Reconciliation: complete    Masha Lopez LPN  "

## 2020-06-03 NOTE — PROGRESS NOTES
"Nursing Notes:   Masha Lopez LPN  6/3/2020  4:29 PM  Signed  Chief Complaint   Patient presents with     Post Op Complications     hot and sweaty, afebrile      Initial /84   Pulse 106   Temp 97  F (36.1  C) (Tympanic)   Resp 12   Wt 82.3 kg (181 lb 6 oz)   SpO2 96%   BMI 30.89 kg/m   Estimated body mass index is 30.89 kg/m  as calculated from the following:    Height as of 5/20/20: 1.632 m (5' 4.25\").    Weight as of this encounter: 82.3 kg (181 lb 6 oz).  Medication Reconciliation: complete  Masha Lopez LPN      SUBJECTIVE:   Cielo Bahena is a 49 year old female who presents to clinic today for the following health issues:    HPI  Cielo has recently had an L4-L5 spinal fusion on 5/15/2020 at [facility] with Dr. Telles of [facility].  Recovered as expected and was ultimately discharged to home on Sunday, 5/17/2020.  By Monday AM (5/18/2020), she had developed a temperature at home of 102 degrees.    She was instructed to be seen; and had appointment at the clinic on 5/20/2020 - at that time they completed CXR, UA, Covid swab - which all ultimately returned normal.  Monday morning was her only fever; and had remained 99-99.7 for the 48 hours prior to her clinic appointment.    Since that time she has continued to have hot flashes/sweats.  NO fever since 5/18/2020.  Can occur at night and all day long.  Nothing seems to be specific trigger, nothing seems to help; besides shedding clothing layers, fan, etc.  But doesn't always take them away.  Incision is not draining or more painful.  No development of cough or URI symptoms.    Was having some R sciatica type pains; and surgeon planned to put her on a steroid - however after having a bowel movement (was previously constipated); this helped her pain.  She is planning to avoid the steroids if she can help it.    Patient Active Problem List    Diagnosis Date Noted     Moderate persistent asthma without complication 02/16/2018     " Priority: Medium     Overview:   Asthma, generally well controlled, history of respiratory arrest requiring   brief mechanical ventilation.       Insomnia 2018     Priority: Medium     Genuine stress incontinence, female 2018     Priority: Medium     Depression 2018     Priority: Medium     Anemia 2018     Priority: Medium     Overview:   mild       Family history of colon cancer 2015     Priority: Medium     RLS (restless legs syndrome) 2014     Priority: Medium     Hip pain 2014     Priority: Medium     GERD (gastroesophageal reflux disease) 10/25/2012     Priority: Medium     Breast hypertrophy 2012     Priority: Medium     Past Medical History:   Diagnosis Date     Anemia     No Comments Provided     Family history of malignant neoplasm of digestive organ     2015     Insomnia     No Comments Provided     Major depressive disorder, single episode     ,Depression.  PHQ-9 score .     Mass of breast     ,Soft breast mass lump, right breast     Pain in hip     No Comments Provided     Personal history of other medical treatment (CODE)     2011,, 1 SAB     Personal history of other specified conditions (CODE)     abnormal Pap smear prior to , subsequent yearly Pap smears have been normal     Restless legs syndrome     No Comments Provided     Segmental and somatic dysfunction of pelvic region     2013     Uncomplicated asthma     generally well controlled, history of respiratory arrest requiring brief mechanical ventilation.      Past Surgical History:   Procedure Laterality Date     DILATION AND CURETTAGE      ,Dilatation and curettage for blighted ovum.     ENDOSCOPY UPPER, COLONOSCOPY, COMBINED      Normal exam, 5 year follow up due to FH     EXTRACTION(S) DENTAL      [place] tooth extraction     HYSTERECTOMY VAGINAL      3/5/2015,Dr. Lindsay for DUB; negative for malignancy; ovaries remain     MAMMOPLASTY REDUCTION      ,  Bilateral breast reduction, [facility]     MAMMOPLASTY REDUCTION           OTHER SURGICAL HISTORY      30053.9,[place] SUBTALAR ATHROEREISIS,Left foot     Family History   Problem Relation Age of Onset     Hypertension Father      Heart Disease Father         CAD with stents     Hyperlipidemia Father      Colon Cancer Maternal Grandmother      Heart Disease Maternal Grandfather         CAD     Heart Disease Paternal Grandfather         CAD     Breast Cancer No family hx of         Cancer-breast     Social History     Tobacco Use     Smoking status: Former Smoker     Packs/day: 0.50     Years: 25.00     Pack years: 12.50     Types: Cigarettes     Last attempt to quit: 2005     Years since quittin.5     Smokeless tobacco: Never Used     Tobacco comment: on rare occasions   Substance Use Topics     Alcohol use: Yes     Alcohol/week: 0.8 standard drinks     Comment: Alcoholic Drinks/day: occassional     Social History     Social History Narrative    Single, moved back to [place] from [place], does have family in town.   Has significant other.     Works at [employer] as a CNA.   Shannan Galvan-mother  2011   Inder     Current Outpatient Medications   Medication Sig Dispense Refill     acetaminophen-codeine (TYLENOL #3) 300-30 MG tablet Take 2 tablets by mouth every 4 hours as needed for severe pain 50 tablet 2     amitriptyline (ELAVIL) 10 MG tablet        amitriptyline (ELAVIL) 25 MG tablet Take 1-2 tablets (25-50 mg) by mouth At Bedtime 90 tablet 1     cyclobenzaprine (FLEXERIL) 10 MG tablet Take 1 tablet by mouth three times daily as needed for muscle spasms. 30 tablet 1     EPINEPHrine (EPIPEN/ADRENACLICK/OR ANY BX GENERIC EQUIV) 0.3 MG/0.3ML injection 2-pack Inject 0.3 mg into the muscle as needed Inject 0.3 mg intramuscular one time if needed for Allergic Reaction.       fluticasone-salmeterol (ADVAIR DISKUS) 250-50 MCG/DOSE inhaler Inhale 1 puff into the lungs every 12 hours Inhale 1  Puff by mouth every 12 hours. 3 Inhaler 4     gabapentin (NEURONTIN) 100 MG capsule TAKE 1 TO 2 CAPSULES BY MOUTH UP TO THREE TIMES DAILY 90 capsule 1     ibuprofen (ADVIL/MOTRIN) 800 MG tablet TAKE 1 TABLET(800 MG) BY MOUTH EVERY 6 HOURS AS NEEDED FOR MODERATE PAIN 50 tablet 11     LORazepam (ATIVAN) 0.5 MG tablet TAKE 1 TABLET BY MOUTH EVERY 8 HOURS AS NEEDED FOR ANXIETY 60 tablet 2     montelukast (SINGULAIR) 10 MG tablet Take 1 tablet (10 mg) by mouth At Bedtime 90 tablet 3     pramipexole (MIRAPEX) 0.25 MG tablet Take 1 tablet (0.25 mg) by mouth At Bedtime 90 tablet 4     predniSONE (DELTASONE) 20 MG tablet TAKE 3 TABLETS BY MOUTH DAILY FOR 3 DAYS, 2 TABLETS DAILY FOR 3 DAYS, 1 TABLET DAILY FOR 3 DAYS, THEN 1/2 TABLET DAILY FOR 3 DAYS 20 tablet 0     VENTOLIN  (90 Base) MCG/ACT inhaler INHALE 2 PUFFS IN THE LUNGS EVERY 4 HOURS AS NEEDED 3 Inhaler 3     No Known Allergies    Review of Systems   HENT: Negative for postnasal drip, sinus pain, sore throat and trouble swallowing.    Respiratory: Negative for cough and shortness of breath.    Gastrointestinal: Negative for constipation, diarrhea, hematochezia and vomiting.   Genitourinary: Negative for dysuria, flank pain, hematuria and vaginal discharge.   Musculoskeletal: Positive for back pain and gait problem.   Neurological: Negative for light-headedness and headaches.        OBJECTIVE:     /84   Pulse 106   Temp 97  F (36.1  C) (Tympanic)   Resp 12   Wt 82.3 kg (181 lb 6 oz)   SpO2 96%   BMI 30.89 kg/m    Body mass index is 30.89 kg/m .  Physical Exam  Vitals signs and nursing note reviewed.   Constitutional:       Appearance: Normal appearance. She is normal weight.   HENT:      Head: Normocephalic and atraumatic.      Right Ear: Tympanic membrane normal.      Left Ear: Tympanic membrane normal.      Nose: Nose normal.      Mouth/Throat:      Mouth: Mucous membranes are moist.   Eyes:      Pupils: Pupils are equal, round, and reactive to  light.   Neck:      Musculoskeletal: Normal range of motion.   Cardiovascular:      Rate and Rhythm: Normal rate and regular rhythm.      Pulses: Normal pulses.      Heart sounds: Normal heart sounds.   Abdominal:      General: Abdomen is flat. There is no distension.      Palpations: Abdomen is soft. There is no mass.      Tenderness: There is no right CVA tenderness or left CVA tenderness.   Skin:     General: Skin is warm.      Capillary Refill: Capillary refill takes less than 2 seconds.      Comments: Healing incisions in lower lumbar back.  Steri strips haphazardly in place.  Few areas with suture expelling under steri strips.  No fluid collection, drainage, overlying skin changes.   Neurological:      General: No focal deficit present.      Mental Status: She is alert.   Psychiatric:         Mood and Affect: Mood normal.     Diagnostic Test Results:  Results for orders placed or performed in visit on 06/03/20   CBC and Differential     Status: None   Result Value Ref Range    WBC 7.8 4.0 - 11.0 10e9/L    RBC Count 4.86 3.8 - 5.2 10e12/L    Hemoglobin 14.6 11.7 - 15.7 g/dL    Hematocrit 43.8 35.0 - 47.0 %    MCV 90 78 - 100 fl    MCH 30.0 26.5 - 33.0 pg    MCHC 33.3 31.5 - 36.5 g/dL    RDW 11.3 10.0 - 15.0 %    Platelet Count 345 150 - 450 10e9/L    Diff Method Automated Method     % Neutrophils 65.5 %    % Lymphocytes 20.2 %    % Monocytes 8.0 %    % Eosinophils 5.0 %    % Basophils 0.9 %    % Immature Granulocytes 0.4 %    Absolute Neutrophil 5.1 1.6 - 8.3 10e9/L    Absolute Lymphocytes 1.6 0.8 - 5.3 10e9/L    Absolute Monocytes 0.6 0.0 - 1.3 10e9/L    Absolute Eosinophils 0.4 0.0 - 0.7 10e9/L    Absolute Basophils 0.1 0.0 - 0.2 10e9/L    Abs Immature Granulocytes 0.0 0 - 0.4 10e9/L   Comprehensive Metabolic Panel     Status: Abnormal   Result Value Ref Range    Sodium 139 134 - 144 mmol/L    Potassium 4.3 3.5 - 5.1 mmol/L    Chloride 105 98 - 107 mmol/L    Carbon Dioxide 27 21 - 31 mmol/L    Anion Gap 7 3 -  14 mmol/L    Glucose 88 70 - 105 mg/dL    Urea Nitrogen 13 7 - 25 mg/dL    Creatinine 1.04 0.60 - 1.20 mg/dL    GFR Estimate 56 (L) >60 mL/min/[1.73_m2]    GFR Estimate If Black 68 >60 mL/min/[1.73_m2]    Calcium 9.4 8.6 - 10.3 mg/dL    Bilirubin Total 0.5 0.3 - 1.0 mg/dL    Albumin 4.4 3.5 - 5.7 g/dL    Protein Total 7.1 6.4 - 8.9 g/dL    Alkaline Phosphatase 103 34 - 104 U/L    ALT 76 (H) 7 - 52 U/L    AST 34 13 - 39 U/L   CRP inflammation     Status: None   Result Value Ref Range    CRP Inflammation 0.1 <0.5 mg/L   Luteinizing Hormone     Status: None   Result Value Ref Range    Lutropin 76.8 IU/L   FSH     Status: None   Result Value Ref Range    .3 IU/L   Estradiol     Status: None   Result Value Ref Range    Estradiol <20 pg/mL   UA reflex to Microscopic     Status: Abnormal   Result Value Ref Range    Color Urine Yellow     Appearance Urine Clear     Glucose Urine Negative NEG^Negative mg/dL    Bilirubin Urine Negative NEG^Negative    Ketones Urine Negative NEG^Negative mg/dL    Specific Gravity Urine 1.038 (H) 1.003 - 1.035    Blood Urine Negative NEG^Negative    pH Urine 5.0 5.0 - 7.0 pH    Protein Albumin Urine 10 (A) NEG^Negative mg/dL    Urobilinogen mg/dL Normal 0.0 - 2.0 mg/dL    Nitrite Urine Negative NEG^Negative    Leukocyte Esterase Urine Negative NEG^Negative    Source Midstream Urine     RBC Urine <1 0 - 2 /HPF    WBC Urine 1 0 - 5 /HPF    Mucous Urine Present (A) NEG^Negative /LPF    Hyaline Casts 2 0 - 2 /LPF   TSH     Status: None   Result Value Ref Range    Thyrotropin 1.04 0.34 - 5.60 IU/mL       ASSESSMENT/PLAN:     1. Abnormal flushing and sweating  Labs as above; shows menopausal state.  Likely with recent stressor, exacerbated menopausal hot flashes (is s/p hysterectomy, so unable to track menstrual cycles).  Expect continued improvement as she is further out from surgery.  Other possibility discussed is disruption in autonomic nervous system; due to slightly elevated pulse today  in addition to symptoms.  Either way - time will help.  If persisting; would be willing to try Effexor vs Prozac.  Labs not pointing to any infectious cause.  Did encourage good hydration, staying away from sugars/processed foods as that will likely worsen her hot flashes.  - CBC and Differential  - Comprehensive Metabolic Panel  - CRP inflammation  - Luteinizing Hormone  - FSH  - Estradiol  - UA reflex to Microscopic  - TSH    Follow up prn.    Valencia Benavides, [facility]

## 2020-06-04 ASSESSMENT — ENCOUNTER SYMPTOMS
CONSTIPATION: 0
HEMATURIA: 0
VOMITING: 0
SORE THROAT: 0
FLANK PAIN: 0
COUGH: 0
BACK PAIN: 1
DYSURIA: 0
SINUS PAIN: 0
DIARRHEA: 0
LIGHT-HEADEDNESS: 0
HEADACHES: 0
TROUBLE SWALLOWING: 0
SHORTNESS OF BREATH: 0
HEMATOCHEZIA: 0

## 2020-06-04 ASSESSMENT — ANXIETY QUESTIONNAIRES: GAD7 TOTAL SCORE: 0

## 2020-06-16 DIAGNOSIS — M47.816 FACET ARTHROPATHY, LUMBAR: ICD-10-CM

## 2020-06-16 DIAGNOSIS — G47.00 INSOMNIA, UNSPECIFIED TYPE: Primary | ICD-10-CM

## 2020-06-16 DIAGNOSIS — M48.061 SPINAL STENOSIS OF LUMBAR REGION, UNSPECIFIED WHETHER NEUROGENIC CLAUDICATION PRESENT: ICD-10-CM

## 2020-06-18 RX ORDER — AMITRIPTYLINE HYDROCHLORIDE 10 MG/1
TABLET ORAL
Qty: 180 TABLET | Refills: 1 | Status: SHIPPED | OUTPATIENT
Start: 2020-06-18 | End: 2020-12-14

## 2020-06-18 RX ORDER — CYCLOBENZAPRINE HCL 10 MG
TABLET ORAL
Qty: 90 TABLET | Refills: 2 | Status: SHIPPED | OUTPATIENT
Start: 2020-06-18 | End: 2020-12-14

## 2020-06-18 NOTE — TELEPHONE ENCOUNTER
Routing refill request to provider for review/approval because:  Drug not on the [facility] refill protocol     LOV: 6/3/2020  Maggie Kiryb RN on 6/18/2020 at 8:57 AM

## 2020-08-24 ENCOUNTER — MYC MEDICAL ADVICE (OUTPATIENT)
Dept: FAMILY MEDICINE | Facility: OTHER | Age: 50
End: 2020-08-24

## 2020-08-24 DIAGNOSIS — Z12.11 COLON CANCER SCREENING: Primary | ICD-10-CM

## 2020-08-27 DIAGNOSIS — Z12.11 SPECIAL SCREENING FOR MALIGNANT NEOPLASMS, COLON: ICD-10-CM

## 2020-08-27 DIAGNOSIS — Z01.818 PRE-OP TESTING: Primary | ICD-10-CM

## 2020-08-27 NOTE — TELEPHONE ENCOUNTER
Screening Questions for the Scheduling of Screening Colonoscopies   (If Colonoscopy is diagnostic, Provider should review the chart before scheduling.)  Are you younger than 50 or older than 80?  NO   Do you take aspirin or fish oil?  YES - FISH OIL  (if yes, tell patient to stop 1 week prior to Colonoscopy)  Do you take warfarin (Coumadin), clopidogrel (Plavix), apixaban (Eliquis), dabigatram (Pradaxa), rivaroxaban (Xarelto) or any blood thinner? NO   Do you use oxygen at home?  NO   Do you have kidney disease? NO   Are you on dialysis? NO   Have you had a stroke or heart attack in the last year? NO   Have you had a stent in your heart or any blood vessel in the last year? NO   Have you had a transplant of any organ? NO   Have you had a colonoscopy or upper endoscopy (EGD) before? YES          When?  6 YRS AGO   Date of scheduled Colonoscopy. 10/16/2020  Provider VIR[facility]

## 2020-09-01 RX ORDER — BISACODYL 5 MG
TABLET, DELAYED RELEASE (ENTERIC COATED) ORAL
Qty: 2 TABLET | Refills: 0 | Status: SHIPPED | OUTPATIENT
Start: 2020-09-01 | End: 2021-03-18

## 2020-09-01 RX ORDER — POLYETHYLENE GLYCOL 3350, SODIUM CHLORIDE, SODIUM BICARBONATE, POTASSIUM CHLORIDE 420; 11.2; 5.72; 1.48 G/4L; G/4L; G/4L; G/4L
4000 POWDER, FOR SOLUTION ORAL ONCE
Qty: 4000 ML | Refills: 0 | Status: SHIPPED | OUTPATIENT
Start: 2020-09-01 | End: 2020-09-01

## 2020-09-20 DIAGNOSIS — J45.20 MILD INTERMITTENT ASTHMA WITHOUT COMPLICATION: ICD-10-CM

## 2020-09-21 RX ORDER — MONTELUKAST SODIUM 10 MG/1
TABLET ORAL
Qty: 90 TABLET | Refills: 3 | Status: SHIPPED | OUTPATIENT
Start: 2020-09-21 | End: 2021-03-18

## 2020-09-21 NOTE — TELEPHONE ENCOUNTER
Ema KNOX sent Rx request for the following:      MONTELUKAST 10MG TABLETS   Sig: TAKE 1 TABLET BY MOUTH AT BEDTIME       Last Prescription Date:   11/7/2019  Last Fill Qty/Refills:         90, R-3    Last Office Visit:              6/3/2020   Future Office visit:           none    Prescription refilled per RN Medication Refill Policy.................... Ok Ellison RN ....................  9/21/2020   3:47 PM

## 2020-09-24 DIAGNOSIS — F41.1 GAD (GENERALIZED ANXIETY DISORDER): ICD-10-CM

## 2020-09-24 DIAGNOSIS — M48.061 SPINAL STENOSIS OF LUMBAR REGION, UNSPECIFIED WHETHER NEUROGENIC CLAUDICATION PRESENT: ICD-10-CM

## 2020-09-28 NOTE — TELEPHONE ENCOUNTER
Routing refill request to provider for review/approval because:  Drug not on the [facility] refill protocol     LOV: 6/3/2020  Maggie Kirby RN on 9/28/2020 at 9:05 AM

## 2020-09-29 RX ORDER — LORAZEPAM 0.5 MG/1
TABLET ORAL
Qty: 60 TABLET | Refills: 5 | Status: SHIPPED | OUTPATIENT
Start: 2020-09-29 | End: 2021-03-18

## 2020-10-13 ENCOUNTER — ALLIED HEALTH/NURSE VISIT (OUTPATIENT)
Dept: FAMILY MEDICINE | Facility: OTHER | Age: 50
End: 2020-10-13
Attending: SURGERY
Payer: COMMERCIAL

## 2020-10-13 DIAGNOSIS — Z20.822 COVID-19 RULED OUT: Primary | ICD-10-CM

## 2020-10-13 DIAGNOSIS — Z01.818 PRE-OP TESTING: ICD-10-CM

## 2020-10-13 PROCEDURE — 99207 PR NO CHARGE NURSE ONLY: CPT

## 2020-10-13 PROCEDURE — U0003 INFECTIOUS AGENT DETECTION BY NUCLEIC ACID (DNA OR RNA); SEVERE ACUTE RESPIRATORY SYNDROME CORONAVIRUS 2 (SARS-COV-2) (CORONAVIRUS DISEASE [COVID-19]), AMPLIFIED PROBE TECHNIQUE, MAKING USE OF HIGH THROUGHPUT TECHNOLOGIES AS DESCRIBED BY CMS-2020-01-R: HCPCS | Mod: ZL | Performed by: SURGERY

## 2020-10-13 PROCEDURE — C9803 HOPD COVID-19 SPEC COLLECT: HCPCS

## 2020-10-14 LAB
SARS-COV-2 RNA SPEC QL NAA+PROBE: NOT DETECTED
SPECIMEN SOURCE: NORMAL

## 2020-10-16 ENCOUNTER — ANESTHESIA EVENT (OUTPATIENT)
Dept: SURGERY | Facility: OTHER | Age: 50
End: 2020-10-16
Payer: COMMERCIAL

## 2020-10-16 ENCOUNTER — HOSPITAL ENCOUNTER (OUTPATIENT)
Facility: OTHER | Age: 50
Discharge: HOME | End: 2020-10-16
Attending: SURGERY | Admitting: SURGERY
Payer: COMMERCIAL

## 2020-10-16 ENCOUNTER — ANESTHESIA (OUTPATIENT)
Dept: SURGERY | Facility: OTHER | Age: 50
End: 2020-10-16
Payer: COMMERCIAL

## 2020-10-16 VITALS
RESPIRATION RATE: 14 BRPM | DIASTOLIC BLOOD PRESSURE: 89 MMHG | SYSTOLIC BLOOD PRESSURE: 132 MMHG | TEMPERATURE: 98.3 F | OXYGEN SATURATION: 98 % | HEART RATE: 79 BPM

## 2020-10-16 DIAGNOSIS — Z80.0 FAMILY HISTORY OF COLON CANCER REQUIRING SCREENING COLONOSCOPY: Primary | ICD-10-CM

## 2020-10-16 PROCEDURE — 999N000010 HC STATISTIC ANES STAT CODE-CRNA PER MINUTE: Performed by: SURGERY

## 2020-10-16 PROCEDURE — 45378 DIAGNOSTIC COLONOSCOPY: CPT | Performed by: NURSE ANESTHETIST, CERTIFIED REGISTERED

## 2020-10-16 PROCEDURE — 258N000003 HC RX IP 258 OP 636: Performed by: NURSE ANESTHETIST, CERTIFIED REGISTERED

## 2020-10-16 PROCEDURE — 45378 DIAGNOSTIC COLONOSCOPY: CPT | Performed by: SURGERY

## 2020-10-16 PROCEDURE — G0105 COLORECTAL SCRN; HI RISK IND: HCPCS | Performed by: SURGERY

## 2020-10-16 PROCEDURE — 250N000009 HC RX 250: Performed by: NURSE ANESTHETIST, CERTIFIED REGISTERED

## 2020-10-16 PROCEDURE — 258N000003 HC RX IP 258 OP 636: Performed by: SURGERY

## 2020-10-16 RX ORDER — NALOXONE HYDROCHLORIDE 0.4 MG/ML
.1-.4 INJECTION, SOLUTION INTRAMUSCULAR; INTRAVENOUS; SUBCUTANEOUS
Status: DISCONTINUED | OUTPATIENT
Start: 2020-10-16 | End: 2020-10-16 | Stop reason: HOSPADM

## 2020-10-16 RX ORDER — ONDANSETRON 4 MG/1
4 TABLET, ORALLY DISINTEGRATING ORAL EVERY 6 HOURS PRN
Status: DISCONTINUED | OUTPATIENT
Start: 2020-10-16 | End: 2020-10-16 | Stop reason: HOSPADM

## 2020-10-16 RX ORDER — LIDOCAINE 40 MG/G
CREAM TOPICAL
Status: DISCONTINUED | OUTPATIENT
Start: 2020-10-16 | End: 2020-10-16 | Stop reason: HOSPADM

## 2020-10-16 RX ORDER — ONDANSETRON 2 MG/ML
4 INJECTION INTRAMUSCULAR; INTRAVENOUS
Status: DISCONTINUED | OUTPATIENT
Start: 2020-10-16 | End: 2020-10-16 | Stop reason: HOSPADM

## 2020-10-16 RX ORDER — ONDANSETRON 2 MG/ML
4 INJECTION INTRAMUSCULAR; INTRAVENOUS EVERY 6 HOURS PRN
Status: DISCONTINUED | OUTPATIENT
Start: 2020-10-16 | End: 2020-10-16 | Stop reason: HOSPADM

## 2020-10-16 RX ORDER — PROCHLORPERAZINE MALEATE 5 MG
10 TABLET ORAL EVERY 6 HOURS PRN
Status: DISCONTINUED | OUTPATIENT
Start: 2020-10-16 | End: 2020-10-16 | Stop reason: HOSPADM

## 2020-10-16 RX ORDER — PROPOFOL 10 MG/ML
INJECTION, EMULSION INTRAVENOUS CONTINUOUS PRN
Status: DISCONTINUED | OUTPATIENT
Start: 2020-10-16 | End: 2020-10-16

## 2020-10-16 RX ORDER — FLUMAZENIL 0.1 MG/ML
0.2 INJECTION, SOLUTION INTRAVENOUS
Status: DISCONTINUED | OUTPATIENT
Start: 2020-10-16 | End: 2020-10-16 | Stop reason: HOSPADM

## 2020-10-16 RX ORDER — SODIUM CHLORIDE, SODIUM LACTATE, POTASSIUM CHLORIDE, CALCIUM CHLORIDE 600; 310; 30; 20 MG/100ML; MG/100ML; MG/100ML; MG/100ML
INJECTION, SOLUTION INTRAVENOUS CONTINUOUS
Status: DISCONTINUED | OUTPATIENT
Start: 2020-10-16 | End: 2020-10-16 | Stop reason: HOSPADM

## 2020-10-16 RX ORDER — SODIUM CHLORIDE, SODIUM LACTATE, POTASSIUM CHLORIDE, CALCIUM CHLORIDE 600; 310; 30; 20 MG/100ML; MG/100ML; MG/100ML; MG/100ML
INJECTION, SOLUTION INTRAVENOUS CONTINUOUS PRN
Status: DISCONTINUED | OUTPATIENT
Start: 2020-10-16 | End: 2020-10-16

## 2020-10-16 RX ADMIN — PROPOFOL 130 MCG/KG/MIN: 10 INJECTION, EMULSION INTRAVENOUS at 08:35

## 2020-10-16 RX ADMIN — SODIUM CHLORIDE, POTASSIUM CHLORIDE, SODIUM LACTATE AND CALCIUM CHLORIDE: 600; 310; 30; 20 INJECTION, SOLUTION INTRAVENOUS at 08:31

## 2020-10-16 RX ADMIN — SODIUM CHLORIDE, POTASSIUM CHLORIDE, SODIUM LACTATE AND CALCIUM CHLORIDE: 600; 310; 30; 20 INJECTION, SOLUTION INTRAVENOUS at 08:28

## 2020-10-16 SDOH — HEALTH STABILITY: MENTAL HEALTH: CURRENT SMOKER: 0

## 2020-10-16 NOTE — ANESTHESIA PREPROCEDURE EVALUATION
Anesthesia Pre-Procedure Evaluation    Patient: Cielo Bahena   MRN: [id number] : 1970          Preoperative Diagnosis: Screening for colon cancer [Z12.11]    Procedure(s):  COLONOSCOPY    Past Medical History:   Diagnosis Date     Anemia     No Comments Provided     Family history of malignant neoplasm of digestive organ     2015     Insomnia     No Comments Provided     Major depressive disorder, single episode     ,Depression.  PHQ-9 score .     Mass of breast     ,Soft breast mass lump, right breast     Pain in hip     No Comments Provided     Personal history of other medical treatment (CODE)     2011,, 1 SAB     Personal history of other specified conditions (CODE)     abnormal Pap smear prior to , subsequent yearly Pap smears have been normal     Restless legs syndrome     No Comments Provided     Segmental and somatic dysfunction of pelvic region     2013     Uncomplicated asthma     generally well controlled, history of respiratory arrest requiring brief mechanical ventilation.     Past Surgical History:   Procedure Laterality Date     DILATION AND CURETTAGE      ,Dilatation and curettage for blighted ovum.     ENDOSCOPY UPPER, COLONOSCOPY, COMBINED      Normal exam, 5 year follow up due to FH     EXTRACTION(S) DENTAL      [place] tooth extraction     HYSTERECTOMY VAGINAL      3/5/2015,Dr. Lindsay for DUB; negative for malignancy; ovaries remain     MAMMOPLASTY REDUCTION      , Bilateral breast reduction, [facility]     MAMMOPLASTY REDUCTION           OTHER SURGICAL HISTORY      83660.9,[place] SUBTALAR ATHROEREISIS,Left foot       Anesthesia Evaluation     . Pt has had prior anesthetic. Type: General and MAC    History of anesthetic complications   - PONV        ROS/MED HX    ENT/Pulmonary:     (+)Mild Persistent asthma Treatment: Inhaler prn,  , . .    Neurologic:  - neg neurologic ROS     Cardiovascular:  - neg cardiovascular ROS       METS/Exercise  "Tolerance:  >4 METS   Hematologic:  - neg hematologic  ROS       Musculoskeletal:  - neg musculoskeletal ROS       GI/Hepatic:     (+) GERD Asymptomatic on medication,       Renal/Genitourinary:  - ROS Renal section negative       Endo:  - neg endo ROS       Psychiatric:  - neg psychiatric ROS       Infectious Disease:  - neg infectious disease ROS       Malignancy:      - no malignancy   Other:    - neg other ROS                      Physical Exam  Normal systems: cardiovascular, pulmonary and dental    Airway   Mallampati: III  TM distance: >3 FB  Neck ROM: full    Dental     Cardiovascular   Rhythm and rate: regular and normal      Pulmonary    breath sounds clear to auscultation            Lab Results   Component Value Date    WBC 7.8 06/03/2020    HGB 14.6 06/03/2020    HCT 43.8 06/03/2020     06/03/2020    CRP 0.1 06/03/2020     06/03/2020    POTASSIUM 4.3 06/03/2020    CHLORIDE 105 06/03/2020    CO2 27 06/03/2020    BUN 13 06/03/2020    CR 1.04 06/03/2020    GLC 88 06/03/2020    ZEINA 9.4 06/03/2020    ALBUMIN 4.4 06/03/2020    PROTTOTAL 7.1 06/03/2020    ALT 76 (H) 06/03/2020    AST 34 06/03/2020    ALKPHOS 103 06/03/2020    BILITOTAL 0.5 06/03/2020    LIPASE 10.9 (L) 07/01/2017    HCG Negative 03/05/2015       Preop Vitals  BP Readings from Last 3 Encounters:   10/16/20 (!) 132/90   06/03/20 120/84   05/20/20 128/80    Pulse Readings from Last 3 Encounters:   06/03/20 106   05/20/20 111   05/11/20 94      Resp Readings from Last 3 Encounters:   06/03/20 12   05/20/20 16   05/11/20 14    SpO2 Readings from Last 3 Encounters:   10/16/20 95%   06/03/20 96%   05/20/20 96%      Temp Readings from Last 1 Encounters:   10/16/20 98.3  F (36.8  C) (Tympanic)    Ht Readings from Last 1 Encounters:   05/20/20 1.632 m (5' 4.25\")      Wt Readings from Last 1 Encounters:   06/03/20 82.3 kg (181 lb 6 oz)    Estimated body mass index is 30.89 kg/m  as calculated from the following:    Height as of 5/20/20: 1.632 " "m (5' 4.25\").    Weight as of 6/3/20: 82.3 kg (181 lb 6 oz).       Anesthesia Plan      History & Physical Review      ASA Status:  2 .    NPO Status:  > 6 hours    Plan for MAC with Intravenous induction.       The patient is not a current smoker      Postoperative Care      Consents  Anesthetic plan, risks, benefits and alternatives discussed with:  Patient..                 GARRETT SHERMAN CRNA  "

## 2020-10-16 NOTE — OP NOTE
PROCEDURE NOTE    SURGEON:Naye Prieto MD.    PRE-OP DIAGNOSIS:  Screening Colonoscopy      POST-OP DIAGNOSIS: normal colon, family history of colon cancer    PROCEDURE:  Screening colonoscopy    ESTIMATED BLOODLOSS: none    COMPLICATIONS:  None    SPECIMEN:  none    ANESTHESIA:  See anesthesia note, anesthesia requested due to: chronic use of benzodiazepines    INDICATION FOR THE PROCEDURE: The patient is a 49 year old female. The patient has no complaints. I explained to the patient the risks, benefits and alternatives to screening colonoscopy for evaluating the colon for colon polyps and colon cancer. We specifically discussed the risks of bleeding, infection, perforation, potential inability to reach the cecum and the risks of sedation. The patient's questions were answered and the patient wished to proceed. Informed consent paperwork was completed.    PROCEDURE: The patient was taken to the endoscopy suite. Appropriate monitors were attached. The patient was placed in the left lateral decubitus position.Timeout was performed confirming the patient's identity and procedure to be performed. After appropriate sedation was confirmed, digital rectal exam was performed. There was normal tone and no gross abnormality was noted. The lubricated colonoscope was introduced into the anus the colon was insufflated with air. The prep quality was adequate. Under direct visualization the scope was advanced to the cecum. The ileocecal valve was intubated and the terminal ileum inspected. No gross abnormality was noted. The scope was withdrawn back into the cecum. The mucosa of colon was inspected while withdrawing the scope. No abnormalities were noted. The scope was retroflexed in the rectum and the anorectal junction was inspected. No abnormalities were noted. The scope was returned to a neutral position and the colon was decompressed. The scope was removed. The patient tolerated the procedure with no immediately apparent  complication. The patient was taken to recovery in stable condition.    FOLLOW UP:RECOMMEND high fiber diet, follow up: 10 year screening colonoscopy.

## 2020-10-16 NOTE — H&P
PRE-PROCEDURE NOTE    CHIEF COMPLAINT / REASON FORPROCEDURE:  Need for screening colonoscopy.    PERTINENT HISTORY   Patient with no complaints. Previous colonoscopy none. No diarrhea, constipation, abdominal pain or rectal bleeding. Family history of colon cancer grandmother.  Past Medical History:   Diagnosis Date     Anemia     No Comments Provided     Family history of malignant neoplasm of digestive organ     2015     Insomnia     No Comments Provided     Major depressive disorder, single episode     ,Depression.  PHQ-9 score .     Mass of breast     ,Soft breast mass lump, right breast     Pain in hip     No Comments Provided     Personal history of other medical treatment (CODE)     2011,, 1 SAB     Personal history of other specified conditions (CODE)     abnormal Pap smear prior to , subsequent yearly Pap smears have been normal     Restless legs syndrome     No Comments Provided     Segmental and somatic dysfunction of pelvic region     2013     Uncomplicated asthma     generally well controlled, history of respiratory arrest requiring brief mechanical ventilation.     Past Surgical History:   Procedure Laterality Date     DILATION AND CURETTAGE      ,Dilatation and curettage for blighted ovum.     ENDOSCOPY UPPER, COLONOSCOPY, COMBINED      Normal exam, 5 year follow up due to FH     EXTRACTION(S) DENTAL      [place] tooth extraction     HYSTERECTOMY VAGINAL      3/5/2015,Dr. Lindsay for DUB; negative for malignancy; ovaries remain     MAMMOPLASTY REDUCTION      , Bilateral breast reduction, [facility]     MAMMOPLASTY REDUCTION           OTHER SURGICAL HISTORY      90254.9,[place] SUBTALAR ATHROEREISIS,Left foot     Other:  None  Bleeding tendencies:  No    Relevant Family History:  yes, as above    Relevant Social History:  none    A relevant review of systems was performed and was Negative.    ALLERGIES/SENSITIVITIES: No Known Allergies     CURRENTMEDICATIONS:     No current facility-administered medications on file prior to encounter.        amitriptyline (ELAVIL) 10 MG tablet, Take 1-2 tablets by mouth at beditme       bisacodyl (DULCOLAX) 5 MG EC tablet, Use as directed per colonoscopy prep.       cyclobenzaprine (FLEXERIL) 10 MG tablet, TAKE 1 TABLET BY MOUTH THREE TIMES DAILY AS NEEDED FOR MUSCLE SPASMS       EPINEPHrine (EPIPEN/ADRENACLICK/OR ANY BX GENERIC EQUIV) 0.3 MG/0.3ML injection 2-pack, Inject 0.3 mg into the muscle as needed Inject 0.3 mg intramuscular one time if needed for Allergic Reaction.       fluticasone-salmeterol (ADVAIR DISKUS) 250-50 MCG/DOSE inhaler, Inhale 1 puff into the lungs every 12 hours Inhale 1 Puff by mouth every 12 hours.       ibuprofen (ADVIL/MOTRIN) 800 MG tablet, TAKE 1 TABLET(800 MG) BY MOUTH EVERY 6 HOURS AS NEEDED FOR MODERATE PAIN       pramipexole (MIRAPEX) 0.25 MG tablet, Take 1 tablet (0.25 mg) by mouth At Bedtime       VENTOLIN  (90 Base) MCG/ACT inhaler, INHALE 2 PUFFS IN THE LUNGS EVERY 4 HOURS AS NEEDED      Current Facility-Administered Medications   Medication     lactated ringers infusion     lidocaine (LMX4) cream     lidocaine 1 % 0.1-1 mL     ondansetron (ZOFRAN) injection 4 mg     sodium chloride (PF) 0.9% PF flush 3 mL     sodium chloride (PF) 0.9% PF flush 3 mL       PRE-SEDATION ASSESSMENT:    BP (!) 132/90   Temp 98.3  F (36.8  C) (Tympanic)   SpO2 95%   Breastfeeding No   Lung Exam:  Normal  Heart Exam:  Normal    Comment(s):      IMPRESSION:  Need for screening colonoscopy.    PLAN:  I discussed screening colonoscopy with the patient. Anesthesia coverage requested due to chronic use of benzodiazepines for anxiety.

## 2020-10-16 NOTE — ANESTHESIA POSTPROCEDURE EVALUATION
Patient: Cielo A Wedge    Procedure(s):  COLONOSCOPY    Diagnosis:Screening for colon cancer [Z12.11]  Diagnosis Additional Information: No value filed.    Anesthesia Type:  MAC    Note:  Anesthesia Post Evaluation    Patient location during evaluation: Phase 2  Patient participation: Able to fully participate in evaluation  Level of consciousness: awake and alert  Pain management: adequate  Airway patency: patent  Cardiovascular status: acceptable  Respiratory status: acceptable  Hydration status: acceptable  PONV: none             Last vitals:  Vitals:    10/16/20 0900 10/16/20 0915 10/16/20 0930   BP: 105/55 114/86 132/89   Pulse: 86 90 79   Resp: 14 14 14   Temp:      SpO2: 98% 96% 98%         Electronically Signed By: David Kellerman, APRN CRNA  October 16, 2020  11:02 AM

## 2020-10-16 NOTE — DISCHARGE INSTRUCTIONS
Procedure you had done: screening colonoscopy  Your health care provider is:  Valencia Benavides  Your surgeon is Dr. Naye Prieto.   Please call your health care provider or surgeon at [phone] if:    - you feel you are getting worse or having an increase in problems    - fever greater than 101 degrees  - increasing shortness of breath or chest pain  - any signs of infection (increasing redness, swelling, tenderness, warmth, change in appearance, or  increased drainage)  - blood in your urine or stool  - coughing or vomiting blood  - nausea (upset stomach) and vomiting and/or diarrhea that will not stop  - severe pain that is not relieved by medicine, rest or ice  You have had medications for sedation. Please be aware that this can cause drowsiness and impaired judgment for up to 24 hours after your procedure. Do not drive, operate power tools or drink alcohol for 24 hours.  If samples were taken-you will get a phone call and a letter with your results in the next 7-10 days. If you don't get results, please call and let us know!     [facility]  Adult Discharge Orders & Instructions    ________________________________________________________________          For 12 hours after surgery  1. Get plenty of rest.  A responsible adult must stay with you for at least 12 hours after you leave the hospital.   2. You may feel lightheaded.  IF so, sit for a few minutes before standing.  Have someone help you get up.   3. You may have a slight fever. Call the doctor if your fever is over 101 F (38.3 C) (taken under the tongue) or lasts longer than 24 hours.  4. You may have a dry mouth, a sore throat, muscle aches or trouble sleeping.  These should go away after 24 hours.  5. Do not make important or legal decisions.  6.   Do not drive or use heavy equipment.  If you have weakness or tingling, don't drive or use heavy equipment until this feeling goes away.    To contact a doctor, call    [phone]_______________________      Your doctor recommends that you eat 25 to 30 Grams of fiber daily. The following are some examples of fiber amounts in different foods.    Fruits: Apple (with skin) 1 medium = 4.4 Grams   Banana      1 medium = 3.1 Grams   Oranges     1 orange = 3.1 Grams   Prunes     1 cup, pitted = 12.4 Grams    Juices: Apple, unsweetened w/ added ascorbic acid  1 cup = 0.5 Grams    Grapefruit, white, canned,sweetened  1 cup = 0.2 Grams    Grape, unsweetened w/ added ascorbic acid  1 cup = 0.5 Grams    Orange     1 cup = 0.7 Grams    Vegetables:   Cooked: Green Beans   1 cup = 4.0 Grams       Carrots   1/2 cup sliced = 2.3 Grams       Peas       1 cup = 8.8 Grams       Potato (baked, with skin)  1 medium = 3.8 Grams    Raw: Cucumber (with peel)  1 cucumber = 1.5 Grams            Lettuce     1 cup shredded = 0.5 Grams            Tomato   1 medium tomato = 1.5 Grams            Spinach  1 cup = 0.7 Grams    Legumes: Baked beans, canned, no salt added  1 cup = 13.9 Grams         Kidney Beans, canned  1 cup = 13.6 Grams         [place] Beans, canned     1 cup = 11.6 Grams         Lentils, boiled   1 cup = 15.6 Grams    Breads, Pastas, Flours: Bran muffins   1 medium muffin = 5.2 Grams           Oatmeal, cooked  1 cup = 4.0 Grams           White Bread   1 slice = 0.6 Grams           Whole- wheat bread = 1.9 Grams    Pasta and rice, cooked: Macaroni  1 cup = 2.5 Grams           Rice, Brown  1 cup = 3.5 Grams           Rice, white   1 cup = 0.6 Grams           Spaghetti (regular) 1 cup = 2.5 Grams    Nuts: Almonds   1 cup = 17.4 Grams            Peanuts    1 cup = 12.4 Grams

## 2020-10-16 NOTE — ANESTHESIA CARE TRANSFER NOTE
Patient: Cielo A Wedge    Procedure(s):  COLONOSCOPY    Diagnosis: Screening for colon cancer [Z12.11]  Diagnosis Additional Information: No value filed.    Anesthesia Type:   MAC     Note:  Airway :Face Mask (Patient transported on O2 @ 10L/min)  Patient transferred to:Phase II  Handoff Report: Identifed the Patient, Identified the Reponsible Provider, Reviewed the pertinent medical history, Discussed the surgical course, Reviewed Intra-OP anesthesia mangement and issues during anesthesia, Set expectations for post-procedure period and Allowed opportunity for questions and acknowledgement of understanding      Vitals: (Last set prior to Anesthesia Care Transfer)    CRNA VITALS  10/16/2020 0823 - 10/16/2020 0857      10/16/2020             Resp Rate (set):  10                Electronically Signed By: David Kellerman, APRN CRNA  October 16, 2020  8:57 AM

## 2020-10-29 ENCOUNTER — MYC MEDICAL ADVICE (OUTPATIENT)
Dept: FAMILY MEDICINE | Facility: OTHER | Age: 50
End: 2020-10-29

## 2020-10-29 DIAGNOSIS — J45.40 MODERATE PERSISTENT ASTHMA WITHOUT COMPLICATION: ICD-10-CM

## 2020-11-09 ENCOUNTER — RESULTS ONLY (OUTPATIENT)
Dept: LAB | Age: 50
End: 2020-11-09

## 2020-11-09 LAB
SARS-COV-2 RNA SPEC QL NAA+PROBE: NORMAL
SPECIMEN SOURCE: NORMAL

## 2020-11-10 LAB
LABORATORY COMMENT REPORT: NORMAL
SARS-COV-2 RNA SPEC QL NAA+PROBE: NEGATIVE
SPECIMEN SOURCE: NORMAL

## 2020-12-11 DIAGNOSIS — G47.00 INSOMNIA, UNSPECIFIED TYPE: ICD-10-CM

## 2020-12-11 DIAGNOSIS — G25.81 RESTLESS LEGS SYNDROME: ICD-10-CM

## 2020-12-11 DIAGNOSIS — M54.42 ACUTE LEFT-SIDED LOW BACK PAIN WITH LEFT-SIDED SCIATICA: ICD-10-CM

## 2020-12-11 DIAGNOSIS — M48.061 SPINAL STENOSIS OF LUMBAR REGION, UNSPECIFIED WHETHER NEUROGENIC CLAUDICATION PRESENT: ICD-10-CM

## 2020-12-11 DIAGNOSIS — M47.816 FACET ARTHROPATHY, LUMBAR: ICD-10-CM

## 2020-12-14 RX ORDER — CYCLOBENZAPRINE HCL 10 MG
TABLET ORAL
Qty: 90 TABLET | Refills: 2 | Status: SHIPPED | OUTPATIENT
Start: 2020-12-14 | End: 2021-03-18

## 2020-12-14 RX ORDER — AMITRIPTYLINE HYDROCHLORIDE 10 MG/1
TABLET ORAL
Qty: 180 TABLET | Refills: 1 | Status: SHIPPED | OUTPATIENT
Start: 2020-12-14 | End: 2020-12-30

## 2020-12-14 RX ORDER — IBUPROFEN 800 MG/1
TABLET, FILM COATED ORAL
Qty: 50 TABLET | Refills: 3 | Status: SHIPPED | OUTPATIENT
Start: 2020-12-14 | End: 2021-03-18

## 2020-12-14 RX ORDER — PRAMIPEXOLE DIHYDROCHLORIDE 0.25 MG/1
TABLET ORAL
Qty: 90 TABLET | Refills: 2 | Status: SHIPPED | OUTPATIENT
Start: 2020-12-14 | End: 2021-03-18

## 2020-12-14 NOTE — TELEPHONE ENCOUNTER
Prescription refilled per RN Medication Refill Policy, except for Cyclobenzaprine which is not on RN refill protocol.  Routing to PCP to address refill at this time.................Ade Wallace RN 12/14/2020 8:26 AM

## 2020-12-15 ENCOUNTER — OFFICE VISIT (OUTPATIENT)
Dept: FAMILY MEDICINE | Facility: OTHER | Age: 50
End: 2020-12-15
Attending: FAMILY MEDICINE
Payer: COMMERCIAL

## 2020-12-15 VITALS
WEIGHT: 193.13 LBS | BODY MASS INDEX: 32.89 KG/M2 | TEMPERATURE: 96.4 F | DIASTOLIC BLOOD PRESSURE: 88 MMHG | HEART RATE: 92 BPM | OXYGEN SATURATION: 99 % | RESPIRATION RATE: 24 BRPM | SYSTOLIC BLOOD PRESSURE: 134 MMHG

## 2020-12-15 DIAGNOSIS — J45.40 MODERATE PERSISTENT ASTHMA WITHOUT COMPLICATION: ICD-10-CM

## 2020-12-15 PROCEDURE — 99213 OFFICE O/P EST LOW 20 MIN: CPT | Performed by: FAMILY MEDICINE

## 2020-12-15 RX ORDER — ALBUTEROL SULFATE 90 UG/1
2 AEROSOL, METERED RESPIRATORY (INHALATION) 4 TIMES DAILY
Qty: 3 INHALER | Refills: 3 | Status: SHIPPED | OUTPATIENT
Start: 2020-12-15 | End: 2021-11-03

## 2020-12-15 ASSESSMENT — PATIENT HEALTH QUESTIONNAIRE - PHQ9: SUM OF ALL RESPONSES TO PHQ QUESTIONS 1-9: 0

## 2020-12-15 ASSESSMENT — PAIN SCALES - GENERAL: PAINLEVEL: NO PAIN (0)

## 2020-12-15 NOTE — PROGRESS NOTES
SUBJECTIVE:   Cielo Bahena is a 50 year old female who presents to clinic today for the following health issues:    HPI  Cielo was originally coming in for stomach issues, she recently had colonoscopy evaluation with Dr. Naye Prieto that was normal.  She does states she stopped eating some sugar-free candies, and her symptoms resolved.    She does bring up her asthma concerns today.  She has been noticing slight worsening in this for the last couple months.  She is using Advair on a more regular basis, which she rarely used previously.  She states this began end of summer, around the time that the pharmacy switched her albuterol inhaler from Ventolin to a generic.  She denies waking up at night with shortness of breath or cough.  If she happens to wake up to use the bathroom, she will notice the same breathing she is noticing throughout the day.  Does not feel like she is wheezing or short of breath.  Just harder to take a full inhalation.  She is not on any antihistamines, requires these in the spring.  She does have a remote history of intubation related to her asthma, but nothing in the last 12 months requiring hospitalization or ER visit.  She has also noticed a 17 pound weight gain in the last 1 year.  Aware that this could also be contributing to her increase in symptoms.      Patient Active Problem List    Diagnosis Date Noted     Moderate persistent asthma without complication 02/16/2018     Priority: Medium     Overview:   Asthma, generally well controlled, history of respiratory arrest requiring   brief mechanical ventilation.       Insomnia 02/16/2018     Priority: Medium     Genuine stress incontinence, female 02/16/2018     Priority: Medium     Depression 02/16/2018     Priority: Medium     Anemia 02/16/2018     Priority: Medium     Overview:   mild       Family history of colon cancer 01/22/2015     Priority: Medium     RLS (restless legs syndrome) 06/20/2014     Priority: Medium     Hip pain  2014     Priority: Medium     GERD (gastroesophageal reflux disease) 10/25/2012     Priority: Medium     Breast hypertrophy 2012     Priority: Medium     Past Medical History:   Diagnosis Date     Anemia     No Comments Provided     Family history of malignant neoplasm of digestive organ     2015     Insomnia     No Comments Provided     Major depressive disorder, single episode     ,Depression.  PHQ-9 score .     Mass of breast     ,Soft breast mass lump, right breast     Pain in hip     No Comments Provided     Personal history of other medical treatment (CODE)     2011,, 1 SAB     Personal history of other specified conditions (CODE)     abnormal Pap smear prior to , subsequent yearly Pap smears have been normal     Restless legs syndrome     No Comments Provided     Segmental and somatic dysfunction of pelvic region     2013     Uncomplicated asthma     generally well controlled, history of respiratory arrest requiring brief mechanical ventilation.      Past Surgical History:   Procedure Laterality Date     COLONOSCOPY N/A 10/16/2020    normal, f/u 10 years     DILATION AND CURETTAGE      ,Dilatation and curettage for blighted ovum.     ENDOSCOPY UPPER, COLONOSCOPY, COMBINED      Normal exam, 5 year follow up due to FH     EXTRACTION(S) DENTAL      [place] tooth extraction     HYSTERECTOMY VAGINAL      3/5/2015,Dr. Lindsay for DUB; negative for malignancy; ovaries remain     MAMMOPLASTY REDUCTION      , Bilateral breast reduction, [facility]     MAMMOPLASTY REDUCTION           OTHER SURGICAL HISTORY      40218.9,NM SUBTALAR ATHROEREISIS,Left foot     Family History   Problem Relation Age of Onset     Hypertension Father      Heart Disease Father         CAD with stents     Hyperlipidemia Father      Colon Cancer Maternal Grandmother      Heart Disease Maternal Grandfather         CAD     Heart Disease Paternal Grandfather         CAD     Breast Cancer No  family hx of         Cancer-breast     Social History     Tobacco Use     Smoking status: Former Smoker     Packs/day: 0.50     Years: 25.00     Pack years: 12.50     Types: Cigarettes     Quit date: 11/24/2005     Years since quitting: 15.0     Smokeless tobacco: Never Used     Tobacco comment: on rare occasions   Substance Use Topics     Alcohol use: Yes     Alcohol/week: 0.8 standard drinks     Comment: Alcoholic Drinks/day: occassional     Social History     Social History Narrative    Single, moved back to [place] from [place], does have family in town.   Has significant other.     Works at [employer] as a CNA.   Shannan Galvan-mother  4/13/2011   Inder     Current Outpatient Medications   Medication Sig Dispense Refill     VENTOLIN  (90 Base) MCG/ACT inhaler Inhale 2 puffs into the lungs 4 times daily 3 Inhaler 3     acetaminophen-codeine (TYLENOL #3) 300-30 MG tablet TAKE 2 TABLETS BY MOUTH EVERY 4 HOURS AS NEEDED FOR SEVERE PAIN 50 tablet 5     amitriptyline (ELAVIL) 10 MG tablet TAKE 1 TO 2 TABLETS BY MOUTH AT BEDTIME 180 tablet 1     bisacodyl (DULCOLAX) 5 MG EC tablet Use as directed per colonoscopy prep. 2 tablet 0     cyclobenzaprine (FLEXERIL) 10 MG tablet TAKE 1 TABLET BY MOUTH THREE TIMES DAILY AS NEEDED FOR MUSCLE SPASMS 90 tablet 2     EPINEPHrine (EPIPEN/ADRENACLICK/OR ANY BX GENERIC EQUIV) 0.3 MG/0.3ML injection 2-pack Inject 0.3 mg into the muscle as needed Inject 0.3 mg intramuscular one time if needed for Allergic Reaction.       esomeprazole (NEXIUM) 20 MG DR capsule Take 20 mg by mouth every morning (before breakfast) Take 30-60 minutes before eating.       fluticasone-salmeterol (ADVAIR DISKUS) 250-50 MCG/DOSE inhaler Inhale 1 puff into the lungs every 12 hours Inhale 1 Puff by mouth every 12 hours. 3 Inhaler 4     ibuprofen (ADVIL/MOTRIN) 800 MG tablet TAKE 1 TABLET(800 MG) BY MOUTH EVERY 6 HOURS AS NEEDED FOR MODERATE PAIN 50 tablet 3     LORazepam (ATIVAN) 0.5 MG  tablet TAKE 1 TABLET BY MOUTH EVERY 8 HOURS AS NEEDED FOR ANXIETY 60 tablet 5     montelukast (SINGULAIR) 10 MG tablet TAKE 1 TABLET BY MOUTH AT BEDTIME 90 tablet 3     pramipexole (MIRAPEX) 0.25 MG tablet TK1 TABLET BY MOUTH AT BEDTIME 90 tablet 2     No Known Allergies    Review of Systems   Constitutional: Negative for activity change, appetite change, chills and fever.   Respiratory: Positive for chest tightness and shortness of breath (rare). Negative for apnea, cough, wheezing and stridor.    Cardiovascular: Negative for chest pain, palpitations and peripheral edema.        OBJECTIVE:     /88   Pulse 92   Temp 96.4  F (35.8  C) (Tympanic)   Resp 24   Wt 87.6 kg (193 lb 2 oz)   SpO2 99%   BMI 32.89 kg/m    Body mass index is 32.89 kg/m .  Physical Exam  Vitals signs and nursing note reviewed.   Constitutional:       Appearance: Normal appearance. She is obese.   HENT:      Head: Normocephalic and atraumatic.      Mouth/Throat:      Mouth: Mucous membranes are moist.   Eyes:      Extraocular Movements: Extraocular movements intact.      Pupils: Pupils are equal, round, and reactive to light.   Cardiovascular:      Rate and Rhythm: Normal rate and regular rhythm.      Pulses: Normal pulses.      Heart sounds: Normal heart sounds.   Pulmonary:      Effort: Pulmonary effort is normal.   Skin:     General: Skin is warm.   Neurological:      General: No focal deficit present.      Mental Status: She is alert.   Psychiatric:         Mood and Affect: Mood normal.         Behavior: Behavior normal.     Diagnostic Test Results:  No results found for any visits on 12/15/20.    ASSESSMENT/PLAN:     1. Moderate persistent asthma without complication  Change back from generic albuterol to Ventolin.  New prescription for sammy.a.w. is written today.  If this is not helpful after couple 2 to 4 weeks, she will add antihistamine daily to her regimen.  During which time if she is improving from her back surgery, she will  increase her activity and attempt efforts at weight loss.  I believe getting even 10 to 15 pounds off, she will notice improvement in her breathing; also pulmonary reserve will improve with exertional efforts.  We discussed plan if none of the above is helpful, we would plan for PFTs, possible imaging, possible dose increase of Advair.  - VENTOLIN  (90 Base) MCG/ACT inhaler; Inhale 2 puffs into the lungs 4 times daily  Dispense: 3 Inhaler; Refill: 3    Follow up prn.    Valencia Benavides, [facility]

## 2020-12-15 NOTE — LETTER
My Asthma Action Plan    Name: Cielo Bahena   YOB: 1970  Date: 12/15/2020   My doctor: Valencia Benavides, DO   My clinic: [facility]        My Control Medicine: Fluticasone propionate + salmeterol (Advair Diskus or Wixela Inhub) -  250/50 mcg one puff every 12 hours  My Rescue Medicine: Albuterol (Proair/Ventolin/Proventil HFA) 2-4 puffs EVERY 4 HOURS as needed. Use a spacer if recommended by your provider.   My Asthma Severity:   Moderate Persistent  Know your asthma triggers: Patient is unaware of triggers               GREEN ZONE   Good Control    I feel good    No cough or wheeze    Can work, sleep and play without asthma symptoms       Take your asthma control medicine every day.     1. If exercise triggers your asthma, take your rescue medication    15 minutes before exercise or sports, and    During exercise if you have asthma symptoms  2. Spacer to use with inhaler: If you have a spacer, make sure to use it with your inhaler             YELLOW ZONE Getting Worse  I have ANY of these:    I do not feel good    Cough or wheeze    Chest feels tight    Wake up at night   1. Keep taking your Green Zone medications  2. Start taking your rescue medicine:    every 20 minutes for up to 1 hour. Then every 4 hours for 24-48 hours.  3. If you stay in the Yellow Zone for more than 12-24 hours, contact your doctor.  4. If you do not return to the Green Zone in 12-24 hours or you get worse, start taking your oral steroid medicine if prescribed by your provider.           RED ZONE Medical Alert - Get Help  I have ANY of these:    I feel awful    Medicine is not helping    Breathing getting harder    Trouble walking or talking    Nose opens wide to breathe       1. Take your rescue medicine NOW  2. If your provider has prescribed an oral steroid medicine, start taking it NOW  3. Call your doctor NOW  4. If you are still in the Red Zone after 20 minutes and you have not reached your  doctor:    Take your rescue medicine again and    Call 911 or go to the emergency room right away    See your regular doctor within 2 weeks of an Emergency Room or Urgent Care visit for follow-up treatment.          Annual Reminders:  Meet with Asthma Educator,  Flu Shot in the Fall, consider Pneumonia Vaccination for patients with asthma (aged 19 and older).    Ph[id number] - [place], [place] - [address]    Electronically signed by Valencia Benavides, DO   Date: 12/15/20                      Asthma Triggers  How To Control Things That Make Your Asthma Worse    Triggers are things that make your asthma worse.  Look at the list below to help you find your triggers and what you can do about them.  You can help prevent asthma flare-ups by staying away from your triggers.      Trigger                                                          What you can do   Cigarette Smoke  Tobacco smoke can make asthma worse. Do not allow smoking in your home, car or around you.  Be sure no one smokes at a child s day care or school.  If you smoke, ask your health care provider for ways to help you quit.  Ask family members to quit too.  Ask your health care provider for a referral to Quit Plan to help you quit smoking, or call 8-091-443-PLAN.     Colds, Flu, Bronchitis  These are common triggers of asthma. Wash your hands often.  Don t touch your eyes, nose or mouth.  Get a flu shot every year.     Dust Mites  These are tiny bugs that live in cloth or carpet. They are too small to see. Wash sheets and blankets in hot water every week.   Encase pillows and mattress in dust mite proof covers.  Avoid having carpet if you can. If you have carpet, vacuum weekly.   Use a dust mask and HEPA vacuum.   Pollen and Outdoor Mold  Some people are allergic to trees, grass, or weed pollen, or molds. Try to keep your windows closed.  Limit time out doors when pollen count is high.   Ask you health  care provider about taking medicine during allergy season.     Animal Dander  Some people are allergic to skin flakes, urine or saliva from pets with fur or feathers. Keep pets with fur or feathers out of your home.    If you can t keep the pet outdoors, then keep the pet out of your bedroom.  Keep the bedroom door closed.  Keep pets off cloth furniture and away from stuffed toys.     Mice, Rats, and Cockroaches   Some people are allergic to the waste from these pests.   Cover food and garbage.  Clean up spills and food crumbs.  Store grease in the refrigerator.   Keep food out of the bedroom.   Indoor Mold  This can be a trigger if your home has high moisture. Fix leaking faucets, pipes, or other sources of water.   Clean moldy surfaces.  Dehumidify basement if it is damp and smelly.   Smoke, Strong Odors, and Sprays  These can reduce air quality. Stay away from strong odors and sprays, such as perfume, powder, hair spray, paints, smoke incense, paint, cleaning products, candles and new carpet.   Exercise or Sports  Some people with asthma have this trigger. Be active!  Ask your doctor about taking medicine before sports or exercise to prevent symptoms.    Warm up for 5-10 minutes before and after sports or exercise.     Other Triggers of Asthma  Cold air:  Cover your nose and mouth with a scarf.  Sometimes laughing or crying can be a trigger.  Some medicines and food can trigger asthma.

## 2020-12-15 NOTE — NURSING NOTE
"Chief Complaint   Patient presents with     Asthma       Initial /88   Pulse 92   Temp 96.4  F (35.8  C) (Tympanic)   Resp 24   Wt 87.6 kg (193 lb 2 oz)   SpO2 99%   BMI 32.89 kg/m   Estimated body mass index is 32.89 kg/m  as calculated from the following:    Height as of 5/20/20: 1.632 m (5' 4.25\").    Weight as of this encounter: 87.6 kg (193 lb 2 oz).  Medication Reconciliation: complete    Masha Lopez LPN  "

## 2020-12-16 ASSESSMENT — ENCOUNTER SYMPTOMS
WHEEZING: 0
APNEA: 0
PALPITATIONS: 0
FEVER: 0
CHEST TIGHTNESS: 1
SHORTNESS OF BREATH: 1
CHILLS: 0
ACTIVITY CHANGE: 0
APPETITE CHANGE: 0
COUGH: 0
STRIDOR: 0

## 2020-12-16 ASSESSMENT — ASTHMA QUESTIONNAIRES: ACT_TOTALSCORE: 12

## 2020-12-28 ENCOUNTER — HOSPITAL ENCOUNTER (OUTPATIENT)
Dept: GENERAL RADIOLOGY | Facility: OTHER | Age: 50
Discharge: HOME | End: 2020-12-28
Attending: ORTHOPAEDIC SURGERY | Admitting: FAMILY MEDICINE
Payer: COMMERCIAL

## 2020-12-28 DIAGNOSIS — Z98.1 ARTHRODESIS STATUS: ICD-10-CM

## 2020-12-28 PROCEDURE — 72100 X-RAY EXAM L-S SPINE 2/3 VWS: CPT

## 2020-12-29 ENCOUNTER — MYC MEDICAL ADVICE (OUTPATIENT)
Dept: FAMILY MEDICINE | Facility: OTHER | Age: 50
End: 2020-12-29

## 2020-12-29 DIAGNOSIS — G47.00 INSOMNIA, UNSPECIFIED TYPE: ICD-10-CM

## 2021-01-03 ENCOUNTER — HEALTH MAINTENANCE LETTER (OUTPATIENT)
Age: 51
End: 2021-01-03

## 2021-03-06 ENCOUNTER — HEALTH MAINTENANCE LETTER (OUTPATIENT)
Age: 51
End: 2021-03-06

## 2021-03-18 ENCOUNTER — MYC MEDICAL ADVICE (OUTPATIENT)
Dept: FAMILY MEDICINE | Facility: OTHER | Age: 51
End: 2021-03-18

## 2021-03-18 DIAGNOSIS — M47.816 FACET ARTHROPATHY, LUMBAR: ICD-10-CM

## 2021-03-18 DIAGNOSIS — G25.81 RESTLESS LEGS SYNDROME: ICD-10-CM

## 2021-03-18 DIAGNOSIS — G47.00 INSOMNIA, UNSPECIFIED TYPE: ICD-10-CM

## 2021-03-18 DIAGNOSIS — K21.9 GASTROESOPHAGEAL REFLUX DISEASE, UNSPECIFIED WHETHER ESOPHAGITIS PRESENT: Primary | ICD-10-CM

## 2021-03-18 DIAGNOSIS — J45.20 MILD INTERMITTENT ASTHMA WITHOUT COMPLICATION: ICD-10-CM

## 2021-03-18 DIAGNOSIS — M48.061 SPINAL STENOSIS OF LUMBAR REGION, UNSPECIFIED WHETHER NEUROGENIC CLAUDICATION PRESENT: ICD-10-CM

## 2021-03-18 DIAGNOSIS — M54.42 ACUTE LEFT-SIDED LOW BACK PAIN WITH LEFT-SIDED SCIATICA: ICD-10-CM

## 2021-03-18 DIAGNOSIS — F41.1 GAD (GENERALIZED ANXIETY DISORDER): ICD-10-CM

## 2021-03-18 RX ORDER — LORAZEPAM 0.5 MG/1
TABLET ORAL
Qty: 60 TABLET | Refills: 5 | Status: SHIPPED | OUTPATIENT
Start: 2021-03-18 | End: 2024-01-02

## 2021-03-18 RX ORDER — CYCLOBENZAPRINE HCL 10 MG
TABLET ORAL
Qty: 90 TABLET | Refills: 2 | Status: SHIPPED | OUTPATIENT
Start: 2021-03-18 | End: 2021-10-12

## 2021-03-18 RX ORDER — IBUPROFEN 800 MG/1
TABLET, FILM COATED ORAL
Qty: 50 TABLET | Refills: 3 | Status: SHIPPED | OUTPATIENT
Start: 2021-03-18 | End: 2021-11-03

## 2021-03-18 RX ORDER — PRAMIPEXOLE DIHYDROCHLORIDE 0.25 MG/1
TABLET ORAL
Qty: 90 TABLET | Refills: 2 | Status: SHIPPED | OUTPATIENT
Start: 2021-03-18 | End: 2022-01-06

## 2021-03-18 RX ORDER — MONTELUKAST SODIUM 10 MG/1
1 TABLET ORAL AT BEDTIME
Qty: 90 TABLET | Refills: 4 | Status: SHIPPED | OUTPATIENT
Start: 2021-03-18 | End: 2022-05-06

## 2021-05-27 NOTE — TELEPHONE ENCOUNTER
DOI:  Insidious onset  Initial Treatment date:  05/27/2021   Date last seen: NA  [facility]:  Unknown  Occupation:  Line cook   Referred by: Jarrod Burns PA-C      SUBJECTIVE:  The patient is a pleasant 34 year old male that presents to our office today for an evaluation and treatment of low back pain.  He states at its worst his pain is 9/10 and at its best it is 3/10 with 10 being the worst.  He states that he feels the pain came on gradually after a fall.  He states that he fell down some stairs.  He has tried physical therapy and medications with limited success.  He states nothing seems to work at this point in time.  He describes his pain as a stabbing pain across his low back.  Is made to feel worse from bending back, coughing or sneezing, bending forward, sitting, walking, and after and during exercise.  Patient's affect is angry which she describes as relating to his frustration with his pain.  Patient rates his pain today at 7/10 with 10 being worst.      Oswestry low Back Pain Scale questionnaire 40/50 or 80% on 05/27/2021  [facility] pain supplemental questions 22/40 on 05/27/2021      Date Time   May 13, 2021  4:36 PM   Reason For Exam    Lumbar radiculopathy, > 6 wks; Low back pain, progressive neurologic deficit   Associated Diagnoses    Chronic midline low back pain with left-sided sciatica       Sedation Narrator    Link to Narrator   IR Timeline    IR Event Log   PACS Images     Show images for MRI LUMBAR SPINE WO CONTRAST   Result Information    Date and Time: Exam End: 5/13/2021 16:36 Status: Final result Provider Status: Reviewed   Priority: Routine          Study Result    MRI LUMBAR SPINE WO CONTRAST     CLINICAL INDICATION:  34 years-old Male with presenting history of Lumbar  radiculopathy, > 6 wks, Low back pain, progressive neurologic deficit.     COMPARISON:  Lumbar spine radiographs 5/3/2021.     TECHNIQUE:  Using a 3.0 T imaging system, multiplanar multisequence MRI of  the  Ema KNOX sent Rx request for the following:      CYCLOBENZAPRINE 10MG TABLETS   Sig: TAKE 1/2 TO 1 TABLET(5 TO 10 MG) BY MOUTH THREE TIMES DAILY AS NEEDED FOR MUSCLE SPASMS  Last Prescription Date:   6/17/19  Last Fill Qty/Refills:         30, R-0    Last Office Visit:              6/17/19  Future Office visit:           None.  Routing refill request to provider for review/approval because:  Drug not on the [facility], [facility] or [facility] refill protocol or controlled substance    Unable to complete prescription refill per RN Medication Refill Policy. Adelaide Padilla RN .............. 9/8/2019  1:38 PM     lumbar spine was performed without contrast.       FINDINGS:  Numbering:  For spinal numbering purposes there are 5 lumbar-type vertebral bodies with  the caudal most fully formed disc space designated as L5-S1.     Vertebrae:  Vertebral body heights are preserved.  Slight straightening of the normal lumbar lordosis.  Normal bone marrow signal for age.  No destructive lesions identified.     Soft tissues:    No signal abnormalities.     Spinal Cord:    Conus medullaris terminates at L2.    Unremarkable cauda equina nerve roots.     Degenerative changes:  Intervertebral disc height and T2 signal loss is mild at L5-S1 and minimal  at L4-L5.  Small posterior annular fissure suggested at L5-S1 and minimally  at L4-L5.  Mild lower lumbar facet arthropathy.     Segmental analysis:   T12-L1:   No significant spinal canal or foraminal stenosis.       L1-L2:   No significant spinal canal or foraminal stenosis.       L2-L3:   No significant spinal canal or foraminal stenosis.       L3-L4:  Minimal symmetric disc bulge and mildly prominent dorsal epidural fat  without significant spinal canal or foraminal stenosis.     L4-L5:   Mild symmetric disc bulge with small superimposed shallow central disc  protrusion, thick ligamenta flava, facet arthropathy and minimally  prominent dorsal epidural fat without significant spinal canal stenosis.  Minimal bilateral foraminal narrowing due to disc bulge, dorsal osteophytic  ridging and facet arthropathy.     L5-S1:   Minimal symmetric disc bulge with small superimposed shallow central/left  central disc protrusion without significant spinal canal stenosis.  Mild bilateral foraminal narrowing due to disc bulge, dorsal osteophytic  ridging and facet arthropathy.        IMPRESSION: Mildly motion degraded.     1.  Mild spondylosis including small disc protrusions and posterior annular  fissures at L4-S1 as above.  No significant spinal canal stenosis.     2.  Foraminal narrowing is mild at  bilateral L5-S1 and minimal bilateral  L4-L5.       Have you had any other conditions?  Patient Active Problem List   Diagnosis   • Syncope - neurocardiogenic   • Tobacco use disorder   • Psychiatric disorder   • Intermittent asthma without complication   • Obesity (BMI 30.0-34.9)   • GERD (gastroesophageal reflux disease)   • Migraine   • Chronic diarrhea   • Erectile dysfunction   • Symptomatic PVCs   • Chronic bilateral low back pain without sciatica   • Closed fracture of coccyx (CMS/HCC)     HEALTH HISTORY:    Acid reflux                                                   Asthma                                                        Chronic back pain                                             Chronic diarrhea                                              Tattoo of skin                                                Syncope                                                         Comment: Poss. Neurocardiogenic Syncope    Migraine                                                      SOCIAL HISTORY:  Patient completed Chiropractic Patient History and Chiropractic Systems Review.  These were reviewed with the patient.    OBJECTIVE FINDINGS:   Problem focus examination revealed:    (L & P-spine) Lumbar spine facet joint function is within normal limits except for his L4-5 L5-S1  facet joints that exhibited limited passive range of motion and segmental restriction and tenderness on palpation; sacroiliac joint function is within normal limits except for his left sacroiliac joint that exhibited limited passive range of motion and joint restriction; The following muscles were examined for flexibility and tone at rest; right hip flexor, left hip flexor, right hip rotator, left hip rotator, right piriformis, left piriformis, right hamstring, left hamstring, right lumbar paraspinals, left lumbar paraspinals, right gluteus medius, left gluteus medius, right gluteus dat, left gluteus dat, right quadratus lumborum,  left quadratus lumborum, right tensor fasciae latae, left tensor fasciae latae, right internal hip rotators, left internal hip rotators, right quadriceps, left quadriceps, right iliotibial band and left iliotibial band. These muscles were found to be within normal limits except for hisleft piriformis, left hamstring, left lumbar paraspinals, left gluteus medius and left gluteus dat muscle(s) that exhibited limited flexibility and were hypertonic at rest.    Assessment:  1. Lumbar spondylosis  2. Sacroiliitis  3. Segmental dysfunction lumbar  4. Segmental dysfunction pelvic  5. Segmental dysfunction sacral       Plan:  Patient was evaluated and then treated with soft tissue manipulation to his lumbar pelvic sacral spine via diversified manipulation technique/Ramirez distraction technique to improve function and passive range of motion of facet joints. Patient also treated with contract/relax stretch to muscle noted as taut in objective findings to improve flexibility and decrease strain to spinal structures. Patient also treated with lumbar distraction x 5 minutes to stretch lumbar paraspinal muscle and centralize possible contained migration of lumbar intervertebral nucleus.    Rehabilitation/Modalities:      Goal of care is to improve muscular and skeletal function and provide symptom relief. Patient responded to the treatment today. Patient rates pain at 5/10 immediately after the treatment today. Patient is to return for continued care and treatment. Care is planned at 1-2 x /week x 6-8 weeks.     Total exam and treatment time was 30 minutes.

## 2021-09-07 ENCOUNTER — OFFICE VISIT (OUTPATIENT)
Dept: FAMILY MEDICINE | Facility: OTHER | Age: 51
End: 2021-09-07
Attending: FAMILY MEDICINE
Payer: COMMERCIAL

## 2021-09-07 VITALS
DIASTOLIC BLOOD PRESSURE: 84 MMHG | RESPIRATION RATE: 16 BRPM | TEMPERATURE: 98.3 F | WEIGHT: 197 LBS | SYSTOLIC BLOOD PRESSURE: 136 MMHG | BODY MASS INDEX: 33.55 KG/M2 | OXYGEN SATURATION: 96 % | HEART RATE: 92 BPM

## 2021-09-07 DIAGNOSIS — R63.5 ABNORMAL WEIGHT GAIN: ICD-10-CM

## 2021-09-07 DIAGNOSIS — Z12.31 VISIT FOR SCREENING MAMMOGRAM: ICD-10-CM

## 2021-09-07 DIAGNOSIS — G47.00 INSOMNIA, UNSPECIFIED TYPE: Primary | ICD-10-CM

## 2021-09-07 DIAGNOSIS — G44.219 EPISODIC TENSION-TYPE HEADACHE, NOT INTRACTABLE: ICD-10-CM

## 2021-09-07 LAB
ALBUMIN SERPL-MCNC: 4.3 G/DL (ref 3.5–5.7)
ALP SERPL-CCNC: 69 U/L (ref 34–104)
ALT SERPL W P-5'-P-CCNC: 26 U/L (ref 7–52)
ANION GAP SERPL CALCULATED.3IONS-SCNC: 8 MMOL/L (ref 3–14)
AST SERPL W P-5'-P-CCNC: 21 U/L (ref 13–39)
BILIRUB SERPL-MCNC: 0.4 MG/DL (ref 0.3–1)
BUN SERPL-MCNC: 17 MG/DL (ref 7–25)
CALCIUM SERPL-MCNC: 9.5 MG/DL (ref 8.6–10.3)
CHLORIDE BLD-SCNC: 105 MMOL/L (ref 98–107)
CHOLEST SERPL-MCNC: 240 MG/DL
CO2 SERPL-SCNC: 27 MMOL/L (ref 21–31)
CREAT SERPL-MCNC: 0.89 MG/DL (ref 0.6–1.2)
CRP SERPL-MCNC: 2.3 MG/L
ERYTHROCYTE [SEDIMENTATION RATE] IN BLOOD BY WESTERGREN METHOD: 5 MM/HR (ref 0–30)
ESTRADIOL SERPL-MCNC: <20 PG/ML
FASTING STATUS PATIENT QL REPORTED: ABNORMAL
FSH SERPL-ACNC: 125.4 PG/ML
GFR SERPL CREATININE-BSD FRML MDRD: 75 ML/MIN/1.73M2
GLUCOSE BLD-MCNC: 82 MG/DL (ref 70–105)
HBA1C MFR BLD: 5.3 % (ref 4–6.2)
HDLC SERPL-MCNC: 46 MG/DL (ref 23–92)
LDLC SERPL CALC-MCNC: 154 MG/DL
NONHDLC SERPL-MCNC: 194 MG/DL
POTASSIUM BLD-SCNC: 4.1 MMOL/L (ref 3.5–5.1)
PROT SERPL-MCNC: 7.3 G/DL (ref 6.4–8.9)
SODIUM SERPL-SCNC: 140 MMOL/L (ref 134–144)
T4 FREE SERPL-MCNC: 0.75 NG/DL (ref 0.6–1.6)
TRIGL SERPL-MCNC: 198 MG/DL
TSH SERPL DL<=0.005 MIU/L-ACNC: 0.84 MU/L (ref 0.4–4)

## 2021-09-07 PROCEDURE — 80053 COMPREHEN METABOLIC PANEL: CPT | Mod: ZL | Performed by: FAMILY MEDICINE

## 2021-09-07 PROCEDURE — 80061 LIPID PANEL: CPT | Mod: ZL | Performed by: FAMILY MEDICINE

## 2021-09-07 PROCEDURE — 85652 RBC SED RATE AUTOMATED: CPT | Mod: ZL | Performed by: FAMILY MEDICINE

## 2021-09-07 PROCEDURE — 83001 ASSAY OF GONADOTROPIN (FSH): CPT | Mod: ZL | Performed by: FAMILY MEDICINE

## 2021-09-07 PROCEDURE — 84439 ASSAY OF FREE THYROXINE: CPT | Mod: ZL | Performed by: FAMILY MEDICINE

## 2021-09-07 PROCEDURE — 84443 ASSAY THYROID STIM HORMONE: CPT | Mod: ZL | Performed by: FAMILY MEDICINE

## 2021-09-07 PROCEDURE — 99213 OFFICE O/P EST LOW 20 MIN: CPT | Performed by: FAMILY MEDICINE

## 2021-09-07 PROCEDURE — 82670 ASSAY OF TOTAL ESTRADIOL: CPT | Mod: ZL | Performed by: FAMILY MEDICINE

## 2021-09-07 PROCEDURE — 36415 COLL VENOUS BLD VENIPUNCTURE: CPT | Mod: ZL | Performed by: FAMILY MEDICINE

## 2021-09-07 PROCEDURE — 86140 C-REACTIVE PROTEIN: CPT | Mod: ZL | Performed by: FAMILY MEDICINE

## 2021-09-07 PROCEDURE — 83036 HEMOGLOBIN GLYCOSYLATED A1C: CPT | Mod: ZL | Performed by: FAMILY MEDICINE

## 2021-09-07 RX ORDER — TRAZODONE HYDROCHLORIDE 50 MG/1
50 TABLET, FILM COATED ORAL AT BEDTIME
Qty: 90 TABLET | Refills: 1 | Status: SHIPPED | OUTPATIENT
Start: 2021-09-07 | End: 2022-03-01

## 2021-09-07 ASSESSMENT — ANXIETY QUESTIONNAIRES
7. FEELING AFRAID AS IF SOMETHING AWFUL MIGHT HAPPEN: NOT AT ALL
3. WORRYING TOO MUCH ABOUT DIFFERENT THINGS: SEVERAL DAYS
5. BEING SO RESTLESS THAT IT IS HARD TO SIT STILL: NOT AT ALL
6. BECOMING EASILY ANNOYED OR IRRITABLE: SEVERAL DAYS
8. IF YOU CHECKED OFF ANY PROBLEMS, HOW DIFFICULT HAVE THESE MADE IT FOR YOU TO DO YOUR WORK, TAKE CARE OF THINGS AT HOME, OR GET ALONG WITH OTHER PEOPLE?: NOT DIFFICULT AT ALL
4. TROUBLE RELAXING: NOT AT ALL
GAD7 TOTAL SCORE: 4
1. FEELING NERVOUS, ANXIOUS, OR ON EDGE: SEVERAL DAYS
GAD7 TOTAL SCORE: 4
7. FEELING AFRAID AS IF SOMETHING AWFUL MIGHT HAPPEN: NOT AT ALL
2. NOT BEING ABLE TO STOP OR CONTROL WORRYING: SEVERAL DAYS
GAD7 TOTAL SCORE: 4

## 2021-09-07 ASSESSMENT — PAIN SCALES - GENERAL: PAINLEVEL: NO PAIN (0)

## 2021-09-07 ASSESSMENT — PATIENT HEALTH QUESTIONNAIRE - PHQ9
SUM OF ALL RESPONSES TO PHQ QUESTIONS 1-9: 8
10. IF YOU CHECKED OFF ANY PROBLEMS, HOW DIFFICULT HAVE THESE PROBLEMS MADE IT FOR YOU TO DO YOUR WORK, TAKE CARE OF THINGS AT HOME, OR GET ALONG WITH OTHER PEOPLE: NOT DIFFICULT AT ALL
SUM OF ALL RESPONSES TO PHQ QUESTIONS 1-9: 8

## 2021-09-07 NOTE — PROGRESS NOTES
SUBJECTIVE:   Cielo Bahena is a 50 year old female who presents to clinic today for the following health issues:    LIMA Buck is here for concerns of:  --headaches.  Generalized.  Lasting all day.  Can wake up with them, but does not wake her from sleep.  Has gained weight.  Tried eating better but didn't help.  --trouble sleeping. OTC not helpful.  --difficulty losing weight, and actually gaining weight. Was eating yogurt, fruit/salads, having healthy snacks in the day.  Wondering if some of it is menopausal related.     Currently on amitriptyline and has been for years for mood/sleep.  No other new meds/changes in doses/etc.    Patient Active Problem List    Diagnosis Date Noted     Moderate persistent asthma without complication 2018     Priority: Medium     Overview:   Asthma, generally well controlled, history of respiratory arrest requiring   brief mechanical ventilation.       Insomnia 2018     Priority: Medium     Genuine stress incontinence, female 2018     Priority: Medium     Depression 2018     Priority: Medium     Anemia 2018     Priority: Medium     Overview:   mild       Family history of colon cancer 2015     Priority: Medium     RLS (restless legs syndrome) 2014     Priority: Medium     Hip pain 2014     Priority: Medium     GERD (gastroesophageal reflux disease) 10/25/2012     Priority: Medium     Breast hypertrophy 2012     Priority: Medium     Past Medical History:   Diagnosis Date     Anemia     No Comments Provided     Family history of malignant neoplasm of digestive organ     2015     Insomnia     No Comments Provided     Major depressive disorder, single episode     2006,Depression.  PHQ-9 score 7/1.     Mass of breast     ,Soft breast mass lump, right breast     Pain in hip     No Comments Provided     Personal history of other medical treatment (CODE)     2011,, 1 SAB     Personal history of other specified  conditions (CODE)     abnormal Pap smear prior to 1997, subsequent yearly Pap smears have been normal     Restless legs syndrome     No Comments Provided     Segmental and somatic dysfunction of pelvic region     7/30/2013     Uncomplicated asthma     generally well controlled, history of respiratory arrest requiring brief mechanical ventilation.      Past Surgical History:   Procedure Laterality Date     COLONOSCOPY N/A 10/16/2020    normal, f/u 10 years     DILATION AND CURETTAGE      2006,Dilatation and curettage for blighted ovum.     ENDOSCOPY UPPER, COLONOSCOPY, COMBINED      Normal exam, 5 year follow up due to FH     EXTRACTION(S) DENTAL      [place] tooth extraction     HYSTERECTOMY VAGINAL      3/5/2015,Dr. iLndsay for DUB; negative for malignancy; ovaries remain     MAMMOPLASTY REDUCTION      2012, Bilateral breast reduction, [facility]     MAMMOPLASTY REDUCTION      2004     OTHER SURGICAL HISTORY      85034.9,VA SUBTALAR ATHROEREISIS,Left foot     Family History   Problem Relation Age of Onset     Hypertension Father      Heart Disease Father         CAD with stents     Hyperlipidemia Father      Colon Cancer Maternal Grandmother      Heart Disease Maternal Grandfather         CAD     Heart Disease Paternal Grandfather         CAD     Breast Cancer No family hx of         Cancer-breast     Social History     Tobacco Use     Smoking status: Former Smoker     Packs/day: 0.50     Years: 25.00     Pack years: 12.50     Types: Cigarettes     Quit date: 11/24/2005     Years since quitting: 15.8     Smokeless tobacco: Never Used     Tobacco comment: on rare occasions   Substance Use Topics     Alcohol use: Yes     Alcohol/week: 2.0 standard drinks     Types: 2 Cans of beer per week     Comment: Alcoholic Drinks/day: occassional     Social History     Social History Narrative    Single, moved back to [place] from [place], does have family in town.   Has significant other.     Works at [employer] as a  [employer].   Shannan Galvan-mother  4/13/2011   Inder     Current Outpatient Medications   Medication Sig Dispense Refill     traZODone (DESYREL) 50 MG tablet Take 1 tablet (50 mg) by mouth At Bedtime 90 tablet 1     acetaminophen-codeine (TYLENOL #3) 300-30 MG tablet Take 2 tablets by mouth every 4 hours as needed for severe pain 50 tablet 5     cyclobenzaprine (FLEXERIL) 10 MG tablet TAKE 1 TABLET BY MOUTH THREE TIMES DAILY AS NEEDED FOR MUSCLE SPASMS 90 tablet 2     EPINEPHrine (EPIPEN/ADRENACLICK/OR ANY BX GENERIC EQUIV) 0.3 MG/0.3ML injection 2-pack Inject 0.3 mg into the muscle as needed Inject 0.3 mg intramuscular one time if needed for Allergic Reaction.       esomeprazole (NEXIUM) 20 MG DR capsule Take 1 capsule (20 mg) by mouth every morning (before breakfast) Take 30-60 minutes before eating. 90 capsule 4     fluticasone-salmeterol (ADVAIR DISKUS) 250-50 MCG/DOSE inhaler Inhale 1 puff into the lungs every 12 hours Inhale 1 Puff by mouth every 12 hours. 3 each 4     ibuprofen (ADVIL/MOTRIN) 800 MG tablet TAKE 1 TABLET(800 MG) BY MOUTH EVERY 6 HOURS AS NEEDED FOR MODERATE PAIN 50 tablet 3     LORazepam (ATIVAN) 0.5 MG tablet TAKE 1 TABLET BY MOUTH EVERY 8 HOURS AS NEEDED FOR ANXIETY 60 tablet 5     montelukast (SINGULAIR) 10 MG tablet Take 1 tablet (10 mg) by mouth At Bedtime 90 tablet 4     pramipexole (MIRAPEX) 0.25 MG tablet TK1 TABLET BY MOUTH AT BEDTIME 90 tablet 2     VENTOLIN  (90 Base) MCG/ACT inhaler Inhale 2 puffs into the lungs 4 times daily 3 Inhaler 3     No Known Allergies    OBJECTIVE:     /84   Pulse 92   Temp 98.3  F (36.8  C) (Tympanic)   Resp 16   Wt 89.4 kg (197 lb)   SpO2 96%   BMI 33.55 kg/m    Body mass index is 33.55 kg/m .  Physical Exam  Vitals and nursing note reviewed.   Constitutional:       Appearance: Normal appearance. She is obese.   HENT:      Head: Normocephalic and atraumatic.   Cardiovascular:      Rate and Rhythm: Normal rate and regular rhythm.       Pulses: Normal pulses.   Pulmonary:      Effort: Pulmonary effort is normal.   Skin:     Capillary Refill: Capillary refill takes less than 2 seconds.   Neurological:      General: No focal deficit present.      Mental Status: She is alert.   Psychiatric:         Mood and Affect: Mood normal.         Behavior: Behavior normal.         Diagnostic Test Results:  Results for orders placed or performed in visit on 09/07/21   Hemoglobin A1c     Status: Normal   Result Value Ref Range    Hemoglobin A1C 5.3 4.0 - 6.2 %   Lipid Panel     Status: Abnormal   Result Value Ref Range    Cholesterol 240 (H) <200 mg/dL    Triglycerides 198 (H) <150 mg/dL    Direct Measure HDL 46 23 - 92 mg/dL    LDL Cholesterol Calculated 154 (H) <=100 mg/dL    Non HDL Cholesterol 194 (H) <130 mg/dL    Patient Fasting > 8hrs? Unknown    Comprehensive Metabolic Panel     Status: Normal   Result Value Ref Range    Sodium 140 134 - 144 mmol/L    Potassium 4.1 3.5 - 5.1 mmol/L    Chloride 105 98 - 107 mmol/L    Carbon Dioxide (CO2) 27 21 - 31 mmol/L    Anion Gap 8 3 - 14 mmol/L    Urea Nitrogen 17 7 - 25 mg/dL    Creatinine 0.89 0.60 - 1.20 mg/dL    Calcium 9.5 8.6 - 10.3 mg/dL    Glucose 82 70 - 105 mg/dL    Alkaline Phosphatase 69 34 - 104 U/L    AST 21 13 - 39 U/L    ALT 26 7 - 52 U/L    Protein Total 7.3 6.4 - 8.9 g/dL    Albumin 4.3 3.5 - 5.7 g/dL    Bilirubin Total 0.4 0.3 - 1.0 mg/dL    GFR Estimate 75 >60 mL/min/1.73m2   CRP inflammation     Status: Normal   Result Value Ref Range    CRP Inflammation 2.3 <10.0 mg/L   Sedimentation Rate (ESR)     Status: Normal   Result Value Ref Range    Erythrocyte Sedimentation Rate 5 0 - 30 mm/hr   FSH     Status: None   Result Value Ref Range    .4 pg/mL   Estradiol     Status: None   Result Value Ref Range    Estradiol <20 pg/mL   T4, Free     Status: Normal   Result Value Ref Range    Free T4 0.75 0.60 - 1.60 ng/dL   TSH     Status: Normal   Result Value Ref Range    TSH 0.84 0.40 - 4.00 mU/L        ASSESSMENT/PLAN:     1. Insomnia, unspecified type  Will stop amitriptyline, although this can be helpful for her headache symptoms - I believe it is not being particularly helpful and can contribute to difficulty losing weight.   Start trazodone and maximize sleep.  Monitor for any mood changes/worsening and may need 2/2 ssri or other.  Labs collected as below.  - traZODone (DESYREL) 50 MG tablet; Take 1 tablet (50 mg) by mouth At Bedtime  Dispense: 90 tablet; Refill: 1  - TSH Reflex GH; Future  - T4, Free; Future  - Estradiol; Future  - FSH; Future  - Sedimentation Rate (ESR); Future  - CRP inflammation; Future  - Comprehensive Metabolic Panel; Future  - Lipid Panel; Future  - Hemoglobin A1c; Future  - Hemoglobin A1c  - Lipid Panel  - Comprehensive Metabolic Panel  - CRP inflammation  - Sedimentation Rate (ESR)  - FSH  - Estradiol  - T4, Free  - TSH    2. Abnormal weight gain  Goal of losing weight discussed: food log x3d; reduction of carbs gradually.  Consider nutrition referral.   Goal set of 10-12# weight loss in 12 weeks; discussed okay if she lost up to ~24# as a healthy pace.  Suspect menopause as hormonal trigger for many of her symptoms today.  - TSH Reflex GH; Future  - T4, Free; Future  - Estradiol; Future  - FSH; Future  - Sedimentation Rate (ESR); Future  - CRP inflammation; Future  - Comprehensive Metabolic Panel; Future  - Lipid Panel; Future  - Hemoglobin A1c; Future  - Hemoglobin A1c  - Lipid Panel  - Comprehensive Metabolic Panel  - CRP inflammation  - Sedimentation Rate (ESR)  - FSH  - Estradiol  - T4, Free  - TSH    3. Visit for screening mammogram  ordered  - MA Screen Bilateral w/Garry; Future    4. Episodic tension-type headache, not intractable  Will monitor for improvement as above with improving sleep; but consideration for - triptan or other treatment prn.  Follow up in 3 months.    Valencia Benavides,   [facility]      Answers for HPI/ROS submitted by the  patient on 9/7/2021  If you checked off any problems, how difficult have these problems made it for you to do your work, take care of things at home, or get along with other people?: Not difficult at all  PHQ9 TOTAL SCORE: 8  ULICES 7 TOTAL SCORE: 4

## 2021-09-07 NOTE — NURSING NOTE
"Chief Complaint   Patient presents with     Menopausal Sx       Initial /84   Pulse 92   Temp 98.3  F (36.8  C) (Tympanic)   Resp 16   Wt 89.4 kg (197 lb)   SpO2 96%   BMI 33.55 kg/m   Estimated body mass index is 33.55 kg/m  as calculated from the following:    Height as of 5/20/20: 1.632 m (5' 4.25\").    Weight as of this encounter: 89.4 kg (197 lb).  Medication Reconciliation: complete    Masha Lopez LPN     FOOD SECURITY SCREENING QUESTIONS  Hunger Vital Signs:  Within the past 12 months we worried whether our food would run out before we got money to buy more. Never  Within the past 12 months the food we bought just didn't last and we didn't have money to get more. Never  Masha Lopez LPN 9/7/2021 4:17 PM    "

## 2021-09-08 ASSESSMENT — PATIENT HEALTH QUESTIONNAIRE - PHQ9: SUM OF ALL RESPONSES TO PHQ QUESTIONS 1-9: 8

## 2021-09-08 ASSESSMENT — ASTHMA QUESTIONNAIRES: ACT_TOTALSCORE: 20

## 2021-09-08 ASSESSMENT — ANXIETY QUESTIONNAIRES: GAD7 TOTAL SCORE: 4

## 2021-09-09 ENCOUNTER — MYC MEDICAL ADVICE (OUTPATIENT)
Dept: SCHEDULING | Facility: OTHER | Age: 51
End: 2021-09-09

## 2021-09-09 NOTE — PATIENT INSTRUCTIONS
Keep a food diary for 3 days.  --monitor amount of carbohydrates each day to calculate an average carbohydrate intake.  --attempt reduction of this by 20-30g/carb per day until you reach a goal of 40-60g/carb per day total.    Work on getting physical activity.  Goal is 150 minutes/week to an intensity of a sweat or having to speak in short phrases of words as opposed to a full sentence.  --start a couple days a week; and increase as tolerated.  --short term goals, rewards and accountability are important!

## 2021-09-22 ENCOUNTER — HOSPITAL ENCOUNTER (OUTPATIENT)
Dept: GENERAL RADIOLOGY | Facility: OTHER | Age: 51
End: 2021-09-22
Attending: NURSE PRACTITIONER
Payer: COMMERCIAL

## 2021-09-22 ENCOUNTER — OFFICE VISIT (OUTPATIENT)
Dept: FAMILY MEDICINE | Facility: OTHER | Age: 51
End: 2021-09-22
Attending: NURSE PRACTITIONER
Payer: COMMERCIAL

## 2021-09-22 VITALS
SYSTOLIC BLOOD PRESSURE: 138 MMHG | BODY MASS INDEX: 32.61 KG/M2 | OXYGEN SATURATION: 95 % | WEIGHT: 191 LBS | HEIGHT: 64 IN | RESPIRATION RATE: 18 BRPM | TEMPERATURE: 98.5 F | HEART RATE: 83 BPM | DIASTOLIC BLOOD PRESSURE: 76 MMHG

## 2021-09-22 DIAGNOSIS — S99.912A ANKLE INJURY, LEFT, INITIAL ENCOUNTER: Primary | ICD-10-CM

## 2021-09-22 PROCEDURE — 99213 OFFICE O/P EST LOW 20 MIN: CPT | Performed by: NURSE PRACTITIONER

## 2021-09-22 PROCEDURE — 73610 X-RAY EXAM OF ANKLE: CPT | Mod: LT

## 2021-09-22 ASSESSMENT — PAIN SCALES - GENERAL: PAINLEVEL: MILD PAIN (3)

## 2021-09-22 ASSESSMENT — MIFFLIN-ST. JEOR: SCORE: 1466.37

## 2021-09-22 NOTE — NURSING NOTE
"Chief Complaint   Patient presents with     Trauma     Patient presented to the clinic with left ankle injury that occurred yesterday evening when she was going down the steps she lost her footing ans twisted her ankle. She did not hear any sort of popping noise at that time but it currently swollen and painful to the touch.    Initial /76 (BP Location: Right arm, Patient Position: Sitting, Cuff Size: Adult Large)   Pulse 83   Temp 98.5  F (36.9  C) (Tympanic)   Resp 18   Ht 1.626 m (5' 4\")   Wt 86.6 kg (191 lb)   SpO2 95%   Breastfeeding No   BMI 32.79 kg/m   Estimated body mass index is 32.79 kg/m  as calculated from the following:    Height as of this encounter: 1.626 m (5' 4\").    Weight as of this encounter: 86.6 kg (191 lb).     FOOD SECURITY SCREENING QUESTIONS  Hunger Vital Signs:  Within the past 12 months we worried whether our food would run out before we got money to buy more. Never  Within the past 12 months the food we bought just didn't last and we didn't have money to get more. Never      Medication Reconciliation: Complete      Jana Velez LPN   "

## 2021-09-22 NOTE — PROGRESS NOTES
ASSESSMENT/PLAN:  1. Ankle injury, left, initial encounter    - XR Ankle Left G/E 3 Views      Xray films and radiology report were reviewed.     Discussed xray results with the patient and informed her that she does not appear to have an acute fracture seen on xray.     Discussed with patient that based on her symptoms, physical exam findings and negative xray results her symptoms are most likely due to an ankle sprain. The patient was instructed to rest, apply a cool compress and elevate the left ankle. The patient's ankle was wrapped with an ACE bandage.     May use over-the-counter Tylenol or ibuprofen PRN    Discussed warning signs/symptoms indicative of need to f/u    Follow up if symptoms persist or worsen or concerns      I explained my diagnostic considerations and recommendations to the patient, who voiced understanding and agreement with the treatment plan. All questions were answered. We discussed potential side effects of any prescribed or recommended therapies, as well as expectations for response to treatments.        HPI:    Cielo Bahena is a 50 year old female  who presents to [facility] today for left ankle injury. States that the injury occurred yesterday evening when she was going down stairs and lost her footing and twisted her left ankle. The patient reports having a previous surgery and has hardware to her lateral surface of the left foot, this was completed in 2008. Denies any pain to her left foot. Rating pain at rest 2-3/10 and when ambulating 4-5/10. Reports having increased pain caused by palpation and ROM of the ankle. Reports taking Ibuprofen and tylenol for pain. Denies applying a cool compress.     Past Medical History:   Diagnosis Date     Anemia     No Comments Provided     Family history of malignant neoplasm of digestive organ     1/22/2015     Insomnia     No Comments Provided     Major depressive disorder, single episode     2006,Depression.  PHQ-9 score 7/1.     Mass of  breast     2009,Soft breast mass lump, right breast     Pain in hip     No Comments Provided     Personal history of other medical treatment (CODE)     2011,, 1 SAB     Personal history of other specified conditions (CODE)     abnormal Pap smear prior to , subsequent yearly Pap smears have been normal     Restless legs syndrome     No Comments Provided     Segmental and somatic dysfunction of pelvic region     2013     Uncomplicated asthma     generally well controlled, history of respiratory arrest requiring brief mechanical ventilation.     Past Surgical History:   Procedure Laterality Date     COLONOSCOPY N/A 10/16/2020    normal, f/u 10 years     DILATION AND CURETTAGE      ,Dilatation and curettage for blighted ovum.     ENDOSCOPY UPPER, COLONOSCOPY, COMBINED      Normal exam, 5 year follow up due to FH     EXTRACTION(S) DENTAL      [place] tooth extraction     HYSTERECTOMY VAGINAL      3/5/2015,Dr. Lindsay for DUB; negative for malignancy; ovaries remain     MAMMOPLASTY REDUCTION      , Bilateral breast reduction, [facility]     MAMMOPLASTY REDUCTION           OTHER SURGICAL HISTORY      61101.9,[place] SUBTALAR ATHROEREISIS,Left foot     Social History     Tobacco Use     Smoking status: Former Smoker     Packs/day: 0.50     Years: 25.00     Pack years: 12.50     Types: Cigarettes     Quit date: 2005     Years since quitting: 15.8     Smokeless tobacco: Never Used     Tobacco comment: on rare occasions   Substance Use Topics     Alcohol use: Yes     Alcohol/week: 2.0 standard drinks     Types: 2 Cans of beer per week     Comment: Alcoholic Drinks/day: occassional     Current Outpatient Medications   Medication Sig Dispense Refill     acetaminophen-codeine (TYLENOL #3) 300-30 MG tablet Take 2 tablets by mouth every 4 hours as needed for severe pain 50 tablet 5     cyclobenzaprine (FLEXERIL) 10 MG tablet TAKE 1 TABLET BY MOUTH THREE TIMES DAILY AS NEEDED FOR MUSCLE SPASMS 90  "tablet 2     EPINEPHrine (EPIPEN/ADRENACLICK/OR ANY BX GENERIC EQUIV) 0.3 MG/0.3ML injection 2-pack Inject 0.3 mg into the muscle as needed Inject 0.3 mg intramuscular one time if needed for Allergic Reaction.       esomeprazole (NEXIUM) 20 MG DR capsule Take 1 capsule (20 mg) by mouth every morning (before breakfast) Take 30-60 minutes before eating. 90 capsule 4     fluticasone-salmeterol (ADVAIR DISKUS) 250-50 MCG/DOSE inhaler Inhale 1 puff into the lungs every 12 hours Inhale 1 Puff by mouth every 12 hours. 3 each 4     ibuprofen (ADVIL/MOTRIN) 800 MG tablet TAKE 1 TABLET(800 MG) BY MOUTH EVERY 6 HOURS AS NEEDED FOR MODERATE PAIN 50 tablet 3     LORazepam (ATIVAN) 0.5 MG tablet TAKE 1 TABLET BY MOUTH EVERY 8 HOURS AS NEEDED FOR ANXIETY 60 tablet 5     montelukast (SINGULAIR) 10 MG tablet Take 1 tablet (10 mg) by mouth At Bedtime 90 tablet 4     pramipexole (MIRAPEX) 0.25 MG tablet TK1 TABLET BY MOUTH AT BEDTIME 90 tablet 2     traZODone (DESYREL) 50 MG tablet Take 1 tablet (50 mg) by mouth At Bedtime 90 tablet 1     VENTOLIN  (90 Base) MCG/ACT inhaler Inhale 2 puffs into the lungs 4 times daily 3 Inhaler 3     No Known Allergies      Past medical history, past surgical history, current medications and allergies reviewed and accurate to the best of my knowledge.        ROS:  Refer to HPI    /76 (BP Location: Right arm, Patient Position: Sitting, Cuff Size: Adult Large)   Pulse 83   Temp 98.5  F (36.9  C) (Tympanic)   Resp 18   Ht 1.626 m (5' 4\")   Wt 86.6 kg (191 lb)   SpO2 95%   Breastfeeding No   BMI 32.79 kg/m      EXAM:  General Appearance: Well appearing female, appropriate appearance for age. No acute distress    Dermatological: No ecchymosis or erythema noted.   Psychological: normal affect, alert, oriented, and pleasant.      LEFT ANKLE PHYSICAL EXAMINATION:  Inspection: Edema over the lateral aspect of the left malleolus noted. No bony deformity or skin abnormalities " noted    Palpation: Tenderness to palpation over lateral malleolus.     ROM: plantarflexion increased pain, dorsiflexion full, inversion full, eversion full.     Neurovascular Exam:   Pulses: Dorsalis pedis and Posterior tibial pulse 2+   Capillary refill intact < 3 seconds   Sensation intact, patient able to wiggle/move all toes        Xray:  Results for orders placed or performed in visit on 09/22/21   XR Ankle Left G/E 3 Views     Status: None    Narrative    Exam: XR ANKLE LEFT G/E 3 VIEWS    Technique: Left ankle, 3 Views    Comparison: None.    Exam reason: Patient fell going down stairs and twisted left ankle  yesterday.; Ankle injury, left, initial encounter    Findings:  No acute fracture or dislocation. Joint spaces are well maintained.  The ankle mortise appears intact. No plantar calcaneal spur. Plate and  screw fixation of the fifth metatarsal is partially visualized.    There is mild soft tissue swelling about the ankle.      Impression    Impression:  No acute fracture or dislocation.    RODRIGUEZ PERRY MD         SYSTEM ID:  [id number]

## 2021-09-22 NOTE — PATIENT INSTRUCTIONS
Rest, apply a cool compress and elevate the left ankle.     Patient Education     Self-Care for Strains and Sprains  Most minor strains and sprains can be treated with self-care. Recovering from a strain or sprain may take 6 to 8 weeks. Your self-care goal is to reduce pain and immobilize the injury to speed healing.  Support the injured area  Wrapping the injured area provides support for short, necessary activities. Be careful not to wrap the area too tightly. This could cut off the blood supply.    Support a wrist, elbow, or shoulder with a sling.    Wrap an ankle or knee with an elastic bandage.    Tape a finger or toe to the one next to it.  Use cold and heat  Cold reduces swelling. Both cold and heat reduce pain. Heat should not be used in the initial treatment of the injury. When using cold or heat, always place a thin towel between the pack and your skin.    Apply ice or a cold pack 10 to 15 minutes every hour you re awake for the first 2 days.    After the swelling goes down, use cold or heat to control pain. Don t use heat late in the day, since it can cause swelling when you re not active.  Rest and elevate  Rest and elevation help your injury heal faster.    Raise the injured area above your heart level.    Keep the injured area from moving.    Limit the use of the joint or limb.  Use medicine    Aspirin reduces pain and swelling. (Note: Don t give aspirin to a child 18 or younger unless prescribed by the doctor.)    Non-steroidal anti-inflammatory medicines, such as ibuprofen, may reduce pain and swelling, as well. Ask your healthcare provider for advice.    When to call your healthcare provider  Call your healthcare provider if:    The injured joint won t move, or bones make a grating sound when they move    You can t put weight on the injured area, even after 24 hours    The injured body part is cold, blue, tingling, or numb    The joint or limb appears bent or crooked.    Pain increases or doesn t  improve in 4 days    When pressing along the injured area, you notice a spot that is especially painful  Krista last reviewed this educational content on 5/1/2018 2000-2021 [employer]. All rights reserved. This information is not intended as a substitute for professional medical care. Always follow your healthcare professional's instructions.

## 2021-10-04 ENCOUNTER — OFFICE VISIT (OUTPATIENT)
Dept: INTERNAL MEDICINE | Facility: OTHER | Age: 51
End: 2021-10-04
Attending: STUDENT IN AN ORGANIZED HEALTH CARE EDUCATION/TRAINING PROGRAM
Payer: COMMERCIAL

## 2021-10-04 VITALS
SYSTOLIC BLOOD PRESSURE: 138 MMHG | OXYGEN SATURATION: 98 % | TEMPERATURE: 98.5 F | BODY MASS INDEX: 33.2 KG/M2 | DIASTOLIC BLOOD PRESSURE: 78 MMHG | HEART RATE: 86 BPM | RESPIRATION RATE: 16 BRPM | WEIGHT: 193.4 LBS

## 2021-10-04 DIAGNOSIS — T23.201A PARTIAL THICKNESS BURN OF RIGHT HAND, UNSPECIFIED SITE OF HAND, INITIAL ENCOUNTER: Primary | ICD-10-CM

## 2021-10-04 PROCEDURE — 99213 OFFICE O/P EST LOW 20 MIN: CPT | Performed by: STUDENT IN AN ORGANIZED HEALTH CARE EDUCATION/TRAINING PROGRAM

## 2021-10-04 PROCEDURE — 250N000009 HC RX 250: Performed by: STUDENT IN AN ORGANIZED HEALTH CARE EDUCATION/TRAINING PROGRAM

## 2021-10-04 RX ORDER — SILVER SULFADIAZINE 10 MG/G
CREAM TOPICAL DAILY
Status: ACTIVE | OUTPATIENT
Start: 2021-10-04

## 2021-10-04 RX ORDER — SILVER SULFADIAZINE 10 MG/G
CREAM TOPICAL DAILY
Qty: 400 G | Refills: 3 | Status: SHIPPED | OUTPATIENT
Start: 2021-10-04 | End: 2023-02-10

## 2021-10-04 RX ADMIN — SILVER SULFADIAZINE: 10 CREAM TOPICAL at 16:30

## 2021-10-04 ASSESSMENT — PATIENT HEALTH QUESTIONNAIRE - PHQ9
10. IF YOU CHECKED OFF ANY PROBLEMS, HOW DIFFICULT HAVE THESE PROBLEMS MADE IT FOR YOU TO DO YOUR WORK, TAKE CARE OF THINGS AT HOME, OR GET ALONG WITH OTHER PEOPLE: NOT DIFFICULT AT ALL
SUM OF ALL RESPONSES TO PHQ QUESTIONS 1-9: 3
SUM OF ALL RESPONSES TO PHQ QUESTIONS 1-9: 3

## 2021-10-04 ASSESSMENT — PAIN SCALES - GENERAL: PAINLEVEL: NO PAIN (0)

## 2021-10-04 NOTE — PROGRESS NOTES
Medication Reconciliation: complete   Advance Care Directive Reviewed    FOOD SECURITY SCREENING QUESTIONS  Hunger Vital Signs:  Within the past 12 months we worried whether our food would run out before we got money to buy more. Never  Within the past 12 months the food we bought just didn't last and we didn't have money to get more. Never     Patient presents with burn to right hand. Patient was using hot glue gun on Saturday.  Fern Obando LPN .............10/4/2021  3:58 PM      Answers for HPI/ROS submitted by the patient on 10/4/2021  If you checked off any problems, how difficult have these problems made it for you to do your work, take care of things at home, or get along with other people?: Not difficult at all  PHQ9 TOTAL SCORE: 3

## 2021-10-04 NOTE — PROGRESS NOTES
"Ms. Bahena is a 50 year old female who presents to the clinic for hand burn    HPI  Patient was putting a hot glue gun away after use and it fell down and she caught it with her right hand.  She burned the base of her first finger towards her thumb on her right hand.  It immediately became wrinkled and blistered.  She has been trying to drain some of the fluid out.  She had significant pain 2 days ago when it happened but this is improved with ibuprofen and Tylenol.  She is not having that bad of pain other than some near the edge currently.  She does not have any fever or chills.  She has a small ring of erythema around it and she is worried about infection.  We discussed that this is likely due to a first-degree burn and ongoing tissue repair and that there is no evidence of infection at this time.    Review of Systems     Reviewed and updated as needed this visit by Provider                  EXAM:   Vitals:    10/04/21 1554   BP: 138/78   BP Location: Left arm   Patient Position: Sitting   Cuff Size: Adult Regular   Pulse: 86   Resp: 16   Temp: 98.5  F (36.9  C)   TempSrc: Tympanic   SpO2: 98%   Weight: 87.7 kg (193 lb 6.4 oz)         BP Readings from Last 3 Encounters:   10/04/21 138/78   09/22/21 138/76   09/07/21 136/84      Wt Readings from Last 3 Encounters:   10/04/21 87.7 kg (193 lb 6.4 oz)   09/22/21 86.6 kg (191 lb)   09/07/21 89.4 kg (197 lb)      Estimated body mass index is 33.2 kg/m  as calculated from the following:    Height as of 9/22/21: 1.626 m (5' 4\").    Weight as of this encounter: 87.7 kg (193 lb 6.4 oz).     Physical Exam             INVESTIGATIONS:  - Labs reviewed in [facility]     ASSESSMENT AND PLAN:    ICD-10-CM    1. Partial thickness burn of right hand, unspecified site of hand, initial encounter  T23.201A silver sulfADIAZINE (SILVADENE) 1 % cream     silver sulfADIAZINE (SILVADENE) 1 % external cream   2. Need for immunization against influenza  Z23        Assessment/Plan:   Second-degree " burn of the right hand: Secondary to hot glue gun falling on her hand.  Very large blister which a small window was created with a sterile surgical scissors for symptomatic relief in hopes that the skin layers will reapproximate.  There is no evidence of infection currently.  She was given a small tube of Silvadene cream in clinic after proper bandaging and a prescription as well.  Discussed wound cares and indications to return to clinic.        Follow-up as needed        Electronically signed by:  Carter Navarro MD on 10/4/2021  Internal Medicine  [facility]

## 2021-10-05 ASSESSMENT — PATIENT HEALTH QUESTIONNAIRE - PHQ9: SUM OF ALL RESPONSES TO PHQ QUESTIONS 1-9: 3

## 2021-10-09 ENCOUNTER — HEALTH MAINTENANCE LETTER (OUTPATIENT)
Age: 51
End: 2021-10-09

## 2021-10-12 ENCOUNTER — OFFICE VISIT (OUTPATIENT)
Dept: INTERNAL MEDICINE | Facility: OTHER | Age: 51
End: 2021-10-12
Attending: STUDENT IN AN ORGANIZED HEALTH CARE EDUCATION/TRAINING PROGRAM
Payer: COMMERCIAL

## 2021-10-12 VITALS
RESPIRATION RATE: 16 BRPM | SYSTOLIC BLOOD PRESSURE: 144 MMHG | HEART RATE: 85 BPM | OXYGEN SATURATION: 95 % | TEMPERATURE: 97.5 F | BODY MASS INDEX: 33.78 KG/M2 | DIASTOLIC BLOOD PRESSURE: 88 MMHG | WEIGHT: 196.8 LBS

## 2021-10-12 DIAGNOSIS — T23.251D PARTIAL THICKNESS BURN OF PALM OF RIGHT HAND, SUBSEQUENT ENCOUNTER: ICD-10-CM

## 2021-10-12 DIAGNOSIS — I10 BENIGN ESSENTIAL HYPERTENSION: Primary | ICD-10-CM

## 2021-10-12 DIAGNOSIS — R42 DIZZINESS: ICD-10-CM

## 2021-10-12 PROCEDURE — 99213 OFFICE O/P EST LOW 20 MIN: CPT | Performed by: STUDENT IN AN ORGANIZED HEALTH CARE EDUCATION/TRAINING PROGRAM

## 2021-10-12 RX ORDER — LOSARTAN POTASSIUM 50 MG/1
50 TABLET ORAL DAILY
Qty: 90 TABLET | Refills: 3 | Status: SHIPPED | OUTPATIENT
Start: 2021-10-12 | End: 2021-12-21

## 2021-10-12 RX ORDER — MECLIZINE HYDROCHLORIDE 25 MG/1
25 TABLET ORAL 3 TIMES DAILY PRN
Qty: 30 TABLET | Refills: 0 | Status: SHIPPED | OUTPATIENT
Start: 2021-10-12 | End: 2022-01-06

## 2021-10-12 ASSESSMENT — ENCOUNTER SYMPTOMS
WOUND: 1
DIZZINESS: 0
COUGH: 0
NUMBNESS: 0
LIGHT-HEADEDNESS: 1
PALPITATIONS: 0
CHEST TIGHTNESS: 0
SHORTNESS OF BREATH: 0

## 2021-10-12 ASSESSMENT — ANXIETY QUESTIONNAIRES
GAD7 TOTAL SCORE: 0
2. NOT BEING ABLE TO STOP OR CONTROL WORRYING: NOT AT ALL
3. WORRYING TOO MUCH ABOUT DIFFERENT THINGS: NOT AT ALL
1. FEELING NERVOUS, ANXIOUS, OR ON EDGE: NOT AT ALL
6. BECOMING EASILY ANNOYED OR IRRITABLE: NOT AT ALL
7. FEELING AFRAID AS IF SOMETHING AWFUL MIGHT HAPPEN: NOT AT ALL
5. BEING SO RESTLESS THAT IT IS HARD TO SIT STILL: NOT AT ALL

## 2021-10-12 ASSESSMENT — PAIN SCALES - GENERAL: PAINLEVEL: MILD PAIN (3)

## 2021-10-12 ASSESSMENT — PATIENT HEALTH QUESTIONNAIRE - PHQ9
5. POOR APPETITE OR OVEREATING: NOT AT ALL
SUM OF ALL RESPONSES TO PHQ QUESTIONS 1-9: 0

## 2021-10-12 NOTE — NURSING NOTE
"Chief Complaint   Patient presents with     Follow Up     Burn right hand     Patient presents to the clinic today for a follow up on her burn on her right hand. She stated feeling light headed and dizzy on 10/6/21.    FOOD SECURITY SCREENING QUESTIONS  Hunger Vital Signs:  Within the past 12 months we worried whether our food would run out before we got money to buy more. Never  Within the past 12 months the food we bought just didn't last and we didn't have money to get more. Never  Marium Hair LPN 10/12/2021 8:25 AM      Initial BP (!) 150/90 (BP Location: Right arm, Patient Position: Sitting, Cuff Size: Adult Large)   Pulse 85   Temp 97.5  F (36.4  C) (Tympanic)   Resp 16   Wt 89.3 kg (196 lb 12.8 oz)   SpO2 95%   BMI 33.78 kg/m   Estimated body mass index is 33.78 kg/m  as calculated from the following:    Height as of 9/22/21: 1.626 m (5' 4\").    Weight as of this encounter: 89.3 kg (196 lb 12.8 oz).  Medication Reconciliation: complete    Marium Hair LPN  "

## 2021-10-12 NOTE — PROGRESS NOTES
"Ms. Bahena is a 50 year old female who presents to the clinic for burn follow up.     HPI     Reviewed burn. Does not appear infected.       Dizzy at rest.  Light headed.   No chest pain, no new shortness of breath.     Taken dramamine, benadryl for dizziness.   Some days feel worse than others.   Some days longer than others.   Pressure elevated today  No trauma        Review of Systems   Respiratory: Negative for cough, chest tightness and shortness of breath.    Cardiovascular: Negative for chest pain and palpitations.   Skin: Positive for wound.   Neurological: Positive for light-headedness. Negative for dizziness, syncope and numbness.        Reviewed and updated as needed this visit by Provider                  EXAM:   Vitals:    10/12/21 0823 10/12/21 0826   BP: (!) 150/90 (!) 144/88   BP Location: Right arm Right arm   Patient Position: Sitting Sitting   Cuff Size: Adult Large Adult Large   Pulse: 85    Resp: 16    Temp: 97.5  F (36.4  C)    TempSrc: Tympanic    SpO2: 95%    Weight: 89.3 kg (196 lb 12.8 oz)          BP Readings from Last 3 Encounters:   10/12/21 (!) 144/88   10/04/21 138/78   09/22/21 138/76      Wt Readings from Last 3 Encounters:   10/12/21 89.3 kg (196 lb 12.8 oz)   10/04/21 87.7 kg (193 lb 6.4 oz)   09/22/21 86.6 kg (191 lb)      Estimated body mass index is 33.78 kg/m  as calculated from the following:    Height as of 9/22/21: 1.626 m (5' 4\").    Weight as of this encounter: 89.3 kg (196 lb 12.8 oz).     Physical Exam  Vitals and nursing note reviewed.   Constitutional:       Appearance: Normal appearance.   Cardiovascular:      Rate and Rhythm: Normal rate and regular rhythm.   Abdominal:      General: Abdomen is flat.      Palpations: Abdomen is soft.   Skin:     Comments: Burn on right hand with no erythema surrounding.  Some orange discoloration as the skin is reattaching to the dermis.  White discoloration of the detached burn.  Minimal serosanguineous fluid leaking from the site.  " Does not appear infected.  No purulence or fluctuance.   Neurological:      Mental Status: She is alert.          INVESTIGATIONS:  - Labs reviewed in [facility]     ASSESSMENT AND PLAN:    ICD-10-CM    1. Benign essential hypertension  I10 losartan (COZAAR) 50 MG tablet   2. Dizziness  R42 meclizine (ANTIVERT) 25 MG tablet   3. Partial thickness burn of palm of right hand, subsequent encounter  T23.251D        Assessment/Plan:     Burn of hand: Does not appear infected at this time, discussed wound care and monitor for purulence.  No antibiotic warranted at this time.    Dizziness, hypertension: Blood pressures been progressively increasing and she has noticed that she feels worse with higher blood pressures.  We discussed starting losartan and returning to clinic in 4 to 6 weeks for recheck of electrolytes and her blood pressure.  She was given a short course of meclizine as needed to help with her symptoms.      Follow-up with me or PCP in 4 to 6 weeks for recheck of blood pressure, consideration of cholesterol medication.      Electronically signed by:  Carter Navarro MD on 10/12/2021  Internal Medicine  [facility]

## 2021-10-13 ENCOUNTER — OFFICE VISIT (OUTPATIENT)
Dept: FAMILY MEDICINE | Facility: OTHER | Age: 51
End: 2021-10-13
Attending: FAMILY MEDICINE
Payer: COMMERCIAL

## 2021-10-13 VITALS
DIASTOLIC BLOOD PRESSURE: 88 MMHG | HEART RATE: 81 BPM | OXYGEN SATURATION: 96 % | TEMPERATURE: 97.9 F | RESPIRATION RATE: 20 BRPM | WEIGHT: 197 LBS | SYSTOLIC BLOOD PRESSURE: 138 MMHG | BODY MASS INDEX: 33.81 KG/M2

## 2021-10-13 DIAGNOSIS — I10 BENIGN ESSENTIAL HYPERTENSION: ICD-10-CM

## 2021-10-13 DIAGNOSIS — F41.1 GAD (GENERALIZED ANXIETY DISORDER): Primary | ICD-10-CM

## 2021-10-13 DIAGNOSIS — G47.00 INSOMNIA, UNSPECIFIED TYPE: ICD-10-CM

## 2021-10-13 PROCEDURE — 99213 OFFICE O/P EST LOW 20 MIN: CPT | Performed by: FAMILY MEDICINE

## 2021-10-13 ASSESSMENT — PAIN SCALES - GENERAL: PAINLEVEL: NO PAIN (0)

## 2021-10-13 ASSESSMENT — ANXIETY QUESTIONNAIRES: GAD7 TOTAL SCORE: 0

## 2021-10-13 ASSESSMENT — PATIENT HEALTH QUESTIONNAIRE - PHQ9
SUM OF ALL RESPONSES TO PHQ QUESTIONS 1-9: 0
10. IF YOU CHECKED OFF ANY PROBLEMS, HOW DIFFICULT HAVE THESE PROBLEMS MADE IT FOR YOU TO DO YOUR WORK, TAKE CARE OF THINGS AT HOME, OR GET ALONG WITH OTHER PEOPLE: NOT DIFFICULT AT ALL
SUM OF ALL RESPONSES TO PHQ QUESTIONS 1-9: 0

## 2021-10-13 NOTE — NURSING NOTE
"Chief Complaint   Patient presents with     RECHECK       Initial /88   Pulse 81   Temp 97.9  F (36.6  C) (Tympanic)   Resp 20   Wt 89.4 kg (197 lb)   SpO2 96%   BMI 33.81 kg/m   Estimated body mass index is 33.81 kg/m  as calculated from the following:    Height as of 9/22/21: 1.626 m (5' 4\").    Weight as of this encounter: 89.4 kg (197 lb).  Medication Reconciliation: complete    Masha Lopez LPN  "

## 2021-10-13 NOTE — PATIENT INSTRUCTIONS
Patient Education     Taking Your Blood Pressure  Blood pressure is the force of blood against the artery wall as it moves from the heart through the blood vessels. You can take your own blood pressure reading using a digital monitor. Take your readings the same each time, using the same arm. Take readings as often as your healthcare provider advises.  About blood pressure monitors  Blood pressure monitors are designed for certain ages and cases. You can find monitors for older adults, for pregnant women, and for children. Make sure the one you choose is the right one for your age and situation.  The American Heart Association advises an automatic cuff monitor that fits on your upper arm (bicep). The cuff should fit your arm size. A cuff that s too large or too small will not give an accurate reading. Measure around your upper arm to find your size.  Monitors that attach to your finger or wrist are not as accurate as monitors for your upper arm.  Ask your healthcare provider for help in choosing a monitor. Bring your monitor to your next provider visit if you need help in using it the correct way.  The steps below are general instructions for using an automatic digital monitor.  Step 1. Relax      Take your blood pressure at the same time every day, such as in the morning or evening. Or take it at the time your healthcare provider advises.    Wait at least30 minutes after smoking, eating, or exercising. Don't drink coffee, tea, soda, or other caffeinated drinks before checking your blood pressure. If needed, use the restroom beforehand.    Sit comfortably at a table with both feet on the floor. Don't cross your legs or feet (or even arms). Place the monitor near you.    Rest for a few minutes before you begin. Make sure there are no distractions. This includes TV, cell phones, and other electronics. Wait to have conversations with others until after you measure you blood pressure.  Step 2. Wrap the cuff      Place  your arm on the table, palm up. Your arm should be at the level of your heart. Wrap the cuff around your upper arm, just above your elbow. It s best done on bare skin, not over clothing. Most cuffs will show you where the blood vessel in the middle of the arm at the inner side of the elbow (the brachial artery) should line up with the cuff. Look in your monitor's instruction booklet for an illustration. You can also bring your cuff to your healthcare provider and have them show you how to correctly place the cuff.  Step 3. Inflate the cuff      Push the button that starts the pump.    The cuff will tighten, then loosen.    The numbers will change. When they stop changing, your blood pressure reading will appear.    Take 2 or 3 readings 1 minute apart.  Step 4. Write down the results of each reading      Write down your blood pressure numbers for each reading. Note the date and time. Keep your results in one place, such as a notebook. Even if your monitor has a built-in memory, keep a hard copy of the readings.    Remove the cuff from your arm. Turn off the machine.    Bring your blood pressure records with you to each healthcare provider visit.    If you start a new blood pressure medicine, note the day you started the new medicine. Also note the day if you change the dose of your medicine. Measure your blood pressure before your take your medicine. This information goes on your blood pressure recording sheet. This will help your healthcare provider check how well the medicine changes are working.    Ask your provider what numbers mean that you should call him or her. Also ask what numbers mean you should get help right away.  [employer] last reviewed this educational content on 7/1/2019 2000-2021 [employer]. All rights reserved. This information is not intended as a substitute for professional medical care. Always follow your healthcare professional's instructions.

## 2021-10-14 ASSESSMENT — PATIENT HEALTH QUESTIONNAIRE - PHQ9: SUM OF ALL RESPONSES TO PHQ QUESTIONS 1-9: 0

## 2021-10-18 PROBLEM — F41.1 GAD (GENERALIZED ANXIETY DISORDER): Status: ACTIVE | Noted: 2021-10-18

## 2021-10-18 PROBLEM — I10 BENIGN ESSENTIAL HYPERTENSION: Status: ACTIVE | Noted: 2021-10-18

## 2021-10-18 NOTE — PROGRESS NOTES
Assessment & Plan     1. ULICES (generalized anxiety disorder)  Chronic; minimal.  Doing well.    2. Insomnia, unspecified type  Improved on trazodone.  Continue on same dose.    3. Benign essential hypertension  Started medication yesterday.  No side effects.  Will monitor BP at home.  Goal discussed: less than 140, less than 90.  Follow low-salt diet.      Valencia Benavides, DO  [facility]    [facility]   Cielo is a 50 year old who presents for the following health issues:    HPI   Hypertension Follow-up    Do you check your blood pressure regularly outside of the clinic? Yes     Are you following a low salt diet? Yes, sometimes    Are your blood pressures ever more than 140 on the top number (systolic) OR more   than 90 on the bottom number (diastolic), for example 140/90? Yes, sometimes    Also here in follow up to trazodone start.  Sleeping better.   Headaches slightly better.  Mood okay.  PHQ 10/4/2021 10/12/2021 10/13/2021   PHQ-9 Total Score 3 0 0   Q9: Thoughts of better off dead/self-harm past 2 weeks Not at all Not at all Not at all     ULICES-7 SCORE 6/3/2020 9/7/2021 10/12/2021   Total Score - 4 (minimal anxiety) -   Total Score 0 4 0       Review of Systems   CONSTITUTIONAL: NEGATIVE for fever, chills, change in weight  ENT/MOUTH: NEGATIVE for ear, mouth and throat problems  RESP: NEGATIVE for significant cough or SOB  CV: NEGATIVE for chest pain, palpitations or peripheral edema  NEURO: + dizzy at times      Objective    /88   Pulse 81   Temp 97.9  F (36.6  C) (Tympanic)   Resp 20   Wt 89.4 kg (197 lb)   SpO2 96%   BMI 33.81 kg/m    Body mass index is 33.81 kg/m .  Physical Exam   GENERAL: healthy, alert and no distress  NECK: no adenopathy, no asymmetry, masses, or scars and thyroid normal to palpation  RESP: lungs clear to auscultation - no rales, rhonchi or wheezes  CV: regular rate and rhythm, normal S1 S2, no S3 or S4, no murmur, click or rub, no peripheral edema  and peripheral pulses strong  ABDOMEN: soft, nontender, no hepatosplenomegaly, no masses and bowel sounds normal  MS: no gross musculoskeletal defects noted, no edema    No results found for any visits on 10/13/21.    Answers for HPI/ROS submitted by the patient on 10/13/2021  If you checked off any problems, how difficult have these problems made it for you to do your work, take care of things at home, or get along with other people?: Not difficult at all  PHQ9 TOTAL SCORE: 0

## 2021-10-20 ENCOUNTER — MYC MEDICAL ADVICE (OUTPATIENT)
Dept: FAMILY MEDICINE | Facility: OTHER | Age: 51
End: 2021-10-20

## 2021-10-27 ENCOUNTER — LAB REQUISITION (OUTPATIENT)
Dept: LAB | Facility: OTHER | Age: 51
End: 2021-10-27

## 2021-10-27 LAB — SARS-COV-2 RNA RESP QL NAA+PROBE: NEGATIVE

## 2021-10-27 PROCEDURE — U0003 INFECTIOUS AGENT DETECTION BY NUCLEIC ACID (DNA OR RNA); SEVERE ACUTE RESPIRATORY SYNDROME CORONAVIRUS 2 (SARS-COV-2) (CORONAVIRUS DISEASE [COVID-19]), AMPLIFIED PROBE TECHNIQUE, MAKING USE OF HIGH THROUGHPUT TECHNOLOGIES AS DESCRIBED BY CMS-2020-01-R: HCPCS | Performed by: FAMILY MEDICINE

## 2021-11-01 DIAGNOSIS — J45.40 MODERATE PERSISTENT ASTHMA WITHOUT COMPLICATION: ICD-10-CM

## 2021-11-01 DIAGNOSIS — M54.42 ACUTE LEFT-SIDED LOW BACK PAIN WITH LEFT-SIDED SCIATICA: ICD-10-CM

## 2021-11-03 RX ORDER — IBUPROFEN 800 MG/1
TABLET, FILM COATED ORAL
Qty: 50 TABLET | Refills: 3 | Status: SHIPPED | OUTPATIENT
Start: 2021-11-03 | End: 2022-03-01

## 2021-11-03 RX ORDER — ALBUTEROL SULFATE 90 UG/1
2 AEROSOL, METERED RESPIRATORY (INHALATION) 4 TIMES DAILY
Qty: 54 G | Refills: 1 | Status: SHIPPED | OUTPATIENT
Start: 2021-11-03 | End: 2022-04-06

## 2021-11-03 NOTE — TELEPHONE ENCOUNTER
[facility] sent Rx request for the following:      Requested Prescriptions   Pending Prescriptions Disp Refills     VENTOLIN  (90 Base) MCG/ACT inhaler [Pharmacy Med Name: Ventolin HFA 90 mcg/actuation aerosol inhaler] 54 g 3     Sig: inhale 2 PUFFS into THE lungs 4 TIMES DAILY   Last Prescription Date:   12/15/20  Last Fill Qty/Refills:         3 inhaler, R-3         ibuprofen (ADVIL/MOTRIN) 800 MG tablet [Pharmacy Med Name: ibuprofen 800 mg tablet] 50 tablet 3     Sig: TAKE 1 TABLET(800 MG) BY MOUTH EVERY 6 HOURS AS NEEDED FOR MODERATE PAIN   Last Prescription Date:   3/18/21  Last Fill Qty/Refills:         50, R-3    Routing refill request to provider for review/approval because:    NSAID Medications Failed - 11/3/2021  4:28 PM        Failed - Normal CBC on file in past 12 months     Last Office Visit:              10/13/21  Future Office visit:             Next 5 appointments (look out 90 days)    Nov 11, 2021  3:40 PM  SHORT with Valencia Benavides DO  [facility] ([facility] ) [address]  [place] [id number] [phone]     Unable to complete prescription refill per RN Medication Refill Policy. Adelaide Padilla RN .............. 11/3/2021  4:31 PM

## 2021-11-11 ENCOUNTER — OFFICE VISIT (OUTPATIENT)
Dept: FAMILY MEDICINE | Facility: OTHER | Age: 51
End: 2021-11-11
Attending: FAMILY MEDICINE
Payer: COMMERCIAL

## 2021-11-11 VITALS
WEIGHT: 194 LBS | HEART RATE: 73 BPM | TEMPERATURE: 97.9 F | BODY MASS INDEX: 33.3 KG/M2 | DIASTOLIC BLOOD PRESSURE: 86 MMHG | OXYGEN SATURATION: 97 % | RESPIRATION RATE: 16 BRPM | SYSTOLIC BLOOD PRESSURE: 130 MMHG

## 2021-11-11 DIAGNOSIS — I10 BENIGN ESSENTIAL HYPERTENSION: ICD-10-CM

## 2021-11-11 DIAGNOSIS — N95.1 MENOPAUSAL SYNDROME (HOT FLASHES): Primary | ICD-10-CM

## 2021-11-11 DIAGNOSIS — R45.4 IRRITABILITY: ICD-10-CM

## 2021-11-11 DIAGNOSIS — F32.89 ATYPICAL DEPRESSIVE DISEASE: ICD-10-CM

## 2021-11-11 PROCEDURE — 99214 OFFICE O/P EST MOD 30 MIN: CPT | Performed by: FAMILY MEDICINE

## 2021-11-11 RX ORDER — VENLAFAXINE HYDROCHLORIDE 75 MG/1
75 CAPSULE, EXTENDED RELEASE ORAL DAILY
Qty: 90 CAPSULE | Refills: 1 | Status: SHIPPED | OUTPATIENT
Start: 2021-11-11 | End: 2021-12-14

## 2021-11-11 ASSESSMENT — PAIN SCALES - GENERAL: PAINLEVEL: NO PAIN (0)

## 2021-11-11 NOTE — NURSING NOTE
"Chief Complaint   Patient presents with     Recheck Medication     Patient presents today as a follow-up on hypertension.   Initial /86   Pulse 73   Temp 97.9  F (36.6  C) (Tympanic)   Resp 16   Wt 88 kg (194 lb)   SpO2 97%   BMI 33.30 kg/m   Estimated body mass index is 33.3 kg/m  as calculated from the following:    Height as of 9/22/21: 1.626 m (5' 4\").    Weight as of this encounter: 88 kg (194 lb).  Medication Reconciliation: complete    Masha Lopez LPN  "

## 2021-11-11 NOTE — PATIENT INSTRUCTIONS
Patient Education     Venlafaxine ER 24 HR Extended Release Tablet 75 mg  Uses  This medicine is used for the following purposes:    anxiety    depression    menopausal symptoms    narcolepsy    post-traumatic stress disorder    Instructions  Swallow the medicine without crushing or chewing it.  Take the medicine with food.  It is very important that you take the medicine at about the same time every day. It will work best if you do this.  Keep the medicine at room temperature. Avoid heat and direct light.  It may take several weeks for this medicine to fully work.  It is important that you keep taking each dose of this medicine on time even if you are feeling well.  If you forget to take a dose on time, take it as soon as you remember. If it is almost time for the next dose, do not take the missed dose. Return to your normal dosing schedule. Do not take 2 doses of this medicine at one time.  Please tell your doctor and pharmacist about all the medicines you take. Include both prescription and over-the-counter medicines. Also tell them about any vitamins, herbal medicines, or anything else you take for your health.  Do not suddenly stop taking this medicine. Check with your doctor before stopping.  Cautions  Tell your doctor and pharmacist if you ever had an allergic reaction to a medicine. Symptoms of an allergic reaction can include trouble breathing, skin rash, itching, swelling, or severe dizziness.  Do not use the medication any more than instructed.  Your ability to stay alert or to react quickly may be impaired by this medicine. Do not drive or operate machinery until you know how this medicine will affect you.  Do not drink beverages with alcohol while on this medicine.  Family should check on the patient often. Call the doctor if patient becomes more depressed, has thoughts of suicide, or shows changes in behavior.  Call the doctor if there are any signs of confusion or unusual changes in behavior.  Tell  the doctor or pharmacist if you are pregnant, planning to be pregnant, or breastfeeding.  Ask your pharmacist if this medicine can interact with any of your other medicines. Be sure to tell them about all the medicines you take.  Do not start or stop any other medicines without first speaking to your doctor or pharmacist.  Call your doctor right away if you notice any unusual bleeding or bruising.  Do not share this medicine with anyone who has not been prescribed this medicine.  This medicine can cause serious side effects in some patients. Important information from the U[place]. Food and Drug Administration (FDA) is available from your pharmacist. Please review it carefully with your pharmacist to understand the risks associated with this medicine.  Side Effects  The following is a list of some common side effects from this medicine. Please speak with your doctor about what you should do if you experience these or other side effects.    agitated feeling or trouble sleeping    decreased appetite    constipation    dizziness    drowsiness or sedation    dry mouth    high blood pressure    nausea    nervousness    sweating  Call your doctor or get medical help right away if you notice any of these more serious side effects:    bleeding that is severe or takes longer to stop    unusual bruising or discoloration on skin    chest pain    cough that does not go away    pain in the eye    hallucinations (unusual thoughts, seeing or hearing things that are not real)    fast or irregular heart beats    muscle cramps    muscle weakness    dilation of the pupils    restlessness    problems with sexual functions or desire    shakiness    shortness of breath    dark, tarry stool    blurring or changes of vision    severe or persistent vomiting  A few people may have an allergic reactions to this medicine. Symptoms can include difficulty breathing, skin rash, itching, swelling, or severe dizziness. If you notice any of these  symptoms, seek medical help quickly.  Extra  Please speak with your doctor, nurse, or pharmacist if you have any questions about this medicine.  [email or web address]  IMPORTANT NOTE: This document tells you briefly how to take your medicine, but it does not tell you all there is to know about it.Your doctor or pharmacist may give you other documents about your medicine. Please talk to them if you have any questions.Always follow their advice. There is a more complete description of this medicine available in English.Scan this code on your smartphone or tablet or use the web address below. You can also ask your pharmacist for a printout. If you have any questions, please ask your pharmacist.     2021 [employer].

## 2021-11-15 PROBLEM — F32.89 ATYPICAL DEPRESSIVE DISEASE: Status: ACTIVE | Noted: 2018-02-16

## 2021-11-15 NOTE — PROGRESS NOTES
Assessment & Plan   1. Menopausal syndrome (hot flashes)  2. Irritability  Worsening; chronic.  Rx for venlafaxine to start to help all with hot flashes and irritability.  Start 1 capsule daily, may require dose increase if no side effects.  - venlafaxine (EFFEXOR-XR) 75 MG 24 hr capsule; Take 1 capsule (75 mg) by mouth daily  Dispense: 90 capsule; Refill: 1      3. Benign essential hypertension  Chronic, improved.  Continues on same dose of losartan.  Refilled x 1 year.    Valencia Benavides, [facility]        Subjective   Cielo is a 50 year old who presents for the following health issues:    HPI   Cielo is here in follow up to BP readings.  She is also finding that her anxiety it worse.  More irritable.  + hot flashes returned.  Had gone off of her selective serotonin reuptake inhibitor previously.  Wondering about options.  + weight gain.  Doesn't feel like she is in control.      Review of Systems   CONSTITUTIONAL: NEGATIVE for fever, chills, change in weight  ENT/MOUTH: NEGATIVE for ear, mouth and throat problems  RESP: NEGATIVE for significant cough or SOB  CV: NEGATIVE for chest pain, palpitations or peripheral edema      Objective    /86   Pulse 73   Temp 97.9  F (36.6  C) (Tympanic)   Resp 16   Wt 88 kg (194 lb)   SpO2 97%   BMI 33.30 kg/m    Body mass index is 33.3 kg/m .  Physical Exam   GENERAL: healthy, alert and no distress  NECK: no adenopathy, no asymmetry, masses, or scars and thyroid normal to palpation  RESP: lungs clear to auscultation - no rales, rhonchi or wheezes  CV: regular rate and rhythm, normal S1 S2, no S3 or S4, no murmur, click or rub, no peripheral edema and peripheral pulses strong  ABDOMEN: soft, nontender, no hepatosplenomegaly, no masses and bowel sounds normal  MS: no gross musculoskeletal defects noted, no edema    No results found for any visits on 11/11/21.

## 2021-11-19 ENCOUNTER — LAB (OUTPATIENT)
Dept: LAB | Facility: OTHER | Age: 51
End: 2021-11-19
Attending: FAMILY MEDICINE
Payer: COMMERCIAL

## 2021-11-19 DIAGNOSIS — I10 BENIGN ESSENTIAL HYPERTENSION: ICD-10-CM

## 2021-11-19 LAB
ANION GAP SERPL CALCULATED.3IONS-SCNC: 7 MMOL/L (ref 3–14)
BUN SERPL-MCNC: 17 MG/DL (ref 7–25)
CALCIUM SERPL-MCNC: 9.3 MG/DL (ref 8.6–10.3)
CHLORIDE BLD-SCNC: 105 MMOL/L (ref 98–107)
CO2 SERPL-SCNC: 26 MMOL/L (ref 21–31)
CREAT SERPL-MCNC: 0.98 MG/DL (ref 0.6–1.2)
GFR SERPL CREATININE-BSD FRML MDRD: 67 ML/MIN/1.73M2
GLUCOSE BLD-MCNC: 81 MG/DL (ref 70–105)
POTASSIUM BLD-SCNC: 4 MMOL/L (ref 3.5–5.1)
SODIUM SERPL-SCNC: 138 MMOL/L (ref 134–144)

## 2021-11-19 PROCEDURE — 80048 BASIC METABOLIC PNL TOTAL CA: CPT | Mod: ZL

## 2021-11-19 PROCEDURE — 36415 COLL VENOUS BLD VENIPUNCTURE: CPT | Mod: ZL

## 2021-12-14 ENCOUNTER — OFFICE VISIT (OUTPATIENT)
Dept: FAMILY MEDICINE | Facility: OTHER | Age: 51
End: 2021-12-14
Attending: FAMILY MEDICINE
Payer: COMMERCIAL

## 2021-12-14 VITALS
HEART RATE: 72 BPM | BODY MASS INDEX: 33.3 KG/M2 | SYSTOLIC BLOOD PRESSURE: 144 MMHG | WEIGHT: 194 LBS | RESPIRATION RATE: 16 BRPM | OXYGEN SATURATION: 96 % | TEMPERATURE: 96.8 F | DIASTOLIC BLOOD PRESSURE: 90 MMHG

## 2021-12-14 DIAGNOSIS — R42 DIZZINESS: Primary | ICD-10-CM

## 2021-12-14 LAB
ALBUMIN SERPL-MCNC: 4.3 G/DL (ref 3.5–5.7)
ALP SERPL-CCNC: 69 U/L (ref 34–104)
ALT SERPL W P-5'-P-CCNC: 19 U/L (ref 7–52)
ANION GAP SERPL CALCULATED.3IONS-SCNC: 6 MMOL/L (ref 3–14)
AST SERPL W P-5'-P-CCNC: 18 U/L (ref 13–39)
BASOPHILS # BLD AUTO: 0 10E3/UL (ref 0–0.2)
BASOPHILS NFR BLD AUTO: 1 %
BILIRUB SERPL-MCNC: 0.5 MG/DL (ref 0.3–1)
BUN SERPL-MCNC: 19 MG/DL (ref 7–25)
CALCIUM SERPL-MCNC: 8.8 MG/DL (ref 8.6–10.3)
CHLORIDE BLD-SCNC: 105 MMOL/L (ref 98–107)
CO2 SERPL-SCNC: 25 MMOL/L (ref 21–31)
CREAT SERPL-MCNC: 0.87 MG/DL (ref 0.6–1.2)
EOSINOPHIL # BLD AUTO: 0.2 10E3/UL (ref 0–0.7)
EOSINOPHIL NFR BLD AUTO: 2 %
ERYTHROCYTE [DISTWIDTH] IN BLOOD BY AUTOMATED COUNT: 12.6 % (ref 10–15)
GFR SERPL CREATININE-BSD FRML MDRD: 77 ML/MIN/1.73M2
GLUCOSE BLD-MCNC: 95 MG/DL (ref 70–105)
HCT VFR BLD AUTO: 43.2 % (ref 35–47)
HGB BLD-MCNC: 14.6 G/DL (ref 11.7–15.7)
IMM GRANULOCYTES # BLD: 0 10E3/UL
IMM GRANULOCYTES NFR BLD: 0 %
LYMPHOCYTES # BLD AUTO: 1.2 10E3/UL (ref 0.8–5.3)
LYMPHOCYTES NFR BLD AUTO: 16 %
MCH RBC QN AUTO: 30.2 PG (ref 26.5–33)
MCHC RBC AUTO-ENTMCNC: 33.8 G/DL (ref 31.5–36.5)
MCV RBC AUTO: 89 FL (ref 78–100)
MONOCYTES # BLD AUTO: 0.5 10E3/UL (ref 0–1.3)
MONOCYTES NFR BLD AUTO: 6 %
NEUTROPHILS # BLD AUTO: 5.5 10E3/UL (ref 1.6–8.3)
NEUTROPHILS NFR BLD AUTO: 75 %
NRBC # BLD AUTO: 0 10E3/UL
NRBC BLD AUTO-RTO: 0 /100
PLATELET # BLD AUTO: 250 10E3/UL (ref 150–450)
POTASSIUM BLD-SCNC: 4.4 MMOL/L (ref 3.5–5.1)
PROT SERPL-MCNC: 6.8 G/DL (ref 6.4–8.9)
RBC # BLD AUTO: 4.83 10E6/UL (ref 3.8–5.2)
SODIUM SERPL-SCNC: 136 MMOL/L (ref 134–144)
WBC # BLD AUTO: 7.3 10E3/UL (ref 4–11)

## 2021-12-14 PROCEDURE — 99214 OFFICE O/P EST MOD 30 MIN: CPT | Performed by: FAMILY MEDICINE

## 2021-12-14 PROCEDURE — 93000 ELECTROCARDIOGRAM COMPLETE: CPT | Performed by: INTERNAL MEDICINE

## 2021-12-14 PROCEDURE — 85025 COMPLETE CBC W/AUTO DIFF WBC: CPT | Mod: ZL | Performed by: FAMILY MEDICINE

## 2021-12-14 PROCEDURE — 36415 COLL VENOUS BLD VENIPUNCTURE: CPT | Mod: ZL | Performed by: FAMILY MEDICINE

## 2021-12-14 PROCEDURE — 80053 COMPREHEN METABOLIC PANEL: CPT | Mod: ZL | Performed by: FAMILY MEDICINE

## 2021-12-14 ASSESSMENT — ANXIETY QUESTIONNAIRES
4. TROUBLE RELAXING: NOT AT ALL
6. BECOMING EASILY ANNOYED OR IRRITABLE: NOT AT ALL
GAD7 TOTAL SCORE: 0
3. WORRYING TOO MUCH ABOUT DIFFERENT THINGS: NOT AT ALL
GAD7 TOTAL SCORE: 0
1. FEELING NERVOUS, ANXIOUS, OR ON EDGE: NOT AT ALL
GAD7 TOTAL SCORE: 0
7. FEELING AFRAID AS IF SOMETHING AWFUL MIGHT HAPPEN: NOT AT ALL
2. NOT BEING ABLE TO STOP OR CONTROL WORRYING: NOT AT ALL
7. FEELING AFRAID AS IF SOMETHING AWFUL MIGHT HAPPEN: NOT AT ALL
5. BEING SO RESTLESS THAT IT IS HARD TO SIT STILL: NOT AT ALL

## 2021-12-14 ASSESSMENT — PATIENT HEALTH QUESTIONNAIRE - PHQ9
10. IF YOU CHECKED OFF ANY PROBLEMS, HOW DIFFICULT HAVE THESE PROBLEMS MADE IT FOR YOU TO DO YOUR WORK, TAKE CARE OF THINGS AT HOME, OR GET ALONG WITH OTHER PEOPLE: NOT DIFFICULT AT ALL
SUM OF ALL RESPONSES TO PHQ QUESTIONS 1-9: 0
SUM OF ALL RESPONSES TO PHQ QUESTIONS 1-9: 0

## 2021-12-14 ASSESSMENT — PAIN SCALES - GENERAL: PAINLEVEL: NO PAIN (0)

## 2021-12-14 NOTE — NURSING NOTE
"Chief Complaint   Patient presents with     Dizziness     Patient presents today with lightheaded, dizzy, and nauseated for the last 2 days.   Initial BP (!) 142/94   Pulse 72   Temp 96.8  F (36  C) (Tympanic)   Resp 16   Wt 88 kg (194 lb)   SpO2 96%   BMI 33.30 kg/m   Estimated body mass index is 33.3 kg/m  as calculated from the following:    Height as of 9/22/21: 1.626 m (5' 4\").    Weight as of this encounter: 88 kg (194 lb).  Medication Reconciliation: complete    Masha Lopez LPN     Advanced Care Directive reviewed.         FOOD SECURITY SCREENING QUESTIONS:    The next two questions are to help us understand your food security.  If you are feeling you need any assistance in this area, we have resources available to support you today.    Hunger Vital Signs:  Within the past 12 months we worried whether our food would run out before we got money to buy more. Never  Within the past 12 months the food we bought just didn't last and we didn't have money to get more. Never  Masha Lopez LPN,LPN on 12/14/2021 at 9:25 AM      "

## 2021-12-14 NOTE — LETTER
December 14, 2021      Cielo FRANCO Wedge  [address]  [facility] [address]        To Whom It May Concern,     Cielo was seen in the office today for acute symptoms of dizziness.  Please excuse her from work today, tomorrow and partially on Thursday for diagnostic testing.    Feel free to contact me at the above information with any questions or concerns.      Sincerely,            Valencia Benavides, DO  Family Practice

## 2021-12-14 NOTE — PROGRESS NOTES
"  Assessment & Plan     1. Dizziness  Recurrent with associated headache/nausea.   Wide differential: migraine, dehydration, poor nutrition/intake, BP related, more ominous causes - tumor, mass, bleeding/aneurysm.  Ruled out anemia and EKG changes today with below orders.  CMP pending at time of note.  Would like to proceed with MRI due to associated headache, persistent dizziness.  Unable to complete today, but will schedule for this week.  Follow up pending results.  For now - go home and take dose of losartan.  Monitor BP and symptoms in correlation with each other.  Aware to return to ER for any stroke like symptoms or significant worsening.  Declines anti-emetic at this time.  - CBC and Differential; Future  - Comprehensive Metabolic Panel; Future  - MR Brain w/o & w Contrast; Future  - EKG 12-lead, tracing only  - Comprehensive Metabolic Panel  - CBC and Differential         BMI:   Estimated body mass index is 33.3 kg/m  as calculated from the following:    Height as of 9/22/21: 1.626 m (5' 4\").    Weight as of this encounter: 88 kg (194 lb).   Weight management plan: Discussed healthy diet and exercise guidelines      Valencia Benavides, [facility]    Subjective   Cielo is a 51 year old who presents for the following health issues:    HPI     Dizziness  Onset/Duration: started/worsened throughout the day yesterday  Description:   Do you feel faint: YES  Does it feel like the surroundings (bed, room) are moving: no  Unsteady/off balance: YES  Have you passed out or fallen: no  Intensity: moderate; making her leave work today - having her  come pick her up  Progression of Symptoms: worsening, now constant  Accompanying Signs & Symptoms:  Heart palpitations or chest pain: no  Nausea, vomiting: YES  Weakness or lack of coordination in arms or legs: no  Vision or speech changes: no; maybe blurred at times.  Numbness or tingling: no  Ringing in ears (Tinnitus): no  Hearing Loss: " no  History:   Head trauma/concussion history: no  Previous similar symptoms: YES; has felt this way off and on since Oct  Recent bleeding history: no  Any new medications (BP?): YES  Precipitating factors:   Worse with activity: YES  Worse with head movement: YES  Alleviating factors:   Does staying in a fixed position give relief: YES- slightly  Therapies tried and outcome: meclazine    BP is more elevated today, similar to her last visit in Oct.  Normally 130/80 or less.  Is on losartan since that visit in Oct, therefore was happening prior to starting her medication.  Did stop venlafaxine (new in Nov), 4 days ago.  No other new diet/activity/supplement changes.      Review of Systems   CONSTITUTIONAL: NEGATIVE for fever, chills, change in weight  ENT/MOUTH: NEGATIVE for ear, mouth and throat problems  RESP: NEGATIVE for significant cough or SOB  CV: NEGATIVE for chest pain, palpitations or peripheral edema      Objective    BP (!) 144/90   Pulse 72   Temp 96.8  F (36  C) (Tympanic)   Resp 16   Wt 88 kg (194 lb)   SpO2 96%   BMI 33.30 kg/m    Body mass index is 33.3 kg/m .  Physical Exam   GENERAL: healthy, alert and no distress  EYES: Eyes grossly normal to inspection, PERRL and conjunctivae and sclerae normal  HENT: ear canals and TM's normal, nose and mouth without ulcers or lesions  NECK: no adenopathy, no asymmetry, masses, or scars and thyroid normal to palpation  RESP: lungs clear to auscultation - no rales, rhonchi or wheezes  CV: regular rate and rhythm, normal S1 S2, no S3 or S4, no murmur, click or rub, no peripheral edema and peripheral pulses strong  MS: no gross musculoskeletal defects noted, no edema  SKIN: no suspicious lesions or rashes  NEURO: Normal strength and tone, mentation intact and speech normal  PSYCH: mentation appears normal, affect normal/bright    Results for orders placed or performed in visit on 12/14/21   Comprehensive Metabolic Panel     Status: Normal   Result Value Ref  Range    Sodium 136 134 - 144 mmol/L    Potassium 4.4 3.5 - 5.1 mmol/L    Chloride 105 98 - 107 mmol/L    Carbon Dioxide (CO2) 25 21 - 31 mmol/L    Anion Gap 6 3 - 14 mmol/L    Urea Nitrogen 19 7 - 25 mg/dL    Creatinine 0.87 0.60 - 1.20 mg/dL    Calcium 8.8 8.6 - 10.3 mg/dL    Glucose 95 70 - 105 mg/dL    Alkaline Phosphatase 69 34 - 104 U/L    AST 18 13 - 39 U/L    ALT 19 7 - 52 U/L    Protein Total 6.8 6.4 - 8.9 g/dL    Albumin 4.3 3.5 - 5.7 g/dL    Bilirubin Total 0.5 0.3 - 1.0 mg/dL    GFR Estimate 77 >60 mL/min/1.73m2   CBC with platelets and differential     Status: None   Result Value Ref Range    WBC Count 7.3 4.0 - 11.0 10e3/uL    RBC Count 4.83 3.80 - 5.20 10e6/uL    Hemoglobin 14.6 11.7 - 15.7 g/dL    Hematocrit 43.2 35.0 - 47.0 %    MCV 89 78 - 100 fL    MCH 30.2 26.5 - 33.0 pg    MCHC 33.8 31.5 - 36.5 g/dL    RDW 12.6 10.0 - 15.0 %    Platelet Count 250 150 - 450 10e3/uL    % Neutrophils 75 %    % Lymphocytes 16 %    % Monocytes 6 %    % Eosinophils 2 %    % Basophils 1 %    % Immature Granulocytes 0 %    NRBCs per 100 WBC 0 <1 /100    Absolute Neutrophils 5.5 1.6 - 8.3 10e3/uL    Absolute Lymphocytes 1.2 0.8 - 5.3 10e3/uL    Absolute Monocytes 0.5 0.0 - 1.3 10e3/uL    Absolute Eosinophils 0.2 0.0 - 0.7 10e3/uL    Absolute Basophils 0.0 0.0 - 0.2 10e3/uL    Absolute Immature Granulocytes 0.0 <=0.4 10e3/uL    Absolute NRBCs 0.0 10e3/uL   EKG 12-lead, tracing only     Status: None (Preliminary result)   Result Value Ref Range    Systolic Blood Pressure  mmHg    Diastolic Blood Pressure  mmHg    Ventricular Rate 70 BPM    Atrial Rate 70 BPM    OK Interval 178 ms    QRS Duration 82 ms     ms    QTc 440 ms    P Axis 55 degrees    R AXIS 54 degrees    T Axis 42 degrees    Interpretation ECG       Sinus rhythm  Normal ECG  When compared with ECG of 11-MAY-2020 12:08,  No significant change was found     CBC and Differential     Status: None    Narrative    The following orders were created for panel  order CBC and Differential.  Procedure                               Abnormality         Status                     ---------                               -----------         ------                     CBC with platelets and d...[[id number]]                      Final result                 Please view results for these tests on the individual orders.           Answers for HPI/ROS submitted by the patient on 12/14/2021  If you checked off any problems, how difficult have these problems made it for you to do your work, take care of things at home, or get along with other people?: Not difficult at all  PHQ9 TOTAL SCORE: 0  ULICES 7 TOTAL SCORE: 0

## 2021-12-15 ENCOUNTER — MYC MEDICAL ADVICE (OUTPATIENT)
Dept: FAMILY MEDICINE | Facility: OTHER | Age: 51
End: 2021-12-15
Payer: COMMERCIAL

## 2021-12-15 DIAGNOSIS — R42 DIZZINESS: Primary | ICD-10-CM

## 2021-12-15 LAB
ATRIAL RATE - MUSE: 70 BPM
DIASTOLIC BLOOD PRESSURE - MUSE: NORMAL MMHG
INTERPRETATION ECG - MUSE: NORMAL
P AXIS - MUSE: 55 DEGREES
PR INTERVAL - MUSE: 178 MS
QRS DURATION - MUSE: 82 MS
QT - MUSE: 408 MS
QTC - MUSE: 440 MS
R AXIS - MUSE: 54 DEGREES
SYSTOLIC BLOOD PRESSURE - MUSE: NORMAL MMHG
T AXIS - MUSE: 42 DEGREES
VENTRICULAR RATE- MUSE: 70 BPM

## 2021-12-15 RX ORDER — ONDANSETRON 4 MG/1
4 TABLET, FILM COATED ORAL EVERY 8 HOURS PRN
Qty: 10 TABLET | Refills: 0 | Status: SHIPPED | OUTPATIENT
Start: 2021-12-15 | End: 2021-12-21

## 2021-12-15 ASSESSMENT — PATIENT HEALTH QUESTIONNAIRE - PHQ9: SUM OF ALL RESPONSES TO PHQ QUESTIONS 1-9: 0

## 2021-12-15 ASSESSMENT — ANXIETY QUESTIONNAIRES: GAD7 TOTAL SCORE: 0

## 2021-12-17 ENCOUNTER — LAB REQUISITION (OUTPATIENT)
Dept: LAB | Facility: OTHER | Age: 51
End: 2021-12-17

## 2021-12-17 ENCOUNTER — MYC MEDICAL ADVICE (OUTPATIENT)
Dept: FAMILY MEDICINE | Facility: OTHER | Age: 51
End: 2021-12-17
Payer: COMMERCIAL

## 2021-12-17 DIAGNOSIS — Z11.52 ENCOUNTER FOR SCREENING FOR COVID-19: ICD-10-CM

## 2021-12-17 LAB
FLUAV RNA SPEC QL NAA+PROBE: NEGATIVE
FLUBV RNA RESP QL NAA+PROBE: NEGATIVE
RSV RNA SPEC NAA+PROBE: NEGATIVE
SARS-COV-2 RNA RESP QL NAA+PROBE: NEGATIVE

## 2021-12-17 PROCEDURE — 87637 SARSCOV2&INF A&B&RSV AMP PRB: CPT | Performed by: FAMILY MEDICINE

## 2021-12-19 ENCOUNTER — MYC MEDICAL ADVICE (OUTPATIENT)
Dept: FAMILY MEDICINE | Facility: OTHER | Age: 51
End: 2021-12-19
Payer: COMMERCIAL

## 2021-12-19 DIAGNOSIS — R42 DIZZINESS: ICD-10-CM

## 2021-12-19 DIAGNOSIS — I10 BENIGN ESSENTIAL HYPERTENSION: ICD-10-CM

## 2021-12-21 RX ORDER — ONDANSETRON 4 MG/1
4 TABLET, FILM COATED ORAL EVERY 8 HOURS PRN
Qty: 30 TABLET | Refills: 5 | Status: SHIPPED | OUTPATIENT
Start: 2021-12-21 | End: 2023-02-10

## 2021-12-21 RX ORDER — LOSARTAN POTASSIUM 50 MG/1
100 TABLET ORAL DAILY
Qty: 180 TABLET | Refills: 4 | Status: SHIPPED | OUTPATIENT
Start: 2021-12-21 | End: 2023-02-10

## 2021-12-24 ENCOUNTER — HOSPITAL ENCOUNTER (OUTPATIENT)
Dept: MRI IMAGING | Facility: OTHER | Age: 51
Discharge: HOME | End: 2021-12-24
Attending: FAMILY MEDICINE | Admitting: FAMILY MEDICINE
Payer: COMMERCIAL

## 2021-12-24 DIAGNOSIS — R42 DIZZINESS: ICD-10-CM

## 2021-12-24 PROCEDURE — 70553 MRI BRAIN STEM W/O & W/DYE: CPT

## 2021-12-24 PROCEDURE — A9575 INJ GADOTERATE MEGLUMI 0.1ML: HCPCS | Performed by: FAMILY MEDICINE

## 2021-12-24 PROCEDURE — 255N000002 HC RX 255 OP 636: Performed by: FAMILY MEDICINE

## 2021-12-24 RX ADMIN — GADOTERATE MEGLUMINE 17 ML: 376.9 INJECTION INTRAVENOUS at 12:44

## 2022-01-04 DIAGNOSIS — G25.81 RESTLESS LEGS SYNDROME: ICD-10-CM

## 2022-01-06 ENCOUNTER — OFFICE VISIT (OUTPATIENT)
Dept: FAMILY MEDICINE | Facility: OTHER | Age: 52
End: 2022-01-06
Attending: FAMILY MEDICINE
Payer: COMMERCIAL

## 2022-01-06 VITALS
OXYGEN SATURATION: 96 % | DIASTOLIC BLOOD PRESSURE: 74 MMHG | TEMPERATURE: 97 F | HEART RATE: 98 BPM | RESPIRATION RATE: 16 BRPM | HEIGHT: 64 IN | BODY MASS INDEX: 33.12 KG/M2 | WEIGHT: 194 LBS | SYSTOLIC BLOOD PRESSURE: 128 MMHG

## 2022-01-06 DIAGNOSIS — M47.816 FACET ARTHROPATHY, LUMBAR: ICD-10-CM

## 2022-01-06 DIAGNOSIS — M48.061 SPINAL STENOSIS OF LUMBAR REGION, UNSPECIFIED WHETHER NEUROGENIC CLAUDICATION PRESENT: ICD-10-CM

## 2022-01-06 DIAGNOSIS — G89.29 CHRONIC NONINTRACTABLE HEADACHE, UNSPECIFIED HEADACHE TYPE: ICD-10-CM

## 2022-01-06 DIAGNOSIS — G25.81 RESTLESS LEGS SYNDROME: ICD-10-CM

## 2022-01-06 DIAGNOSIS — R42 DIZZINESS: ICD-10-CM

## 2022-01-06 DIAGNOSIS — E78.00 ELEVATED LDL CHOLESTEROL LEVEL: ICD-10-CM

## 2022-01-06 DIAGNOSIS — G44.209 TENSION HEADACHE: ICD-10-CM

## 2022-01-06 DIAGNOSIS — Z12.31 VISIT FOR SCREENING MAMMOGRAM: ICD-10-CM

## 2022-01-06 DIAGNOSIS — R51.9 CHRONIC NONINTRACTABLE HEADACHE, UNSPECIFIED HEADACHE TYPE: ICD-10-CM

## 2022-01-06 DIAGNOSIS — Z13.220 LIPID SCREENING: Primary | ICD-10-CM

## 2022-01-06 LAB
CHOLEST SERPL-MCNC: 224 MG/DL
FASTING STATUS PATIENT QL REPORTED: YES
HDLC SERPL-MCNC: 53 MG/DL (ref 23–92)
LDLC SERPL CALC-MCNC: 147 MG/DL
NONHDLC SERPL-MCNC: 171 MG/DL
TRIGL SERPL-MCNC: 119 MG/DL

## 2022-01-06 PROCEDURE — 99214 OFFICE O/P EST MOD 30 MIN: CPT | Performed by: FAMILY MEDICINE

## 2022-01-06 PROCEDURE — 80061 LIPID PANEL: CPT | Mod: ZL | Performed by: FAMILY MEDICINE

## 2022-01-06 PROCEDURE — 36415 COLL VENOUS BLD VENIPUNCTURE: CPT | Mod: ZL | Performed by: FAMILY MEDICINE

## 2022-01-06 RX ORDER — CYCLOBENZAPRINE HCL 10 MG
TABLET ORAL
Qty: 90 TABLET | Refills: 5 | Status: SHIPPED | OUTPATIENT
Start: 2022-01-06 | End: 2023-02-10

## 2022-01-06 RX ORDER — MECLIZINE HYDROCHLORIDE 25 MG/1
25 TABLET ORAL 3 TIMES DAILY PRN
Qty: 30 TABLET | Refills: 2 | Status: SHIPPED | OUTPATIENT
Start: 2022-01-06 | End: 2022-06-22

## 2022-01-06 RX ORDER — PRAMIPEXOLE DIHYDROCHLORIDE 0.25 MG/1
TABLET ORAL
Qty: 90 TABLET | Refills: 4 | Status: SHIPPED | OUTPATIENT
Start: 2022-01-06 | End: 2023-02-10

## 2022-01-06 RX ORDER — SUMATRIPTAN 50 MG/1
50 TABLET, FILM COATED ORAL
Qty: 20 TABLET | Refills: 2 | Status: SHIPPED | OUTPATIENT
Start: 2022-01-06 | End: 2023-02-10

## 2022-01-06 RX ORDER — PRAMIPEXOLE DIHYDROCHLORIDE 0.25 MG/1
TABLET ORAL
Qty: 90 TABLET | Refills: 2 | OUTPATIENT
Start: 2022-01-06

## 2022-01-06 ASSESSMENT — ANXIETY QUESTIONNAIRES
GAD7 TOTAL SCORE: 3
GAD7 TOTAL SCORE: 3
2. NOT BEING ABLE TO STOP OR CONTROL WORRYING: NOT AT ALL
GAD7 TOTAL SCORE: 3
1. FEELING NERVOUS, ANXIOUS, OR ON EDGE: SEVERAL DAYS
5. BEING SO RESTLESS THAT IT IS HARD TO SIT STILL: NOT AT ALL
3. WORRYING TOO MUCH ABOUT DIFFERENT THINGS: SEVERAL DAYS
7. FEELING AFRAID AS IF SOMETHING AWFUL MIGHT HAPPEN: NOT AT ALL
4. TROUBLE RELAXING: NOT AT ALL
7. FEELING AFRAID AS IF SOMETHING AWFUL MIGHT HAPPEN: NOT AT ALL
6. BECOMING EASILY ANNOYED OR IRRITABLE: SEVERAL DAYS

## 2022-01-06 ASSESSMENT — PATIENT HEALTH QUESTIONNAIRE - PHQ9
10. IF YOU CHECKED OFF ANY PROBLEMS, HOW DIFFICULT HAVE THESE PROBLEMS MADE IT FOR YOU TO DO YOUR WORK, TAKE CARE OF THINGS AT HOME, OR GET ALONG WITH OTHER PEOPLE: NOT DIFFICULT AT ALL
SUM OF ALL RESPONSES TO PHQ QUESTIONS 1-9: 1
SUM OF ALL RESPONSES TO PHQ QUESTIONS 1-9: 1

## 2022-01-06 ASSESSMENT — PAIN SCALES - GENERAL: PAINLEVEL: NO PAIN (0)

## 2022-01-06 ASSESSMENT — MIFFLIN-ST. JEOR: SCORE: 1474.98

## 2022-01-06 NOTE — LETTER
My Asthma Action Plan    Name: Cielo Bahena   YOB: 1970  Date: 1/6/2022   My doctor: Valencia Benavides, DO   My clinic: [facility]        My Rescue Medicine:   Albuterol inhaler (Proair/Ventolin/Proventil HFA)  2-4 puffs EVERY 4 HOURS as needed. Use a spacer if recommended by your provider.   My Asthma Severity:   Intermittent / Exercise Induced  Know your asthma triggers: .             GREEN ZONE   Good Control    I feel good    No cough or wheeze    Can work, sleep and play without asthma symptoms       Take your asthma control medicine every day.     1. If exercise triggers your asthma, take your rescue medication    15 minutes before exercise or sports, and    During exercise if you have asthma symptoms  2. Spacer to use with inhaler: If you have a spacer, make sure to use it with your inhaler             YELLOW ZONE Getting Worse  I have ANY of these:    I do not feel good    Cough or wheeze    Chest feels tight    Wake up at night   1. Keep taking your Green Zone medications  2. Start taking your rescue medicine:    every 20 minutes for up to 1 hour. Then every 4 hours for 24-48 hours.  3. If you stay in the Yellow Zone for more than 12-24 hours, contact your doctor.  4. If you do not return to the Green Zone in 12-24 hours or you get worse, start taking your oral steroid medicine if prescribed by your provider.           RED ZONE Medical Alert - Get Help  I have ANY of these:    I feel awful    Medicine is not helping    Breathing getting harder    Trouble walking or talking    Nose opens wide to breathe       1. Take your rescue medicine NOW  2. If your provider has prescribed an oral steroid medicine, start taking it NOW  3. Call your doctor NOW  4. If you are still in the Red Zone after 20 minutes and you have not reached your doctor:    Take your rescue medicine again and    Call 911 or go to the emergency room right away    See your regular doctor within 2 weeks of  an Emergency Room or Urgent Care visit for follow-up treatment.          Annual Reminders:  Meet with Asthma Educator,  Flu Shot in the Fall, consider Pneumonia Vaccination for patients with asthma (aged 19 and older).    Pharmacy: [facility] - [place], [place] - [facility] [address]    Electronically signed by Valencia Benavides DO   Date: 01/06/22                    Asthma Triggers  How To Control Things That Make Your Asthma Worse    Triggers are things that make your asthma worse.  Look at the list below to help you find your triggers and   what you can do about them. You can help prevent asthma flare-ups by staying away from your triggers.      Trigger                                                          What you can do   Cigarette Smoke  Tobacco smoke can make asthma worse. Do not allow smoking in your home, car or around you.  Be sure no one smokes at a child s day care or school.  If you smoke, ask your health care provider for ways to help you quit.  Ask family members to quit too.  Ask your health care provider for a referral to Quit Plan to help you quit smoking, or call 5-479-485-PLAN.     Colds, Flu, Bronchitis  These are common triggers of asthma. Wash your hands often.  Don t touch your eyes, nose or mouth.  Get a flu shot every year.     Dust Mites  These are tiny bugs that live in cloth or carpet. They are too small to see. Wash sheets and blankets in hot water every week.   Encase pillows and mattress in dust mite proof covers.  Avoid having carpet if you can. If you have carpet, vacuum weekly.   Use a dust mask and HEPA vacuum.   Pollen and Outdoor Mold  Some people are allergic to trees, grass, or weed pollen, or molds. Try to keep your windows closed.  Limit time out doors when pollen count is high.   Ask you health care provider about taking medicine during allergy season.     Animal Dander  Some people are allergic to skin flakes, urine or saliva from pets with fur or feathers.  Keep pets with fur or feathers out of your home.    If you can t keep the pet outdoors, then keep the pet out of your bedroom.  Keep the bedroom door closed.  Keep pets off cloth furniture and away from stuffed toys.     Mice, Rats, and Cockroaches  Some people are allergic to the waste from these pests.   Cover food and garbage.  Clean up spills and food crumbs.  Store grease in the refrigerator.   Keep food out of the bedroom.   Indoor Mold  This can be a trigger if your home has high moisture. Fix leaking faucets, pipes, or other sources of water.   Clean moldy surfaces.  Dehumidify basement if it is damp and smelly.   Smoke, Strong Odors, and Sprays  These can reduce air quality. Stay away from strong odors and sprays, such as perfume, powder, hair spray, paints, smoke incense, paint, cleaning products, candles and new carpet.   Exercise or Sports  Some people with asthma have this trigger. Be active!  Ask your doctor about taking medicine before sports or exercise to prevent symptoms.    Warm up for 5-10 minutes before and after sports or exercise.     Other Triggers of Asthma  Cold air:  Cover your nose and mouth with a scarf.  Sometimes laughing or crying can be a trigger.  Some medicines and food can trigger asthma.

## 2022-01-06 NOTE — TELEPHONE ENCOUNTER
[facility] sent Rx request for the following:      pramipexole 0.25 mg tablet  Sig: TAKE 1 TABLET BY MOUTH AT BEDTIME      Last Prescription Date:   1/6/2022  Last Fill Qty/Refills:         90, R-4        Prescription refused.  This is a duplicate request.  Ok Ellison RN.......1/6/2022 8:41 AM

## 2022-01-06 NOTE — NURSING NOTE
"Chief Complaint   Patient presents with     Physical       Initial /74   Pulse 98   Temp 97  F (36.1  C) (Tympanic)   Resp 16   Ht 1.626 m (5' 4\")   Wt 88 kg (194 lb)   SpO2 96%   BMI 33.30 kg/m   Estimated body mass index is 33.3 kg/m  as calculated from the following:    Height as of this encounter: 1.626 m (5' 4\").    Weight as of this encounter: 88 kg (194 lb).  Medication Reconciliation: complete    Masha Lopez LPN     Advanced Care Directive reviewed.   Information given.  "

## 2022-01-06 NOTE — PROGRESS NOTES
Assessment & Plan     1. Restless legs syndrome  Chronic; stable.  Refilled pramipexole at current dose.  - pramipexole (MIRAPEX) 0.25 MG tablet; TK1 TABLET BY MOUTH AT BEDTIME  Dispense: 90 tablet; Refill: 4    2. Dizziness  Improved overall; refilled meclizine to have available.  MRI/evaluation previously normal.  - meclizine (ANTIVERT) 25 MG tablet; Take 1 tablet (25 mg) by mouth 3 times daily as needed for dizziness  Dispense: 30 tablet; Refill: 2    3. Lipid screening  - Lipid Panel; Future  - Lipid Panel    4. Visit for screening mammogram  - MA Screen Bilateral w/Garry; Future    5. Chronic nonintractable headache, unspecified headache type  Chronic, worsening recently.  Likely tension headache.  Trial of triptan for treatment as she is already taking a muscle relaxer.  - SUMAtriptan (IMITREX) 50 MG tablet; Take 1 tablet (50 mg) by mouth at onset of headache for migraine  Dispense: 20 tablet; Refill: 2    6. Spinal stenosis of lumbar region, unspecified whether neurogenic claudication present  Chronic; stable.  Weight reduction to improve symptoms and long term back health.  Continue cyclobenzaprine as previously ordered.  Would require imaging with worsening.  - cyclobenzaprine (FLEXERIL) 10 MG tablet; TAKE 1 TABLET BY MOUTH THREE TIMES DAILY AS NEEDED FOR MUSCLE SPASMS  Dispense: 90 tablet; Refill: 5    7. Facet arthropathy, lumbar  See #6  - cyclobenzaprine (FLEXERIL) 10 MG tablet; TAKE 1 TABLET BY MOUTH THREE TIMES DAILY AS NEEDED FOR MUSCLE SPASMS  Dispense: 90 tablet; Refill: 5    8. Tension headache  triptan and chiropractor trial.  Recent MRI reassuring.  - Chiropractic Referral; Future    9. Elevated LDL cholesterol level  New.  Rx for crestor 5mg daily; and adjust as tolerated.  Repeat lipid panel 3 months after being on medication.  - rosuvastatin (CRESTOR) 5 MG tablet; Take 1 tablet (5 mg) by mouth daily  Dispense: 90 tablet; Refill: 1  - Lipid Panel; Future    [facility]    Subjective   Cielo is a 51 year old who presents for the following health issues:    History of Present Illness       Hyperlipidemia:  She presents for follow up of hyperlipidemia.  She is not taking medication to lower cholesterol. She is not having myalgia or other side effects to statin medications.    Migraines:   Since the patient's last clinic visit, headaches are: worsened  The patient is getting headaches:  3 to4 times a week  She is not able to do normal daily activities when she has a migraine.  The patient is taking the following rescue/relief medications:  Ibuprofen (Advil, Motrin) and Tylenol   Patient states \"I get some relief\" from the rescue/relief medications.   The patient is taking the following medications to prevent migraines:  No medications to prevent migraines  In the past 4 weeks, the patient has gone to an Urgent Care or Emergency Room 0 times times due to headaches.    She eats 0-1 servings of fruits and vegetables daily.She consumes 0 sweetened beverage(s) daily.She exercises with enough effort to increase her heart rate 9 or less minutes per day.  She exercises with enough effort to increase her heart rate 3 or less days per week.   She is taking medications regularly.     1. Dizziness: improved.    2. BP: improved.    3. Foot pain: mid foot.  Worse as she is on her feet longer.      Review of Systems   CONSTITUTIONAL: NEGATIVE for fever, chills, change in weight  ENT/MOUTH: NEGATIVE for ear, mouth and throat problems  RESP: NEGATIVE for significant cough or SOB  CV: NEGATIVE for chest pain, palpitations or peripheral edema      Objective    /74   Pulse 98   Temp 97  F (36.1  C) (Tympanic)   Resp 16   Ht 1.626 m (5' 4\")   Wt 88 kg (194 lb)   SpO2 96%   BMI 33.30 kg/m    Body mass index is 33.3 kg/m .  Physical Exam   GENERAL: healthy, alert and no distress  NECK: no adenopathy, no asymmetry, masses, or scars and thyroid normal to palpation  RESP: " lungs clear to auscultation - no rales, rhonchi or wheezes  CV: regular rate and rhythm, normal S1 S2, no S3 or S4, no murmur, click or rub, no peripheral edema and peripheral pulses strong  ABDOMEN: soft, nontender, no hepatosplenomegaly, no masses and bowel sounds normal  MS: no gross musculoskeletal defects noted, no edema  NEURO: Normal strength and tone, mentation intact and speech normal  PSYCH: mentation appears normal, affect normal/bright  Diabetic foot exam: normal DP and PT pulses, no trophic changes or ulcerative lesions, normal sensory exam and bunions B/L.  TTP at base of 1st MTP.    Results for orders placed or performed in visit on 01/06/22   Lipid Panel     Status: Abnormal   Result Value Ref Range    Cholesterol 224 (H) <200 mg/dL    Triglycerides 119 <150 mg/dL    Direct Measure HDL 53 23 - 92 mg/dL    LDL Cholesterol Calculated 147 (H) <=100 mg/dL    Non HDL Cholesterol 171 (H) <130 mg/dL    Patient Fasting > 8hrs? Yes     Narrative    Cholesterol  Desirable:  <200 mg/dL    Triglycerides  Normal:  Less than 150 mg/dL  Borderline High:  150-199 mg/dL  High:  200-499 mg/dL  Very High:  Greater than or equal to 500 mg/dL    Direct Measure HDL  Female:  Greater than or equal to 50 mg/dL   Male:  Greater than or equal to 40 mg/dL    LDL Cholesterol  Desirable:  <100mg/dL  Above Desirable:  100-129 mg/dL   Borderline High:  130-159 mg/dL   High:  160-189 mg/dL   Very High:  >= 190 mg/dL    Non HDL Cholesterol  Desirable:  130 mg/dL  Above Desirable:  130-159 mg/dL  Borderline High:  160-189 mg/dL  High:  190-219 mg/dL  Very High:  Greater than or equal to 220 mg/dL

## 2022-01-06 NOTE — PATIENT INSTRUCTIONS
"Voltaren Gel 1% - use to feet up to 4x/day.    Patient Education     Treating Metatarsalgia    Metatarsalgia is pain in the \"ball of your foot,\" the area between your arch and your toes. The pain usually starts in one or more of the five bones in this area under the toes. These bones are called the metatarsals. Often, a callus builds up in this area. In most cases, wearing low-heeled, well-cushioned shoes and filing down the callus will ease the pain. If these steps don t work, you may need to do exercises or have surgery to remove part of the bone. To take pressure off the ball of your foot and ease the pain, your healthcare provider may suggest that you do one or more of the following.  Wear shoes with padded soles  Wear shoes with thick padding in the soles. Keep heels low. Make sure the shoe is wide across the ball of your foot and your toes.  Using a metatarsal pad  Put a metatarsal pad in your shoe. This lifts and takes pressure off the painful area. To insert the pad:    Put a small lipstick eliezer on the callus.    Step into the shoe to leave a eliezer on the insole.    Peel the backing off the pad. Then put the pad in the shoe just behind the lipstick eliezer.  You may also be able to find inserts with built-in metatarsal pads.  Filing the callus  1. Soak your foot in warm water for a few minutes to soften the skin.  2. Dry the foot.  3. Gently rub the callus with a pumice stone or nail file to remove the hard skin. Stop if bleeding or pain occurs.  If you have diabetes, see your diabetes healthcare provider for foot care.  [employer] last reviewed this educational content on 1/1/2018 2000-2021 [employer]. All rights reserved. This information is not intended as a substitute for professional medical care. Always follow your healthcare professional's instructions.           "

## 2022-01-07 ENCOUNTER — MYC MEDICAL ADVICE (OUTPATIENT)
Dept: FAMILY MEDICINE | Facility: OTHER | Age: 52
End: 2022-01-07
Payer: COMMERCIAL

## 2022-01-07 RX ORDER — ROSUVASTATIN CALCIUM 5 MG/1
5 TABLET, COATED ORAL DAILY
Qty: 90 TABLET | Refills: 1 | Status: SHIPPED | OUTPATIENT
Start: 2022-01-07 | End: 2022-06-22

## 2022-01-07 ASSESSMENT — PATIENT HEALTH QUESTIONNAIRE - PHQ9: SUM OF ALL RESPONSES TO PHQ QUESTIONS 1-9: 1

## 2022-01-07 ASSESSMENT — ASTHMA QUESTIONNAIRES: ACT_TOTALSCORE: 20

## 2022-01-07 ASSESSMENT — ANXIETY QUESTIONNAIRES: GAD7 TOTAL SCORE: 3

## 2022-01-13 ENCOUNTER — THERAPY VISIT (OUTPATIENT)
Dept: CHIROPRACTIC MEDICINE | Facility: OTHER | Age: 52
End: 2022-01-13
Attending: CHIROPRACTOR
Payer: COMMERCIAL

## 2022-01-13 VITALS
RESPIRATION RATE: 16 BRPM | OXYGEN SATURATION: 95 % | SYSTOLIC BLOOD PRESSURE: 124 MMHG | DIASTOLIC BLOOD PRESSURE: 84 MMHG | HEART RATE: 88 BPM | TEMPERATURE: 97.1 F

## 2022-01-13 DIAGNOSIS — R42 DIZZINESS: ICD-10-CM

## 2022-01-13 DIAGNOSIS — G44.209 TENSION HEADACHE: ICD-10-CM

## 2022-01-13 DIAGNOSIS — M99.01 SEGMENTAL AND SOMATIC DYSFUNCTION OF CERVICAL REGION: Primary | ICD-10-CM

## 2022-01-13 PROCEDURE — 99213 OFFICE O/P EST LOW 20 MIN: CPT | Mod: 25 | Performed by: CHIROPRACTOR

## 2022-01-13 PROCEDURE — 98940 CHIROPRACT MANJ 1-2 REGIONS: CPT | Mod: AT | Performed by: CHIROPRACTOR

## 2022-01-13 NOTE — PROGRESS NOTES
Having headaches which started off and on for last 4 months. Rates 4/10 W24 8/10. Frequent dull-pressure-throbbing-feeling dizzy. Using Tylenol and Ibuprofen with some decrease in pain.  Bijal Painting on 1/13/2022 at 7:10 AM    Visit #:  1/4-6    Subjective:  Cielo Bahena is a 51 year old female who is seen for:        Segmental and somatic dysfunction of cervical region  Tension headache  Dizziness.     Since last visit on Visit date not found,  Cielo Bahena reports:4 months, all other tests benign, Dr. Benavides suggested chiro. Last episode was before [place].  At a Dr appt and noticed dizzy, comes goes but seems to worsen with each episode. Possible vertigo in past. No radiations. No visual disturbance, no tinnitus. HA location seems to change locations. Primarily occiput and frontal.    Area of chief complaint:  Headache :  Symptoms are graded at 4-8/10. The quality is described as dull-pressure-throbbing-feeling dizzy.     Objective:  The following was observed:  /84 (BP Location: Right arm, Patient Position: Sitting)   Pulse 88   Temp 97.1  F (36.2  C) (Tympanic)   Resp 16   SpO2 95%      Neck Disability Index (  Dylon H. and Hagino C. 1991. All rights reserved.; used with permission) 1/13/2022   SECTION 1 - PAIN INTENSITY 2   SECTION 2 - PERSONAL CARE 0   SECTION 3 - LIFTING 1   SECTION 4 - READING 1   SECTION 5 - HEADACHES 3   SECTION 6 - CONCENTRATION 1   SECTION 7 - WORK 1   SECTION 8 - DRIVING 1   SECTION 9 - SLEEPING 1   SECTION 10 - RECREATION 2   Count 10   Sum 13   Raw Score: /50 13   Neck Disability Index Score: (%) 26       Cervical AROM: right lateral flexion restriction, all other AROM appears WNL    Cervical compression:-  Cervical distraction:-  Hallpike:-    P: palpatory tenderness suboccipital ridge L>>R:    A: static palpation demonstrates intersegmental asymmetry , cervical  R: motion palpation notes restricted motion, C1  and C2   T: muscle spasm at level(s): left  suboccipital:      Segmental spinal dysfunction/restrictions found at:  :  C1 Left lateral flexion restricted and Extension restriction  C2 Right lateral flexion restricted and Extension restriction.      Assessment: Evidence of segmental/somatic dysfunction present.  Given patient's presentation is possible that headaches are cervicogenic in nature.  Assessment of patient for possible BPPV possible but unlikely.  This should still be considered if patient's symptoms fail to improve.  Chiropractic care does seem appropriate.  Plan of care to include 4-6 visits in the next 4 weeks barring acute exacerbation.  Consideration should be given to referring patient to physical therapy if BPPV symptoms resurface or if headache symptoms fail to improve during course of treatment.    Diagnoses:      1. Segmental and somatic dysfunction of cervical region    2. Tension headache    3. Dizziness        Patient's condition:  Patient had restrictions pre-manipulation    Treatment effectiveness:  Post manipulation there is better intersegmental movement and Patient claims to feel looser post manipulation      Procedures:  E/M 76337    CMT:  15454 Chiropractic manipulative treatment 1-2 regions performed   Cervical: Diversified, C1 , C2, Supine    Modalities:  None performed this visit    Therapeutic procedures:  None    Response to Treatment  Reduction in symptoms as reported by patient    Prognosis: Good    Goals: reduce headache frequency by 50%  Reduce headache intensity to 1-2 on pain scale  Improve cervical AROM     Recommendations:    Instructions:ice 20 minutes every other hour as needed and heat 15 minutes every other hour as needed    Follow-up:  Return to care in 5 days.

## 2022-01-18 ENCOUNTER — THERAPY VISIT (OUTPATIENT)
Dept: CHIROPRACTIC MEDICINE | Facility: OTHER | Age: 52
End: 2022-01-18
Attending: CHIROPRACTOR
Payer: COMMERCIAL

## 2022-01-18 VITALS
TEMPERATURE: 97.6 F | OXYGEN SATURATION: 96 % | RESPIRATION RATE: 16 BRPM | SYSTOLIC BLOOD PRESSURE: 130 MMHG | DIASTOLIC BLOOD PRESSURE: 94 MMHG | HEART RATE: 86 BPM

## 2022-01-18 DIAGNOSIS — M99.01 SEGMENTAL AND SOMATIC DYSFUNCTION OF CERVICAL REGION: Primary | ICD-10-CM

## 2022-01-18 DIAGNOSIS — G44.209 TENSION HEADACHE: ICD-10-CM

## 2022-01-18 DIAGNOSIS — M54.2 CERVICALGIA: ICD-10-CM

## 2022-01-18 PROCEDURE — 98940 CHIROPRACT MANJ 1-2 REGIONS: CPT | Mod: AT | Performed by: CHIROPRACTOR

## 2022-01-18 NOTE — PROGRESS NOTES
Headaches rates 2/10 W24 2/10. Intermittent dull and pressure. Using Ibuprofen and Tylenol with a little decrease in pain.   Bijal Blackguillermo on 1/18/2022 at 7:09 AM    Visit #:  2    Subjective:  Cielo Bahena is a 51 year old female who is seen in f/u up for:        Segmental and somatic dysfunction of cervical region  Cervicalgia  Tension headache.     Since last visit on 1/13/2022,  Cielo Bahena reports: Symptoms showing quite a bit of improvement since last visit.  States that she has not been dizzy and headaches have also been decreasing in overall intensity and frequency.  Still having some residual neck pain.    Area of chief complaint:  Neck/headaches :  Symptoms are graded at 2/10. The quality is described as dull, pressure.       Objective:  The following was observed:  BP (!) 130/94 (BP Location: Right arm, Patient Position: Sitting)   Pulse 86   Temp 97.6  F (36.4  C) (Tympanic)   Resp 16   SpO2 96%      P: palpatory tenderness Left side C2:    A: static palpation demonstrates intersegmental asymmetry , cervical  R: motion palpation notes restricted motion, C2   T: muscle spasm at level(s): Mild cervical paraspinal musculature on the left:      Segmental spinal dysfunction/restrictions found at:  :  C2 Right rotation restricted, Left lateral flexion restricted and Extension restriction.      Assessment: Symptoms showing quite a bit of improvement since last visit.  Recommend at least 1 more visit before transitioning patient to as needed basis.  This will be determined upon next patient visit however.    Diagnoses:      1. Segmental and somatic dysfunction of cervical region    2. Cervicalgia    3. Tension headache        Patient's condition:  Patient had restrictions pre-manipulation and Patient symptoms are gradually improving    Treatment effectiveness:  Post manipulation there is better intersegmental movement, Patient claims to feel looser post manipulation, Patients symptoms are getting  better, Tenderness is reducing and Muscle spasm is reducing      Procedures:  CMT:  45098 Chiropractic manipulative treatment 1-2 regions performed   Cervical: Diversified, C2, Supine    Modalities:  None performed this visit    Therapeutic procedures:  None    Response to Treatment  Reduction in symptoms as reported by patient    Prognosis: Good    Progress towards Goals: Patient is making progress towards the goal.   reduce headache frequency by 50%  Reduce headache intensity to 1-2 on pain scale  Improve cervical AROM      Recommendations:    Instructions:Monitor symptoms and perform activities as tolerated    Follow-up:  Return to care in 3 days.

## 2022-01-26 ENCOUNTER — THERAPY VISIT (OUTPATIENT)
Dept: CHIROPRACTIC MEDICINE | Facility: OTHER | Age: 52
End: 2022-01-26
Attending: CHIROPRACTOR
Payer: COMMERCIAL

## 2022-01-26 VITALS
TEMPERATURE: 96.6 F | OXYGEN SATURATION: 94 % | RESPIRATION RATE: 18 BRPM | HEART RATE: 80 BPM | DIASTOLIC BLOOD PRESSURE: 88 MMHG | SYSTOLIC BLOOD PRESSURE: 122 MMHG

## 2022-01-26 DIAGNOSIS — G44.209 TENSION HEADACHE: ICD-10-CM

## 2022-01-26 DIAGNOSIS — M99.01 SEGMENTAL AND SOMATIC DYSFUNCTION OF CERVICAL REGION: Primary | ICD-10-CM

## 2022-01-26 PROCEDURE — 98940 CHIROPRACT MANJ 1-2 REGIONS: CPT | Mod: AT | Performed by: CHIROPRACTOR

## 2022-01-26 NOTE — PROGRESS NOTES
Headaches rates 3/10 W24 3/10. Intermittent dull with some dizziness. Using Ibuprofen with some relief.  Bijal Mert on 1/26/2022 at 7:09 AM    Visit #:  3    Subjective:  Cielo Bahena is a 51 year old female who is seen in f/u up for:        Segmental and somatic dysfunction of cervical region  Tension headache.     Since last visit on 1/18/2022,  Cielo Bahena reports: unable to follow up last Friday due to having to care for a sick child. Finds relief for approximately a few days post treatment.  Overall patient does feel that she has made progress with course of care.    Area of chief complaint:  Cervical :  Symptoms are graded at 3/10. The quality is described as dull.      (DVPRS) Pain Rating Score : Sometimes distracts me (W24 3/10) (01/26/22 0702)     Objective:  The following was observed:  /88 (BP Location: Right arm, Patient Position: Sitting)   Pulse 80   Temp (!) 96.6  F (35.9  C) (Tympanic)   Resp 18   SpO2 94%      P: palpatory tendernessC1 on left:    A: static palpation demonstrates intersegmental asymmetry , cervical  R: intersegmental range of motion restriction, C1   T: muscle spasm at level(s): Sub-occipital L>>R    Segmental spinal dysfunction/restrictions found at:  :  C1 Right rotation restricted and Left lateral flexion restricted.      Assessment: Restriction of the upper cervical spine is improving.  Residual muscle spasms are still elevated however.  Plan 1 additional visit at this time.    Diagnoses:      1. Segmental and somatic dysfunction of cervical region    2. Tension headache        Patient's condition:  Patient had restrictions pre-manipulation and Patient symptoms are gradually improving    Treatment effectiveness:  Post manipulation there is better intersegmental movement, Patient claims to feel looser post manipulation, Symptoms appear to be decreasing and Tenderness is decreasing      Procedures:  CMT:  89413 Chiropractic manipulative treatment 1-2 regions  performed   Cervical: Diversified, C1 , Supine    Modalities:  28019: MSTM:  To Sub-occipital  for 2 min    Therapeutic procedures:  Suboccipital self release  Oblique cervical stretching    Response to Treatment  Reduction in symptoms as reported by patient    Prognosis: Good    Progress towards Goals: Patient is making progress towards the goal.   reduce headache frequency by 50%  Reduce headache intensity to 1-2 on pain scale  Improve cervical AROM        Recommendations:    Instructions:stretch as instructed at visit    Follow-up:  Return to care in 5 days.

## 2022-01-29 ENCOUNTER — HEALTH MAINTENANCE LETTER (OUTPATIENT)
Age: 52
End: 2022-01-29

## 2022-01-31 ENCOUNTER — THERAPY VISIT (OUTPATIENT)
Dept: CHIROPRACTIC MEDICINE | Facility: OTHER | Age: 52
End: 2022-01-31
Attending: CHIROPRACTOR
Payer: COMMERCIAL

## 2022-01-31 VITALS
RESPIRATION RATE: 16 BRPM | SYSTOLIC BLOOD PRESSURE: 132 MMHG | HEART RATE: 78 BPM | OXYGEN SATURATION: 98 % | DIASTOLIC BLOOD PRESSURE: 94 MMHG | TEMPERATURE: 96.3 F

## 2022-01-31 DIAGNOSIS — M54.2 CERVICALGIA: ICD-10-CM

## 2022-01-31 DIAGNOSIS — R42 DIZZINESS: ICD-10-CM

## 2022-01-31 DIAGNOSIS — G44.209 TENSION HEADACHE: Primary | ICD-10-CM

## 2022-01-31 PROCEDURE — 99212 OFFICE O/P EST SF 10 MIN: CPT | Performed by: CHIROPRACTOR

## 2022-01-31 NOTE — PROGRESS NOTES
Headaches. Rates 0/10 W24 0/10. Occassionally dull. Taking Ibuprofen with a little relief.  Bijal Painting on 1/31/2022 at 7:09 AM  Visit #:  4    Subjective:  Cielo Bahena is a 51 year old female who is seen in f/u up for:        Tension headache  Cervicalgia  Dizziness.     Since last visit on 1/26/2022,  Cielo Bahena reports: fell in sons room, tripped and landed on knees. Didn't seem to aggravate neck/headache symptoms. Thinks she may have jonh her neck.    (DVPRS) Pain Rating Score : No pain (W24 0/10) (01/31/22 0702)     Objective:  The following was observed:  BP (!) 132/94 (BP Location: Right arm, Patient Position: Sitting)   Pulse 78   Temp (!) 96.3  F (35.7  C) (Tympanic)   Resp 16   SpO2 98%      P: palpatory tenderness none reported:    A: asymmetric joints, none apparent  R: motion palpation notes restricted motion, none apparent  T: no palpable spasms, equal hypertonicity of the cervical paraspinals    Segmental spinal dysfunction/restrictions found at:  None apparent.      Assessment: Patient appears to be nearing or is at MMI with current episode of care. Patient may still benefit from 1-2 additional visits within the next couple weeks. No segmental/somatic dysfunction apparent today.    Diagnoses:      1. Tension headache    2. Cervicalgia    3. Dizziness        Patient's condition:  Patient is noticing a decrease in their symptoms    Treatment effectiveness:  Post manipulation the patient improves and then feels more restricted motion over time, Tenderness is decreasing, Muscle spasm is reducing, Range of motion is gradually improving and ADL are improving      Procedures:  CMT:  None performed    Modalities:  None performed this visit    Therapeutic procedures:  None    Response to Treatment  Patient has responded favorably with course of care    Prognosis: Excellent    Progress towards Goals: Patient has met the goal.   Reduce headache frequency by 50%  Reduce headache intensity to 1-2  on pain scale  Improve cervical AROM        Recommendations:    Instructions:monitor symptoms, utilize tennis ball massage as discussed    Follow-up:  Return to care if symptoms persist.

## 2022-02-15 ENCOUNTER — THERAPY VISIT (OUTPATIENT)
Dept: CHIROPRACTIC MEDICINE | Facility: OTHER | Age: 52
End: 2022-02-15
Attending: CHIROPRACTOR
Payer: COMMERCIAL

## 2022-02-15 VITALS
SYSTOLIC BLOOD PRESSURE: 120 MMHG | RESPIRATION RATE: 16 BRPM | HEART RATE: 69 BPM | OXYGEN SATURATION: 97 % | TEMPERATURE: 96.7 F | DIASTOLIC BLOOD PRESSURE: 82 MMHG

## 2022-02-15 DIAGNOSIS — M99.01 SEGMENTAL AND SOMATIC DYSFUNCTION OF CERVICAL REGION: ICD-10-CM

## 2022-02-15 DIAGNOSIS — G44.209 TENSION HEADACHE: Primary | ICD-10-CM

## 2022-02-15 PROCEDURE — 98940 CHIROPRACT MANJ 1-2 REGIONS: CPT | Mod: AT | Performed by: CHIROPRACTOR

## 2022-02-15 NOTE — PROGRESS NOTES
Headaches with dizziness. Occasionally dull. Using Ibuprofen with some relief. Rates 0/10 W24 2/10.   Sang Gonsales on 2/15/2022 at 9:14 AM    Visit #:  5    Subjective:  Cielo Bahena is a 51 year old female who is seen in f/u up for:        Tension headache  Segmental and somatic dysfunction of cervical region.     Since last visit on 1/31/2022,  Cielo Bahena reports: last Thursday having lunch turned head to the right and was instantly dizzy. Symptoms improving but did have one other dizziness symptom over the weekend while looking up to clean ceilings      (DVPRS) Pain Rating Score : No pain (W24 2/10) (02/15/22 0902)     Objective:  The following was observed:  /82 (BP Location: Right arm, Patient Position: Sitting)   Pulse 69   Temp (!) 96.7  F (35.9  C) (Tympanic)   Resp 16   SpO2 97%      P: palpatory tenderness Left side C1:    A: static palpation demonstrates intersegmental asymmetry , cervical  R: motion palpation notes restricted motion, C1   T: muscle spasm at level(s): Left suboccipitals:      Segmental spinal dysfunction/restrictions found at:  :  C1 Right rotation restricted and Left lateral flexion restricted.      Assessment: Mild acute flareup of headache/dizziness symptoms.  This was expected to occur as noted on past patient encounter.  Patient may still benefit from flareup care which will be done on as needed basis.    Diagnoses:      1. Tension headache    2. Segmental and somatic dysfunction of cervical region        Patient's condition:  Patient had restrictions pre-manipulation    Treatment effectiveness:  Post manipulation there is better intersegmental movement and Patient claims to feel looser post manipulation      Procedures:  CMT:  54426 Chiropractic manipulative treatment 1-2 regions performed   Cervical: Diversified, C1 , Supine    Modalities:  None performed this visit    Therapeutic procedures:  None    Response to Treatment  Reduction in symptoms as reported by  patient    Prognosis: Excellent    Progress towards Goals: mild acute flare on 2/15/22  Reduce headache frequency by 50%  Reduce headache intensity to 1-2 on pain scale  Improve cervical AROM     Recommendations:    Instructions:monitor symptoms and perform activities as tolerated    Follow-up:  Continue treatment PRN.

## 2022-02-28 DIAGNOSIS — G47.00 INSOMNIA, UNSPECIFIED TYPE: ICD-10-CM

## 2022-02-28 DIAGNOSIS — M54.42 ACUTE LEFT-SIDED LOW BACK PAIN WITH LEFT-SIDED SCIATICA: ICD-10-CM

## 2022-03-01 RX ORDER — TRAZODONE HYDROCHLORIDE 50 MG/1
50 TABLET, FILM COATED ORAL AT BEDTIME
Qty: 90 TABLET | Refills: 3 | Status: SHIPPED | OUTPATIENT
Start: 2022-03-01 | End: 2023-02-10

## 2022-03-01 RX ORDER — IBUPROFEN 800 MG/1
TABLET, FILM COATED ORAL
Qty: 50 TABLET | Refills: 3 | Status: SHIPPED | OUTPATIENT
Start: 2022-03-01 | End: 2022-07-25

## 2022-03-01 NOTE — TELEPHONE ENCOUNTER
"Routing to provider as directed. Vivian Agosto RN on 3/1/2022 at 1:06 PM    ibuprofen (ADVIL/MOTRIN) 800 MG  Last Prescription Date: 11/3/21  Last Qty/Refills: 50 / 3    traZODone (DESYREL) 50 MG   Last Prescription Date: 9/7/21  Last Qty/Refills: 90 / 1    Last Office Visit: 1/6/22 Makayla  Future Office Visit: 4/22/22     Requested Prescriptions   Pending Prescriptions Disp Refills     ibuprofen (ADVIL/MOTRIN) 800 MG tablet [Pharmacy Med Name: ibuprofen 800 mg tablet] 50 tablet 3     Sig: TAKE 1 TABLET (800 MG) BY MOUTH EVERY 6 HOURS AS NEEDED FOR MODERATE PAIN       NSAID Medications Passed - 2/28/2022  8:08 AM        Passed - Blood pressure under 140/90 in past 12 months     BP Readings from Last 3 Encounters:   02/15/22 120/82   01/31/22 (!) 132/94   01/26/22 122/88                 Passed - Normal ALT on file in past 12 months     Recent Labs   Lab Test 12/14/21  1000 12/11/18  1050 11/21/17  1729   ALT 19   < >  --    GICHALT  --   --  20    < > = values in this interval not displayed.             Passed - Normal AST on file in past 12 months     Recent Labs   Lab Test 12/14/21  1000 12/11/18  1050 11/21/17  1729   AST 18   < >  --    GICHAST  --   --  16    < > = values in this interval not displayed.             Passed - Recent (12 mo) or future (30 days) visit within the authorizing provider's specialty     Patient has had an office visit with the authorizing provider or a provider within the authorizing providers department within the previous 12 mos or has a future within next 30 days. See \"Patient Info\" tab in inbasket, or \"Choose Columns\" in Meds & Orders section of the refill encounter.              Passed - Patient is age 6-64 years        Passed - Normal CBC on file in past 12 months     Recent Labs   Lab Test 12/14/21  1024 12/11/18  1050 07/01/17  0917   WBC 7.3   < >  --    GICHWBC  --   --  8.3   RBC 4.83   < >  --    GICHRBC  --   --  4.98   HGB 14.6   < > 15.4   HCT 43.2   < > 43.4      " "< > 223    < > = values in this interval not displayed.                 Passed - Medication is active on med list        Passed - No active pregnancy on record        Passed - Normal serum creatinine on file in past 12 months     Recent Labs   Lab Test 12/14/21  1000   CR 0.87       Ok to refill medication if creatinine is low          Passed - No positive pregnancy test in past 12 months           traZODone (DESYREL) 50 MG tablet [Pharmacy Med Name: trazodone 50 mg tablet] 90 tablet 3     Sig: Take 1 tablet (50 mg) by mouth At Bedtime       Serotonin Modulators Passed - 2/28/2022  8:08 AM        Passed - Recent (12 mo) or future (30 days) visit within the authorizing provider's specialty     Patient has had an office visit with the authorizing provider or a provider within the authorizing providers department within the previous 12 mos or has a future within next 30 days. See \"Patient Info\" tab in inbasket, or \"Choose Columns\" in Meds & Orders section of the refill encounter.              Passed - Medication is active on med list        Passed - Patient is age 18 or older        Passed - No active pregnancy on record        Passed - No positive pregnancy test in past 12 months                                "

## 2022-03-26 ENCOUNTER — HEALTH MAINTENANCE LETTER (OUTPATIENT)
Age: 52
End: 2022-03-26

## 2022-03-28 DIAGNOSIS — K21.9 GASTROESOPHAGEAL REFLUX DISEASE, UNSPECIFIED WHETHER ESOPHAGITIS PRESENT: ICD-10-CM

## 2022-03-28 NOTE — TELEPHONE ENCOUNTER
"Requested Prescriptions   Pending Prescriptions Disp Refills     esomeprazole (NEXIUM) 20 MG DR capsule [Pharmacy Med Name: esomeprazole magnesium 20 mg capsule,delayed release] 90 capsule 4     Sig: Take 1 capsule (20 mg) by mouth every morning (before breakfast) Take 30-60 minutes before eating.       PPI Protocol Passed - 3/28/2022  8:07 AM        Passed - Not on Clopidogrel (unless Pantoprazole ordered)        Passed - No diagnosis of osteoporosis on record        Passed - Recent (12 mo) or future (30 days) visit within the authorizing provider's specialty     Patient has had an office visit with the authorizing provider or a provider within the authorizing providers department within the previous 12 mos or has a future within next 30 days. See \"Patient Info\" tab in inbasket, or \"Choose Columns\" in Meds & Orders section of the refill encounter.              Passed - Medication is active on med list        Passed - Patient is age 18 or older        Passed - No active pregnacy on record        Passed - No positive pregnancy test in past 12 months     Last Written Prescription Date:  3/18/21  Last Fill Quantity: 90,   # refills: 4  Last Office Visit: 1/6/22 Makayla  Future Office visit:    Next 5 appointments (look out 90 days)    Apr 22, 2022  3:40 PM  Gold Tam with Valencia Benavides,   [facility] ([facility] ) [address]  [place] [address]  [phone]        Routing refill request to provider for review/approval because:  Routed to provider for review and consideration.  Brenda J. Goodell, RN on 3/28/2022 at 3:41 PM      "

## 2022-03-29 ENCOUNTER — LAB REQUISITION (OUTPATIENT)
Dept: LAB | Facility: OTHER | Age: 52
End: 2022-03-29

## 2022-03-29 DIAGNOSIS — Z11.52 ENCOUNTER FOR SCREENING FOR COVID-19: ICD-10-CM

## 2022-03-29 PROCEDURE — 87637 SARSCOV2&INF A&B&RSV AMP PRB: CPT | Performed by: FAMILY MEDICINE

## 2022-04-06 ENCOUNTER — PATIENT OUTREACH (OUTPATIENT)
Dept: FAMILY MEDICINE | Facility: OTHER | Age: 52
End: 2022-04-06
Payer: COMMERCIAL

## 2022-04-06 DIAGNOSIS — J45.40 MODERATE PERSISTENT ASTHMA WITHOUT COMPLICATION: ICD-10-CM

## 2022-04-06 RX ORDER — ALBUTEROL SULFATE 90 UG/1
2 AEROSOL, METERED RESPIRATORY (INHALATION) 4 TIMES DAILY
Qty: 54 G | Refills: 3 | Status: SHIPPED | OUTPATIENT
Start: 2022-04-06 | End: 2023-02-22

## 2022-04-06 NOTE — TELEPHONE ENCOUNTER
"Valencia Benavides  ACT sent via [employer].  Vivian Agosto RN on 4/6/2022 at 1:58 PM    Last Prescription Date: 11/3/21  Last Qty/Refills: 54 g / 1  Last Office Visit: 1/6/22 PCP  Future Office Visit: 4/22/22 PCP     Requested Prescriptions   Pending Prescriptions Disp Refills     VENTOLIN  (90 Base) MCG/ACT inhaler [Pharmacy Med Name: Ventolin HFA 90 mcg/actuation aerosol inhaler] 54 g 1     Sig: inhale 2 PUFFS into THE lungs 4 TIMES DAILY       Asthma Maintenance Inhalers - Anticholinergics Passed - 4/6/2022  8:03 AM        Passed - Patient is age 12 years or older        Passed - Asthma control assessment score within normal limits in last 6 months     Please review ACT score.           Passed - Medication is active on med list        Passed - Recent (6 mo) or future (30 days) visit within the authorizing provider's specialty     Patient had office visit in the last 6 months or has a visit in the next 30 days with authorizing provider or within the authorizing provider's specialty.  See \"Patient Info\" tab in inbasket, or \"Choose Columns\" in Meds & Orders section of the refill encounter.           Short-Acting Beta Agonist Inhalers Protocol  Passed - 4/6/2022  8:03 AM        Passed - Patient is age 12 or older        Passed - Asthma control assessment score within normal limits in last 6 months     Please review ACT score.           Passed - Medication is active on med list        Passed - Recent (6 mo) or future (30 days) visit within the authorizing provider's specialty     Patient had office visit in the last 6 months or has a visit in the next 30 days with authorizing provider or within the authorizing provider's specialty.  See \"Patient Info\" tab in inbasket, or \"Choose Columns\" in Meds & Orders section of the refill encounter.                 "

## 2022-04-06 NOTE — TELEPHONE ENCOUNTER
Patient Quality Outreach    Patient is due for the following:   Asthma  -  ACT needed    NEXT STEPS:   Patient has upcoming appointment, these items will be addressed at that time.    Type of outreach:    Sent Social[employer]305 message.      Questions for provider review:    None     Vivian Agosto RN

## 2022-04-12 ENCOUNTER — THERAPY VISIT (OUTPATIENT)
Dept: CHIROPRACTIC MEDICINE | Facility: OTHER | Age: 52
End: 2022-04-12
Attending: CHIROPRACTOR
Payer: COMMERCIAL

## 2022-04-12 VITALS
DIASTOLIC BLOOD PRESSURE: 86 MMHG | SYSTOLIC BLOOD PRESSURE: 124 MMHG | TEMPERATURE: 96.9 F | RESPIRATION RATE: 16 BRPM | OXYGEN SATURATION: 98 % | HEART RATE: 97 BPM

## 2022-04-12 DIAGNOSIS — G44.209 TENSION HEADACHE: ICD-10-CM

## 2022-04-12 DIAGNOSIS — M99.01 SEGMENTAL AND SOMATIC DYSFUNCTION OF CERVICAL REGION: Primary | ICD-10-CM

## 2022-04-12 DIAGNOSIS — M99.02 SEGMENTAL AND SOMATIC DYSFUNCTION OF THORACIC REGION: ICD-10-CM

## 2022-04-12 DIAGNOSIS — M54.2 CERVICALGIA: ICD-10-CM

## 2022-04-12 PROCEDURE — 99212 OFFICE O/P EST SF 10 MIN: CPT | Mod: 25 | Performed by: CHIROPRACTOR

## 2022-04-12 PROCEDURE — 98940 CHIROPRACT MANJ 1-2 REGIONS: CPT | Mod: AT | Performed by: CHIROPRACTOR

## 2022-04-12 NOTE — PROGRESS NOTES
Ongoing headaches rates 1/10 W24 3/10. Occasional dull headaches. Using Tylenol with relief.  Bijal Painting on 4/12/2022 at 7:07 AM    Reviewed by EW    Visit #:  6 PRN  Re-evaluation    Subjective:  Cielo Bahena is a 51 year old female who is seen in f/u up for:        Segmental and somatic dysfunction of cervical region  Segmental and somatic dysfunction of thoracic region  Tension headache  Cervicalgia.     Since last visit on 2/15/2022,  Cielo Bahena reports: Traveling to [facility] this week. Concerned about symptoms aggravating on the trip    Headaches are occasional, seems to be related to stress. Has been having more stress recently. No recent traumatic events, or overuse injuries. No radiations.    (DVPRS) Pain Rating Score : Hardly notice pain (W24 3/10) (04/12/22 0700)     Objective:  The following was observed:  /86 (BP Location: Left arm, Patient Position: Sitting, Cuff Size: Adult Regular)   Pulse 97   Temp 96.9  F (36.1  C) (Tympanic)   Resp 16   SpO2 98%    Neck Disability Index (  Dylon H. and Joanna C. 1991. All rights reserved.; used with permission) 4/12/2022   SECTION 1 - PAIN INTENSITY 1   SECTION 2 - PERSONAL CARE 0   SECTION 3 - LIFTING 0   SECTION 4 - READING 0   SECTION 5 - HEADACHES 3   SECTION 6 - CONCENTRATION 1   SECTION 7 - WORK 0   SECTION 8 - DRIVING 0   SECTION 9 - SLEEPING 0   SECTION 10 - RECREATION 0   Count 10   Sum 5   Raw Score: /50 5   Neck Disability Index Score: (%) 10     Cervical AROM: LLF, LR    Cervical compression: -  Cervical distraction: +    P: palpatory tendernessC6 on left, T2 midline, T6 on right:    A: static palpation demonstrates intersegmental asymmetry , cervical, thoracic  R: motion palpation notes restricted motion, C6 , T2  and T6   T: muscle spasm at level(s): upper trapezius bilaterally, hypertonic paraspinals of the lower cervical into the upper thoraic region bilaterally:      Segmental spinal dysfunction/restrictions found  at:  :  C6 Left lateral flexion restricted and Extension restriction  T2 Extension restriction.  T6 extension and right lateral flexion restriction      Assessment: Mild aggravation of neck pain with headache symptoms.  Follow-up with patient as needed at this time.    Diagnoses:      1. Segmental and somatic dysfunction of cervical region    2. Segmental and somatic dysfunction of thoracic region    3. Tension headache    4. Cervicalgia        Patient's condition:  Patient had restrictions pre-manipulation    Treatment effectiveness:  Post manipulation there is better intersegmental movement and Patient claims to feel looser post manipulation      Procedures:  E/M [address]    [facility]:  [address] Chiropractic manipulative treatment 1-2 regions performed   Cervical: Diversified, C6, Supine  Thoracic: Diversified, T2, T6, Prone    Modalities:  None performed this visit    Therapeutic procedures:  None    Response to Treatment  Reduction in symptoms as reported by patient    Prognosis: Good    Progress towards Goals: in progress    Recommendations:    Instructions:none    Follow-up:  Continue treatment PRN.

## 2022-04-21 ENCOUNTER — MYC MEDICAL ADVICE (OUTPATIENT)
Dept: FAMILY MEDICINE | Facility: OTHER | Age: 52
End: 2022-04-21

## 2022-04-21 ENCOUNTER — E-VISIT (OUTPATIENT)
Dept: FAMILY MEDICINE | Facility: OTHER | Age: 52
End: 2022-04-21
Attending: FAMILY MEDICINE
Payer: COMMERCIAL

## 2022-04-21 DIAGNOSIS — N39.0 ACUTE UTI (URINARY TRACT INFECTION): Primary | ICD-10-CM

## 2022-04-21 DIAGNOSIS — T36.95XA ANTIBIOTIC-INDUCED YEAST INFECTION: ICD-10-CM

## 2022-04-21 DIAGNOSIS — B37.9 ANTIBIOTIC-INDUCED YEAST INFECTION: ICD-10-CM

## 2022-04-21 PROCEDURE — 99421 OL DIG E/M SVC 5-10 MIN: CPT | Performed by: FAMILY MEDICINE

## 2022-04-21 RX ORDER — NITROFURANTOIN 25; 75 MG/1; MG/1
100 CAPSULE ORAL 2 TIMES DAILY
Qty: 14 CAPSULE | Refills: 0 | Status: SHIPPED | OUTPATIENT
Start: 2022-04-21 | End: 2022-04-28

## 2022-04-21 RX ORDER — FLUCONAZOLE 150 MG/1
150 TABLET ORAL ONCE
Qty: 2 TABLET | Refills: 0 | Status: SHIPPED | OUTPATIENT
Start: 2022-04-21 | End: 2022-04-21

## 2022-04-21 NOTE — PATIENT INSTRUCTIONS
Dear Cielo Bahena    After reviewing your responses, I've been able to diagnose you with a urinary tract infection, which is a common infection of the bladder with bacteria.  This is not a sexually transmitted infection, though urinating immediately after intercourse can help prevent infections.  Drinking lots of fluids is also helpful to clear your current infection and prevent the next one.      I have sent a prescription for antibiotics to your pharmacy to treat this infection.  If you would like to help with the burning pain, there is an over the counter medication called Azo Urinary Pain relief (active ingredient: phenazopyridine) - take 1 tablet three times a day for 2 days.  This will cause your urine to turn pumpkin orange.    It is important that you take all of your prescribed antibiotic medication even if your symptoms are improving after a few doses.  Taking all of your medicine helps prevent the symptoms from returning.     If your symptoms worsen, you develop pain in your back or stomach, develop fevers, or are not improving in 5 days, please contact your primary care provider for an appointment or visit any of our convenient Walk-in or Urgent Care Centers to be seen, which can be found on our website here.    Thanks again for choosing us as your health care partner,    Valencia Benavides, DO    Urinary Tract Infections in Women  Urinary tract infections (UTIs) are most often caused by bacteria. These bacteria enter the urinary tract. The bacteria may come from inside the body. Or they may travel from the skin outside the rectum or vagina into the urethra. Female anatomy makes it easy for bacteria from the bowel to enter a woman s urinary tract, which is the most common source of UTI. This means women develop UTIs more often than men. Pain in or around the urinary tract is a common UTI symptom. But the only way to know for sure if you have a UTI for the healthcare provider to test your urine. The two  tests that may be done are the urinalysis and urine culture.     Types of UTIs  Cystitis. A bladder infection (cystitis) is the most common UTI in women. You may have urgent or frequent need to pee. You may also have pain, burning when you pee, and bloody urine.  Urethritis. This is an inflamed urethra, which is the tube that carries urine from the bladder to outside the body. You may have lower stomach or back pain. You may also have urgent or frequent need to pee.  Pyelonephritis. This is a kidney infection. If not treated, it can be serious and damage your kidneys. In severe cases, you may need to stay in the hospital. You may have a fever and lower back pain.    Medicines to treat a UTI  Most UTIs are treated with antibiotics. These kill the bacteria. The length of time you need to take them depends on the type of infection. It may be as short as 3 days. If you have repeated UTIs, you may need a low-dose antibiotic for several months. Take antibiotics exactly as directed. Don t stop taking them until all of the medicine is gone. If you stop taking the antibiotic too soon, the infection may not go away. You may also develop a resistance to the antibiotic. This can make it much harder to treat.   Lifestyle changes to treat and prevent UTIs   The lifestyle changes below will help get rid of your UTI. They may also help prevent future UTIs.   Drink plenty of fluids. This includes water, juice, or other caffeine-free drinks. Fluids help flush bacteria out of your body.  Empty your bladder. Always empty your bladder when you feel the urge to pee. And always pee before going to sleep. Urine that stays in your bladder can lead to infection. Try to pee before and after sex as well.  Practice good personal hygiene. Wipe yourself from front to back after using the toilet. This helps keep bacteria from getting into the urethra.  Use condoms during sex. These help prevent UTIs caused by sexually transmitted bacteria. Also  don't use spermicides during sex. These can increase the risk for UTIs. Choose other forms of birth control instead. For women who tend to get UTIs after sex, a low-dose of a preventive antibiotic may be used. Be sure to discuss this option with your healthcare provider.  Follow up with your healthcare provider as directed. He or she may test to make sure the infection has cleared. If needed, more treatment may be started.  [employer] last reviewed this educational content on 7/1/2019 2000-2021 [employer]. All rights reserved. This information is not intended as a substitute for professional medical care. Always follow your healthcare professional's instructions.

## 2022-06-22 ENCOUNTER — OFFICE VISIT (OUTPATIENT)
Dept: FAMILY MEDICINE | Facility: OTHER | Age: 52
End: 2022-06-22
Attending: FAMILY MEDICINE
Payer: COMMERCIAL

## 2022-06-22 VITALS
SYSTOLIC BLOOD PRESSURE: 126 MMHG | WEIGHT: 195 LBS | TEMPERATURE: 97.9 F | BODY MASS INDEX: 33.47 KG/M2 | HEART RATE: 83 BPM | OXYGEN SATURATION: 97 % | RESPIRATION RATE: 16 BRPM | DIASTOLIC BLOOD PRESSURE: 84 MMHG

## 2022-06-22 DIAGNOSIS — E78.00 ELEVATED LDL CHOLESTEROL LEVEL: ICD-10-CM

## 2022-06-22 DIAGNOSIS — I10 BENIGN ESSENTIAL HYPERTENSION: ICD-10-CM

## 2022-06-22 LAB
CHOLEST SERPL-MCNC: 172 MG/DL
FASTING STATUS PATIENT QL REPORTED: NO
HDLC SERPL-MCNC: 59 MG/DL (ref 23–92)
LDLC SERPL CALC-MCNC: 95 MG/DL
NONHDLC SERPL-MCNC: 113 MG/DL
TRIGL SERPL-MCNC: 91 MG/DL

## 2022-06-22 PROCEDURE — 80061 LIPID PANEL: CPT | Mod: ZL | Performed by: FAMILY MEDICINE

## 2022-06-22 PROCEDURE — 99214 OFFICE O/P EST MOD 30 MIN: CPT | Performed by: FAMILY MEDICINE

## 2022-06-22 PROCEDURE — 36415 COLL VENOUS BLD VENIPUNCTURE: CPT | Mod: ZL | Performed by: FAMILY MEDICINE

## 2022-06-22 RX ORDER — ROSUVASTATIN CALCIUM 5 MG/1
5 TABLET, COATED ORAL DAILY
Qty: 90 TABLET | Refills: 4 | Status: SHIPPED | OUTPATIENT
Start: 2022-06-22 | End: 2023-02-10

## 2022-06-22 RX ORDER — PHENTERMINE HYDROCHLORIDE 15 MG/1
15-30 CAPSULE ORAL EVERY MORNING
Qty: 60 CAPSULE | Refills: 2 | Status: SHIPPED | OUTPATIENT
Start: 2022-06-22 | End: 2022-10-26

## 2022-06-22 RX ORDER — ROSUVASTATIN CALCIUM 5 MG/1
5 TABLET, COATED ORAL DAILY
Qty: 90 TABLET | Refills: 3 | OUTPATIENT
Start: 2022-06-22

## 2022-06-22 ASSESSMENT — ANXIETY QUESTIONNAIRES
GAD7 TOTAL SCORE: 1
6. BECOMING EASILY ANNOYED OR IRRITABLE: SEVERAL DAYS
7. FEELING AFRAID AS IF SOMETHING AWFUL MIGHT HAPPEN: NOT AT ALL
4. TROUBLE RELAXING: NOT AT ALL
GAD7 TOTAL SCORE: 1
GAD7 TOTAL SCORE: 1
3. WORRYING TOO MUCH ABOUT DIFFERENT THINGS: NOT AT ALL
2. NOT BEING ABLE TO STOP OR CONTROL WORRYING: NOT AT ALL
7. FEELING AFRAID AS IF SOMETHING AWFUL MIGHT HAPPEN: NOT AT ALL
1. FEELING NERVOUS, ANXIOUS, OR ON EDGE: NOT AT ALL
5. BEING SO RESTLESS THAT IT IS HARD TO SIT STILL: NOT AT ALL
8. IF YOU CHECKED OFF ANY PROBLEMS, HOW DIFFICULT HAVE THESE MADE IT FOR YOU TO DO YOUR WORK, TAKE CARE OF THINGS AT HOME, OR GET ALONG WITH OTHER PEOPLE?: NOT DIFFICULT AT ALL

## 2022-06-22 ASSESSMENT — PAIN SCALES - GENERAL: PAINLEVEL: NO PAIN (0)

## 2022-06-22 NOTE — NURSING NOTE
"Chief Complaint   Patient presents with     Recheck Medication       Initial /84   Pulse 83   Temp 97.9  F (36.6  C) (Tympanic)   Resp 16   Wt 88.5 kg (195 lb)   SpO2 97%   BMI 33.47 kg/m   Estimated body mass index is 33.47 kg/m  as calculated from the following:    Height as of 1/6/22: 1.626 m (5' 4\").    Weight as of this encounter: 88.5 kg (195 lb).  Medication Reconciliation: complete    Masha Lopez LPN     Advanced Care Directive reviewed.     "

## 2022-06-22 NOTE — PROGRESS NOTES
Assessment & Plan     1. BMI 33.0-33.9,adult  Chronic, waxes and wanes with fluctuating weight.  Trial of phentermine 15mg -->30mg if needed.   Reduction of carbohydrates to 50-60g/d.  Track with apps as recommended.  Increased activity with work; but continue to plan for exercise times (treadmill, elliptical) at home.  Check in monthly with [employer] & weight.  - phentermine (ADIPEX-P) 15 MG capsule; Take 1-2 capsules (15-30 mg) by mouth every morning  Dispense: 60 capsule; Refill: 2    2. Benign essential hypertension  Chronic, stable.  On lower dose of losartan.  Monitor BP at home while starting phentermine.  Discussed tolerance of slightly high BP if effective at reducing weight by 5-10% within the first 3 months.  - phentermine (ADIPEX-P) 15 MG capsule; Take 1-2 capsules (15-30 mg) by mouth every morning  Dispense: 60 capsule; Refill: 2    3. Elevated LDL cholesterol level  Chronic, stable.  Due for monitoring labs today.  Continue at same dose; or consider increase if cholesterol still uncontrolled.  - rosuvastatin (CRESTOR) 5 MG tablet; Take 1 tablet (5 mg) by mouth daily  Dispense: 90 tablet; Refill: 4  - Lipid Panel      Valencia Benavides, [facility]   Cielo is a 51 year old, presenting for the following health issues:  Recheck Medication      History of Present Illness       Hypertension: She presents for follow up of hypertension.  She does not check blood pressure  regularly outside of the clinic. Outpatient blood pressures have not been over 140/90. She follows a low salt diet.    Today's ULICES-7 Score: 1  Only taking losartan 50mg daily.     Hyperlipidemia Follow-Up    Are you regularly taking any medication or supplement to lower your cholesterol?   Yes- rosuvastatin 5mg daily    Are you having muscle aches or other side effects that you think could be caused by your cholesterol lowering medication?  No    Weight   Continues to struggle.  Got down 10#  between Jan and April before going to [employer].  Has put weight all back on.  Heaviest she has been, even with term pregnancy.  Trying to incorporate more fruit, vegetables - over carbohydrates.  Wondering if perimenopause is contributing.  Walking on her 15 min breaks at work; also has treadmill and elliptical at home as well.      Review of Systems   CONSTITUTIONAL: NEGATIVE for fever, chills, change in weight  ENT/MOUTH: NEGATIVE for ear, mouth and throat problems  RESP: NEGATIVE for significant cough or SOB  CV: NEGATIVE for chest pain, palpitations or peripheral edema      Objective    /84   Pulse 83   Temp 97.9  F (36.6  C) (Tympanic)   Resp 16   Wt 88.5 kg (195 lb)   SpO2 97%   BMI 33.47 kg/m    Body mass index is 33.47 kg/m .  Physical Exam   GENERAL: healthy, alert and no distress  EYES: Eyes grossly normal to inspection, PERRL and conjunctivae and sclerae normal  NECK: no adenopathy, no asymmetry, masses, or scars and thyroid normal to palpation  RESP: lungs clear to auscultation - no rales, rhonchi or wheezes  CV: regular rate and rhythm, normal S1 S2, no S3 or S4, no murmur, click or rub, no peripheral edema and peripheral pulses strong  MS: no gross musculoskeletal defects noted, no edema  SKIN: no suspicious lesions or rashes  PSYCH: mentation appears normal, affect normal/bright    Results for orders placed or performed in visit on 06/22/22   Lipid Panel     Status: None   Result Value Ref Range    Cholesterol 172 <200 mg/dL    Triglycerides 91 <150 mg/dL    Direct Measure HDL 59 23 - 92 mg/dL    LDL Cholesterol Calculated 95 <=100 mg/dL    Non HDL Cholesterol 113 <130 mg/dL    Patient Fasting > 8hrs? No     Narrative    Cholesterol  Desirable:  <200 mg/dL    Triglycerides  Normal:  Less than 150 mg/dL  Borderline High:  150-199 mg/dL  High:  200-499 mg/dL  Very High:  Greater than or equal to 500 mg/dL    Direct Measure HDL  Female:  Greater than or equal to 50 mg/dL   Male:  Greater than  or equal to 40 mg/dL    LDL Cholesterol  Desirable:  <100mg/dL  Above Desirable:  100-129 mg/dL   Borderline High:  130-159 mg/dL   High:  160-189 mg/dL   Very High:  >= 190 mg/dL    Non HDL Cholesterol  Desirable:  130 mg/dL  Above Desirable:  130-159 mg/dL  Borderline High:  160-189 mg/dL  High:  190-219 mg/dL  Very High:  Greater than or equal to 220 mg/dL

## 2022-06-22 NOTE — TELEPHONE ENCOUNTER
[facility] sent Rx request for the following:      Requested Prescriptions   Pending Prescriptions Disp Refills     rosuvastatin (CRESTOR) 5 MG tablet [Pharmacy Med Name: rosuvastatin 5 mg tablet] 90 tablet 1     Sig: Take 1 tablet (5 mg) by mouth daily   Last Prescription Date:   1/7/22  Last Fill Qty/Refills:         90, R-1    Last Office Visit:              1/6/22   Future Office visit:             Next 5 appointments (look out 90 days)    Jun 22, 2022  3:40 PM  Gold Tam with Valencia Benavides DO  [facility] ([facility] ) [address]  [place] [address] [phone]        Appointment today    Adelaide Padilla RN .............. 6/22/2022  11:52 AM

## 2022-06-22 NOTE — PATIENT INSTRUCTIONS
Carbohydrates:  50-60 grams per day.    Can track on Apps such as:  --Carb Tracker  --My Fitness Pal

## 2022-07-05 ENCOUNTER — HOSPITAL ENCOUNTER (OUTPATIENT)
Dept: GENERAL RADIOLOGY | Facility: OTHER | Age: 52
Discharge: HOME | End: 2022-07-05
Attending: ORTHOPAEDIC SURGERY | Admitting: ORTHOPAEDIC SURGERY
Payer: COMMERCIAL

## 2022-07-05 DIAGNOSIS — Z98.1 ARTHRODESIS STATUS: ICD-10-CM

## 2022-07-05 PROCEDURE — 72100 X-RAY EXAM L-S SPINE 2/3 VWS: CPT

## 2022-07-14 ENCOUNTER — TELEPHONE (OUTPATIENT)
Dept: FAMILY MEDICINE | Facility: OTHER | Age: 52
End: 2022-07-14

## 2022-07-14 NOTE — TELEPHONE ENCOUNTER
Received PA denial from [facility] [place] for Phentermine HCl 15MG capsules.  Faxed the denial / Appeal information to the refill nurses

## 2022-07-25 DIAGNOSIS — M54.42 ACUTE LEFT-SIDED LOW BACK PAIN WITH LEFT-SIDED SCIATICA: ICD-10-CM

## 2022-07-25 NOTE — TELEPHONE ENCOUNTER
"Requested Prescriptions   Pending Prescriptions Disp Refills     ibuprofen (ADVIL/MOTRIN) 800 MG tablet [Pharmacy Med Name: ibuprofen 800 mg tablet] 50 tablet 3     Sig: TAKE 1 TABLET (800 MG) BY MOUTH EVERY 6 HOURS AS NEEDED FOR MODERATE PAIN       NSAID Medications Passed - 7/25/2022  8:02 AM        Passed - Blood pressure under 140/90 in past 12 months     BP Readings from Last 3 Encounters:   06/22/22 126/84   04/12/22 124/86   02/15/22 120/82                 Passed - Normal ALT on file in past 12 months     Recent Labs   Lab Test 12/14/21  1000 12/11/18  1050 11/21/17  1729   ALT 19   < >  --    GICHALT  --   --  20    < > = values in this interval not displayed.             Passed - Normal AST on file in past 12 months     Recent Labs   Lab Test 12/14/21  1000 12/11/18  1050 11/21/17  1729   AST 18   < >  --    GICHAST  --   --  16    < > = values in this interval not displayed.             Passed - Recent (12 mo) or future (30 days) visit within the authorizing provider's specialty     Patient has had an office visit with the authorizing provider or a provider within the authorizing providers department within the previous 12 mos or has a future within next 30 days. See \"Patient Info\" tab in inbasket, or \"Choose Columns\" in Meds & Orders section of the refill encounter.              Passed - Patient is age 6-64 years        Passed - Normal CBC on file in past 12 months     Recent Labs   Lab Test 12/14/21  1024 12/11/18  1050 07/01/17  0917   WBC 7.3   < >  --    GICHWBC  --   --  8.3   RBC 4.83   < >  --    GICHRBC  --   --  4.98   HGB 14.6   < > 15.4   HCT 43.2   < > 43.4      < > 223    < > = values in this interval not displayed.                 Passed - Medication is active on med list        Passed - No active pregnancy on record        Passed - Normal serum creatinine on file in past 12 months     Recent Labs   Lab Test 12/14/21  1000   CR 0.87       Ok to refill medication if creatinine is low   "        Passed - No positive pregnancy test in past 12 months     Last Written Prescription Date:  3/1/22  Last Fill Quantity: 50,   # refills: 3  Last Office Visit: 6/22/22 Makayla  Future Office visit:  none     Routing refill request to provider for review/approval   Brenda J. Goodell, RN on 7/25/2022 at 3:44 PM

## 2022-07-26 RX ORDER — IBUPROFEN 800 MG/1
TABLET, FILM COATED ORAL
Qty: 100 TABLET | Refills: 3 | Status: SHIPPED | OUTPATIENT
Start: 2022-07-26 | End: 2024-01-02

## 2022-09-02 DIAGNOSIS — R79.89 LOW VITAMIN D LEVEL: ICD-10-CM

## 2022-09-02 DIAGNOSIS — R79.0 LOW FERRITIN: ICD-10-CM

## 2022-09-02 DIAGNOSIS — Z78.0 MENOPAUSE: ICD-10-CM

## 2022-09-02 DIAGNOSIS — E27.9 ADRENAL ABNORMALITY (H): Primary | ICD-10-CM

## 2022-09-02 DIAGNOSIS — E53.8 VITAMIN B 12 DEFICIENCY: ICD-10-CM

## 2022-09-17 ENCOUNTER — HEALTH MAINTENANCE LETTER (OUTPATIENT)
Age: 52
End: 2022-09-17

## 2022-10-20 ENCOUNTER — LAB (OUTPATIENT)
Dept: LAB | Facility: OTHER | Age: 52
End: 2022-10-20
Attending: FAMILY MEDICINE
Payer: COMMERCIAL

## 2022-10-20 DIAGNOSIS — E53.8 VITAMIN B 12 DEFICIENCY: ICD-10-CM

## 2022-10-20 DIAGNOSIS — E27.9 ADRENAL ABNORMALITY (H): ICD-10-CM

## 2022-10-20 DIAGNOSIS — R79.89 LOW VITAMIN D LEVEL: ICD-10-CM

## 2022-10-20 DIAGNOSIS — Z78.0 MENOPAUSE: ICD-10-CM

## 2022-10-20 DIAGNOSIS — R79.0 LOW FERRITIN: ICD-10-CM

## 2022-10-20 LAB
ESTRADIOL SERPL-MCNC: <20 PG/ML
FERRITIN SERPL-MCNC: 40 NG/ML (ref 24–336)
HOLD SPECIMEN: NORMAL
PROGEST SERPL-MCNC: 1.7 NG/ML
VIT B12 SERPL-MCNC: >1500 PG/ML (ref 180–914)

## 2022-10-20 PROCEDURE — 84144 ASSAY OF PROGESTERONE: CPT | Mod: ZL

## 2022-10-20 PROCEDURE — 83550 IRON BINDING TEST: CPT | Mod: ZL

## 2022-10-20 PROCEDURE — 36415 COLL VENOUS BLD VENIPUNCTURE: CPT | Mod: ZL

## 2022-10-20 PROCEDURE — 82670 ASSAY OF TOTAL ESTRADIOL: CPT | Mod: ZL

## 2022-10-20 PROCEDURE — 82306 VITAMIN D 25 HYDROXY: CPT | Mod: ZL

## 2022-10-20 PROCEDURE — 82533 TOTAL CORTISOL: CPT | Mod: ZL

## 2022-10-20 PROCEDURE — 84403 ASSAY OF TOTAL TESTOSTERONE: CPT | Mod: ZL

## 2022-10-20 PROCEDURE — 84270 ASSAY OF SEX HORMONE GLOBUL: CPT | Mod: ZL

## 2022-10-20 PROCEDURE — 82627 DEHYDROEPIANDROSTERONE: CPT | Mod: ZL

## 2022-10-20 PROCEDURE — 82728 ASSAY OF FERRITIN: CPT | Mod: ZL

## 2022-10-20 PROCEDURE — 82607 VITAMIN B-12: CPT | Mod: ZL

## 2022-10-21 LAB
CORTIS SERPL-MCNC: 6.1 UG/DL
IRON BINDING CAPACITY (ROCHE): 317 UG/DL (ref 240–430)
IRON SATN MFR SERPL: 23 % (ref 15–46)
IRON SERPL-MCNC: 73 UG/DL (ref 37–145)
SHBG SERPL-SCNC: 28 NMOL/L (ref 30–135)
SHBG SERPL-SCNC: 32 NMOL/L (ref 30–135)

## 2022-10-24 DIAGNOSIS — I10 BENIGN ESSENTIAL HYPERTENSION: ICD-10-CM

## 2022-10-24 LAB — DHEA-S SERPL-MCNC: 107 UG/DL (ref 35–430)

## 2022-10-25 LAB
TESTOST FREE SERPL-MCNC: 0.24 NG/DL
TESTOST SERPL-MCNC: 13 NG/DL (ref 8–60)

## 2022-10-26 RX ORDER — PHENTERMINE HYDROCHLORIDE 15 MG/1
15-30 CAPSULE ORAL EVERY MORNING
Qty: 60 CAPSULE | Refills: 2 | Status: SHIPPED | OUTPATIENT
Start: 2022-10-26 | End: 2023-02-10

## 2022-10-26 NOTE — TELEPHONE ENCOUNTER
Routing refill request to provider for review/approval because:    LOV: 6/22/22  Maggie Kirby RN on 10/26/2022 at 11:27 AM

## 2022-10-28 LAB
DEPRECATED CALCIDIOL+CALCIFEROL SERPL-MC: <62 UG/L (ref 20–75)
VITAMIN D2 SERPL-MCNC: <5 UG/L
VITAMIN D3 SERPL-MCNC: 57 UG/L

## 2023-01-12 DIAGNOSIS — E27.9 ADRENAL ABNORMALITY (H): ICD-10-CM

## 2023-01-12 DIAGNOSIS — Z78.0 MENOPAUSE: ICD-10-CM

## 2023-01-12 DIAGNOSIS — R79.89 LOW VITAMIN D LEVEL: ICD-10-CM

## 2023-01-12 DIAGNOSIS — E53.8 VITAMIN B 12 DEFICIENCY: Primary | ICD-10-CM

## 2023-01-12 DIAGNOSIS — R79.0 LOW FERRITIN: ICD-10-CM

## 2023-01-13 ENCOUNTER — HOSPITAL ENCOUNTER (OUTPATIENT)
Dept: GENERAL RADIOLOGY | Facility: OTHER | Age: 53
Discharge: HOME | End: 2023-01-13
Attending: ORTHOPAEDIC SURGERY | Admitting: ORTHOPAEDIC SURGERY
Payer: COMMERCIAL

## 2023-01-13 DIAGNOSIS — Z98.1 ARTHRODESIS STATUS: ICD-10-CM

## 2023-01-13 PROCEDURE — 72100 X-RAY EXAM L-S SPINE 2/3 VWS: CPT

## 2023-01-30 ENCOUNTER — LAB (OUTPATIENT)
Dept: LAB | Facility: OTHER | Age: 53
End: 2023-01-30
Attending: NURSE PRACTITIONER
Payer: COMMERCIAL

## 2023-01-30 DIAGNOSIS — R79.0 LOW FERRITIN: ICD-10-CM

## 2023-01-30 DIAGNOSIS — Z78.0 MENOPAUSE: ICD-10-CM

## 2023-01-30 DIAGNOSIS — R79.89 LOW VITAMIN D LEVEL: ICD-10-CM

## 2023-01-30 DIAGNOSIS — E27.9 ADRENAL ABNORMALITY (H): ICD-10-CM

## 2023-01-30 DIAGNOSIS — E53.8 VITAMIN B 12 DEFICIENCY: ICD-10-CM

## 2023-01-30 LAB
ESTRADIOL SERPL-MCNC: 9 PG/ML
FERRITIN SERPL-MCNC: 72 NG/ML (ref 11–328)
IRON BINDING CAPACITY (ROCHE): 305 UG/DL (ref 240–430)
IRON SATN MFR SERPL: 25 % (ref 15–46)
IRON SERPL-MCNC: 75 UG/DL (ref 37–145)
PROGEST SERPL-MCNC: 1.1 NG/ML
VIT B12 SERPL-MCNC: >2000 PG/ML (ref 232–1245)

## 2023-01-30 PROCEDURE — 82533 TOTAL CORTISOL: CPT | Mod: ZL

## 2023-01-30 PROCEDURE — 82627 DEHYDROEPIANDROSTERONE: CPT | Mod: ZL

## 2023-01-30 PROCEDURE — 84144 ASSAY OF PROGESTERONE: CPT | Mod: ZL

## 2023-01-30 PROCEDURE — 83550 IRON BINDING TEST: CPT | Mod: ZL

## 2023-01-30 PROCEDURE — 82728 ASSAY OF FERRITIN: CPT | Mod: ZL

## 2023-01-30 PROCEDURE — 82670 ASSAY OF TOTAL ESTRADIOL: CPT | Mod: ZL

## 2023-01-30 PROCEDURE — 82607 VITAMIN B-12: CPT | Mod: ZL

## 2023-01-30 PROCEDURE — 36415 COLL VENOUS BLD VENIPUNCTURE: CPT | Mod: ZL

## 2023-01-30 PROCEDURE — 84270 ASSAY OF SEX HORMONE GLOBUL: CPT | Mod: ZL

## 2023-01-30 PROCEDURE — 82306 VITAMIN D 25 HYDROXY: CPT | Mod: ZL

## 2023-01-30 PROCEDURE — 84403 ASSAY OF TOTAL TESTOSTERONE: CPT | Mod: ZL

## 2023-01-31 ENCOUNTER — MYC MEDICAL ADVICE (OUTPATIENT)
Dept: FAMILY MEDICINE | Facility: OTHER | Age: 53
End: 2023-01-31
Payer: COMMERCIAL

## 2023-01-31 LAB — CORTIS SERPL-MCNC: 6.7 UG/DL

## 2023-02-01 LAB
DHEA-S SERPL-MCNC: 233 UG/DL (ref 35–430)
SHBG SERPL-SCNC: 34 NMOL/L (ref 30–135)

## 2023-02-02 LAB
TESTOST FREE SERPL-MCNC: 0.35 NG/DL
TESTOST SERPL-MCNC: 20 NG/DL (ref 8–60)

## 2023-02-05 LAB
DEPRECATED CALCIDIOL+CALCIFEROL SERPL-MC: <54 UG/L (ref 20–75)
VITAMIN D2 SERPL-MCNC: <5 UG/L
VITAMIN D3 SERPL-MCNC: 49 UG/L

## 2023-02-10 ENCOUNTER — OFFICE VISIT (OUTPATIENT)
Dept: INTERNAL MEDICINE | Facility: OTHER | Age: 53
End: 2023-02-10
Attending: STUDENT IN AN ORGANIZED HEALTH CARE EDUCATION/TRAINING PROGRAM
Payer: COMMERCIAL

## 2023-02-10 VITALS
HEIGHT: 64 IN | HEART RATE: 80 BPM | SYSTOLIC BLOOD PRESSURE: 118 MMHG | OXYGEN SATURATION: 99 % | TEMPERATURE: 98 F | DIASTOLIC BLOOD PRESSURE: 66 MMHG | WEIGHT: 176.7 LBS | BODY MASS INDEX: 30.17 KG/M2 | RESPIRATION RATE: 16 BRPM

## 2023-02-10 DIAGNOSIS — E78.00 ELEVATED LDL CHOLESTEROL LEVEL: ICD-10-CM

## 2023-02-10 DIAGNOSIS — Z00.00 ANNUAL PHYSICAL EXAM: Primary | ICD-10-CM

## 2023-02-10 DIAGNOSIS — J45.20 MILD INTERMITTENT ASTHMA WITHOUT COMPLICATION: ICD-10-CM

## 2023-02-10 DIAGNOSIS — Z12.31 VISIT FOR SCREENING MAMMOGRAM: ICD-10-CM

## 2023-02-10 DIAGNOSIS — G47.00 INSOMNIA, UNSPECIFIED TYPE: ICD-10-CM

## 2023-02-10 DIAGNOSIS — E53.8 VITAMIN B12 DEFICIENCY (NON ANEMIC): ICD-10-CM

## 2023-02-10 DIAGNOSIS — R13.13 PHARYNGEAL DYSPHAGIA: ICD-10-CM

## 2023-02-10 DIAGNOSIS — I10 BENIGN ESSENTIAL HYPERTENSION: ICD-10-CM

## 2023-02-10 DIAGNOSIS — E78.2 MIXED HYPERLIPIDEMIA: ICD-10-CM

## 2023-02-10 DIAGNOSIS — K21.9 GASTROESOPHAGEAL REFLUX DISEASE WITHOUT ESOPHAGITIS: ICD-10-CM

## 2023-02-10 LAB
ALBUMIN SERPL BCG-MCNC: 4.6 G/DL (ref 3.5–5.2)
ALP SERPL-CCNC: 74 U/L (ref 35–104)
ALT SERPL W P-5'-P-CCNC: 36 U/L (ref 10–35)
ANION GAP SERPL CALCULATED.3IONS-SCNC: 7 MMOL/L (ref 7–15)
AST SERPL W P-5'-P-CCNC: 29 U/L (ref 10–35)
BILIRUB SERPL-MCNC: 0.5 MG/DL
BUN SERPL-MCNC: 13.6 MG/DL (ref 6–20)
CALCIUM SERPL-MCNC: 9 MG/DL (ref 8.6–10)
CHLORIDE SERPL-SCNC: 106 MMOL/L (ref 98–107)
CHOLEST SERPL-MCNC: 138 MG/DL
CREAT SERPL-MCNC: 0.93 MG/DL (ref 0.51–0.95)
DEPRECATED HCO3 PLAS-SCNC: 29 MMOL/L (ref 22–29)
ERYTHROCYTE [DISTWIDTH] IN BLOOD BY AUTOMATED COUNT: 11.6 % (ref 10–15)
GFR SERPL CREATININE-BSD FRML MDRD: 73 ML/MIN/1.73M2
GLUCOSE SERPL-MCNC: 85 MG/DL (ref 70–99)
HCT VFR BLD AUTO: 42.2 % (ref 35–47)
HDLC SERPL-MCNC: 61 MG/DL
HGB BLD-MCNC: 14.6 G/DL (ref 11.7–15.7)
HOLD SPECIMEN: NORMAL
LDLC SERPL CALC-MCNC: 67 MG/DL
MCH RBC QN AUTO: 30.5 PG (ref 26.5–33)
MCHC RBC AUTO-ENTMCNC: 34.6 G/DL (ref 31.5–36.5)
MCV RBC AUTO: 88 FL (ref 78–100)
NONHDLC SERPL-MCNC: 77 MG/DL
PLATELET # BLD AUTO: 216 10E3/UL (ref 150–450)
POTASSIUM SERPL-SCNC: 4.4 MMOL/L (ref 3.4–5.3)
PROT SERPL-MCNC: 7.2 G/DL (ref 6.4–8.3)
RBC # BLD AUTO: 4.79 10E6/UL (ref 3.8–5.2)
SODIUM SERPL-SCNC: 142 MMOL/L (ref 136–145)
TRIGL SERPL-MCNC: 52 MG/DL
WBC # BLD AUTO: 7 10E3/UL (ref 4–11)

## 2023-02-10 PROCEDURE — 80061 LIPID PANEL: CPT | Mod: ZL | Performed by: STUDENT IN AN ORGANIZED HEALTH CARE EDUCATION/TRAINING PROGRAM

## 2023-02-10 PROCEDURE — 36415 COLL VENOUS BLD VENIPUNCTURE: CPT | Mod: ZL | Performed by: STUDENT IN AN ORGANIZED HEALTH CARE EDUCATION/TRAINING PROGRAM

## 2023-02-10 PROCEDURE — 85027 COMPLETE CBC AUTOMATED: CPT | Mod: ZL | Performed by: STUDENT IN AN ORGANIZED HEALTH CARE EDUCATION/TRAINING PROGRAM

## 2023-02-10 PROCEDURE — 99396 PREV VISIT EST AGE 40-64: CPT | Performed by: STUDENT IN AN ORGANIZED HEALTH CARE EDUCATION/TRAINING PROGRAM

## 2023-02-10 PROCEDURE — 80053 COMPREHEN METABOLIC PANEL: CPT | Mod: ZL | Performed by: STUDENT IN AN ORGANIZED HEALTH CARE EDUCATION/TRAINING PROGRAM

## 2023-02-10 RX ORDER — LOSARTAN POTASSIUM 50 MG/1
50 TABLET ORAL DAILY
Qty: 90 TABLET | Refills: 3 | Status: SHIPPED | OUTPATIENT
Start: 2023-02-10 | End: 2024-02-09

## 2023-02-10 RX ORDER — ROSUVASTATIN CALCIUM 5 MG/1
5 TABLET, COATED ORAL DAILY
Qty: 90 TABLET | Refills: 4 | Status: SHIPPED | OUTPATIENT
Start: 2023-02-10 | End: 2024-02-09

## 2023-02-10 RX ORDER — MONTELUKAST SODIUM 10 MG/1
1 TABLET ORAL AT BEDTIME
Qty: 90 TABLET | Refills: 4 | Status: SHIPPED | OUTPATIENT
Start: 2023-02-10 | End: 2024-02-09

## 2023-02-10 RX ORDER — TRAZODONE HYDROCHLORIDE 50 MG/1
50 TABLET, FILM COATED ORAL AT BEDTIME
Qty: 90 TABLET | Refills: 3 | Status: SHIPPED | OUTPATIENT
Start: 2023-02-10 | End: 2024-02-08

## 2023-02-10 RX ORDER — PANTOPRAZOLE SODIUM 40 MG/1
40 TABLET, DELAYED RELEASE ORAL
Qty: 180 TABLET | Refills: 3 | Status: SHIPPED | OUTPATIENT
Start: 2023-02-10 | End: 2023-04-11

## 2023-02-10 ASSESSMENT — ENCOUNTER SYMPTOMS
DIARRHEA: 0
SORE THROAT: 0
DIZZINESS: 0
CONSTIPATION: 0
COUGH: 0
HEMATURIA: 0
MYALGIAS: 0
FREQUENCY: 0
PALPITATIONS: 0
HEMATOCHEZIA: 0
NERVOUS/ANXIOUS: 0
WEAKNESS: 0
DYSURIA: 0
BREAST MASS: 0
HEADACHES: 1
FEVER: 0
HEARTBURN: 1
EYE PAIN: 0
CHILLS: 0
ABDOMINAL PAIN: 0
NAUSEA: 0
ARTHRALGIAS: 0
JOINT SWELLING: 0
SHORTNESS OF BREATH: 0
PARESTHESIAS: 0

## 2023-02-10 ASSESSMENT — PATIENT HEALTH QUESTIONNAIRE - PHQ9
SUM OF ALL RESPONSES TO PHQ QUESTIONS 1-9: 0
SUM OF ALL RESPONSES TO PHQ QUESTIONS 1-9: 0
10. IF YOU CHECKED OFF ANY PROBLEMS, HOW DIFFICULT HAVE THESE PROBLEMS MADE IT FOR YOU TO DO YOUR WORK, TAKE CARE OF THINGS AT HOME, OR GET ALONG WITH OTHER PEOPLE: NOT DIFFICULT AT ALL

## 2023-02-10 ASSESSMENT — PAIN SCALES - GENERAL: PAINLEVEL: NO PAIN (0)

## 2023-02-10 NOTE — PROGRESS NOTES
SUBJECTIVE:   CC: Cielo is an 52 year old who presents for preventive health visit.   Patient has been advised of split billing requirements and indicates understanding: Yes  Healthy Habits:     Getting at least 3 servings of Calcium per day:  Yes    Bi-annual eye exam:  Yes    Dental care twice a year:  Yes    Sleep apnea or symptoms of sleep apnea:  Daytime drowsiness    Diet:  Regular (no restrictions)    Frequency of exercise:  1 day/week    Duration of exercise:  Less than 15 minutes    Taking medications regularly:  Yes    Medication side effects:  None    PHQ-2 Total Score: 0    Additional concerns today:  No    Wt Readings from Last 5 Encounters:   02/10/23 80.2 kg (176 lb 11.2 oz)   22 88.5 kg (195 lb)   22 88 kg (194 lb)   21 88 kg (194 lb)   21 88 kg (194 lb)     Has lost 20 pounds since . Working with [employer].   Not up eating at night.               Today's PHQ-2 Score:   PHQ-2 (  [employer]) 2/10/2023   Q1: Little interest or pleasure in doing things 0   Q2: Feeling down, depressed or hopeless 0   PHQ-2 Score 0   PHQ-2 Total Score (12-17 Years)- Positive if 3 or more points; Administer PHQ-A if positive -   Q1: Little interest or pleasure in doing things Not at all   Q2: Feeling down, depressed or hopeless Not at all   PHQ-2 Score 0       Have you ever done Advance Care Planning? (For example, a Health Directive, POLST, or a discussion with a medical provider or your loved ones about your wishes): No, advance care planning information given to patient to review.  Patient declined advance care planning discussion at this time.    Social History     Tobacco Use     Smoking status: Former     Packs/day: 0.50     Years: 25.00     Pack years: 12.50     Types: Cigarettes     Quit date: 2005     Years since quittin.2     Smokeless tobacco: Never     Tobacco comments:     on rare occasions   Substance Use Topics     Alcohol use: Yes     Alcohol/week: 2.0 standard drinks      Types: 2 Cans of beer per week     Comment: Alcoholic Drinks/day: occassional     If you drink alcohol do you typically have >3 drinks per day or >7 drinks per week? No    Alcohol Use 2/10/2023   Prescreen: >3 drinks/day or >7 drinks/week? No   Prescreen: >3 drinks/day or >7 drinks/week? -       Reviewed orders with patient.  Reviewed health maintenance and updated orders accordingly - Yes  Lab work is in process    Breast Cancer Screening:  Any new diagnosis of family breast, ovarian, or bowel cancer? No    FHS-7: No flowsheet data found.      Pertinent mammograms are reviewed under the imaging tab.  Due this year    History of abnormal Pap smear: Status post hysterectomy no cervix present     Reviewed and updated as needed this visit by clinical staff   Tobacco  Allergies  Meds  Problems     Soc Hx        Reviewed and updated as needed this visit by Provider     Meds  Problems            Past Medical History:   Diagnosis Date     Anemia     No Comments Provided     Family history of malignant neoplasm of digestive organ     2015     Insomnia     No Comments Provided     Major depressive disorder, single episode     ,Depression.  PHQ-9 score .     Mass of breast     ,Soft breast mass lump, right breast     Pain in hip     No Comments Provided     Personal history of other medical treatment (CODE)     2011,, 1 SAB     Personal history of other specified conditions (CODE)     abnormal Pap smear prior to , subsequent yearly Pap smears have been normal     Restless legs syndrome     No Comments Provided     Segmental and somatic dysfunction of pelvic region     2013     Uncomplicated asthma     generally well controlled, history of respiratory arrest requiring brief mechanical ventilation.      Past Surgical History:   Procedure Laterality Date     BACK SURGERY      Lumbar L4-5     COLONOSCOPY N/A 10/16/2020    normal, f/u 10 years     DILATION AND CURETTAGE       "2006,Dilatation and curettage for blighted ovum.     ENDOSCOPY UPPER, COLONOSCOPY, COMBINED      Normal exam, 5 year follow up due to FH     EXTRACTION(S) DENTAL      Taylor tooth extraction     HYSTERECTOMY VAGINAL      3/5/2015,Dr. Lindsay for DUB; negative for malignancy; ovaries remain     MAMMOPLASTY REDUCTION      2012, Bilateral breast reduction, [facility]     MAMMOPLASTY REDUCTION      2004     OTHER SURGICAL HISTORY      51095.9,KS SUBTALAR ATHROEREISIS,Left foot       Review of Systems  CONSTITUTIONAL: NEGATIVE for fever, chills, change in weight  INTEGUMENTARY/SKIN: NEGATIVE for worrisome rashes, moles or lesions  EYES: NEGATIVE for vision changes or irritation  ENT: NEGATIVE for ear, mouth and throat problems  RESP: NEGATIVE for significant cough or SOB  BREAST: NEGATIVE for masses, tenderness or discharge  CV: NEGATIVE for chest pain, palpitations or peripheral edema  GI: NEGATIVE for nausea, abdominal pain, heartburn, or change in bowel habits  : NEGATIVE for unusual urinary or vaginal symptoms. No vaginal bleeding.  MUSCULOSKELETAL: NEGATIVE for significant arthralgias or myalgia  NEURO: NEGATIVE for weakness, dizziness or paresthesias  PSYCHIATRIC: NEGATIVE for changes in mood or affect      OBJECTIVE:   /66 (BP Location: Right arm, Patient Position: Sitting, Cuff Size: Adult Regular)   Pulse 80   Temp 98  F (36.7  C) (Temporal)   Resp 16   Ht 1.626 m (5' 4\")   Wt 80.2 kg (176 lb 11.2 oz)   SpO2 99%   Breastfeeding No   BMI 30.33 kg/m    Physical Exam  Vitals and nursing note reviewed.   Constitutional:       General: She is not in acute distress.     Appearance: Normal appearance. She is well-developed. She is not ill-appearing.   HENT:      Head: Normocephalic and atraumatic.      Right Ear: Tympanic membrane and external ear normal.      Left Ear: Tympanic membrane and external ear normal.      Nose: Nose normal. No rhinorrhea.      Mouth/Throat:      Mouth: Mucous membranes are " moist.      Pharynx: No oropharyngeal exudate.   Eyes:      General:         Right eye: No discharge.         Left eye: No discharge.      Extraocular Movements: Extraocular movements intact.      Conjunctiva/sclera: Conjunctivae normal.   Neck:      Thyroid: No thyromegaly.      Trachea: No tracheal deviation.   Cardiovascular:      Rate and Rhythm: Normal rate and regular rhythm.      Pulses: Normal pulses.      Heart sounds: Normal heart sounds, S1 normal and S2 normal. No murmur heard.    No friction rub. No S3 or S4 sounds.   Pulmonary:      Effort: Pulmonary effort is normal. No respiratory distress.      Breath sounds: Normal breath sounds. No wheezing or rales.   Chest:   Breasts:     Right: No inverted nipple, mass, nipple discharge or skin change.      Left: No inverted nipple, mass, nipple discharge or skin change.   Abdominal:      General: Bowel sounds are normal.      Palpations: Abdomen is soft. There is no mass.   Genitourinary:     Labia:         Right: No rash.         Left: No rash.       Vagina: Normal.      Cervix: Normal.   Musculoskeletal:         General: Normal range of motion.      Cervical back: Normal range of motion and neck supple.      Right lower leg: No edema.      Left lower leg: No edema.   Lymphadenopathy:      Cervical: No cervical adenopathy.      Upper Body:      Right upper body: No supraclavicular or axillary adenopathy.      Left upper body: No supraclavicular or axillary adenopathy.   Skin:     General: Skin is warm and dry.      Capillary Refill: Capillary refill takes less than 2 seconds.      Findings: No rash.      Comments: Scattered skin tags and dermatofibroma's.  No atypical nevi   Neurological:      General: No focal deficit present.      Mental Status: She is alert and oriented to person, place, and time.      Motor: No abnormal muscle tone.      Deep Tendon Reflexes: Reflexes are normal and symmetric.   Psychiatric:         Mood and Affect: Mood normal.          Behavior: Behavior normal.         Thought Content: Thought content normal.         Judgment: Judgment normal.           Diagnostic Test Results:  Labs reviewed in Epic    ASSESSMENT/PLAN:       ICD-10-CM    1. Annual physical exam  Z00.00 CBC W PLT No Diff     CBC W PLT No Diff      2. Benign essential hypertension  I10 losartan (COZAAR) 50 MG tablet     Comprehensive Metabolic Panel     Comprehensive Metabolic Panel      3. Elevated LDL cholesterol level  E78.00 rosuvastatin (CRESTOR) 5 MG tablet      4. Mild intermittent asthma without complication  J45.20 montelukast (SINGULAIR) 10 MG tablet      5. Insomnia, unspecified type  G47.00 traZODone (DESYREL) 50 MG tablet      6. Vitamin B12 deficiency (non anemic)  E53.8       7. Gastroesophageal reflux disease without esophagitis  K21.9 pantoprazole (PROTONIX) 40 MG EC tablet      8. Pharyngeal dysphagia  R13.13       9. Visit for screening mammogram  Z12.31 MA Screen Bilateral w/Garry      10. Mixed hyperlipidemia  E78.2 Lipid Panel     Lipid Panel          Physical exam: Updated past medical history surgical history and social history.  Reviewed her medications.  Refilled her trazodone rosuvastatin montelukast and pantoprazole.  Obtain screening labs including lipids CBC and CMP.  Continue rosuvastatin for hyperlipidemia and blood pressure is under excellent control today.  Change her losartan 50 mg daily as she has been taking at home.  Status post hysterectomy no longer needs cervical cancer screening.  Ordered mammogram screening today as well.    Patient is having some pharyngeal dysphagia and heartburn.  Increase her PPI to twice daily and if not improved she will follow-up in clinic and we will refer for endoscopy.        Patient has been advised of split billing requirements and indicates understanding: Yes      COUNSELING:  Reviewed preventive health counseling, as reflected in patient instructions       Regular exercise       Healthy  "diet/nutrition      BMI:   Estimated body mass index is 30.33 kg/m  as calculated from the following:    Height as of this encounter: 1.626 m (5' 4\").    Weight as of this encounter: 80.2 kg (176 lb 11.2 oz).   Weight management plan: Discussed healthy diet and exercise guidelines      She reports that she quit smoking about 17 years ago. Her smoking use included cigarettes. She has a 12.50 pack-year smoking history. She has never used smokeless tobacco.      Carter Navarro MD  [facility] AND [facility]  Answers for HPI/ROS submitted by the patient on 2/10/2023  If you checked off any problems, how difficult have these problems made it for you to do your work, take care of things at home, or get along with other people?: Not difficult at all  PHQ9 TOTAL SCORE: 0      "

## 2023-02-10 NOTE — LETTER
February 14, 2023      Cielo Bahena  [address]  [place] [address]        Dear ,    We are writing to inform you of your test results.    Your lab results are below.  Your blood counts are normal your cholesterol is under excellent control and your kidney function electrolytes are normal.  Overall no significant abnormalities.  Please follow-up in clinic if you do not have improvement in your swallowing issues.    Resulted Orders   CBC W PLT No Diff   Result Value Ref Range    WBC Count 7.0 4.0 - 11.0 10e3/uL    RBC Count 4.79 3.80 - 5.20 10e6/uL    Hemoglobin 14.6 11.7 - 15.7 g/dL    Hematocrit 42.2 35.0 - 47.0 %    MCV 88 78 - 100 fL    MCH 30.5 26.5 - 33.0 pg    MCHC 34.6 31.5 - 36.5 g/dL    RDW 11.6 10.0 - 15.0 %    Platelet Count 216 150 - 450 10e3/uL   Lipid Panel   Result Value Ref Range    Cholesterol 138 <200 mg/dL    Triglycerides 52 <150 mg/dL    Direct Measure HDL 61 >=50 mg/dL    LDL Cholesterol Calculated 67 <=100 mg/dL    Non HDL Cholesterol 77 <130 mg/dL    Narrative    Cholesterol  Desirable:  <200 mg/dL    Triglycerides  Normal:  Less than 150 mg/dL  Borderline High:  150-199 mg/dL  High:  200-499 mg/dL  Very High:  Greater than or equal to 500 mg/dL    Direct Measure HDL  Female:  Greater than or equal to 50 mg/dL   Male:  Greater than or equal to 40 mg/dL    LDL Cholesterol  Desirable:  <100mg/dL  Above Desirable:  100-129 mg/dL   Borderline High:  130-159 mg/dL   High:  160-189 mg/dL   Very High:  >= 190 mg/dL    Non HDL Cholesterol  Desirable:  130 mg/dL  Above Desirable:  130-159 mg/dL  Borderline High:  160-189 mg/dL  High:  190-219 mg/dL  Very High:  Greater than or equal to 220 mg/dL   Comprehensive Metabolic Panel   Result Value Ref Range    Sodium 142 136 - 145 mmol/L    Potassium 4.4 3.4 - 5.3 mmol/L    Chloride 106 98 - 107 mmol/L    Carbon Dioxide (CO2) 29 22 - 29 mmol/L    Anion Gap 7 7 - 15 mmol/L    Urea Nitrogen 13.6 6.0 - 20.0 mg/dL    Creatinine 0.93 0.51 - 0.95  mg/dL    Calcium 9.0 8.6 - 10.0 mg/dL    Glucose 85 70 - 99 mg/dL    Alkaline Phosphatase 74 35 - 104 U/L    AST 29 10 - 35 U/L    ALT 36 (H) 10 - 35 U/L    Protein Total 7.2 6.4 - 8.3 g/dL    Albumin 4.6 3.5 - 5.2 g/dL    Bilirubin Total 0.5 <=1.2 mg/dL    GFR Estimate 73 >60 mL/min/1.73m2      Comment:      eGFR calculated using 2021 CKD-EPI equation.       If you have any questions or concerns, please call the clinic at the number listed above.       Sincerely,      Carter Navarro MD

## 2023-02-10 NOTE — NURSING NOTE
"Chief Complaint   Patient presents with     Physical     And lab work       Medication reconciliation completed.    FOOD SECURITY SCREENING QUESTIONS:    The next two questions are to help us understand your food security.  If you are feeling you need any assistance in this area, we have resources available to support you today.    Hunger Vital Signs:  Within the past 12 months we worried whether our food would run out before we got money to buy more. Never  Within the past 12 months the food we bought just didn't last and we didn't have money to get more. Never    Initial /66 (BP Location: Right arm, Patient Position: Sitting, Cuff Size: Adult Regular)   Pulse 80   Temp 98  F (36.7  C) (Temporal)   Resp 16   Ht 1.626 m (5' 4\")   Wt 80.2 kg (176 lb 11.2 oz)   SpO2 99%   Breastfeeding No   BMI 30.33 kg/m   Estimated body mass index is 30.33 kg/m  as calculated from the following:    Height as of this encounter: 1.626 m (5' 4\").    Weight as of this encounter: 80.2 kg (176 lb 11.2 oz).       Amna Ojeda LPN .......  2/10/2023  3:45 PM  "

## 2023-02-20 DIAGNOSIS — J45.40 MODERATE PERSISTENT ASTHMA WITHOUT COMPLICATION: ICD-10-CM

## 2023-02-22 RX ORDER — ALBUTEROL SULFATE 90 UG/1
2 AEROSOL, METERED RESPIRATORY (INHALATION) 4 TIMES DAILY
Qty: 54 G | Refills: 3 | Status: SHIPPED | OUTPATIENT
Start: 2023-02-22 | End: 2023-12-27

## 2023-02-22 NOTE — TELEPHONE ENCOUNTER
Routing refill request to provider for review/approval because:    LOV:2/10/23    Maggie Kirby RN on 2/22/2023 at 1:40 PM

## 2023-03-21 ENCOUNTER — LAB REQUISITION (OUTPATIENT)
Dept: LAB | Facility: OTHER | Age: 53
End: 2023-03-21

## 2023-03-21 DIAGNOSIS — Z11.52 ENCOUNTER FOR SCREENING FOR COVID-19: ICD-10-CM

## 2023-03-21 LAB — SARS-COV-2 RNA RESP QL NAA+PROBE: NEGATIVE

## 2023-03-21 PROCEDURE — U0005 INFEC AGEN DETEC AMPLI PROBE: HCPCS | Performed by: FAMILY MEDICINE

## 2023-04-11 ENCOUNTER — TELEPHONE (OUTPATIENT)
Dept: SURGERY | Facility: OTHER | Age: 53
End: 2023-04-11

## 2023-04-11 ENCOUNTER — OFFICE VISIT (OUTPATIENT)
Dept: INTERNAL MEDICINE | Facility: OTHER | Age: 53
End: 2023-04-11
Attending: STUDENT IN AN ORGANIZED HEALTH CARE EDUCATION/TRAINING PROGRAM
Payer: COMMERCIAL

## 2023-04-11 VITALS
DIASTOLIC BLOOD PRESSURE: 64 MMHG | RESPIRATION RATE: 16 BRPM | BODY MASS INDEX: 30.73 KG/M2 | WEIGHT: 179 LBS | OXYGEN SATURATION: 98 % | HEART RATE: 75 BPM | SYSTOLIC BLOOD PRESSURE: 110 MMHG | TEMPERATURE: 97.5 F

## 2023-04-11 DIAGNOSIS — K21.9 GASTROESOPHAGEAL REFLUX DISEASE WITHOUT ESOPHAGITIS: Primary | ICD-10-CM

## 2023-04-11 PROCEDURE — 99213 OFFICE O/P EST LOW 20 MIN: CPT | Performed by: STUDENT IN AN ORGANIZED HEALTH CARE EDUCATION/TRAINING PROGRAM

## 2023-04-11 RX ORDER — FAMOTIDINE 40 MG/1
40 TABLET, FILM COATED ORAL 2 TIMES DAILY PRN
Qty: 180 TABLET | Refills: 3 | Status: SHIPPED | OUTPATIENT
Start: 2023-04-11 | End: 2024-02-09

## 2023-04-11 RX ORDER — PANTOPRAZOLE SODIUM 40 MG/1
40 TABLET, DELAYED RELEASE ORAL DAILY
Qty: 180 TABLET | Refills: 3 | Status: SHIPPED | OUTPATIENT
Start: 2023-04-11 | End: 2023-09-20

## 2023-04-11 ASSESSMENT — PAIN SCALES - GENERAL: PAINLEVEL: NO PAIN (0)

## 2023-04-11 NOTE — PROGRESS NOTES
Assessment & Plan         ICD-10-CM    1. Gastroesophageal reflux disease without esophagitis  K21.9 Adult GI  Referral - Procedure Only     famotidine (PEPCID) 40 MG tablet     pantoprazole (PROTONIX) 40 MG EC tablet        Refractory heartburn despite twice daily PPI.  We will change PPI to once daily and add famotidine twice daily.  Suspect she may have an anatomical defect such as a hiatal hernia versus possible H. pylori.  Refer for endoscopy as well.  If all of this is negative could pursue cardiac work-up versus refer to gastroenterology.        No follow-ups on file.    Carter Navarro MD  [facility]      Zheng Buck is a 52 year old, presenting for the following health issues:  Heartburn (Continued heart burn  )         View : No data to display.              HPI     CONTINUED HEART BURN      On BID PPI.   Takes pantoprazole BID before meals.   Uses tums and peppermint oil  Has buringing in throat.    Doesn't have sour taste.             Review of Systems         Objective    /64 (BP Location: Right arm, Patient Position: Sitting, Cuff Size: Adult Regular)   Pulse 75   Temp 97.5  F (36.4  C) (Temporal)   Resp 16   Wt 81.2 kg (179 lb)   SpO2 98%   Breastfeeding No   BMI 30.73 kg/m    Body mass index is 30.73 kg/m .  Physical Exam   General: Pleasant 52-year-old woman sitting clinic no acute distress

## 2023-04-11 NOTE — NURSING NOTE
"Chief Complaint   Patient presents with     Heartburn     Continued heart burn         Medication reconciliation completed.    FOOD SECURITY SCREENING QUESTIONS:    The next two questions are to help us understand your food security.  If you are feeling you need any assistance in this area, we have resources available to support you today.    Hunger Vital Signs:  Within the past 12 months we worried whether our food would run out before we got money to buy more. Never  Within the past 12 months the food we bought just didn't last and we didn't have money to get more. Never    Initial /64 (BP Location: Right arm, Patient Position: Sitting, Cuff Size: Adult Regular)   Pulse 75   Temp 97.5  F (36.4  C) (Temporal)   Resp 16   Wt 81.2 kg (179 lb)   SpO2 98%   Breastfeeding No   BMI 30.73 kg/m   Estimated body mass index is 30.73 kg/m  as calculated from the following:    Height as of 2/10/23: 1.626 m (5' 4\").    Weight as of this encounter: 81.2 kg (179 lb).       Amna Ojeda LPN .......  4/11/2023  8:14 AM  "

## 2023-04-11 NOTE — TELEPHONE ENCOUNTER
[facility] Diagnostic Referral    Patient has a referral for a EGD with a diagnosis of Gastroesophageal reflux disease without esophagitis.    Please advise.    Thank you,    Stephanie Chapman on 4/11/2023 at 10:49 AM

## 2023-04-12 ENCOUNTER — TELEPHONE (OUTPATIENT)
Dept: SURGERY | Facility: OTHER | Age: 53
End: 2023-04-12

## 2023-04-12 NOTE — TELEPHONE ENCOUNTER
Screening Questions for the Scheduling of Screening Colonoscopies   (If Colonoscopy is diagnostic, Provider should review the chart before scheduling.)  Are you younger than 50 or older than 80?  NO  Do you take aspirin or fish oil?  NO (if yes, tell patient to stop 1 week prior to Colonoscopy)  Do you take warfarin (Coumadin), clopidogrel (Plavix), apixaban (Eliquis), dabigatram (Pradaxa), rivaroxaban (Xarelto) or any blood thinner? NO  Do you use oxygen at home?  NO  Do you have kidney disease? NO  Are you on dialysis? NO  Have you had a stroke or heart attack in the last year? NO  Have you had a stent in your heart or any blood vessel in the last year? NO  Have you had a transplant of any organ? NO  Have you had a colonoscopy or upper endoscopy (EGD) before? YES         When?  7 OR 8 YARS AGO  Date of scheduled EGD. 06/22/2023  Provider MONTANO  Pharmacy [facility]

## 2023-05-06 ENCOUNTER — HEALTH MAINTENANCE LETTER (OUTPATIENT)
Age: 53
End: 2023-05-06

## 2023-05-23 ENCOUNTER — OFFICE VISIT (OUTPATIENT)
Dept: INTERNAL MEDICINE | Facility: OTHER | Age: 53
End: 2023-05-23
Attending: STUDENT IN AN ORGANIZED HEALTH CARE EDUCATION/TRAINING PROGRAM
Payer: COMMERCIAL

## 2023-05-23 VITALS
TEMPERATURE: 98.1 F | WEIGHT: 174 LBS | SYSTOLIC BLOOD PRESSURE: 110 MMHG | HEART RATE: 87 BPM | RESPIRATION RATE: 18 BRPM | DIASTOLIC BLOOD PRESSURE: 60 MMHG | BODY MASS INDEX: 29.87 KG/M2 | OXYGEN SATURATION: 98 %

## 2023-05-23 DIAGNOSIS — R10.13 ABDOMINAL DISCOMFORT, EPIGASTRIC: Primary | ICD-10-CM

## 2023-05-23 DIAGNOSIS — E66.3 OVERWEIGHT (BMI 25.0-29.9): ICD-10-CM

## 2023-05-23 PROCEDURE — 99213 OFFICE O/P EST LOW 20 MIN: CPT | Performed by: STUDENT IN AN ORGANIZED HEALTH CARE EDUCATION/TRAINING PROGRAM

## 2023-05-23 ASSESSMENT — ENCOUNTER SYMPTOMS: ABDOMINAL PAIN: 1

## 2023-05-23 ASSESSMENT — PAIN SCALES - GENERAL: PAINLEVEL: NO PAIN (0)

## 2023-05-23 NOTE — PROGRESS NOTES
Assessment & Plan         ICD-10-CM    1. Abdominal discomfort, epigastric  R10.13         Patient with nonspecific upper epigastric abdominal discomfort brought on sometimes by eating but also at rest.  Nonexertional so doubt heart.  She has scheduled endoscopy for her severe GERD to ensure she does not have Metcalf's, hiatal hernia, ulceration etc.  Continue PPI and as needed famotidine.  Suspect this is related to her GERD.  It is currently unrelated to eating and is not so much pain as bloating so doubt biliary colic.  We decided to hold off on additional work-up until after her endoscopy.  If her endoscopy is negative may pursue right upper quadrant ultrasound versus CT of the abdomen versus stress testing if she were to develop exertional chest pain.  Discussed the pathophysiology of stomach ulcers, hiatal hernias, Metcalf's esophagus etc. at length with the patient today.      Overweight: BMI 29.87.  Congratulated her that she is no longer classified as obese.  She has lost 20 pounds in the last year and a half.  5 pounds since April.  Encouraged her to continue lifestyle changes and we discussed a target weight of approximately 150 pounds.    No follow-ups on file.    Carter Navarro MD  [facility] AND \[facility]\""   Cielo is a 52 year old, presenting for the following health issues:  Abdominal Pain (Pressure/ fulness by sternum.  Started a month ago   )         View : No data to display.              Abdominal Pain          Pressure and fullness by sternum.   Started about one month ago.      Wt Readings from Last 5 Encounters:   05/23/23 78.9 kg (174 lb)   04/11/23 81.2 kg (179 lb)   02/10/23 80.2 kg (176 lb 11.2 oz)   06/22/22 88.5 kg (195 lb)   01/06/22 88 kg (194 lb)       Pain under right side of ribs.   Feels like a fullness.   Rubbed it this morning and it went away.   It is less of a pain and more bloating.  Is not brought on by exertion or eating as much now as it was brought  on by eating early on.  Has been going on for over a month.  She is scheduled for an endoscopy in June.  She has no chest pain.  No shortness of breath.  She did not feel like she aspirates.  She notices that her PPI and as needed famotidine will help.  She has worsened heartburn symptoms when she bends over.  She is working on weight loss.  She has lost approximately 5 pounds since April and 20 pounds since January 2022.        Review of Systems   Gastrointestinal: Positive for abdominal pain.          Objective    /60 (BP Location: Right arm, Patient Position: Sitting, Cuff Size: Adult Regular)   Pulse 87   Temp 98.1  F (36.7  C) (Temporal)   Resp 18   Wt 78.9 kg (174 lb)   SpO2 98%   Breastfeeding No   BMI 29.87 kg/m    Body mass index is 29.87 kg/m .  Physical Exam   General: Pleasant 49-year-old woman sitting clinic no acute distress

## 2023-05-23 NOTE — NURSING NOTE
"Chief Complaint   Patient presents with     Abdominal Pain     Pressure/ fulness by sternum.  Started a month ago          Medication reconciliation completed.    FOOD SECURITY SCREENING QUESTIONS:    The next two questions are to help us understand your food security.  If you are feeling you need any assistance in this area, we have resources available to support you today.    Hunger Vital Signs:  Within the past 12 months we worried whether our food would run out before we got money to buy more. Never  Within the past 12 months the food we bought just didn't last and we didn't have money to get more. Never    Initial /60 (BP Location: Right arm, Patient Position: Sitting, Cuff Size: Adult Regular)   Pulse 87   Temp 98.1  F (36.7  C) (Temporal)   Resp 18   Wt 78.9 kg (174 lb)   SpO2 98%   Breastfeeding No   BMI 29.87 kg/m   Estimated body mass index is 29.87 kg/m  as calculated from the following:    Height as of 2/10/23: 1.626 m (5' 4\").    Weight as of this encounter: 78.9 kg (174 lb).       Amna Ojeda LPN .......  5/23/2023  10:02 AM  "

## 2023-05-23 NOTE — H&P (VIEW-ONLY)
Assessment & Plan         ICD-10-CM    1. Abdominal discomfort, epigastric  R10.13         Patient with nonspecific upper epigastric abdominal discomfort brought on sometimes by eating but also at rest.  Nonexertional so doubt heart.  She has scheduled endoscopy for her severe GERD to ensure she does not have Metcalf's, hiatal hernia, ulceration etc.  Continue PPI and as needed famotidine.  Suspect this is related to her GERD.  It is currently unrelated to eating and is not so much pain as bloating so doubt biliary colic.  We decided to hold off on additional work-up until after her endoscopy.  If her endoscopy is negative may pursue right upper quadrant ultrasound versus CT of the abdomen versus stress testing if she were to develop exertional chest pain.  Discussed the pathophysiology of stomach ulcers, hiatal hernias, Metcalf's esophagus etc. at length with the patient today.      Overweight: BMI 29.87.  Congratulated her that she is no longer classified as obese.  She has lost 20 pounds in the last year and a half.  5 pounds since April.  Encouraged her to continue lifestyle changes and we discussed a target weight of approximately 150 pounds.    No follow-ups on file.    Carter Navarro MD  [facility]   Cielo is a 52 year old, presenting for the following health issues:  Abdominal Pain (Pressure/ fulness by sternum.  Started a month ago   )         View : No data to display.              Abdominal Pain          Pressure and fullness by sternum.   Started about one month ago.      Wt Readings from Last 5 Encounters:   05/23/23 78.9 kg (174 lb)   04/11/23 81.2 kg (179 lb)   02/10/23 80.2 kg (176 lb 11.2 oz)   06/22/22 88.5 kg (195 lb)   01/06/22 88 kg (194 lb)       Pain under right side of ribs.   Feels like a fullness.   Rubbed it this morning and it went away.   It is less of a pain and more bloating.  Is not brought on by exertion or eating as much now as it was brought  on by eating early on.  Has been going on for over a month.  She is scheduled for an endoscopy in June.  She has no chest pain.  No shortness of breath.  She did not feel like she aspirates.  She notices that her PPI and as needed famotidine will help.  She has worsened heartburn symptoms when she bends over.  She is working on weight loss.  She has lost approximately 5 pounds since April and 20 pounds since January 2022.        Review of Systems   Gastrointestinal: Positive for abdominal pain.          Objective    /60 (BP Location: Right arm, Patient Position: Sitting, Cuff Size: Adult Regular)   Pulse 87   Temp 98.1  F (36.7  C) (Temporal)   Resp 18   Wt 78.9 kg (174 lb)   SpO2 98%   Breastfeeding No   BMI 29.87 kg/m    Body mass index is 29.87 kg/m .  Physical Exam   General: Pleasant 49-year-old woman sitting clinic no acute distress

## 2023-06-22 ENCOUNTER — ANESTHESIA EVENT (OUTPATIENT)
Dept: SURGERY | Facility: OTHER | Age: 53
End: 2023-06-22
Payer: COMMERCIAL

## 2023-06-22 ENCOUNTER — ANESTHESIA (OUTPATIENT)
Dept: SURGERY | Facility: OTHER | Age: 53
End: 2023-06-22
Payer: COMMERCIAL

## 2023-06-22 ENCOUNTER — HOSPITAL ENCOUNTER (OUTPATIENT)
Facility: OTHER | Age: 53
Discharge: HOME | End: 2023-06-22
Attending: SURGERY | Admitting: SURGERY
Payer: COMMERCIAL

## 2023-06-22 VITALS
OXYGEN SATURATION: 98 % | RESPIRATION RATE: 16 BRPM | TEMPERATURE: 98.8 F | HEART RATE: 61 BPM | SYSTOLIC BLOOD PRESSURE: 134 MMHG | DIASTOLIC BLOOD PRESSURE: 84 MMHG

## 2023-06-22 PROCEDURE — 43239 EGD BIOPSY SINGLE/MULTIPLE: CPT | Performed by: SURGERY

## 2023-06-22 PROCEDURE — 250N000011 HC RX IP 250 OP 636

## 2023-06-22 PROCEDURE — 258N000003 HC RX IP 258 OP 636: Performed by: SURGERY

## 2023-06-22 PROCEDURE — 258N000003 HC RX IP 258 OP 636

## 2023-06-22 PROCEDURE — 88305 TISSUE EXAM BY PATHOLOGIST: CPT

## 2023-06-22 PROCEDURE — 250N000009 HC RX 250

## 2023-06-22 PROCEDURE — 999N000010 HC STATISTIC ANES STAT CODE-CRNA PER MINUTE: Performed by: SURGERY

## 2023-06-22 PROCEDURE — 43239 EGD BIOPSY SINGLE/MULTIPLE: CPT | Performed by: NURSE ANESTHETIST, CERTIFIED REGISTERED

## 2023-06-22 RX ORDER — PROGESTERONE 100 MG/1
100 CAPSULE ORAL AT BEDTIME
COMMUNITY
Start: 2023-04-12

## 2023-06-22 RX ORDER — NALOXONE HYDROCHLORIDE 0.4 MG/ML
0.2 INJECTION, SOLUTION INTRAMUSCULAR; INTRAVENOUS; SUBCUTANEOUS
Status: DISCONTINUED | OUTPATIENT
Start: 2023-06-22 | End: 2023-06-22 | Stop reason: HOSPADM

## 2023-06-22 RX ORDER — ONDANSETRON 4 MG/1
4 TABLET, FILM COATED ORAL EVERY 8 HOURS PRN
COMMUNITY
End: 2023-09-20

## 2023-06-22 RX ORDER — SODIUM CHLORIDE, SODIUM LACTATE, POTASSIUM CHLORIDE, CALCIUM CHLORIDE 600; 310; 30; 20 MG/100ML; MG/100ML; MG/100ML; MG/100ML
INJECTION, SOLUTION INTRAVENOUS CONTINUOUS PRN
Status: DISCONTINUED | OUTPATIENT
Start: 2023-06-22 | End: 2023-06-22

## 2023-06-22 RX ORDER — PROPOFOL 10 MG/ML
INJECTION, EMULSION INTRAVENOUS CONTINUOUS PRN
Status: DISCONTINUED | OUTPATIENT
Start: 2023-06-22 | End: 2023-06-22

## 2023-06-22 RX ORDER — PNV NO.95/FERROUS FUM/FOLIC AC 28MG-0.8MG
TABLET ORAL
COMMUNITY
Start: 2022-09-26 | End: 2024-01-02

## 2023-06-22 RX ORDER — NALOXONE HYDROCHLORIDE 0.4 MG/ML
0.4 INJECTION, SOLUTION INTRAMUSCULAR; INTRAVENOUS; SUBCUTANEOUS
Status: DISCONTINUED | OUTPATIENT
Start: 2023-06-22 | End: 2023-06-22 | Stop reason: HOSPADM

## 2023-06-22 RX ORDER — SODIUM CHLORIDE, SODIUM LACTATE, POTASSIUM CHLORIDE, CALCIUM CHLORIDE 600; 310; 30; 20 MG/100ML; MG/100ML; MG/100ML; MG/100ML
INJECTION, SOLUTION INTRAVENOUS CONTINUOUS
Status: DISCONTINUED | OUTPATIENT
Start: 2023-06-22 | End: 2023-06-22 | Stop reason: HOSPADM

## 2023-06-22 RX ORDER — LIDOCAINE HYDROCHLORIDE 20 MG/ML
INJECTION, SOLUTION INFILTRATION; PERINEURAL PRN
Status: DISCONTINUED | OUTPATIENT
Start: 2023-06-22 | End: 2023-06-22

## 2023-06-22 RX ORDER — PROPOFOL 10 MG/ML
INJECTION, EMULSION INTRAVENOUS PRN
Status: DISCONTINUED | OUTPATIENT
Start: 2023-06-22 | End: 2023-06-22

## 2023-06-22 RX ORDER — LIDOCAINE 40 MG/G
CREAM TOPICAL
Status: DISCONTINUED | OUTPATIENT
Start: 2023-06-22 | End: 2023-06-22 | Stop reason: HOSPADM

## 2023-06-22 RX ORDER — FLUMAZENIL 0.1 MG/ML
0.2 INJECTION, SOLUTION INTRAVENOUS
Status: DISCONTINUED | OUTPATIENT
Start: 2023-06-22 | End: 2023-06-22 | Stop reason: HOSPADM

## 2023-06-22 RX ADMIN — PROPOFOL 70 MG: 10 INJECTION, EMULSION INTRAVENOUS at 13:42

## 2023-06-22 RX ADMIN — PROPOFOL 150 MCG/KG/MIN: 10 INJECTION, EMULSION INTRAVENOUS at 13:42

## 2023-06-22 RX ADMIN — SODIUM CHLORIDE, SODIUM LACTATE, POTASSIUM CHLORIDE, AND CALCIUM CHLORIDE: 600; 310; 30; 20 INJECTION, SOLUTION INTRAVENOUS at 13:37

## 2023-06-22 RX ADMIN — LIDOCAINE HYDROCHLORIDE 100 MG: 20 INJECTION, SOLUTION INFILTRATION; PERINEURAL at 13:41

## 2023-06-22 RX ADMIN — PROPOFOL 50 MG: 10 INJECTION, EMULSION INTRAVENOUS at 13:45

## 2023-06-22 RX ADMIN — SODIUM CHLORIDE, POTASSIUM CHLORIDE, SODIUM LACTATE AND CALCIUM CHLORIDE: 600; 310; 30; 20 INJECTION, SOLUTION INTRAVENOUS at 11:59

## 2023-06-22 ASSESSMENT — ACTIVITIES OF DAILY LIVING (ADL)
ADLS_ACUITY_SCORE: 35
ADLS_ACUITY_SCORE: 35

## 2023-06-22 NOTE — OR NURSING
Cielo has been discharged to home at 1500 via ambulatory accompanied by spouse, Sincere.    Written discharge instructions were provided to patient.  Prescriptions were none.      Patient and adult caring for them verbalize understanding of discharge instructions including no driving until tomorrow and no longer taking narcotic pain medications - no operating mechanical equipment and no making any important decisions.They understand reason for discharge, and necessary follow-up appointments.

## 2023-06-22 NOTE — H&P
CHIEF COMPLAINT / REASON FOR PROCEDURE:  Gerd Dysphagia    PERTINENT HISTORY   Patient complains of  Gerd Dysphagia.     Past Medical History:   Diagnosis Date     Anemia     No Comments Provided     Family history of malignant neoplasm of digestive organ     2015     Insomnia     No Comments Provided     Major depressive disorder, single episode     ,Depression.  PHQ-9 score .     Mass of breast     ,Soft breast mass lump, right breast     Pain in hip     No Comments Provided     Personal history of other medical treatment (CODE)     2011,, 1 SAB     Personal history of other specified conditions (CODE)     abnormal Pap smear prior to , subsequent yearly Pap smears have been normal     PONV (postoperative nausea and vomiting)      Restless legs syndrome     No Comments Provided     Segmental and somatic dysfunction of pelvic region     2013     Uncomplicated asthma     generally well controlled, history of respiratory arrest requiring brief mechanical ventilation.       Past Surgical History:   Procedure Laterality Date     BACK SURGERY      Lumbar L4-5     COLONOSCOPY N/A 10/16/2020    normal, f/u 10 years     DILATION AND CURETTAGE      ,Dilatation and curettage for blighted ovum.     ENDOSCOPY UPPER, COLONOSCOPY, COMBINED      Normal exam, 5 year follow up due to FH     EXTRACTION(S) DENTAL      [place] tooth extraction     HYSTERECTOMY VAGINAL N/A 2015    Performed at [facility]. Dr. Lindsay for DUB; negative for malignancy; ovaries remain     HYSTERECTOMY, PAP NO LONGER INDICATED N/A 2015    Cervix removed per [facility] path report.     MAMMOPLASTY REDUCTION      , Bilateral breast reduction, [facility]     MAMMOPLASTY REDUCTION           OTHER SURGICAL HISTORY      50426.9,[place] SUBTALAR ATHROEREISIS,Left foot       Other:  None  Bleeding tendencies:  None      Relevant Family History: None     Relevant Social History:  None     10 point ROS of systems  including Constitutional, Eyes, Respiratory, Cardiovascular, Gastroenterology, Genitourinary, Integumentary, Muscularskeletal, Psychiatric were all negative except for pertinent positives noted in my HPI.      ALLERGIES/SENSITIVITIES: No Known Allergies     CURRENT MEDICATIONS:  No current facility-administered medications on file prior to encounter.  EPINEPHrine (EPIPEN/ADRENACLICK/OR ANY BX GENERIC EQUIV) 0.3 MG/0.3ML injection 2-pack, Inject 0.3 mg into the muscle as needed Inject 0.3 mg intramuscular one time if needed for Allergic Reaction.  famotidine (PEPCID) 40 MG tablet, Take 1 tablet (40 mg) by mouth 2 times daily as needed for heartburn  fluticasone-salmeterol (ADVAIR DISKUS) 250-50 MCG/DOSE inhaler, Inhale 1 puff into the lungs every 12 hours Inhale 1 Puff by mouth every 12 hours.  ibuprofen (ADVIL/MOTRIN) 800 MG tablet, TAKE 1 TABLET (800 MG) BY MOUTH EVERY 6 HOURS AS NEEDED FOR MODERATE PAIN  LORazepam (ATIVAN) 0.5 MG tablet, TAKE 1 TABLET BY MOUTH EVERY 8 HOURS AS NEEDED FOR ANXIETY  losartan (COZAAR) 50 MG tablet, Take 1 tablet (50 mg) by mouth daily  Magnesium Oxide 250 MG tablet, TAKE 2 TABLETS BY MOUTH IN THE MORNING, and TAKE 2 TABLETS IN THE EVENING  montelukast (SINGULAIR) 10 MG tablet, Take 1 tablet (10 mg) by mouth At Bedtime  naltrexone (REVIA) 1 mg/mL SOLN, Take 1 mg by mouth 2 times daily  ondansetron (ZOFRAN) 4 MG tablet, Take 4 mg by mouth every 8 hours as needed for nausea  pantoprazole (PROTONIX) 40 MG EC tablet, Take 1 tablet (40 mg) by mouth daily  phentermine (ADIPEX-P) 15 MG capsule, Take 1-2 capsules (15-30 mg) by mouth every morning  progesterone (PROMETRIUM) 100 MG capsule, Take 100 mg by mouth At Bedtime  rosuvastatin (CRESTOR) 5 MG tablet, Take 1 tablet (5 mg) by mouth daily  traZODone (DESYREL) 50 MG tablet, Take 1 tablet (50 mg) by mouth At Bedtime  VENTOLIN  (90 Base) MCG/ACT inhaler, inhale 2 PUFFS into THE lungs 4 TIMES DAILY          PRE-SEDATION ASSESSMENT:     LUNGS:  CTA B/L, no wheezing or crackles.  Heart & CV:  RRR no murmur.  Intact distal pulses, good cap refill.    Comment(s):      IMPRESSION:  52 year old female in need of EGD to evaluate  Gerd Dysphagia.    PLAN:  I discussed diagnostic EGD with the patient. Anesthesia requested    Julian Cervantes MD

## 2023-06-22 NOTE — ANESTHESIA PREPROCEDURE EVALUATION
Anesthesia Pre-Procedure Evaluation    Patient: Cielo Bahena   MRN: [id number] : 1970        Procedure : Procedure(s):  Esophagoscopy, gastroscopy, duodenoscopy (EGD), combined          Past Medical History:   Diagnosis Date     Anemia     No Comments Provided     Family history of malignant neoplasm of digestive organ     2015     Insomnia     No Comments Provided     Major depressive disorder, single episode     2006,Depression.  PHQ-9 score .     Mass of breast     ,Soft breast mass lump, right breast     Pain in hip     No Comments Provided     Personal history of other medical treatment (CODE)     2011,, 1 SAB     Personal history of other specified conditions (CODE)     abnormal Pap smear prior to , subsequent yearly Pap smears have been normal     PONV (postoperative nausea and vomiting)      Restless legs syndrome     No Comments Provided     Segmental and somatic dysfunction of pelvic region     2013     Uncomplicated asthma     generally well controlled, history of respiratory arrest requiring brief mechanical ventilation.      Past Surgical History:   Procedure Laterality Date     BACK SURGERY      Lumbar L4-5     COLONOSCOPY N/A 10/16/2020    normal, f/u 10 years     DILATION AND CURETTAGE      ,Dilatation and curettage for blighted ovum.     ENDOSCOPY UPPER, COLONOSCOPY, COMBINED      Normal exam, 5 year follow up due to FH     EXTRACTION(S) DENTAL      [place] tooth extraction     HYSTERECTOMY VAGINAL N/A 2015    Performed at [facility]. Dr. Lindsay for DUB; negative for malignancy; ovaries remain     HYSTERECTOMY, PAP NO LONGER INDICATED N/A 2015    Cervix removed per [facility] path report.     MAMMOPLASTY REDUCTION      , Bilateral breast reduction, [facility]     MAMMOPLASTY REDUCTION           OTHER SURGICAL HISTORY      66891.9,[place] SUBTALAR ATHROEREISIS,Left foot      No Known Allergies   Social History     Tobacco Use     Smoking  status: Former     Packs/day: 0.50     Years: 25.00     Pack years: 12.50     Types: Cigarettes     Quit date: 2005     Years since quittin.5     Smokeless tobacco: Never     Tobacco comments:     on rare occasions   Substance Use Topics     Alcohol use: Yes     Alcohol/week: 2.0 standard drinks of alcohol     Types: 2 Cans of beer per week     Comment: Alcoholic Drinks/day: occassional      Wt Readings from Last 1 Encounters:   23 78.9 kg (174 lb)        Anesthesia Evaluation   Pt has had prior anesthetic.     History of anesthetic complications  - PONV.      ROS/MED HX  ENT/Pulmonary:     (+) Moderate Persistent, asthma Treatment: Inhaler daily,      Neurologic:  - neg neurologic ROS     Cardiovascular:  - neg cardiovascular ROS   (+) hypertension-----    METS/Exercise Tolerance: >4 METS    Hematologic:     (+) anemia,     Musculoskeletal:  - neg musculoskeletal ROS     GI/Hepatic:     (+) GERD,     Renal/Genitourinary:  - neg Renal ROS     Endo:  - neg endo ROS     Psychiatric/Substance Use:     (+) psychiatric history depression and anxiety     Infectious Disease:  - neg infectious disease ROS     Malignancy:  - neg malignancy ROS     Other:  - neg other ROS          Physical Exam    Airway  airway exam normal      Mallampati: II   TM distance: > 3 FB   Neck ROM: full   Mouth opening: > 3 cm    Respiratory Devices and Support         Dental       (+) Completely normal teeth      Cardiovascular   cardiovascular exam normal       Rhythm and rate: regular and normal     Pulmonary   pulmonary exam normal        breath sounds clear to auscultation           OUTSIDE LABS:  CBC:   Lab Results   Component Value Date    WBC 7.0 02/10/2023    WBC 7.3 2021    HGB 14.6 02/10/2023    HGB 14.6 2021    HCT 42.2 02/10/2023    HCT 43.2 2021     02/10/2023     2021     BMP:   Lab Results   Component Value Date     02/10/2023     2021    POTASSIUM 4.4  02/10/2023    POTASSIUM 4.4 12/14/2021    CHLORIDE 106 02/10/2023    CHLORIDE 105 12/14/2021    CO2 29 02/10/2023    CO2 25 12/14/2021    BUN 13.6 02/10/2023    BUN 19 12/14/2021    CR 0.93 02/10/2023    CR 0.87 12/14/2021    GLC 85 02/10/2023    GLC 95 12/14/2021     COAGS: No results found for: PTT, INR, FIBR  POC:   Lab Results   Component Value Date    HCG Negative 03/05/2015     HEPATIC:   Lab Results   Component Value Date    ALBUMIN 4.6 02/10/2023    PROTTOTAL 7.2 02/10/2023    ALT 36 (H) 02/10/2023    AST 29 02/10/2023    ALKPHOS 74 02/10/2023    BILITOTAL 0.5 02/10/2023     OTHER:   Lab Results   Component Value Date    LACT 0.9 12/11/2018    A1C 5.3 09/07/2021    ZEINA 9.0 02/10/2023    LIPASE 10.9 (L) 07/01/2017    TSH 0.84 09/07/2021    T4 0.75 09/07/2021    CRP 2.3 09/07/2021    SED 5 09/07/2021       Anesthesia Plan    ASA Status:  2   NPO Status:  NPO Appropriate    Anesthesia Type: MAC.   Induction: Propofol.           Consents    Anesthesia Plan(s) and associated risks, benefits, and realistic alternatives discussed. Questions answered and patient/representative(s) expressed understanding.     - Discussed: Risks, Benefits and Alternatives for BOTH SEDATION and the PROCEDURE were discussed     - Discussed with:  Patient      - Extended Intubation/Ventilatory Support Discussed: No.      - Patient is DNR/DNI Status: No    Use of blood products discussed: No .     Postoperative Care            Comments:                GARRETT GARCIA CRNA

## 2023-06-22 NOTE — OP NOTE
PROCEDURE NOTE    SURGEON:Julian Cervantes MD    PRE-OP DIAGNOSIS:   GERD, Dysphagia       POST-OP DIAGNOSIS: GERD    PROCEDURE PLANNED:   Diagnostic EGD, flexible        PROCEDURE PERFORMED:  EGD with biopsy, flexible    SPECIMEN:      ID Type Source Tests Collected by Time Destination   1 : duodenuem biopsies  Biopsy Small Intestine, Duodenum SURGICAL PATHOLOGY EXAM Julian Cervantes MD 6/22/2023  1:45 PM    2 : antrum biopsies  Biopsy Stomach, Antrum SURGICAL PATHOLOGY EXAM Julian Cervantes MD 6/22/2023  1:46 PM    3 : GE JUNCTION BIOPSIES Biopsy Gastric Esophageal Junction SURGICAL PATHOLOGY EXAM Julian Cervantes MD 6/22/2023  1:48 PM            ANESTHESIA: MAC  Coverage requested   CRNA Independent: Nory Ruiz APRN CRNA  SRNA: Tara Cervantes      ESTIMATED BLOOD LOSS: None    COMPLICATIONS:  None    INDICATION FOR THE PROCEDURE:The patient is a 52 year oldfemale. The patient presents with hx of GERD. I explained to the patient the risks, benefits and alternatives to diagnostic EGD for evaluating GERD. We specifically discussed the risks of bleeding, infection, perforation and the risks of sedation. The patient's questions were answered and the patient wished to proceed. Informed consent paperwork was completed.    PROCEDURE: The patient was taken to the endoscopy suite. Appropriate monitors were attached. The patient was placed in the left lateral decubitus position. Bite block was positioned. Timeout was performed confirming the patient's identity and procedure to be performed. After appropriate sedation was confirmed, the flexible endoscope was advanced into the oropharynx. The posterior oropharynx appeared grossly normal. The scope was advanced into the proximal esophagus. The esophagus was insufflated with air. The scope was advanced under direct visualization. No acute abnormalities of the esophagus were noted. The scope was advanced into the stomach. The scope was advanced through the pylorus into the  duodenal bulb. The bulb and distal duodenum appeared grossly normal. The scope was withdrawn back into the stomach. Antral biopsy was obtained and sent to pathology. The scope was retroflexed and the GE junction inspected. No abnormalities were noted.  The scope was returned to a neutral position and the stomach was decompressed. The scope was withdrawn to the GE junction and biopsy obtained. The mucosa of the esophagus was inspected while withdrawing the scope. No abnormalities were noted. The scope was withdrawn from the patient. The bite block was removed. The patient tolerated the procedure with no immediately apparent complication. The patient was taken to recovery instable condition.    FOLLOW UP:  RECOMMENDATIONS  Follow-up pathology. Possible [facility] if interested in surgery.     Julian Cervantes MD on 6/22/2023 at 1:53 PM      Julian Cervantes MD on 6/22/2023 at 1:50 PM

## 2023-06-22 NOTE — ANESTHESIA POSTPROCEDURE EVALUATION
Patient: Cielo A Wedge    Procedure: Procedure(s):  Esophagoscopy, gastroscopy, duodenoscopy (EGD), combined       Anesthesia Type:  MAC    Note:  Disposition: Outpatient   Postop Pain Control: Uneventful            Sign Out: Well controlled pain   PONV: No   Neuro/Psych: Uneventful            Sign Out: Acceptable/Baseline neuro status   Airway/Respiratory: Uneventful            Sign Out: Acceptable/Baseline resp. status   CV/Hemodynamics: Uneventful            Sign Out: Acceptable CV status; No obvious hypovolemia; No obvious fluid overload   Other NRE: NONE   DID A NON-ROUTINE EVENT OCCUR? No           Last vitals:  Vitals Value Taken Time   /84 06/22/23 1445   Temp 98.8  F (37.1  C) 06/22/23 1445   Pulse 61 06/22/23 1445   Resp 16 06/22/23 1445   SpO2 98 % 06/22/23 1445       Electronically Signed By: GARRETT GARCIA CRNA  June 22, 2023  3:04 PM

## 2023-06-22 NOTE — ANESTHESIA CARE TRANSFER NOTE
Patient: Cielo A Wedge    Procedure: Procedure(s):  Esophagoscopy, gastroscopy, duodenoscopy (EGD), combined       Diagnosis: GERD (gastroesophageal reflux disease) [K21.9]  Diagnosis Additional Information: No value filed.    Anesthesia Type:   MAC     Note:    Oropharynx: oropharynx clear of all foreign objects and spontaneously breathing  Level of Consciousness: awake  Oxygen Supplementation: nasal cannula  Level of Supplemental Oxygen (L/min / FiO2): 3  Independent Airway: airway patency satisfactory and stable  Dentition: dentition unchanged  Vital Signs Stable: post-procedure vital signs reviewed and stable  Report to RN Given: handoff report given  Patient transferred to: Phase II    Handoff Report: Identifed the Patient, Identified the Reponsible Provider, Reviewed the pertinent medical history, Discussed the surgical course, Reviewed Intra-OP anesthesia mangement and issues during anesthesia, Set expectations for post-procedure period and Allowed opportunity for questions and acknowledgement of understanding      Vitals:  Vitals Value Taken Time   BP     Temp     Pulse     Resp     SpO2         Electronically Signed By: Tara Cervantes  June 22, 2023  1:58 PM

## 2023-06-22 NOTE — DISCHARGE INSTRUCTIONS
.[facility]  Adult Discharge Orders & Instructions    ________________________________________________________________          For 12 hours after surgery  Get plenty of rest.  A responsible adult must stay with you for at least 12 hours after you leave the hospital.   You may feel lightheaded.  IF so, sit for a few minutes before standing.  Have someone help you get up.   You may have a slight fever. Call the doctor if your fever is over 101 F (38.3 C) (taken under the tongue) or lasts longer than 24 hours.  You may have a dry mouth, a sore throat, muscle aches or trouble sleeping.  These should go away after 24 hours.  Do not make important or legal decisions.  6.   Do not drive or use heavy equipment.  If you have weakness or tingling, don't drive or use heavy equipment until this feeling goes away.    To contact a doctor, call   [phone]_______________________

## 2023-06-26 LAB
PATH REPORT.COMMENTS IMP SPEC: NORMAL
PATH REPORT.FINAL DX SPEC: NORMAL
PATH REPORT.RELEVANT HX SPEC: NORMAL
PHOTO IMAGE: NORMAL

## 2023-07-20 ENCOUNTER — LAB (OUTPATIENT)
Dept: LAB | Facility: OTHER | Age: 53
End: 2023-07-20
Attending: NURSE PRACTITIONER
Payer: COMMERCIAL

## 2023-07-20 DIAGNOSIS — E55.9 VITAMIN D2 DEFICIENCY: ICD-10-CM

## 2023-07-20 DIAGNOSIS — E27.9 ADRENAL HYPERTENSION (H): ICD-10-CM

## 2023-07-20 DIAGNOSIS — Z78.0 MENOPAUSE: ICD-10-CM

## 2023-07-20 DIAGNOSIS — I15.2 ADRENAL HYPERTENSION (H): ICD-10-CM

## 2023-07-20 DIAGNOSIS — R79.0 LOW FERRITIN: ICD-10-CM

## 2023-07-20 LAB
ESTRADIOL SERPL-MCNC: 13 PG/ML
FERRITIN SERPL-MCNC: 114 NG/ML (ref 11–328)
IRON BINDING CAPACITY (ROCHE): 292 UG/DL (ref 240–430)
IRON SATN MFR SERPL: 33 % (ref 15–46)
IRON SERPL-MCNC: 97 UG/DL (ref 37–145)
PROGEST SERPL-MCNC: 1.4 NG/ML

## 2023-07-20 PROCEDURE — 84270 ASSAY OF SEX HORMONE GLOBUL: CPT | Mod: ZL

## 2023-07-20 PROCEDURE — 84403 ASSAY OF TOTAL TESTOSTERONE: CPT | Mod: ZL

## 2023-07-20 PROCEDURE — 83550 IRON BINDING TEST: CPT | Mod: ZL

## 2023-07-20 PROCEDURE — 82533 TOTAL CORTISOL: CPT | Mod: ZL

## 2023-07-20 PROCEDURE — 82627 DEHYDROEPIANDROSTERONE: CPT | Mod: ZL

## 2023-07-20 PROCEDURE — 82306 VITAMIN D 25 HYDROXY: CPT | Mod: ZL

## 2023-07-20 PROCEDURE — 36415 COLL VENOUS BLD VENIPUNCTURE: CPT | Mod: ZL

## 2023-07-20 PROCEDURE — 84144 ASSAY OF PROGESTERONE: CPT | Mod: ZL

## 2023-07-20 PROCEDURE — 82670 ASSAY OF TOTAL ESTRADIOL: CPT | Mod: ZL

## 2023-07-20 PROCEDURE — 82728 ASSAY OF FERRITIN: CPT | Mod: ZL

## 2023-07-21 LAB
CORTIS SERPL-MCNC: 7.6 UG/DL
DHEA-S SERPL-MCNC: 165 UG/DL (ref 35–430)
SHBG SERPL-SCNC: 51 NMOL/L (ref 30–135)

## 2023-07-23 LAB
TESTOST FREE SERPL-MCNC: 0.3 NG/DL
TESTOST SERPL-MCNC: 22 NG/DL (ref 8–60)

## 2023-07-27 LAB
DEPRECATED CALCIDIOL+CALCIFEROL SERPL-MC: <81 UG/L (ref 20–75)
VITAMIN D2 SERPL-MCNC: <5 UG/L
VITAMIN D3 SERPL-MCNC: 76 UG/L

## 2023-09-20 ENCOUNTER — HOSPITAL ENCOUNTER (OUTPATIENT)
Dept: GENERAL RADIOLOGY | Facility: OTHER | Age: 53
Discharge: HOME | End: 2023-09-20
Attending: STUDENT IN AN ORGANIZED HEALTH CARE EDUCATION/TRAINING PROGRAM
Payer: COMMERCIAL

## 2023-09-20 ENCOUNTER — OFFICE VISIT (OUTPATIENT)
Dept: INTERNAL MEDICINE | Facility: OTHER | Age: 53
End: 2023-09-20
Attending: STUDENT IN AN ORGANIZED HEALTH CARE EDUCATION/TRAINING PROGRAM
Payer: COMMERCIAL

## 2023-09-20 VITALS
SYSTOLIC BLOOD PRESSURE: 117 MMHG | HEART RATE: 73 BPM | BODY MASS INDEX: 30.15 KG/M2 | OXYGEN SATURATION: 97 % | TEMPERATURE: 97.4 F | DIASTOLIC BLOOD PRESSURE: 76 MMHG | WEIGHT: 176.6 LBS | HEIGHT: 64 IN | RESPIRATION RATE: 18 BRPM

## 2023-09-20 DIAGNOSIS — Z12.31 ENCOUNTER FOR SCREENING MAMMOGRAM FOR BREAST CANCER: ICD-10-CM

## 2023-09-20 DIAGNOSIS — L98.9 FOOT LESION: ICD-10-CM

## 2023-09-20 DIAGNOSIS — L98.9 FOOT LESION: Primary | ICD-10-CM

## 2023-09-20 DIAGNOSIS — J45.20 MILD INTERMITTENT ASTHMA WITHOUT COMPLICATION: ICD-10-CM

## 2023-09-20 DIAGNOSIS — R11.0 NAUSEA: ICD-10-CM

## 2023-09-20 PROCEDURE — 99214 OFFICE O/P EST MOD 30 MIN: CPT | Performed by: STUDENT IN AN ORGANIZED HEALTH CARE EDUCATION/TRAINING PROGRAM

## 2023-09-20 PROCEDURE — 73630 X-RAY EXAM OF FOOT: CPT | Mod: LT

## 2023-09-20 RX ORDER — ONDANSETRON 4 MG/1
4 TABLET, FILM COATED ORAL EVERY 8 HOURS PRN
Qty: 30 TABLET | Refills: 1 | Status: SHIPPED | OUTPATIENT
Start: 2023-09-20 | End: 2023-11-08

## 2023-09-20 RX ORDER — FLUTICASONE PROPIONATE AND SALMETEROL 250; 50 UG/1; UG/1
1 POWDER RESPIRATORY (INHALATION) 2 TIMES DAILY
Qty: 60 EACH | Refills: 4 | Status: SHIPPED | OUTPATIENT
Start: 2023-09-20 | End: 2024-02-09

## 2023-09-20 ASSESSMENT — PAIN SCALES - GENERAL: PAINLEVEL: NO PAIN (0)

## 2023-09-20 ASSESSMENT — ASTHMA QUESTIONNAIRES: ACT_TOTALSCORE: 10

## 2023-09-20 NOTE — PROGRESS NOTES
Assessment & Plan         ICD-10-CM    1. Foot lesion  L98.9 XR Foot Left G/E 3 Views      2. Mild intermittent asthma without complication  J45.20 fluticasone-salmeterol (ADVAIR DISKUS) 250-50 MCG/ACT inhaler      3. Nausea  R11.0 ondansetron (ZOFRAN) 4 MG tablet      4. Encounter for screening mammogram for breast cancer  Z12.31 MA Screen Bilateral w/Garry        Foot lesion: Suspect she has a plantar fascia ganglion cyst versus fibroma.  Due to her stepping on rocks in June about the time of this lesion appearing x-ray of the foot was performed to ensure no foreign body.  Hardware appears intact.  No foreign body on my read.  She will let me know if it is getting bigger or not improving and I will refer to podiatry for evaluation.    Asthma: Well-controlled needs refills of her Advair as seasonal allergies are making this worse.    Patient is also due for mammogram I will order this today.    No follow-ups on file.    Carter Navarro MD  [facility]      Zheng Buck is a 52 year old, presenting for the following health issues:  Foot Problems (Left foot, bump on mid bottom)      9/20/2023     8:03 AM   Additional Questions   Roomed by Neha CHESTER CNA/ARJUN       History of Present Illness       Reason for visit:  Med refill and lump on bottom of foot  Symptom onset:  More than a month  Symptoms include:  Sore  Symptom intensity:  Mild  Symptom progression:  Staying the same  Had these symptoms before:  No  What makes it worse:  Certain shoes  What makes it better:  No    She eats 4 or more servings of fruits and vegetables daily.She consumes 0 sweetened beverage(s) daily.She exercises with enough effort to increase her heart rate 10 to 19 minutes per day.  She exercises with enough effort to increase her heart rate 3 or less days per week.   She is taking medications regularly.       Patient has a lesion on the bottom of her foot.  It started in approximately June.  She was walking the water  "and hurt the bottom of her foot.  She does not think there is a foreign body in it.    There is pain only when she wears certain shoes.  She also needs a refill of ondansetron and Advair.      Review of Systems         Objective    /76 (BP Location: Right arm, Patient Position: Sitting, Cuff Size: Adult Regular)   Pulse 73   Temp 97.4  F (36.3  C) (Tympanic)   Resp 18   Ht 1.626 m (5' 4\")   Wt 80.1 kg (176 lb 9.6 oz)   LMP 09/20/2015 (Approximate)   SpO2 97%   Breastfeeding No   BMI 30.31 kg/m    Body mass index is 30.31 kg/m .  Physical Exam   General: Pleasant 52-year-old woman sitting clinic no acute distress  MSK: Left foot with a lesion on the bottom of the foot appears to be approximately 1 cm in diameter attached to the plantar fascia pain on palpation.  No erythema or warmth overlying.                    "

## 2023-09-20 NOTE — NURSING NOTE
"Chief Complaint   Patient presents with    Foot Problems     Left foot, bump on mid bottom     Patient states the bump has been present since June, and it is painful depending on the shoe she wears. Patient has not tried any previous treatments.    Initial /76 (BP Location: Right arm, Patient Position: Sitting, Cuff Size: Adult Regular)   Pulse 73   Temp 97.4  F (36.3  C) (Tympanic)   Resp 18   Ht 1.626 m (5' 4\")   Wt 80.1 kg (176 lb 9.6 oz)   LMP 09/20/2015 (Approximate)   SpO2 97%   Breastfeeding No   BMI 30.31 kg/m   Estimated body mass index is 30.31 kg/m  as calculated from the following:    Height as of this encounter: 1.626 m (5' 4\").    Weight as of this encounter: 80.1 kg (176 lb 9.6 oz).       FOOD SECURITY SCREENING QUESTIONS:    The next two questions are to help us understand your food security.  If you are feeling you need any assistance in this area, we have resources available to support you today.    Hunger Vital Signs:  Within the past 12 months we worried whether our food would run out before we got money to buy more. Never  Within the past 12 months the food we bought just didn't last and we didn't have money to get more. Never  Neha Gutierrez LPN on 9/20/2023 at 8:11 AM     Neha Gutierrez   "

## 2023-09-21 ENCOUNTER — MYC MEDICAL ADVICE (OUTPATIENT)
Dept: INTERNAL MEDICINE | Facility: OTHER | Age: 53
End: 2023-09-21
Payer: COMMERCIAL

## 2023-11-01 ENCOUNTER — LAB (OUTPATIENT)
Dept: LAB | Facility: OTHER | Age: 53
End: 2023-11-01
Payer: COMMERCIAL

## 2023-11-01 DIAGNOSIS — Z78.0 MENOPAUSE: ICD-10-CM

## 2023-11-01 DIAGNOSIS — E27.9 ADRENAL ABNORMALITY (H): ICD-10-CM

## 2023-11-01 DIAGNOSIS — R79.0 LOW FERRITIN: ICD-10-CM

## 2023-11-01 LAB
BASOPHILS # BLD AUTO: 0 10E3/UL (ref 0–0.2)
BASOPHILS NFR BLD AUTO: 1 %
EOSINOPHIL # BLD AUTO: 0.1 10E3/UL (ref 0–0.7)
EOSINOPHIL NFR BLD AUTO: 1 %
ERYTHROCYTE [DISTWIDTH] IN BLOOD BY AUTOMATED COUNT: 11.7 % (ref 10–15)
ESTRADIOL SERPL-MCNC: 26 PG/ML
HCT VFR BLD AUTO: 41.4 % (ref 35–47)
HGB BLD-MCNC: 14.2 G/DL (ref 11.7–15.7)
IMM GRANULOCYTES # BLD: 0 10E3/UL
IMM GRANULOCYTES NFR BLD: 1 %
IRON BINDING CAPACITY (ROCHE): 290 UG/DL (ref 240–430)
IRON SATN MFR SERPL: 29 % (ref 15–46)
IRON SERPL-MCNC: 84 UG/DL (ref 37–145)
LYMPHOCYTES # BLD AUTO: 1.7 10E3/UL (ref 0.8–5.3)
LYMPHOCYTES NFR BLD AUTO: 25 %
MCH RBC QN AUTO: 31 PG (ref 26.5–33)
MCHC RBC AUTO-ENTMCNC: 34.3 G/DL (ref 31.5–36.5)
MCV RBC AUTO: 90 FL (ref 78–100)
MONOCYTES # BLD AUTO: 0.5 10E3/UL (ref 0–1.3)
MONOCYTES NFR BLD AUTO: 7 %
NEUTROPHILS # BLD AUTO: 4.3 10E3/UL (ref 1.6–8.3)
NEUTROPHILS NFR BLD AUTO: 65 %
NRBC # BLD AUTO: 0 10E3/UL
NRBC BLD AUTO-RTO: 0 /100
PLATELET # BLD AUTO: 213 10E3/UL (ref 150–450)
PROGEST SERPL-MCNC: 1 NG/ML
RBC # BLD AUTO: 4.58 10E6/UL (ref 3.8–5.2)
WBC # BLD AUTO: 6.6 10E3/UL (ref 4–11)

## 2023-11-01 PROCEDURE — 82533 TOTAL CORTISOL: CPT | Mod: ZL

## 2023-11-01 PROCEDURE — 82670 ASSAY OF TOTAL ESTRADIOL: CPT | Mod: ZL

## 2023-11-01 PROCEDURE — 84403 ASSAY OF TOTAL TESTOSTERONE: CPT | Mod: ZL

## 2023-11-01 PROCEDURE — 83550 IRON BINDING TEST: CPT | Mod: ZL

## 2023-11-01 PROCEDURE — 84144 ASSAY OF PROGESTERONE: CPT | Mod: ZL

## 2023-11-01 PROCEDURE — 85025 COMPLETE CBC W/AUTO DIFF WBC: CPT | Mod: ZL

## 2023-11-01 PROCEDURE — 84270 ASSAY OF SEX HORMONE GLOBUL: CPT | Mod: ZL

## 2023-11-01 PROCEDURE — 36415 COLL VENOUS BLD VENIPUNCTURE: CPT | Mod: ZL

## 2023-11-01 PROCEDURE — 82627 DEHYDROEPIANDROSTERONE: CPT | Mod: ZL

## 2023-11-02 LAB
CORTIS SERPL-MCNC: 7.7 UG/DL
SHBG SERPL-SCNC: 39 NMOL/L (ref 30–135)
SHBG SERPL-SCNC: 43 NMOL/L (ref 30–135)

## 2023-11-03 DIAGNOSIS — R11.0 NAUSEA: ICD-10-CM

## 2023-11-03 LAB — DHEA-S SERPL-MCNC: 208 UG/DL (ref 35–430)

## 2023-11-05 LAB
TESTOST FREE SERPL-MCNC: 0.46 NG/DL
TESTOST SERPL-MCNC: 30 NG/DL (ref 8–60)

## 2023-11-08 RX ORDER — ONDANSETRON 4 MG/1
4 TABLET, FILM COATED ORAL EVERY 8 HOURS PRN
Qty: 30 TABLET | Refills: 1 | Status: SHIPPED | OUTPATIENT
Start: 2023-11-08 | End: 2024-07-10

## 2023-11-08 NOTE — TELEPHONE ENCOUNTER
GI sent Rx request for the following:      Requested Prescriptions   Pending Prescriptions Disp Refills    ondansetron (ZOFRAN) 4 MG tablet [Pharmacy Med Name: ondansetron HCl 4 mg tablet] 30 tablet 1     Sig: Take 1 tablet (4 mg) by mouth every 8 hours as needed for nausea        Antivertigo/Antiemetic Agents Passed - 11/3/2023  8:21 AM   Last Prescription Date:   9/20/23  Last Fill Qty/Refills:         30, R-1    Last Office Visit:              9/20/23   Future Office visit:           none     Freida Childs RN on 11/8/2023 at 3:17 PM

## 2023-12-09 ENCOUNTER — MYC MEDICAL ADVICE (OUTPATIENT)
Dept: FAMILY MEDICINE | Facility: OTHER | Age: 53
End: 2023-12-09
Payer: COMMERCIAL

## 2023-12-09 DIAGNOSIS — F41.1 GAD (GENERALIZED ANXIETY DISORDER): ICD-10-CM

## 2023-12-11 ENCOUNTER — TRANSFERRED RECORDS (OUTPATIENT)
Dept: HEALTH INFORMATION MANAGEMENT | Facility: CLINIC | Age: 53
End: 2023-12-11
Payer: COMMERCIAL

## 2023-12-11 DIAGNOSIS — R40.0 SLEEPINESS: Primary | ICD-10-CM

## 2023-12-11 DIAGNOSIS — I10 BENIGN ESSENTIAL HYPERTENSION: ICD-10-CM

## 2023-12-11 DIAGNOSIS — Z72.820 POOR SLEEP: ICD-10-CM

## 2023-12-11 DIAGNOSIS — R06.83 SNORING: ICD-10-CM

## 2023-12-11 RX ORDER — LORAZEPAM 0.5 MG/1
TABLET ORAL
Qty: 60 TABLET | Refills: 5 | Status: CANCELLED | OUTPATIENT
Start: 2023-12-11

## 2023-12-11 NOTE — TELEPHONE ENCOUNTER
Last OV was 9/20/23    Last Annual physical Exam was 2/10/23.    Last prescription for Ativan was ordered on 3/18/21.    No upcoming scheduled appointments.    Demario'd up order and routed to provider to review.  Deon Obando RN on 12/11/2023 at 4:50 PM

## 2023-12-12 NOTE — TELEPHONE ENCOUNTER
[employer] message sent back to patient with Dr. Navarro's response.  Masha Lopez LPN 12/12/2023  8:41 AM

## 2023-12-12 NOTE — TELEPHONE ENCOUNTER
Needs appointment for controlled substance per clinic policy.    Carter Navarro MD on 12/12/2023 at 8:08 AM

## 2023-12-13 RX ORDER — PHENTERMINE HYDROCHLORIDE 15 MG/1
15-30 CAPSULE ORAL EVERY MORNING
Qty: 60 CAPSULE | Refills: 2 | OUTPATIENT
Start: 2023-12-13

## 2023-12-13 NOTE — TELEPHONE ENCOUNTER
Patient returned call.  Patient states she is no longer taking Phentermine and probably got reordered by mistake.  Rx denied.  Maggie Kirby RN on 12/13/2023 at 4:04 PM        Phentermine noted as discontinued on 9/20/23    Called and left message for patient to return call.  To verify request for Phentermine as it is not longer on med list.  Maggie Kirby RN on 12/13/2023 at 4:00 PM

## 2023-12-26 DIAGNOSIS — J45.40 MODERATE PERSISTENT ASTHMA WITHOUT COMPLICATION: ICD-10-CM

## 2023-12-27 RX ORDER — ALBUTEROL SULFATE 90 UG/1
2 AEROSOL, METERED RESPIRATORY (INHALATION) 4 TIMES DAILY
Qty: 54 G | Refills: 0 | Status: SHIPPED | OUTPATIENT
Start: 2023-12-27 | End: 2024-02-09

## 2023-12-27 NOTE — TELEPHONE ENCOUNTER
[facility] sent Rx request for the following:      Requested Prescriptions   Pending Prescriptions Disp Refills    VENTOLIN  (90 Base) MCG/ACT inhaler [Pharmacy Med Name: Ventolin HFA 90 mcg/actuation aerosol inhaler] 54 g 3     Sig: INHALE 2 PUFFS INTO THE LUNGS 4 TIMES DAILY       Asthma Maintenance Inhalers - Anticholinergics Failed - 12/26/2023  9:37 AM        Failed - Asthma control assessment score within normal limits in last 6 months     Please review ACT score.          Short-Acting Beta Agonist Inhalers Protocol  Failed - 12/26/2023  9:37 AM        Failed - Asthma control assessment score within normal limits in last 6 months     Please review ACT score.          Last Prescription Date:   2/22/23  Last Fill Qty/Refills:         54g, R-3    Last Office Visit:              2/10/23   Future Office visit:           2/9/24    Routing refill request to provider for review/approval because:  Drug not on the [facility] refill protocol     Yamilka Khanna RN on 12/27/2023 at 3:58 PM

## 2023-12-28 NOTE — PROGRESS NOTES
Assessment & Plan     1. ULICES (generalized anxiety disorder)  Chronic, stable. One time refill as below. PDMP reviewed and appears appropriate.  Urine drug screen and controlled substance agreement can be updated with PCP next month.  - LORazepam (ATIVAN) 0.5 MG tablet; TAKE 1 TABLET BY MOUTH EVERY 8 HOURS AS NEEDED FOR ANXIETY  Dispense: 30 tablet; Refill: 0    2. Acute non-recurrent sinusitis, unspecified location  Vitals and exam stable.  Most consistent with viral sinusitis.  Continue symptomatic management.  Follow-up if symptoms persist or worsen.      No follow-ups on file.    Corinne Darden PA-C  [facility]   Cielo is a 53 year old, presenting for the following health issues:  Refill Request and Sinus Problem      History of Present Illness       Reason for visit:  Sinus infection and a med refill  Symptom onset:  1-2 weeks ago  Symptom intensity:  Mild  Symptom progression:  Improving  Had these symptoms before:  Yes  Has tried/received treatment for these symptoms:  Yes  Previous treatment was successful:  Yes  Prior treatment description:  Antibiotics    She eats 0-1 servings of fruits and vegetables daily.She consumes 0 sweetened beverage(s) daily.She exercises with enough effort to increase her heart rate 9 or less minutes per day.  She exercises with enough effort to increase her heart rate 3 or less days per week.   She is taking medications regularly.     Here for medication refill and discussion of sinus concerns.  Patient is needing refill of Ativan.  She uses this medication sparingly.  Initially prescribed in 2021 and has not had it refilled since.  She will be establishing with a new internal medicine provider next month who can take over refills as needed.  Patient also reports she is struggling with bilateral maxillary and frontal sinus pain and pressure since 12/23.  Initially had some green drainage which is now transitioned to more yellow coloring.   Associated headache.  No fever/chills, cough, shortness of breath, wheezing, GI symptoms, rash.  Feels there may be some more congestion settling in her chest.    PAST MEDICAL HISTORY:   Past Medical History:   Diagnosis Date    Anemia     No Comments Provided    Family history of malignant neoplasm of digestive organ     2015    Insomnia     No Comments Provided    Major depressive disorder, single episode     2006,Depression.  PHQ-9 score .    Mass of breast     ,Soft breast mass lump, right breast    Pain in hip     No Comments Provided    Personal history of other medical treatment (CODE)     2011,, 1 SAB    Personal history of other specified conditions (CODE)     abnormal Pap smear prior to , subsequent yearly Pap smears have been normal    PONV (postoperative nausea and vomiting)     Restless legs syndrome     No Comments Provided    Segmental and somatic dysfunction of pelvic region     2013    Uncomplicated asthma     generally well controlled, history of respiratory arrest requiring brief mechanical ventilation.       PAST SURGICAL HISTORY:   Past Surgical History:   Procedure Laterality Date    BACK SURGERY      Lumbar L4-5    COLONOSCOPY N/A 10/16/2020    normal, f/u 10 years    DILATION AND CURETTAGE      ,Dilatation and curettage for blighted ovum.    ENDOSCOPY UPPER, COLONOSCOPY, COMBINED      Normal exam, 5 year follow up due to     ESOPHAGOSCOPY, GASTROSCOPY, DUODENOSCOPY (EGD), COMBINED N/A 2023    Procedure: Esophagoscopy, gastroscopy, duodenoscopy (EGD), combined;  Surgeon: Julian Cervantes MD;  Location: [facility] OR    EXTRACTION(S) DENTAL      [place] tooth extraction    HYSTERECTOMY VAGINAL N/A 2015    Performed at [facility]. Dr. Lindsay for DUB; negative for malignancy; ovaries remain    HYSTERECTOMY, PAP NO LONGER INDICATED N/A 2015    Cervix removed per [facility] path report.    MAMMOPLASTY REDUCTION      , Bilateral breast reduction,  "[facility]    MAMMOPLASTY REDUCTION          OTHER SURGICAL HISTORY      11088.9,[place] SUBTALAR ATHROEREISIS,Left foot       FAMILY HISTORY:   Family History   Problem Relation Age of Onset    Hypertension Father     Heart Disease Father         CAD with stents    Hyperlipidemia Father     Colon Cancer Maternal Grandmother     Heart Disease Maternal Grandfather         CAD    Emphysema Paternal Grandmother     Heart Disease Paternal Grandfather         CAD    Breast Cancer No family hx of         Cancer-breast       SOCIAL HISTORY:   Social History     Tobacco Use    Smoking status: Former     Packs/day: 0.50     Years: 25.00     Additional pack years: 0.00     Total pack years: 12.50     Types: Cigarettes     Quit date: 2005     Years since quittin.1    Smokeless tobacco: Never    Tobacco comments:     on rare occasions   Substance Use Topics    Alcohol use: Yes     Alcohol/week: 2.0 standard drinks of alcohol     Types: 2 Cans of beer per week     Comment: Alcoholic Drinks/day: occassional      No Known Allergies  Current Outpatient Medications   Medication    famotidine (PEPCID) 40 MG tablet    fluticasone-salmeterol (ADVAIR DISKUS) 250-50 MCG/ACT inhaler    LORazepam (ATIVAN) 0.5 MG tablet    losartan (COZAAR) 50 MG tablet    montelukast (SINGULAIR) 10 MG tablet    naltrexone (REVIA) 1 mg/mL SOLN    ondansetron (ZOFRAN) 4 MG tablet    progesterone (PROMETRIUM) 100 MG capsule    rosuvastatin (CRESTOR) 5 MG tablet    traZODone (DESYREL) 50 MG tablet    VENTOLIN  (90 Base) MCG/ACT inhaler    EPINEPHrine (EPIPEN/ADRENACLICK/OR ANY BX GENERIC EQUIV) 0.3 MG/0.3ML injection 2-pack    ibuprofen (ADVIL/MOTRIN) 800 MG tablet    Magnesium Oxide 250 MG tablet     Current Facility-Administered Medications   Medication    silver sulfADIAZINE (SILVADENE) 1 % cream       Review of Systems   Per HPI        Objective    /82   Pulse 93   Temp 97.4  F (36.3  C)   Resp 14   Ht 1.626 m (5' 4\")   Wt 79.9 " kg (176 lb 3.2 oz)   LMP 09/20/2015 (Approximate)   SpO2 99%   BMI 30.24 kg/m    Body mass index is 30.24 kg/m .  Physical Exam   General: Pleasant, in no apparent distress.  Eyes: Sclera are white and conjunctiva are clear bilaterally. Lacrimal apparatus free of erythema, edema, and discharge bilaterally.  Ears: External ears without erythema or edema. Tympanic membranes are pearly white and without erythema, scarring or perforations bilaterally. External auditory canals are free of foreign bodies, erythema, ulcers, and masses.  Nose: External nose is symmetrical and free of lesions and deformities.   Oropharynx: Oral mucosa is pink and without ulcers, nodules, and white patches. Tongue is symmetrical, pink, and without masses or lesions. Pharynx is pink, symmetrical, and without lesions. Uvula is midline. Tonsils are pink, symmetrical, and without edema, ulcers, or exudates, and 1+ bilaterally.  Neck: No cervical lymphadenopathy on inspection and palpation.  Cardiovascular: Regular rate and rhythm with S1 equal to S2. No murmurs, friction rubs, or gallops.   Respiratory: Lungs are resonant and clear to auscultation bilaterally. No wheezes, crackles, or rhonchi.  Skin: No jaundice, pallor, rashes, or lesions.  Psych: Appropriate mood and affect.

## 2024-01-01 ASSESSMENT — ASTHMA QUESTIONNAIRES
ACT_TOTALSCORE: 17
QUESTION_1 LAST FOUR WEEKS HOW MUCH OF THE TIME DID YOUR ASTHMA KEEP YOU FROM GETTING AS MUCH DONE AT WORK, SCHOOL OR AT HOME: A LITTLE OF THE TIME
QUESTION_4 LAST FOUR WEEKS HOW OFTEN HAVE YOU USED YOUR RESCUE INHALER OR NEBULIZER MEDICATION (SUCH AS ALBUTEROL): ONE OR TWO TIMES PER DAY
QUESTION_3 LAST FOUR WEEKS HOW OFTEN DID YOUR ASTHMA SYMPTOMS (WHEEZING, COUGHING, SHORTNESS OF BREATH, CHEST TIGHTNESS OR PAIN) WAKE YOU UP AT NIGHT OR EARLIER THAN USUAL IN THE MORNING: ONCE A WEEK
QUESTION_5 LAST FOUR WEEKS HOW WOULD YOU RATE YOUR ASTHMA CONTROL: WELL CONTROLLED
ACT_TOTALSCORE: 17
QUESTION_2 LAST FOUR WEEKS HOW OFTEN HAVE YOU HAD SHORTNESS OF BREATH: ONCE OR TWICE A WEEK

## 2024-01-02 ENCOUNTER — OFFICE VISIT (OUTPATIENT)
Dept: FAMILY MEDICINE | Facility: OTHER | Age: 54
End: 2024-01-02
Attending: PHYSICIAN ASSISTANT
Payer: COMMERCIAL

## 2024-01-02 VITALS
DIASTOLIC BLOOD PRESSURE: 82 MMHG | OXYGEN SATURATION: 99 % | SYSTOLIC BLOOD PRESSURE: 136 MMHG | RESPIRATION RATE: 14 BRPM | BODY MASS INDEX: 30.08 KG/M2 | WEIGHT: 176.2 LBS | HEIGHT: 64 IN | HEART RATE: 93 BPM | TEMPERATURE: 97.4 F

## 2024-01-02 DIAGNOSIS — F41.1 GAD (GENERALIZED ANXIETY DISORDER): Primary | ICD-10-CM

## 2024-01-02 DIAGNOSIS — J01.90 ACUTE NON-RECURRENT SINUSITIS, UNSPECIFIED LOCATION: ICD-10-CM

## 2024-01-02 PROCEDURE — 99213 OFFICE O/P EST LOW 20 MIN: CPT | Performed by: PHYSICIAN ASSISTANT

## 2024-01-02 RX ORDER — LORAZEPAM 0.5 MG/1
TABLET ORAL
Qty: 30 TABLET | Refills: 0 | Status: SHIPPED | OUTPATIENT
Start: 2024-01-02 | End: 2024-02-09

## 2024-01-02 ASSESSMENT — PAIN SCALES - GENERAL: PAINLEVEL: NO PAIN (0)

## 2024-01-02 NOTE — NURSING NOTE
Patient presents to clinic for medication refill of ativan until her appt with [facility] 2/7/2024. She is also having sinus pain and pressure that started 12/23/2023.  Arleen Solorzano LPN ....................  1/2/2024   8:46 AM  EXT [id number]

## 2024-01-04 ENCOUNTER — MYC MEDICAL ADVICE (OUTPATIENT)
Dept: FAMILY MEDICINE | Facility: OTHER | Age: 54
End: 2024-01-04
Payer: COMMERCIAL

## 2024-01-04 DIAGNOSIS — J01.90 ACUTE SINUSITIS WITH SYMPTOMS > 10 DAYS: Primary | ICD-10-CM

## 2024-01-04 RX ORDER — AZITHROMYCIN 250 MG/1
TABLET, FILM COATED ORAL
Qty: 6 TABLET | Refills: 0 | Status: SHIPPED | OUTPATIENT
Start: 2024-01-04 | End: 2024-01-09

## 2024-01-04 NOTE — TELEPHONE ENCOUNTER
OV 1/2:    2. Acute non-recurrent sinusitis, unspecified location  Vitals and exam stable.  Most consistent with viral sinusitis.  Continue symptomatic management.  Follow-up if symptoms persist or worsen.    Patient also reports she is struggling with bilateral maxillary and frontal sinus pain and pressure since 12/23.  Initially had some green drainage which is now transitioned to more yellow coloring.  Associated headache.  No fever/chills, cough, shortness of breath, wheezing, GI symptoms, rash.  Feels there may be some more congestion settling in her chest.     Routing to provider to review and respond.  Deon Obando RN on 1/4/2024 at 9:10 AM

## 2024-01-24 NOTE — PROGRESS NOTES
Assessment & Plan     1. Chronic pain of left knee  Chronic, x-ray showing mild degenerative changes. Symptoms and exam more consistent with patellofemoral syndrome.  Discussed options including at home stretching exercises, physical therapy referral, sports medicine referral.  Patient would like to try [facility] at home stretching exercises packet.  If minimal improvement will consider formal physical therapy.  - XR Knee Left 3 Views; Future      No follow-ups on file.    Zheng Buck is a 53 year old, presenting for the following health issues:  Knee Pain    History of Present Illness       Reason for visit:  Left knee pain  Symptom onset:  More than a month  Symptoms include:  Left knee pain  Symptom intensity:  Moderate  Symptom progression:  Worsening  Had these symptoms before:  No  What makes it worse:  Straighten leg after being bent for a bit    She eats 2-3 servings of fruits and vegetables daily.She consumes 0 sweetened beverage(s) daily.She exercises with enough effort to increase her heart rate 10 to 19 minutes per day.  She exercises with enough effort to increase her heart rate 3 or less days per week.   She is taking medications regularly.     Here for evaluation of left knee pain for the last couple months.  No known injury.  Patient reports pain is mainly over the anterior aspect but sometimes over the distal lateral aspect.  Grinding.  Sensation of giving out.  No associated swelling, erythema, warmth, fevers.  Management Tylenol as needed.  Able to bear weight and ambulate.  No numbness or tingling.      PAST MEDICAL HISTORY:   Past Medical History:   Diagnosis Date    Anemia     No Comments Provided    Family history of malignant neoplasm of digestive organ     1/22/2015    Insomnia     No Comments Provided    Major depressive disorder, single episode     2006,Depression.  PHQ-9 score 7/1.    Mass of breast     2009,Soft breast mass lump, right breast    Pain in hip     No Comments Provided     Personal history of other medical treatment (CODE)     2011,, 1 SAB    Personal history of other specified conditions (CODE)     abnormal Pap smear prior to , subsequent yearly Pap smears have been normal    PONV (postoperative nausea and vomiting)     Restless legs syndrome     No Comments Provided    Segmental and somatic dysfunction of pelvic region     2013    Uncomplicated asthma     generally well controlled, history of respiratory arrest requiring brief mechanical ventilation.       PAST SURGICAL HISTORY:   Past Surgical History:   Procedure Laterality Date    BACK SURGERY      Lumbar L4-5    COLONOSCOPY N/A 10/16/2020    normal, f/u 10 years    DILATION AND CURETTAGE      ,Dilatation and curettage for blighted ovum.    ENDOSCOPY UPPER, COLONOSCOPY, COMBINED      Normal exam, 5 year follow up due to     ESOPHAGOSCOPY, GASTROSCOPY, DUODENOSCOPY (EGD), COMBINED N/A 2023    Procedure: Esophagoscopy, gastroscopy, duodenoscopy (EGD), combined;  Surgeon: Julian Cervantes MD;  Location: [facility] OR    EXTRACTION(S) DENTAL      [place] tooth extraction    HYSTERECTOMY VAGINAL N/A 2015    Performed at [facility]. Dr. Lindsay for DUB; negative for malignancy; ovaries remain    HYSTERECTOMY, PAP NO LONGER INDICATED N/A 2015    Cervix removed per [facility] path report.    MAMMOPLASTY REDUCTION      , Bilateral breast reduction, [facility]    MAMMOPLASTY REDUCTION          OTHER SURGICAL HISTORY      48058.9,[place] SUBTALAR ATHROEREISIS,Left foot       FAMILY HISTORY:   Family History   Problem Relation Age of Onset    Hypertension Father     Heart Disease Father         CAD with stents    Hyperlipidemia Father     Colon Cancer Maternal Grandmother     Heart Disease Maternal Grandfather         CAD    Emphysema Paternal Grandmother     Heart Disease Paternal Grandfather         CAD    Breast Cancer No family hx of         Cancer-breast       SOCIAL HISTORY:   Social History  "    Tobacco Use    Smoking status: Former     Packs/day: 0.50     Years: 25.00     Additional pack years: 0.00     Total pack years: 12.50     Types: Cigarettes     Quit date: 2005     Years since quittin.1    Smokeless tobacco: Never    Tobacco comments:     on rare occasions   Substance Use Topics    Alcohol use: Yes     Alcohol/week: 2.0 standard drinks of alcohol     Types: 2 Cans of beer per week     Comment: Alcoholic Drinks/day: occassional      No Known Allergies  Current Outpatient Medications   Medication    EPINEPHrine (EPIPEN/ADRENACLICK/OR ANY BX GENERIC EQUIV) 0.3 MG/0.3ML injection 2-pack    famotidine (PEPCID) 40 MG tablet    fluticasone-salmeterol (ADVAIR DISKUS) 250-50 MCG/ACT inhaler    LORazepam (ATIVAN) 0.5 MG tablet    losartan (COZAAR) 50 MG tablet    montelukast (SINGULAIR) 10 MG tablet    naltrexone (REVIA) 1 mg/mL SOLN    ondansetron (ZOFRAN) 4 MG tablet    progesterone (PROMETRIUM) 100 MG capsule    rosuvastatin (CRESTOR) 5 MG tablet    traZODone (DESYREL) 50 MG tablet    VENTOLIN  (90 Base) MCG/ACT inhaler     Current Facility-Administered Medications   Medication    silver sulfADIAZINE (SILVADENE) 1 % cream         Review of Systems  Per HPI        Objective    /78   Pulse 99   Temp 97.4  F (36.3  C)   Resp 12   Ht 1.626 m (5' 4\")   Wt 79.6 kg (175 lb 6.4 oz)   LMP 2015 (Approximate)   SpO2 97%   BMI 30.11 kg/m    Body mass index is 30.11 kg/m .  Physical Exam   General: Pleasant, in no apparent distress.  Musculoskeletal: No tenderness palpation throughout bilateral knees.  Full extension and flexion of knees bilaterally.  Palpable grinding to left anterior knee with extension.  Negative anterior posterior drawer, varus and valgus stress test, Steinmann test bilaterally.  Nonantalgic gait in clinic.  Skin: No jaundice, pallor, rashes, or lesions on exposed skin.   Psych: Appropriate mood and affect.      Results for orders placed or performed during " the hospital encounter of 01/29/24   XR Knee Left 3 Views     Status: None    Narrative    XR KNEE LEFT 3 VIEWS    HISTORY: 53 years Female Chronic pain of left knee; Chronic pain of  left knee    COMPARISON: None    TECHNIQUE: Left knee 3 views    FINDINGS: Joint spaces are congruent. There is mild lateral  compartment joint space narrowing. There is no evidence of fracture or  dislocation.      Impression    IMPRESSION: Mild osteoarthritic change. No evidence of fracture or  dislocation.    MANISH PRABHAKAR MD         SYSTEM ID:  [id number]         Signed Electronically by: Corinne Darden PA-C

## 2024-01-29 ENCOUNTER — HOSPITAL ENCOUNTER (OUTPATIENT)
Dept: GENERAL RADIOLOGY | Facility: OTHER | Age: 54
Discharge: HOME | End: 2024-01-29
Attending: PHYSICIAN ASSISTANT
Payer: COMMERCIAL

## 2024-01-29 ENCOUNTER — OFFICE VISIT (OUTPATIENT)
Dept: FAMILY MEDICINE | Facility: OTHER | Age: 54
End: 2024-01-29
Attending: PHYSICIAN ASSISTANT
Payer: COMMERCIAL

## 2024-01-29 VITALS
SYSTOLIC BLOOD PRESSURE: 126 MMHG | RESPIRATION RATE: 12 BRPM | HEIGHT: 64 IN | OXYGEN SATURATION: 97 % | TEMPERATURE: 97.4 F | BODY MASS INDEX: 29.94 KG/M2 | HEART RATE: 99 BPM | WEIGHT: 175.4 LBS | DIASTOLIC BLOOD PRESSURE: 78 MMHG

## 2024-01-29 DIAGNOSIS — M25.562 CHRONIC PAIN OF LEFT KNEE: ICD-10-CM

## 2024-01-29 DIAGNOSIS — G89.29 CHRONIC PAIN OF LEFT KNEE: Primary | ICD-10-CM

## 2024-01-29 DIAGNOSIS — G89.29 CHRONIC PAIN OF LEFT KNEE: ICD-10-CM

## 2024-01-29 DIAGNOSIS — M25.562 CHRONIC PAIN OF LEFT KNEE: Primary | ICD-10-CM

## 2024-01-29 PROBLEM — R31.21 ASYMPTOMATIC MICROSCOPIC HEMATURIA: Status: ACTIVE | Noted: 2018-01-24

## 2024-01-29 PROCEDURE — 99213 OFFICE O/P EST LOW 20 MIN: CPT | Performed by: PHYSICIAN ASSISTANT

## 2024-01-29 PROCEDURE — 73562 X-RAY EXAM OF KNEE 3: CPT | Mod: LT

## 2024-01-29 ASSESSMENT — PAIN SCALES - GENERAL: PAINLEVEL: MILD PAIN (3)

## 2024-01-29 NOTE — NURSING NOTE
Patient presents to clinic with left knee pain that started a few months ago.  Arleen Solorzano LPN ....................  1/29/2024   1:42 PM  EXT [id number]

## 2024-01-29 NOTE — NURSING NOTE
Patient presents to clinic with left knee pain.  Arleen Solorzano LPN ....................  1/29/2024   1:37 PM  EXT [id number]

## 2024-02-02 ASSESSMENT — ASTHMA QUESTIONNAIRES
QUESTION_3 LAST FOUR WEEKS HOW OFTEN DID YOUR ASTHMA SYMPTOMS (WHEEZING, COUGHING, SHORTNESS OF BREATH, CHEST TIGHTNESS OR PAIN) WAKE YOU UP AT NIGHT OR EARLIER THAN USUAL IN THE MORNING: ONCE A WEEK
QUESTION_2 LAST FOUR WEEKS HOW OFTEN HAVE YOU HAD SHORTNESS OF BREATH: ONCE OR TWICE A WEEK
ACT_TOTALSCORE: 17
ACT_TOTALSCORE: 17
QUESTION_1 LAST FOUR WEEKS HOW MUCH OF THE TIME DID YOUR ASTHMA KEEP YOU FROM GETTING AS MUCH DONE AT WORK, SCHOOL OR AT HOME: NONE OF THE TIME
QUESTION_4 LAST FOUR WEEKS HOW OFTEN HAVE YOU USED YOUR RESCUE INHALER OR NEBULIZER MEDICATION (SUCH AS ALBUTEROL): ONE OR TWO TIMES PER DAY
QUESTION_5 LAST FOUR WEEKS HOW WOULD YOU RATE YOUR ASTHMA CONTROL: SOMEWHAT CONTROLLED

## 2024-02-07 DIAGNOSIS — F41.1 GAD (GENERALIZED ANXIETY DISORDER): ICD-10-CM

## 2024-02-07 DIAGNOSIS — G47.00 INSOMNIA, UNSPECIFIED TYPE: ICD-10-CM

## 2024-02-08 RX ORDER — TRAZODONE HYDROCHLORIDE 50 MG/1
50 TABLET, FILM COATED ORAL AT BEDTIME
Qty: 90 TABLET | Refills: 3 | Status: SHIPPED | OUTPATIENT
Start: 2024-02-08 | End: 2024-02-09

## 2024-02-08 NOTE — TELEPHONE ENCOUNTER
[facility] sent Rx request for the following:      Requested Prescriptions   Pending Prescriptions Disp Refills    traZODone (DESYREL) 50 MG tablet [Pharmacy Med Name: trazodone 50 mg tablet] 90 tablet 3     Sig: TAKE 1 TABLET BY MOUTH AT BEDTIME     Last Prescription Date:   2/10/23  Last Fill Qty/Refills:         90, R-3    Last Office Visit:              1/29/24   Future Office visit:           2/9/24    Routing to PCP for refill consideration.  Radha Chew RN on 2/8/2024 at 3:54 PM

## 2024-02-09 ENCOUNTER — OFFICE VISIT (OUTPATIENT)
Dept: INTERNAL MEDICINE | Facility: OTHER | Age: 54
End: 2024-02-09
Attending: STUDENT IN AN ORGANIZED HEALTH CARE EDUCATION/TRAINING PROGRAM
Payer: COMMERCIAL

## 2024-02-09 ENCOUNTER — HOSPITAL ENCOUNTER (OUTPATIENT)
Dept: MAMMOGRAPHY | Facility: OTHER | Age: 54
Discharge: HOME | End: 2024-02-09
Attending: STUDENT IN AN ORGANIZED HEALTH CARE EDUCATION/TRAINING PROGRAM
Payer: COMMERCIAL

## 2024-02-09 VITALS
DIASTOLIC BLOOD PRESSURE: 76 MMHG | WEIGHT: 174.6 LBS | HEART RATE: 78 BPM | HEIGHT: 64 IN | BODY MASS INDEX: 29.81 KG/M2 | RESPIRATION RATE: 17 BRPM | OXYGEN SATURATION: 98 % | SYSTOLIC BLOOD PRESSURE: 116 MMHG | TEMPERATURE: 98.7 F

## 2024-02-09 DIAGNOSIS — K21.9 GASTROESOPHAGEAL REFLUX DISEASE WITHOUT ESOPHAGITIS: ICD-10-CM

## 2024-02-09 DIAGNOSIS — J45.20 MILD INTERMITTENT ASTHMA WITHOUT COMPLICATION: ICD-10-CM

## 2024-02-09 DIAGNOSIS — E78.00 ELEVATED LDL CHOLESTEROL LEVEL: ICD-10-CM

## 2024-02-09 DIAGNOSIS — I10 BENIGN ESSENTIAL HYPERTENSION: ICD-10-CM

## 2024-02-09 DIAGNOSIS — G47.00 INSOMNIA, UNSPECIFIED TYPE: ICD-10-CM

## 2024-02-09 DIAGNOSIS — J45.40 MODERATE PERSISTENT ASTHMA WITHOUT COMPLICATION: ICD-10-CM

## 2024-02-09 DIAGNOSIS — Z12.31 ENCOUNTER FOR SCREENING MAMMOGRAM FOR BREAST CANCER: ICD-10-CM

## 2024-02-09 DIAGNOSIS — F41.1 GAD (GENERALIZED ANXIETY DISORDER): ICD-10-CM

## 2024-02-09 PROCEDURE — 77063 BREAST TOMOSYNTHESIS BI: CPT

## 2024-02-09 PROCEDURE — 99214 OFFICE O/P EST MOD 30 MIN: CPT | Performed by: STUDENT IN AN ORGANIZED HEALTH CARE EDUCATION/TRAINING PROGRAM

## 2024-02-09 RX ORDER — ROSUVASTATIN CALCIUM 5 MG/1
5 TABLET, COATED ORAL DAILY
Qty: 90 TABLET | Refills: 4 | Status: SHIPPED | OUTPATIENT
Start: 2024-02-09 | End: 2024-08-07

## 2024-02-09 RX ORDER — LOSARTAN POTASSIUM 50 MG/1
50 TABLET ORAL DAILY
Qty: 90 TABLET | Refills: 4 | Status: SHIPPED | OUTPATIENT
Start: 2024-02-09 | End: 2024-08-07

## 2024-02-09 RX ORDER — FAMOTIDINE 40 MG/1
40 TABLET, FILM COATED ORAL 2 TIMES DAILY PRN
Qty: 180 TABLET | Refills: 4 | Status: SHIPPED | OUTPATIENT
Start: 2024-02-09 | End: 2024-08-07

## 2024-02-09 RX ORDER — ALBUTEROL SULFATE 90 UG/1
2 AEROSOL, METERED RESPIRATORY (INHALATION) 4 TIMES DAILY
Qty: 54 G | Refills: 11 | Status: SHIPPED | OUTPATIENT
Start: 2024-02-09 | End: 2024-08-07

## 2024-02-09 RX ORDER — LORAZEPAM 0.5 MG/1
TABLET ORAL
Qty: 30 TABLET | Refills: 0 | OUTPATIENT
Start: 2024-02-09

## 2024-02-09 RX ORDER — MONTELUKAST SODIUM 10 MG/1
1 TABLET ORAL AT BEDTIME
Qty: 90 TABLET | Refills: 4 | Status: SHIPPED | OUTPATIENT
Start: 2024-02-09 | End: 2024-08-07

## 2024-02-09 RX ORDER — TRAZODONE HYDROCHLORIDE 100 MG/1
100 TABLET ORAL AT BEDTIME
Qty: 90 TABLET | Refills: 4 | Status: SHIPPED | OUTPATIENT
Start: 2024-02-09 | End: 2024-08-07

## 2024-02-09 RX ORDER — LORAZEPAM 0.5 MG/1
TABLET ORAL
Qty: 30 TABLET | Refills: 5 | Status: SHIPPED | OUTPATIENT
Start: 2024-02-09 | End: 2024-08-07

## 2024-02-09 RX ORDER — FLUTICASONE PROPIONATE AND SALMETEROL 250; 50 UG/1; UG/1
1 POWDER RESPIRATORY (INHALATION) 2 TIMES DAILY
Qty: 60 EACH | Refills: 4 | Status: SHIPPED | OUTPATIENT
Start: 2024-02-09 | End: 2024-08-07

## 2024-02-09 ASSESSMENT — PAIN SCALES - GENERAL: PAINLEVEL: NO PAIN (0)

## 2024-02-09 NOTE — NURSING NOTE
"Chief Complaint   Patient presents with    Recheck Medication       Initial /76 (BP Location: Right arm, Patient Position: Sitting, Cuff Size: Adult Regular)   Pulse 78   Temp 98.7  F (37.1  C) (Temporal)   Resp 17   Ht 1.626 m (5' 4\")   Wt 79.2 kg (174 lb 9.6 oz)   LMP 09/20/2015 (Approximate)   SpO2 98%   Breastfeeding No   BMI 29.97 kg/m   Estimated body mass index is 29.97 kg/m  as calculated from the following:    Height as of this encounter: 1.626 m (5' 4\").    Weight as of this encounter: 79.2 kg (174 lb 9.6 oz).  Medication Review: complete    The next two questions are to help us understand your food security.  If you are feeling you need any assistance in this area, we have resources available to support you today.          1/1/2024   SDOH- Food Insecurity   Within the past 12 months, did you worry that your food would run out before you got money to buy more? N   Within the past 12 months, did the food you bought just not last and you didn t have money to get more? N         Health Care Directive:  Patient does not have a Health Care Directive or Living Will: Patient states has Advance Directive and will bring in a copy to clinic.    Neha Gutierrez      "

## 2024-02-09 NOTE — TELEPHONE ENCOUNTER
[facility] sent Rx request for the following:      Requested Prescriptions   Pending Prescriptions Disp Refills    LORazepam (ATIVAN) 0.5 MG tablet [Pharmacy Med Name: lorazepam 0.5 mg tablet] 30 tablet 0     Sig: TAKE 1 TABLET BY MOUTH EVERY 8 HOURS AS NEEDED FOR ANXIETY       There is no refill protocol information for this order          Last Prescription Date:   1/2/24  Last Fill Qty/Refills:         0, R-0    Last Office Visit:              1/2/24   Future Office visit:           today    Routing refill request to provider for review/approval because:  Drug not on the [facility] [place]     Yamilka Khanna RN on 2/9/2024 at 11:40 AM
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
## 2024-03-07 DIAGNOSIS — I10 BENIGN ESSENTIAL HYPERTENSION: ICD-10-CM

## 2024-03-12 RX ORDER — PHENTERMINE HYDROCHLORIDE 15 MG/1
15-30 CAPSULE ORAL EVERY MORNING
Qty: 60 CAPSULE | Refills: 2 | OUTPATIENT
Start: 2024-03-12

## 2024-03-12 NOTE — TELEPHONE ENCOUNTER
Medication was discontinued on 2/10/2023. Denying request.    Last Prescription Date: 10/16/2022  Last Qty/Refills: 60 / R-2  Last Office Visit: 2/09/2024  Future Office Visit: 4/25/2024     Requested Prescriptions   Pending Prescriptions Disp Refills    phentermine 15 MG capsule [Pharmacy Med Name: phentermine 15 mg capsule] 60 capsule 2     Sig: Take 1-2 capsules (15-30 mg) by mouth every morning       There is no refill protocol information for this order          Nandini Huang RN on 3/12/2024 at 2:54 PM

## 2024-04-23 SDOH — HEALTH STABILITY: PHYSICAL HEALTH: ON AVERAGE, HOW MANY MINUTES DO YOU ENGAGE IN EXERCISE AT THIS LEVEL?: 10 MIN

## 2024-04-23 SDOH — HEALTH STABILITY: PHYSICAL HEALTH: ON AVERAGE, HOW MANY DAYS PER WEEK DO YOU ENGAGE IN MODERATE TO STRENUOUS EXERCISE (LIKE A BRISK WALK)?: 1 DAY

## 2024-04-23 ASSESSMENT — SOCIAL DETERMINANTS OF HEALTH (SDOH): HOW OFTEN DO YOU GET TOGETHER WITH FRIENDS OR RELATIVES?: ONCE A WEEK

## 2024-05-05 ENCOUNTER — HEALTH MAINTENANCE LETTER (OUTPATIENT)
Age: 54
End: 2024-05-05

## 2024-06-03 ENCOUNTER — LAB (OUTPATIENT)
Dept: LAB | Facility: OTHER | Age: 54
End: 2024-06-03
Attending: STUDENT IN AN ORGANIZED HEALTH CARE EDUCATION/TRAINING PROGRAM
Payer: COMMERCIAL

## 2024-06-03 DIAGNOSIS — R79.0 LOW FERRITIN: ICD-10-CM

## 2024-06-03 DIAGNOSIS — E27.9 ADRENAL ABNORMALITY (H): ICD-10-CM

## 2024-06-03 DIAGNOSIS — E53.8 VITAMIN B12 DEFICIENCY (NON ANEMIC): ICD-10-CM

## 2024-06-03 DIAGNOSIS — Z78.0 MENOPAUSE: ICD-10-CM

## 2024-06-03 LAB
ESTRADIOL SERPL-MCNC: 9 PG/ML
FERRITIN SERPL-MCNC: 89 NG/ML (ref 11–328)
IRON BINDING CAPACITY (ROCHE): 304 UG/DL (ref 240–430)
IRON SATN MFR SERPL: 21 % (ref 15–46)
IRON SERPL-MCNC: 65 UG/DL (ref 37–145)
PROGEST SERPL-MCNC: 1.3 NG/ML
VIT B12 SERPL-MCNC: 1367 PG/ML (ref 232–1245)

## 2024-06-03 PROCEDURE — 82728 ASSAY OF FERRITIN: CPT | Mod: ZL

## 2024-06-03 PROCEDURE — 82670 ASSAY OF TOTAL ESTRADIOL: CPT | Mod: ZL

## 2024-06-03 PROCEDURE — 84403 ASSAY OF TOTAL TESTOSTERONE: CPT | Mod: ZL

## 2024-06-03 PROCEDURE — 83550 IRON BINDING TEST: CPT | Mod: ZL

## 2024-06-03 PROCEDURE — 36415 COLL VENOUS BLD VENIPUNCTURE: CPT | Mod: ZL

## 2024-06-03 PROCEDURE — 82607 VITAMIN B-12: CPT | Mod: ZL

## 2024-06-03 PROCEDURE — 84144 ASSAY OF PROGESTERONE: CPT | Mod: ZL

## 2024-06-03 PROCEDURE — 82533 TOTAL CORTISOL: CPT | Mod: ZL

## 2024-06-03 PROCEDURE — 84270 ASSAY OF SEX HORMONE GLOBUL: CPT | Mod: ZL

## 2024-06-03 PROCEDURE — 82627 DEHYDROEPIANDROSTERONE: CPT | Mod: ZL

## 2024-06-04 LAB
CORTIS SERPL-MCNC: 11.8 UG/DL
DHEA-S SERPL-MCNC: 108 UG/DL (ref 35–430)
SHBG SERPL-SCNC: 40 NMOL/L (ref 30–135)

## 2024-06-05 LAB
TESTOST FREE SERPL-MCNC: 0.3 NG/DL
TESTOST SERPL-MCNC: 19 NG/DL (ref 8–60)

## 2024-07-05 DIAGNOSIS — R11.0 NAUSEA: ICD-10-CM

## 2024-07-10 RX ORDER — ONDANSETRON 4 MG/1
4 TABLET, FILM COATED ORAL EVERY 8 HOURS PRN
Qty: 30 TABLET | Refills: 1 | Status: SHIPPED | OUTPATIENT
Start: 2024-07-10

## 2024-07-10 NOTE — TELEPHONE ENCOUNTER
GI sent Rx request for the following:      Requested Prescriptions   Pending Prescriptions Disp Refills    ondansetron (ZOFRAN) 4 MG tablet [Pharmacy Med Name: ondansetron HCl 4 mg tablet] 30 tablet 1     Sig: Take 1 tablet (4 mg) by mouth every 8 hours as needed for nausea        Antivertigo/Antiemetic Agents Passed - 7/10/2024  8:49 AM     Last Prescription Date:   11/8/23  Last Fill Qty/Refills:         30, R-1    Last Office Visit:              2/9/4   Future Office visit:             Next 5 appointments (look out 90 days)      Jul 18, 2024 3:20 PM  (Arrive by 3:05 PM)  Provider Visit with Carter Navarro MD  [facility] ([facility] ) [address]  [place] [address]  [phone]     Aug 07, 2024 10:40 AM  (Arrive by 10:25 AM)  PHYSICAL with Carter Navarro MD  [facility] ([facility] ) [address]  [facility] [address]  [phone]         Freida hCilds RN on 7/10/2024 at 8:49 AM

## 2024-07-12 ENCOUNTER — HOSPITAL ENCOUNTER (EMERGENCY)
Facility: OTHER | Age: 54
Discharge: HOME | End: 2024-07-12
Attending: FAMILY MEDICINE | Admitting: FAMILY MEDICINE
Payer: COMMERCIAL

## 2024-07-12 ENCOUNTER — APPOINTMENT (OUTPATIENT)
Dept: CT IMAGING | Facility: OTHER | Age: 54
End: 2024-07-12
Attending: FAMILY MEDICINE
Payer: COMMERCIAL

## 2024-07-12 ENCOUNTER — APPOINTMENT (OUTPATIENT)
Dept: CARDIOLOGY | Facility: OTHER | Age: 54
End: 2024-07-12
Attending: FAMILY MEDICINE
Payer: COMMERCIAL

## 2024-07-12 ENCOUNTER — APPOINTMENT (OUTPATIENT)
Dept: GENERAL RADIOLOGY | Facility: OTHER | Age: 54
End: 2024-07-12
Attending: FAMILY MEDICINE
Payer: COMMERCIAL

## 2024-07-12 VITALS
SYSTOLIC BLOOD PRESSURE: 132 MMHG | TEMPERATURE: 97 F | WEIGHT: 174 LBS | OXYGEN SATURATION: 97 % | DIASTOLIC BLOOD PRESSURE: 86 MMHG | HEIGHT: 64 IN | BODY MASS INDEX: 29.71 KG/M2 | RESPIRATION RATE: 12 BRPM | HEART RATE: 77 BPM

## 2024-07-12 DIAGNOSIS — J43.2 CENTRILOBULAR EMPHYSEMA (H): ICD-10-CM

## 2024-07-12 LAB
ALBUMIN SERPL BCG-MCNC: 4.2 G/DL (ref 3.5–5.2)
ALP SERPL-CCNC: 77 U/L (ref 40–150)
ALT SERPL W P-5'-P-CCNC: 36 U/L (ref 0–50)
ANION GAP SERPL CALCULATED.3IONS-SCNC: 10 MMOL/L (ref 7–15)
AST SERPL W P-5'-P-CCNC: 27 U/L (ref 0–45)
ATRIAL RATE - MUSE: 83 BPM
BASOPHILS # BLD AUTO: 0 10E3/UL (ref 0–0.2)
BASOPHILS NFR BLD AUTO: 1 %
BILIRUB SERPL-MCNC: 0.3 MG/DL
BUN SERPL-MCNC: 11.5 MG/DL (ref 6–20)
CALCIUM SERPL-MCNC: 9.3 MG/DL (ref 8.6–10)
CHLORIDE SERPL-SCNC: 108 MMOL/L (ref 98–107)
CREAT SERPL-MCNC: 1.11 MG/DL (ref 0.51–0.95)
D DIMER PPP FEU-MCNC: <=0.27 UG/ML FEU (ref 0–0.5)
DEPRECATED HCO3 PLAS-SCNC: 24 MMOL/L (ref 22–29)
DIASTOLIC BLOOD PRESSURE - MUSE: NORMAL MMHG
EGFRCR SERPLBLD CKD-EPI 2021: 59 ML/MIN/1.73M2
EOSINOPHIL # BLD AUTO: 0.5 10E3/UL (ref 0–0.7)
EOSINOPHIL NFR BLD AUTO: 9 %
ERYTHROCYTE [DISTWIDTH] IN BLOOD BY AUTOMATED COUNT: 11.6 % (ref 10–15)
FLUAV RNA SPEC QL NAA+PROBE: NEGATIVE
FLUBV RNA RESP QL NAA+PROBE: NEGATIVE
GLUCOSE SERPL-MCNC: 110 MG/DL (ref 70–99)
HCT VFR BLD AUTO: 41.9 % (ref 35–47)
HGB BLD-MCNC: 14.2 G/DL (ref 11.7–15.7)
HOLD SPECIMEN: NORMAL
IMM GRANULOCYTES # BLD: 0 10E3/UL
IMM GRANULOCYTES NFR BLD: 0 %
INTERPRETATION ECG - MUSE: NORMAL
L PNEUMO1 AG UR QL IA: NEGATIVE
LVEF ECHO: NORMAL
LYMPHOCYTES # BLD AUTO: 1.6 10E3/UL (ref 0.8–5.3)
LYMPHOCYTES NFR BLD AUTO: 27 %
MCH RBC QN AUTO: 30.5 PG (ref 26.5–33)
MCHC RBC AUTO-ENTMCNC: 33.9 G/DL (ref 31.5–36.5)
MCV RBC AUTO: 90 FL (ref 78–100)
MONOCYTES # BLD AUTO: 0.5 10E3/UL (ref 0–1.3)
MONOCYTES NFR BLD AUTO: 8 %
NEUTROPHILS # BLD AUTO: 3.2 10E3/UL (ref 1.6–8.3)
NEUTROPHILS NFR BLD AUTO: 55 %
NRBC # BLD AUTO: 0 10E3/UL
NRBC BLD AUTO-RTO: 0 /100
P AXIS - MUSE: 71 DEGREES
PLATELET # BLD AUTO: 217 10E3/UL (ref 150–450)
POTASSIUM SERPL-SCNC: 3.8 MMOL/L (ref 3.4–5.3)
PR INTERVAL - MUSE: 160 MS
PROT SERPL-MCNC: 6.9 G/DL (ref 6.4–8.3)
QRS DURATION - MUSE: 80 MS
QT - MUSE: 378 MS
QTC - MUSE: 444 MS
R AXIS - MUSE: 50 DEGREES
RBC # BLD AUTO: 4.66 10E6/UL (ref 3.8–5.2)
RSV RNA SPEC NAA+PROBE: NEGATIVE
S PNEUM AG SPEC QL: NEGATIVE
SARS-COV-2 RNA RESP QL NAA+PROBE: NEGATIVE
SODIUM SERPL-SCNC: 142 MMOL/L (ref 135–145)
SYSTOLIC BLOOD PRESSURE - MUSE: NORMAL MMHG
T AXIS - MUSE: 52 DEGREES
TROPONIN T SERPL HS-MCNC: <6 NG/L
VENTRICULAR RATE- MUSE: 83 BPM
WBC # BLD AUTO: 5.9 10E3/UL (ref 4–11)

## 2024-07-12 PROCEDURE — 93306 TTE W/DOPPLER COMPLETE: CPT

## 2024-07-12 PROCEDURE — 84484 ASSAY OF TROPONIN QUANT: CPT | Performed by: FAMILY MEDICINE

## 2024-07-12 PROCEDURE — 80053 COMPREHEN METABOLIC PANEL: CPT | Performed by: FAMILY MEDICINE

## 2024-07-12 PROCEDURE — 99284 EMERGENCY DEPT VISIT MOD MDM: CPT | Performed by: FAMILY MEDICINE

## 2024-07-12 PROCEDURE — 93306 TTE W/DOPPLER COMPLETE: CPT | Mod: 26 | Performed by: INTERNAL MEDICINE

## 2024-07-12 PROCEDURE — 71260 CT THORAX DX C+: CPT

## 2024-07-12 PROCEDURE — 87637 SARSCOV2&INF A&B&RSV AMP PRB: CPT | Performed by: FAMILY MEDICINE

## 2024-07-12 PROCEDURE — 85025 COMPLETE CBC W/AUTO DIFF WBC: CPT | Performed by: FAMILY MEDICINE

## 2024-07-12 PROCEDURE — 250N000011 HC RX IP 250 OP 636: Performed by: FAMILY MEDICINE

## 2024-07-12 PROCEDURE — 71045 X-RAY EXAM CHEST 1 VIEW: CPT

## 2024-07-12 PROCEDURE — 36415 COLL VENOUS BLD VENIPUNCTURE: CPT | Performed by: FAMILY MEDICINE

## 2024-07-12 PROCEDURE — 85379 FIBRIN DEGRADATION QUANT: CPT | Performed by: FAMILY MEDICINE

## 2024-07-12 PROCEDURE — 93005 ELECTROCARDIOGRAM TRACING: CPT | Performed by: FAMILY MEDICINE

## 2024-07-12 PROCEDURE — 87899 AGENT NOS ASSAY W/OPTIC: CPT | Mod: 91 | Performed by: FAMILY MEDICINE

## 2024-07-12 PROCEDURE — 99285 EMERGENCY DEPT VISIT HI MDM: CPT | Mod: 25 | Performed by: FAMILY MEDICINE

## 2024-07-12 PROCEDURE — 93010 ELECTROCARDIOGRAM REPORT: CPT | Performed by: INTERNAL MEDICINE

## 2024-07-12 RX ORDER — IOPAMIDOL 755 MG/ML
90 INJECTION, SOLUTION INTRAVASCULAR ONCE
Status: COMPLETED | OUTPATIENT
Start: 2024-07-12 | End: 2024-07-12

## 2024-07-12 RX ADMIN — IOPAMIDOL 90 ML: 755 INJECTION, SOLUTION INTRAVENOUS at 10:25

## 2024-07-12 ASSESSMENT — ACTIVITIES OF DAILY LIVING (ADL)
ADLS_ACUITY_SCORE: 35

## 2024-07-12 ASSESSMENT — ENCOUNTER SYMPTOMS
NAUSEA: 0
FATIGUE: 1
COUGH: 0
VOMITING: 0
FEVER: 0
SHORTNESS OF BREATH: 1

## 2024-07-12 ASSESSMENT — COLUMBIA-SUICIDE SEVERITY RATING SCALE - C-SSRS
2. HAVE YOU ACTUALLY HAD ANY THOUGHTS OF KILLING YOURSELF IN THE PAST MONTH?: NO
6. HAVE YOU EVER DONE ANYTHING, STARTED TO DO ANYTHING, OR PREPARED TO DO ANYTHING TO END YOUR LIFE?: NO
1. IN THE PAST MONTH, HAVE YOU WISHED YOU WERE DEAD OR WISHED YOU COULD GO TO SLEEP AND NOT WAKE UP?: NO

## 2024-07-12 NOTE — DISCHARGE INSTRUCTIONS
Cielo    I think we have a new diagnosis today of centrilobular emphysema.  I did place a referral to pulmonology and sent a note to Dr Navarro.    Thank you for choosing our Emergency Department for your care.     You may receive a phone call or letter for a survey about your care in our ED.  Please complete this as this is how we improve care for our patients.     If you have any questions after leaving the ED you can call or text me on my cell phone at [phone].  I am not on call so if I do not answer my phone please leave a message- I will get back to you.  If you are not doing well please return to the ED.     Sincerely,    Dr Redd Mendez M.D.  Medical Director  [facility] Emergency Department

## 2024-07-12 NOTE — ED PROVIDER NOTES
History     Chief Complaint   Patient presents with    Shortness of Breath     The history is provided by the patient, the spouse and medical records.     Cielo Bahena is a 53 year old female with fatigue and SOB with activity. No cough, no fever or chills, no N/V. She has had symptoms since the middle of May (they told me a week before Memorial Day) but today she was really SOB with ambulation.     She has a history of moderate persistent asthma, prior smoker (quit about 4 years ago), seasonal allergies, HTN, anxiety. No history of DVT or PE, not on blood thinners.    Allergies:  No Known Allergies    Problem List:    Patient Active Problem List    Diagnosis Date Noted    Centrilobular emphysema (H) 07/12/2024     Priority: Medium    Benign essential hypertension 10/18/2021     Priority: Medium    ULICES (generalized anxiety disorder) 10/18/2021     Priority: Medium    Moderate persistent asthma without complication 02/16/2018     Priority: Medium     Overview:   Asthma, generally well controlled, history of respiratory arrest requiring   brief mechanical ventilation.      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     Priority: Medium    Anemia 02/16/2018     Priority: Medium     Overview:   mild      Asymptomatic microscopic hematuria 01/24/2018     Priority: Medium    Family history of colon cancer 01/22/2015     Priority: Medium    RLS (restless legs syndrome) 06/20/2014     Priority: Medium    Hip pain 04/18/2014     Priority: Medium    GERD (gastroesophageal reflux disease) 10/25/2012     Priority: Medium    Breast hypertrophy 03/14/2012     Priority: Medium        Past Medical History:    Past Medical History:   Diagnosis Date    Anemia     Family history of malignant neoplasm of digestive organ     Insomnia     Major depressive disorder, single episode     Mass of breast     Pain in hip      Personal history of other medical treatment (CODE)     Personal history of other specified conditions (CODE)     PONV (postoperative nausea and vomiting)     Restless legs syndrome     Segmental and somatic dysfunction of pelvic region     Uncomplicated asthma        Past Surgical History:    Past Surgical History:   Procedure Laterality Date    BACK SURGERY      Lumbar L4-5    COLONOSCOPY N/A 10/16/2020    normal, f/u 10 years    DILATION AND CURETTAGE      2006,Dilatation and curettage for blighted ovum.    ENDOSCOPY UPPER, COLONOSCOPY, COMBINED      Normal exam, 5 year follow up due to     ESOPHAGOSCOPY, GASTROSCOPY, DUODENOSCOPY (EGD), COMBINED N/A 6/22/2023    Procedure: Esophagoscopy, gastroscopy, duodenoscopy (EGD), combined;  Surgeon: Julian Cervantes MD;  Location: [facility] OR    EXTRACTION(S) DENTAL      [place] tooth extraction    HYSTERECTOMY VAGINAL N/A 03/05/2015    Performed at [facility]. Dr. Lindsay for DUB; negative for malignancy; ovaries remain    HYSTERECTOMY, PAP NO LONGER INDICATED N/A 03/05/2015    Cervix removed per [facility] path report.    MAMMOPLASTY REDUCTION      2012, Bilateral breast reduction, [facility]    MAMMOPLASTY REDUCTION      2004    OTHER SURGICAL HISTORY      66747.9,NE SUBTALAR ATHROEREISIS,Left foot       Family History:    Family History   Problem Relation Age of Onset    Hypertension Father     Heart Disease Father         CAD with stents    Hyperlipidemia Father     Colon Cancer Maternal Grandmother     Heart Disease Maternal Grandfather         CAD    Emphysema Paternal Grandmother     Heart Disease Paternal Grandfather         CAD    Breast Cancer No family hx of         Cancer-breast       Social History:  Marital Status:   [2]  Social History     Tobacco Use    Smoking status: Former     Current packs/day: 0.00     Average packs/day: 0.5 packs/day for 25.0 years (12.5 ttl pk-yrs)     Types: Cigarettes     Start date: 11/24/1980     Quit date: 11/24/2005      "Years since quittin.6    Smokeless tobacco: Never    Tobacco comments:     on rare occasions   Vaping Use    Vaping status: Never Used   Substance Use Topics    Alcohol use: Yes     Alcohol/week: 2.0 standard drinks of alcohol     Types: 2 Cans of beer per week     Comment: Alcoholic Drinks/day: occassional    Drug use: No        Medications:    LORazepam (ATIVAN) 0.5 MG tablet  VENTOLIN  (90 Base) MCG/ACT inhaler  famotidine (PEPCID) 40 MG tablet  fluticasone-salmeterol (ADVAIR DISKUS) 250-50 MCG/ACT inhaler  losartan (COZAAR) 50 MG tablet  montelukast (SINGULAIR) 10 MG tablet  naltrexone (REVIA) 1 mg/mL SOLN  ondansetron (ZOFRAN) 4 MG tablet  progesterone (PROMETRIUM) 100 MG capsule  rosuvastatin (CRESTOR) 5 MG tablet  traZODone (DESYREL) 100 MG tablet          Review of Systems   Constitutional:  Positive for fatigue. Negative for fever.   Respiratory:  Positive for shortness of breath. Negative for cough.    Gastrointestinal:  Negative for nausea and vomiting.   All other systems reviewed and are negative.      Physical Exam   BP: (!) 149/90  Pulse: 87  Temp: 97  F (36.1  C)  Resp: 16  Height: 162.6 cm (5' 4\")  Weight: 78.9 kg (174 lb)  SpO2: 97 %      Physical Exam  Vitals and nursing note reviewed.   Constitutional:       General: She is not in acute distress.     Appearance: She is well-developed. She is not ill-appearing.      Interventions: She is not intubated.  Cardiovascular:      Rate and Rhythm: Normal rate and regular rhythm.      Pulses: Normal pulses.      Heart sounds: Normal heart sounds.   Pulmonary:      Effort: Pulmonary effort is normal. No accessory muscle usage or respiratory distress. She is not intubated.      Breath sounds: Normal breath sounds. No stridor. No decreased breath sounds, wheezing, rhonchi or rales.   Musculoskeletal:      Right lower leg: No tenderness. No edema.      Left lower leg: No tenderness. No edema.   Skin:     General: Skin is warm and dry. "   Neurological:      General: No focal deficit present.      Mental Status: She is alert and oriented to person, place, and time.       EKG: NSR, rate 83, normal axis    Results for orders placed or performed during the hospital encounter of 07/12/24 (from the past 24 hour(s))   [place]illiard Draw    Narrative    The following orders were created for panel order [place] Draw.  Procedure                               Abnormality         Status                     ---------                               -----------         ------                     Extra Blue Top Tube[[id number]]                              Final result               Extra Red Top Tube[[id number]]                               Final result               Extra Green Top (Lithium...[[id number]]                      Final result               Extra Purple Top Tube[[id number]]                            Final result               Extra Green Top (Lithium...[[id number]]                      Final result                 Please view results for these tests on the individual orders.   Extra Blue Top Tube   Result Value Ref Range    Hold Specimen JIC    Extra Red Top Tube   Result Value Ref Range    Hold Specimen JIC    Extra Green Top (Lithium Heparin) Tube   Result Value Ref Range    Hold Specimen JIC    Extra Purple Top Tube   Result Value Ref Range    Hold Specimen JIC    Extra Green Top (Lithium Heparin) ON ICE   Result Value Ref Range    Hold Specimen JIC    CBC with platelets differential    Narrative    The following orders were created for panel order CBC with platelets differential.  Procedure                               Abnormality         Status                     ---------                               -----------         ------                     CBC with platelets and d...[[id number]]                      Final result                 Please view results for these tests on the individual orders.   Comprehensive metabolic panel   Result Value Ref Range     Sodium 142 135 - 145 mmol/L    Potassium 3.8 3.4 - 5.3 mmol/L    Carbon Dioxide (CO2) 24 22 - 29 mmol/L    Anion Gap 10 7 - 15 mmol/L    Urea Nitrogen 11.5 6.0 - 20.0 mg/dL    Creatinine 1.11 (H) 0.51 - 0.95 mg/dL    GFR Estimate 59 (L) >60 mL/min/1.73m2    Calcium 9.3 8.6 - 10.0 mg/dL    Chloride 108 (H) 98 - 107 mmol/L    Glucose 110 (H) 70 - 99 mg/dL    Alkaline Phosphatase 77 40 - 150 U/L    AST 27 0 - 45 U/L    ALT 36 0 - 50 U/L    Protein Total 6.9 6.4 - 8.3 g/dL    Albumin 4.2 3.5 - 5.2 g/dL    Bilirubin Total 0.3 <=1.2 mg/dL   Troponin T, High Sensitivity   Result Value Ref Range    Troponin T, High Sensitivity <6 <=14 ng/L   CBC with platelets and differential   Result Value Ref Range    WBC Count 5.9 4.0 - 11.0 10e3/uL    RBC Count 4.66 3.80 - 5.20 10e6/uL    Hemoglobin 14.2 11.7 - 15.7 g/dL    Hematocrit 41.9 35.0 - 47.0 %    MCV 90 78 - 100 fL    MCH 30.5 26.5 - 33.0 pg    MCHC 33.9 31.5 - 36.5 g/dL    RDW 11.6 10.0 - 15.0 %    Platelet Count 217 150 - 450 10e3/uL    % Neutrophils 55 %    % Lymphocytes 27 %    % Monocytes 8 %    % Eosinophils 9 %    % Basophils 1 %    % Immature Granulocytes 0 %    NRBCs per 100 WBC 0 <1 /100    Absolute Neutrophils 3.2 1.6 - 8.3 10e3/uL    Absolute Lymphocytes 1.6 0.8 - 5.3 10e3/uL    Absolute Monocytes 0.5 0.0 - 1.3 10e3/uL    Absolute Eosinophils 0.5 0.0 - 0.7 10e3/uL    Absolute Basophils 0.0 0.0 - 0.2 10e3/uL    Absolute Immature Granulocytes 0.0 <=0.4 10e3/uL    Absolute NRBCs 0.0 10e3/uL   D dimer quantitative   Result Value Ref Range    D-Dimer Quantitative <=0.27 0.00 - 0.50 ug/mL FEU    Narrative    This D-dimer assay is intended for use in conjunction with a clinical pretest probability assessment model to exclude pulmonary embolism (PE) and deep venous thrombosis (DVT) in outpatients suspected of PE or DVT. The cut-off value is 0.50 ug/mL FEU.    For patients 50 years of age or older, the application of age-adjusted cut-off values for D-Dimer may increase  the specificity without significant effect on sensitivity. The literature suggested calculation age adjusted cut-off in ug/L = age in years x 10 ug/L. The results in this laboratory are reported as ug/mL rather than ug/L. The calculation for age adjusted cut off in ug/mL= age in years x 0.01 ug/mL. For example, the cut off for a 76 year old male is 76 x 0.01 ug/mL = 0.76 ug/mL (760 ug/L).    M Deb et al. Age adjusted D-dimer cut-off levels to rule out pulmonary embolism: The ADJUST-PE Study. DEREK 2014;311:9704-0790.; HJ Holland et al. Diagnostic accuracy of conventional or age adjusted D-dimer cutoff values in older patients with suspected venous thromboembolism. Systemic review and meta-analysis. BMJ 2013:346:f2492.   Symptomatic Influenza A/B, RSV, & SARS-CoV2 PCR (COVID-19) Nose    Specimen: Nose; Swab   Result Value Ref Range    Influenza A PCR Negative Negative    Influenza B PCR Negative Negative    RSV PCR Negative Negative    SARS CoV2 PCR Negative Negative    Narrative    Testing was performed using the Xpert Xpress CoV2/Flu/RSV Assay on the [employer] GeneXpert Instrument. This test should be ordered for the detection of SARS-CoV-2, influenza, and RSV viruses in individuals who meet clinical and/or epidemiological criteria. Test performance is unknown in asymptomatic patients. This test is for in vitro diagnostic use under the FDA EUA for laboratories certified under CLIA to perform high or moderate complexity testing. This test has not been FDA cleared or approved. A negative result does not rule out the presence of PCR inhibitors in the specimen or target RNA in concentration below the limit of detection for the assay. If only one viral target is positive but coinfection with multiple targets is suspected, the sample should be re-tested with another FDA cleared, approved, or authorized test, if coinfection would change clinical management. This test was validated by the [facility].  These laboratories are certified under the Clinical Laboratory Improvement Amendments of 1988 (CLIA-88) as qualified to perform high complexity laboratory testing.   XR Chest Port 1 View    Narrative    PROCEDURE:  XR CHEST PORT 1 VIEW    HISTORY:  sob.     COMPARISON:  None.    FINDINGS:   The cardiac silhouette is normal in size. The pulmonary vasculature is  normal.  The lungs are clear. No pleural effusion or pneumothorax.      Impression    IMPRESSION:  No acute cardiopulmonary disease.      CHRIS CHA MD         SYSTEM ID:  [id number]   Echocardiogram Complete   Result Value Ref Range    LVEF  55-60%     Narrative    [id number]  [id number]  [id number]  [id number]^CHARLY^WILL^HUGO     [facility]  [address] [place] [address]     Name: PONCHO ORLANDO  MRN: [id number]  : 1970  Study Date: 2024 09:52 AM  Age: 53 yrs  Gender: Female  Patient Location: [facility]  Reason For Study: Dyspnea  Ordering Physician: WILL PARKS  Performed By: ALINA Clayton, RDCS, RVT     BSA: 1.8 m2  Height: 64 in  Weight: 174 lb  HR: 80  BP: 145/78 mmHg  ______________________________________________________________________________  Procedure  Complete Portable Echo Adult.  ______________________________________________________________________________  Interpretation Summary  Left ventricular size, wall motion and function are normal. The ejection  fraction is 55-60%.Left ventricular diastolic function is normal.  Global right ventricular function is normal.  No significant valvular abnormalities present.  No pericardial effusion is present.  Previous study not available for comparison.     ______________________________________________________________________________  Left Ventricle  Left ventricular size, wall motion and function are normal. The ejection  fraction is 55-60%. Left ventricular size is normal. Left ventricular wall  thickness is normal. Left ventricular diastolic  function is normal. Diastolic  Doppler findings (E/E' ratio and/or other parameters) suggest left ventricular  filling pressures are normal.     Right Ventricle  The right ventricle is normal size. Global right ventricular function is  normal.     Atria  Both atria appear normal.     Mitral Valve  The mitral valve is normal. Trace mitral insufficiency is present.     Aortic Valve  Aortic valve is normal in structure and function.     Tricuspid Valve  The tricuspid valve is normal.     Pulmonic Valve  The pulmonic valve is normal.     Vessels  The aorta root is normal. The inferior vena cava was normal in size with  preserved respiratory variability.     Pericardium  No pericardial effusion is present.     Miscellaneous  No significant valvular abnormalities present.     Compared to Previous Study  Previous study not available for comparison.  ______________________________________________________________________________  MMode/2D Measurements & Calculations     IVSd: 0.83 cm  LVIDd: 3.9 cm  LVIDs: 2.7 cm  LVPWd: 0.81 cm  FS: 29.4 %  LV mass(C)d: 91.4 grams  LV mass(C)dI: 49.6 grams/m2  Ao root diam: 2.9 cm  asc Aorta Diam: 3.3 cm  LVOT diam: 1.9 cm  LVOT area: 2.7 cm2  Ao root diam index Ht(cm/m): 1.8  Ao root diam index BSA (cm/m2): 1.6  Asc Ao diam index BSA (cm/m2): 1.8  Asc Ao diam index Ht(cm/m): 2.0  LA Volume (BP): 29.3 ml     LA Volume Index (BP): 15.9 ml/m2  RWT: 0.42     Doppler Measurements & Calculations  MV E max jose guadalupe: 88.6 cm/sec  MV A max jose guadalupe: 85.9 cm/sec  MV E/A: 1.0  MV dec time: 0.19 sec  Ao V2 max: 119.0 cm/sec  Ao max P.0 mmHg  Ao V2 mean: 84.2 cm/sec  Ao mean PG: 3.0 mmHg  Ao V2 VTI: 22.3 cm  PARISH(I,D): 2.6 cm2  PARISH(V,D): 2.4 cm2  LV V1 max P.5 mmHg  LV V1 max: 106.0 cm/sec  LV V1 VTI: 21.3 cm  SV(LVOT): 57.6 ml  SI(LVOT): 31.2 ml/m2  PA acc time: 0.12 sec  TR max jose guadalupe: 212.2 cm/sec  TR max P.1 mmHg  AV Jose Guadalupe Ratio (DI): 0.89  PARISH Index (cm2/m2): 1.4  E/E' av.3     Lateral E/e':  6.7  Medial E/e': 7.8     ______________________________________________________________________________  Report approved by: Haim DESAI 07/12/2024 10:26 AM         CT Chest w Contrast    Narrative    PROCEDURE:  CT CHEST W CONTRAST.      HISTORY:  hypoxia of unknown cause, negative D dimer    TECHNIQUE:  Contrast enhanced helical thoracic CT was performed. This  CT exam was performed using one or more the following dose reduction  techniques: automated exposure control, adjustment of the mA and/or kV  according to patient size, and/or iterative reconstruction technique.    COMPARISON:  7/12/2024    MEDS/CONTRAST: Isovue 370 91 ml    FINDINGS:    The neck base is grossly symmetric and unremarkable in appearance.  Cardiac size is normal. There is no pericardial or pleural effusion.  The thoracic aorta and pulmonary artery are within normal limits in  size. No proximal pulmonary embolus is identified. No suspicious  thoracic adenopathy is identified.    The central airways are patent. There is no focal consolidation or  intrapulmonary mass. Trace emphysematous changes are present.         Limited views of the upper abdomen reveal no adrenal mass or acute  process.     No suspicious osseous lesions are seen.      Impression    IMPRESSION:    Trace centrilobular emphysema.    BONITA AGRAWAL MD         SYSTEM ID:  [id number]       Medications   iopamidol (ISOVUE-370) solution 90 mL (90 mLs Intravenous $Given 7/12/24 1025)       Assessments & Plan (with Medical Decision Making)  Cielo Bahena is a 53 year old female with fatigue and SOB with activity. No cough, no fever or chills, no N/V. She has had symptoms since the middle of May (they told me a week before Memorial Day) but today she was really SOB with ambulation.   She has a history of moderate persistent asthma, prior smoker (quit about 4 years ago), seasonal allergies, HTN, anxiety. No history of DVT or PE, not on blood thinners.  VS in the ED on room  "air BP (!) 149/90   Pulse 87   Temp 97  F (36.1  C) (Tympanic)   Resp 16   Ht 1.626 m (5' 4\")   Wt 78.9 kg (174 lb)   LMP 09/20/2015 (Approximate)   SpO2 97%   BMI 29.87 kg/m    Exam shows no concerns- no LE edema, no wheezing, normal heart sounds with good pulses.  EKG stable  Labs show CBC normal, CMP with Cr 1.11, troponin normal, D dimer normal.  Chest xray negative.  9:36 AM  We did get her up and walking in the hallway and her oxygen saturation was down to 88% and her pulse was 102.    CT Chest with contrast shows trace centrilobular emphysema.   ECHO normal.   11:03 AM  I will send a note to Dr Navarro (she sees him July 18) and did place a referral for pulmonology (they want to see [facility], not [facility]).      I have reviewed the nursing notes.    I have reviewed the findings, diagnosis, plan and need for follow up with the patient.  Medical Decision Making  The patient's presentation was of moderate complexity (an undiagnosed new problem with uncertain prognosis).    The patient's evaluation involved:  an assessment requiring an independent historian (see separate area of note for details)  ordering and/or review of 3+ test(s) in this encounter (see separate area of note for details)    The patient's management necessitated only low risk treatment.      Final diagnoses:   Centrilobular emphysema (H)       7/12/2024   [facility]  07/12/24 1111    "

## 2024-07-12 NOTE — ED TRIAGE NOTES
Triage Assessment (Adult)       Row Name 07/12/24 0756          Triage Assessment    Airway WDL WDL        Respiratory WDL    Respiratory WDL WDL        Skin Circulation/Temperature WDL    Skin Circulation/Temperature WDL WDL        Cardiac WDL    Cardiac WDL WDL        Peripheral/Neurovascular WDL    Peripheral Neurovascular WDL WDL        Cognitive/Neuro/Behavioral WDL    Cognitive/Neuro/Behavioral WDL WDL

## 2024-07-12 NOTE — ED NOTES
Pt ambulated in hallway, pt reports feeling SOB with activity, o2 sats dropped to 87% on RA, pt assisted back to bed, MD aware

## 2024-07-12 NOTE — ED TRIAGE NOTES
Pt here with inbtermitten SOB x 2 months, pt reports that she does have a hx of asthma, pt denies any pain, pt is in no acute distress, VSS, EKG performed, pt into bay 3 ambulatory     Triage Assessment (Adult)       Row Name 07/12/24 0756          Triage Assessment    Airway WDL WDL        Respiratory WDL    Respiratory WDL WDL        Skin Circulation/Temperature WDL    Skin Circulation/Temperature WDL WDL        Cardiac WDL    Cardiac WDL WDL        Peripheral/Neurovascular WDL    Peripheral Neurovascular WDL WDL        Cognitive/Neuro/Behavioral WDL    Cognitive/Neuro/Behavioral WDL WDL

## 2024-07-23 ENCOUNTER — TRANSCRIBE ORDERS (OUTPATIENT)
Dept: OTHER | Age: 54
End: 2024-07-23

## 2024-07-23 DIAGNOSIS — J43.2 CENTRILOBULAR EMPHYSEMA (H): Primary | ICD-10-CM

## 2024-07-24 ENCOUNTER — TELEPHONE (OUTPATIENT)
Dept: PULMONOLOGY | Facility: CLINIC | Age: 54
End: 2024-07-24
Payer: COMMERCIAL

## 2024-07-24 DIAGNOSIS — J43.2 CENTRILOBULAR EMPHYSEMA (H): Primary | ICD-10-CM

## 2024-07-24 NOTE — TELEPHONE ENCOUNTER
[facility] Call Center    Phone Message    May a detailed message be left on voicemail: yes     Reason for Call: Appointment Intake    Referring Provider Name: Dr. Rashi Mendez  Diagnosis and/or Symptoms: Centrilobular emphysema    Pt is scheduled to establish care 9/6/24 with Lizzette Groves NP, needs orders placed for PFT's (pended).    Action Taken: Other: Pulm    Travel Screening: Not Applicable

## 2024-08-04 SDOH — HEALTH STABILITY: PHYSICAL HEALTH: ON AVERAGE, HOW MANY MINUTES DO YOU ENGAGE IN EXERCISE AT THIS LEVEL?: 10 MIN

## 2024-08-04 SDOH — HEALTH STABILITY: PHYSICAL HEALTH: ON AVERAGE, HOW MANY DAYS PER WEEK DO YOU ENGAGE IN MODERATE TO STRENUOUS EXERCISE (LIKE A BRISK WALK)?: 1 DAY

## 2024-08-04 ASSESSMENT — ANXIETY QUESTIONNAIRES
4. TROUBLE RELAXING: NOT AT ALL
7. FEELING AFRAID AS IF SOMETHING AWFUL MIGHT HAPPEN: SEVERAL DAYS
GAD7 TOTAL SCORE: 5
2. NOT BEING ABLE TO STOP OR CONTROL WORRYING: SEVERAL DAYS
6. BECOMING EASILY ANNOYED OR IRRITABLE: SEVERAL DAYS
IF YOU CHECKED OFF ANY PROBLEMS ON THIS QUESTIONNAIRE, HOW DIFFICULT HAVE THESE PROBLEMS MADE IT FOR YOU TO DO YOUR WORK, TAKE CARE OF THINGS AT HOME, OR GET ALONG WITH OTHER PEOPLE: NOT DIFFICULT AT ALL
5. BEING SO RESTLESS THAT IT IS HARD TO SIT STILL: NOT AT ALL
3. WORRYING TOO MUCH ABOUT DIFFERENT THINGS: SEVERAL DAYS
1. FEELING NERVOUS, ANXIOUS, OR ON EDGE: SEVERAL DAYS
8. IF YOU CHECKED OFF ANY PROBLEMS, HOW DIFFICULT HAVE THESE MADE IT FOR YOU TO DO YOUR WORK, TAKE CARE OF THINGS AT HOME, OR GET ALONG WITH OTHER PEOPLE?: NOT DIFFICULT AT ALL
GAD7 TOTAL SCORE: 5
GAD7 TOTAL SCORE: 5
7. FEELING AFRAID AS IF SOMETHING AWFUL MIGHT HAPPEN: SEVERAL DAYS

## 2024-08-04 ASSESSMENT — SOCIAL DETERMINANTS OF HEALTH (SDOH): HOW OFTEN DO YOU GET TOGETHER WITH FRIENDS OR RELATIVES?: ONCE A WEEK

## 2024-08-04 ASSESSMENT — PATIENT HEALTH QUESTIONNAIRE - PHQ9
SUM OF ALL RESPONSES TO PHQ QUESTIONS 1-9: 4
10. IF YOU CHECKED OFF ANY PROBLEMS, HOW DIFFICULT HAVE THESE PROBLEMS MADE IT FOR YOU TO DO YOUR WORK, TAKE CARE OF THINGS AT HOME, OR GET ALONG WITH OTHER PEOPLE: NOT DIFFICULT AT ALL
SUM OF ALL RESPONSES TO PHQ QUESTIONS 1-9: 4

## 2024-08-07 ENCOUNTER — OFFICE VISIT (OUTPATIENT)
Dept: INTERNAL MEDICINE | Facility: OTHER | Age: 54
End: 2024-08-07
Attending: STUDENT IN AN ORGANIZED HEALTH CARE EDUCATION/TRAINING PROGRAM
Payer: COMMERCIAL

## 2024-08-07 VITALS
TEMPERATURE: 98.4 F | HEART RATE: 73 BPM | RESPIRATION RATE: 16 BRPM | OXYGEN SATURATION: 97 % | DIASTOLIC BLOOD PRESSURE: 86 MMHG | WEIGHT: 186 LBS | SYSTOLIC BLOOD PRESSURE: 128 MMHG | BODY MASS INDEX: 31.76 KG/M2 | HEIGHT: 64 IN

## 2024-08-07 DIAGNOSIS — J45.20 MILD INTERMITTENT ASTHMA WITHOUT COMPLICATION: ICD-10-CM

## 2024-08-07 DIAGNOSIS — E78.00 ELEVATED LDL CHOLESTEROL LEVEL: ICD-10-CM

## 2024-08-07 DIAGNOSIS — R06.02 SHORTNESS OF BREATH: ICD-10-CM

## 2024-08-07 DIAGNOSIS — R06.83 SNORING: ICD-10-CM

## 2024-08-07 DIAGNOSIS — J43.2 CENTRILOBULAR EMPHYSEMA (H): ICD-10-CM

## 2024-08-07 DIAGNOSIS — G47.00 INSOMNIA, UNSPECIFIED TYPE: ICD-10-CM

## 2024-08-07 DIAGNOSIS — F41.1 GAD (GENERALIZED ANXIETY DISORDER): ICD-10-CM

## 2024-08-07 DIAGNOSIS — Z00.00 ANNUAL PHYSICAL EXAM: Primary | ICD-10-CM

## 2024-08-07 DIAGNOSIS — K21.9 GASTROESOPHAGEAL REFLUX DISEASE WITHOUT ESOPHAGITIS: ICD-10-CM

## 2024-08-07 DIAGNOSIS — J45.40 MODERATE PERSISTENT ASTHMA WITHOUT COMPLICATION: ICD-10-CM

## 2024-08-07 DIAGNOSIS — I10 BENIGN ESSENTIAL HYPERTENSION: ICD-10-CM

## 2024-08-07 LAB
ALBUMIN SERPL BCG-MCNC: 4.7 G/DL (ref 3.5–5.2)
ALP SERPL-CCNC: 93 U/L (ref 40–150)
ALT SERPL W P-5'-P-CCNC: 40 U/L (ref 0–50)
ANION GAP SERPL CALCULATED.3IONS-SCNC: 9 MMOL/L (ref 7–15)
AST SERPL W P-5'-P-CCNC: 34 U/L (ref 0–45)
BILIRUB SERPL-MCNC: 0.4 MG/DL
BUN SERPL-MCNC: 21.5 MG/DL (ref 6–20)
CALCIUM SERPL-MCNC: 9.5 MG/DL (ref 8.8–10.4)
CHLORIDE SERPL-SCNC: 104 MMOL/L (ref 98–107)
CHOLEST SERPL-MCNC: 191 MG/DL
CREAT SERPL-MCNC: 1.15 MG/DL (ref 0.51–0.95)
EGFRCR SERPLBLD CKD-EPI 2021: 56 ML/MIN/1.73M2
FASTING STATUS PATIENT QL REPORTED: YES
FASTING STATUS PATIENT QL REPORTED: YES
GLUCOSE SERPL-MCNC: 98 MG/DL (ref 70–99)
HCO3 SERPL-SCNC: 28 MMOL/L (ref 22–29)
HDLC SERPL-MCNC: 66 MG/DL
LDLC SERPL CALC-MCNC: 108 MG/DL
NONHDLC SERPL-MCNC: 125 MG/DL
POTASSIUM SERPL-SCNC: 4.5 MMOL/L (ref 3.4–5.3)
PROT SERPL-MCNC: 7.9 G/DL (ref 6.4–8.3)
SODIUM SERPL-SCNC: 141 MMOL/L (ref 135–145)
TRIGL SERPL-MCNC: 86 MG/DL

## 2024-08-07 PROCEDURE — 90715 TDAP VACCINE 7 YRS/> IM: CPT | Performed by: STUDENT IN AN ORGANIZED HEALTH CARE EDUCATION/TRAINING PROGRAM

## 2024-08-07 PROCEDURE — 82374 ASSAY BLOOD CARBON DIOXIDE: CPT | Mod: ZL | Performed by: STUDENT IN AN ORGANIZED HEALTH CARE EDUCATION/TRAINING PROGRAM

## 2024-08-07 PROCEDURE — 83718 ASSAY OF LIPOPROTEIN: CPT | Mod: ZL | Performed by: STUDENT IN AN ORGANIZED HEALTH CARE EDUCATION/TRAINING PROGRAM

## 2024-08-07 PROCEDURE — 36415 COLL VENOUS BLD VENIPUNCTURE: CPT | Mod: ZL | Performed by: STUDENT IN AN ORGANIZED HEALTH CARE EDUCATION/TRAINING PROGRAM

## 2024-08-07 PROCEDURE — 99214 OFFICE O/P EST MOD 30 MIN: CPT | Mod: 25 | Performed by: STUDENT IN AN ORGANIZED HEALTH CARE EDUCATION/TRAINING PROGRAM

## 2024-08-07 PROCEDURE — 82247 BILIRUBIN TOTAL: CPT | Mod: ZL | Performed by: STUDENT IN AN ORGANIZED HEALTH CARE EDUCATION/TRAINING PROGRAM

## 2024-08-07 PROCEDURE — 99396 PREV VISIT EST AGE 40-64: CPT | Mod: 25 | Performed by: STUDENT IN AN ORGANIZED HEALTH CARE EDUCATION/TRAINING PROGRAM

## 2024-08-07 PROCEDURE — 90471 IMMUNIZATION ADMIN: CPT | Performed by: STUDENT IN AN ORGANIZED HEALTH CARE EDUCATION/TRAINING PROGRAM

## 2024-08-07 RX ORDER — TRAZODONE HYDROCHLORIDE 100 MG/1
100 TABLET ORAL AT BEDTIME
Qty: 90 TABLET | Refills: 4 | Status: SHIPPED | OUTPATIENT
Start: 2024-08-07

## 2024-08-07 RX ORDER — FAMOTIDINE 40 MG/1
40 TABLET, FILM COATED ORAL 2 TIMES DAILY PRN
Qty: 180 TABLET | Refills: 4 | Status: SHIPPED | OUTPATIENT
Start: 2024-08-07

## 2024-08-07 RX ORDER — ROSUVASTATIN CALCIUM 5 MG/1
5 TABLET, COATED ORAL DAILY
Qty: 90 TABLET | Refills: 4 | Status: SHIPPED | OUTPATIENT
Start: 2024-08-07

## 2024-08-07 RX ORDER — FLUTICASONE PROPIONATE AND SALMETEROL 250; 50 UG/1; UG/1
1 POWDER RESPIRATORY (INHALATION) 2 TIMES DAILY
Qty: 60 EACH | Refills: 4 | Status: SHIPPED | OUTPATIENT
Start: 2024-08-07

## 2024-08-07 RX ORDER — LORAZEPAM 0.5 MG/1
TABLET ORAL
Qty: 30 TABLET | Refills: 5 | Status: SHIPPED | OUTPATIENT
Start: 2024-08-07

## 2024-08-07 RX ORDER — ALBUTEROL SULFATE 90 UG/1
2 AEROSOL, METERED RESPIRATORY (INHALATION) 4 TIMES DAILY
Qty: 54 G | Refills: 11 | Status: SHIPPED | OUTPATIENT
Start: 2024-08-07

## 2024-08-07 RX ORDER — LOSARTAN POTASSIUM 50 MG/1
50 TABLET ORAL DAILY
Qty: 90 TABLET | Refills: 4 | Status: SHIPPED | OUTPATIENT
Start: 2024-08-07

## 2024-08-07 RX ORDER — MONTELUKAST SODIUM 10 MG/1
1 TABLET ORAL AT BEDTIME
Qty: 90 TABLET | Refills: 4 | Status: SHIPPED | OUTPATIENT
Start: 2024-08-07

## 2024-08-07 ASSESSMENT — PAIN SCALES - GENERAL: PAINLEVEL: NO PAIN (0)

## 2024-08-07 NOTE — LETTER
August 7, 2024      Cielo Bahena  [address]  [facility] [address]        Dear ,    We are writing to inform you of your test results.    Your laboratory results look great.  Kidney function electrolytes are at baseline.  Cholesterol is under reasonable control.    Resulted Orders   Comprehensive Metabolic Panel   Result Value Ref Range    Sodium 141 135 - 145 mmol/L    Potassium 4.5 3.4 - 5.3 mmol/L    Carbon Dioxide (CO2) 28 22 - 29 mmol/L    Anion Gap 9 7 - 15 mmol/L    Urea Nitrogen 21.5 (H) 6.0 - 20.0 mg/dL    Creatinine 1.15 (H) 0.51 - 0.95 mg/dL    GFR Estimate 56 (L) >60 mL/min/1.73m2      Comment:      eGFR calculated using 2021 CKD-EPI equation.    Calcium 9.5 8.8 - 10.4 mg/dL      Comment:      Reference intervals for this test were updated on 7/16/2024 to reflect our healthy population more accurately. There may be differences in the flagging of prior results with similar values performed with this method. Those prior results can be interpreted in the context of the updated reference intervals.    Chloride 104 98 - 107 mmol/L    Glucose 98 70 - 99 mg/dL    Alkaline Phosphatase 93 40 - 150 U/L    AST 34 0 - 45 U/L    ALT 40 0 - 50 U/L    Protein Total 7.9 6.4 - 8.3 g/dL    Albumin 4.7 3.5 - 5.2 g/dL    Bilirubin Total 0.4 <=1.2 mg/dL    Patient Fasting > 8hrs? Yes    Lipid Panel   Result Value Ref Range    Cholesterol 191 <200 mg/dL    Triglycerides 86 <150 mg/dL    Direct Measure HDL 66 >=50 mg/dL    LDL Cholesterol Calculated 108 (H) <=100 mg/dL    Non HDL Cholesterol 125 <130 mg/dL    Patient Fasting > 8hrs? Yes     Narrative    Cholesterol  Desirable:  <200 mg/dL    Triglycerides  Normal:  Less than 150 mg/dL  Borderline High:  150-199 mg/dL  High:  200-499 mg/dL  Very High:  Greater than or equal to 500 mg/dL    Direct Measure HDL  Female:  Greater than or equal to 50 mg/dL   Male:  Greater than or equal to 40 mg/dL    LDL Cholesterol  Desirable:  <100mg/dL  Above Desirable:  100-129  mg/dL   Borderline High:  130-159 mg/dL   High:  160-189 mg/dL   Very High:  >= 190 mg/dL    Non HDL Cholesterol  Desirable:  130 mg/dL  Above Desirable:  130-159 mg/dL  Borderline High:  160-189 mg/dL  High:  190-219 mg/dL  Very High:  Greater than or equal to 220 mg/dL       If you have any questions or concerns, please call the clinic at the number listed above.       Sincerely,      Carter Navarro MD

## 2024-08-07 NOTE — PROGRESS NOTES
Preventive Care Visit  [facility]  Carter Navarro MD, Internal Medicine  Aug 7, 2024      Assessment & Plan       ICD-10-CM    1. Annual physical exam  Z00.00 Comprehensive Metabolic Panel     Comprehensive Metabolic Panel      2. Gastroesophageal reflux disease without esophagitis  K21.9 famotidine (PEPCID) 40 MG tablet      3. Mild intermittent asthma without complication  J45.20 fluticasone-salmeterol (ADVAIR DISKUS) 250-50 MCG/ACT inhaler     montelukast (SINGULAIR) 10 MG tablet      4. ULICES (generalized anxiety disorder)  F41.1 LORazepam (ATIVAN) 0.5 MG tablet      5. Benign essential hypertension  I10 losartan (COZAAR) 50 MG tablet      6. Elevated LDL cholesterol level  E78.00 rosuvastatin (CRESTOR) 5 MG tablet     Lipid Panel     Lipid Panel      7. Insomnia, unspecified type  G47.00 traZODone (DESYREL) 100 MG tablet      8. Moderate persistent asthma without complication  J45.40 Pulmonary Function Test ()     VENTOLIN  (90 Base) MCG/ACT inhaler      9. Snoring  R06.83 Adult Sleep Eval & Management  Referral      10. Centrilobular emphysema (H)  J43.2       11. Shortness of breath  R06.02 NM Lexiscan stress test        Annual wellness exam: Updated patient's past medical history, surgical history, social history, and medications.  Age appropriate laboratory results were ordered as above.    Shortness of breath: Likely multifactorial.  She does have some emphysematous changes seen on her CT scan from the emergency department.  Reassuringly she has a normal echocardiogram but she does have intermittent chest pressure at times.  I think it certainly warrants a stress test which was ordered for today.  Will also repeat PFTs to see degree of obstructive lung disease she is experiencing as well.  Could be generalized deconditioning but it is unusual that her symptoms have worsened particularly since May.  No longer smoking.    Patient also snores and has apneic episodes at  "night.  Referral for sleep study was provided today.    Patient has been advised of split billing requirements and indicates understanding: Yes    She has follow-up with me in September for follow-up of the above medical issues, sooner if needed.    BMI  Estimated body mass index is 31.93 kg/m  as calculated from the following:    Height as of this encounter: 1.626 m (5' 4\").    Weight as of this encounter: 84.4 kg (186 lb).   Weight management plan: Discussed healthy diet and exercise guidelines    Counseling  Appropriate preventive services were addressed with this patient via screening, questionnaire, or discussion as appropriate for fall prevention, nutrition, physical activity, Tobacco-use cessation, weight loss and cognition.  Checklist reviewing preventive services available has been given to the patient.  Reviewed patient's diet, addressing concerns and/or questions.   She is at risk for lack of exercise and has been provided with information to increase physical activity for the benefit of her well-being.   The patient was instructed to see the dentist every 6 months.           No follow-ups on file.    Zheng Buck is a 53 year old, presenting for the following:  Physical        8/7/2024    10:46 AM   Additional Questions   Roomed by Elvis Santoro LPN        Health Care Directive  Patient does not have a Health Care Directive or Living Will: Patient states has Advance Directive and will bring in a copy to clinic.    History of Present Illness       Hypertension: She presents for follow up of hypertension.  She does not check blood pressure  regularly outside of the clinic. Outpatient blood pressures have not been over 140/90. She follows a low salt diet.      She is having shortness of breath.   Has waxing and waning course.   Was seen in the ED in July and has trace emphysema on Ct    Has some chest heaviness at times  She has been gaining weight as well.  Less active.  Encouraged her to get " exercise ball for stamina building and to prevent further weight gain and I do not think that her shortness of breath is going to resolve and this may be her new baseline.        She snores at night and holds her breath.         8/4/2024   General Health   How would you rate your overall physical health? (!) FAIR   Feel stress (tense, anxious, or unable to sleep) To some extent      (!) STRESS CONCERN      8/4/2024   Nutrition   Three or more servings of calcium each day? (!) NO   Diet: Regular (no restrictions)   How many servings of fruit and vegetables per day? (!) 0-1   How many sweetened beverages each day? 0-1            8/4/2024   Exercise   Days per week of moderate/strenous exercise 1 day   Average minutes spent exercising at this level 10 min      (!) EXERCISE CONCERN      8/4/2024   Social Factors   Frequency of gathering with friends or relatives Once a week   Worry food won't last until get money to buy more No   Food not last or not have enough money for food? No   Do you have housing? (Housing is defined as stable permanent housing and does not include staying ouside in a car, in a tent, in an abandoned building, in an overnight shelter, or couch-surfing.) Yes   Are you worried about losing your housing? No   Lack of transportation? No   Unable to get utilities (heat,electricity)? No            8/7/2024   Fall Risk   Trouble with walking or balance? No   Reason Gait Speed Test Not Completed Patient declines             8/4/2024   Dental   Dentist two times every year? (!) NO            4/23/2024   TB Screening   Were you born outside of the [place]? No          Today's PHQ-9 Score:       8/4/2024    10:12 AM   PHQ-9 SCORE   PHQ-9 Total Score MyChart 4 (Minimal depression)   PHQ-9 Total Score 4         8/4/2024   Substance Use   Alcohol more than 3/day or more than 7/wk No   Do you use any other substances recreationally? No        Social History     Tobacco Use    Smoking status: Former     Current  packs/day: 0.00     Average packs/day: 0.5 packs/day for 25.0 years (12.5 ttl pk-yrs)     Types: Cigarettes     Start date: 1980     Quit date: 2005     Years since quittin.7    Smokeless tobacco: Never    Tobacco comments:     on rare occasions   Vaping Use    Vaping status: Never Used   Substance Use Topics    Alcohol use: Yes     Alcohol/week: 2.0 standard drinks of alcohol     Types: 2 Cans of beer per week     Comment: Alcoholic Drinks/day: occassional    Drug use: No           2024   LAST FHS-7 RESULTS   1st degree relative breast or ovarian cancer No   Any relative bilateral breast cancer No   Any male have breast cancer No   Any ONE woman have BOTH breast AND ovarian cancer No   Any woman with breast cancer before 50yrs No   2 or more relatives with breast AND/OR ovarian cancer No   2 or more relatives with breast AND/OR bowel cancer No                   2024   STI Screening   New sexual partner(s) since last STI/HIV test? No        History of abnormal Pap smear:        ASCVD Risk   The 10-year ASCVD risk score (Aden WINTER, et al., 2019) is: 1.9%    Values used to calculate the score:      Age: 53 years      Sex: Female      Is Non- : No      Diabetic: No      Tobacco smoker: No      Systolic Blood Pressure: 128 mmHg      Is BP treated: Yes      HDL Cholesterol: 66 mg/dL      Total Cholesterol: 191 mg/dL           Reviewed and updated as needed this visit by Provider   Tobacco     Med Hx  Surg Hx  Fam Hx            Past Medical History:   Diagnosis Date    Anemia     No Comments Provided    Family history of malignant neoplasm of digestive organ     2015    Insomnia     No Comments Provided    Major depressive disorder, single episode     ,Depression.  PHQ-9 score .    Mass of breast     ,Soft breast mass lump, right breast    Pain in hip     No Comments Provided    Personal history of other medical treatment (CODE)      "2011,, 1 SAB    Personal history of other specified conditions (CODE)     abnormal Pap smear prior to , subsequent yearly Pap smears have been normal    PONV (postoperative nausea and vomiting)     Restless legs syndrome     No Comments Provided    Segmental and somatic dysfunction of pelvic region     2013    Uncomplicated asthma     generally well controlled, history of respiratory arrest requiring brief mechanical ventilation.     Past Surgical History:   Procedure Laterality Date    BACK SURGERY      Lumbar L4-5    COLONOSCOPY N/A 10/16/2020    normal, f/u 10 years    DILATION AND CURETTAGE      ,Dilatation and curettage for blighted ovum.    ENDOSCOPY UPPER, COLONOSCOPY, COMBINED      Normal exam, 5 year follow up due to     ESOPHAGOSCOPY, GASTROSCOPY, DUODENOSCOPY (EGD), COMBINED N/A 2023    Procedure: Esophagoscopy, gastroscopy, duodenoscopy (EGD), combined;  Surgeon: Julian Cervantes MD;  Location: [facility] OR    EXTRACTION(S) DENTAL      Mobile tooth extraction    HYSTERECTOMY VAGINAL N/A 2015    Performed at [facility]. Dr. Lindsay for DUB; negative for malignancy; ovaries remain    HYSTERECTOMY, PAP NO LONGER INDICATED N/A 2015    Cervix removed per [facility] path report.    MAMMOPLASTY REDUCTION      , Bilateral breast reduction, [facility]    MAMMOPLASTY REDUCTION          OTHER SURGICAL HISTORY      24875.9,DE SUBTALAR ATHROEREISIS,Left foot     Lab work is in process  Labs reviewed in EPIC         Objective    Exam  /86   Pulse 73   Temp 98.4  F (36.9  C) (Temporal)   Resp 16   Ht 1.626 m (5' 4\")   Wt 84.4 kg (186 lb)   LMP 2015 (Approximate)   SpO2 97%   Breastfeeding No   BMI 31.93 kg/m     Estimated body mass index is 31.93 kg/m  as calculated from the following:    Height as of this encounter: 1.626 m (5' 4\").    Weight as of this encounter: 84.4 kg (186 lb).    Physical Exam  Vitals and nursing note reviewed.   Constitutional:  "      General: She is not in acute distress.     Appearance: She is well-developed. She is not diaphoretic.   HENT:      Head: Normocephalic and atraumatic.      Right Ear: Tympanic membrane and ear canal normal. There is no impacted cerumen.      Left Ear: Tympanic membrane and ear canal normal. There is no impacted cerumen.      Mouth/Throat:      Mouth: Mucous membranes are moist.      Pharynx: Oropharynx is clear.   Eyes:      General: No scleral icterus.     Conjunctiva/sclera: Conjunctivae normal.   Neck:      Vascular: No carotid bruit.   Cardiovascular:      Rate and Rhythm: Normal rate and regular rhythm.      Heart sounds: No murmur heard.  Pulmonary:      Effort: Pulmonary effort is normal. No respiratory distress.      Breath sounds: Normal breath sounds. No stridor. No wheezing or rhonchi.   Abdominal:      Palpations: Abdomen is soft.      Tenderness: There is no abdominal tenderness.   Musculoskeletal:         General: No swelling or deformity.      Cervical back: Neck supple.      Right lower leg: No edema.      Left lower leg: No edema.   Skin:     General: Skin is warm and dry.      Coloration: Skin is not jaundiced.      Findings: No rash.      Comments: No atypical or hyperpigmented lesions warranting biopsy today.   Neurological:      Mental Status: She is alert. Mental status is at baseline.   Psychiatric:         Mood and Affect: Mood normal.         Behavior: Behavior normal.           Signed Electronically by: Carter Navarro MD

## 2024-08-07 NOTE — NURSING NOTE
"Chief Complaint   Patient presents with    Physical       Initial /86   Pulse 73   Temp 98.4  F (36.9  C) (Temporal)   Resp 16   Ht 1.626 m (5' 4\")   Wt 84.4 kg (186 lb)   LMP 09/20/2015 (Approximate)   SpO2 97%   Breastfeeding No   BMI 31.93 kg/m   Estimated body mass index is 31.93 kg/m  as calculated from the following:    Height as of this encounter: 1.626 m (5' 4\").    Weight as of this encounter: 84.4 kg (186 lb).  Medication Review: complete    The next two questions are to help us understand your food security.  If you are feeling you need any assistance in this area, we have resources available to support you today.          8/4/2024   SDOH- Food Insecurity   Within the past 12 months, did you worry that your food would run out before you got money to buy more? N   Within the past 12 months, did the food you bought just not last and you didn t have money to get more? N            Health Care Directive:  Patient does not have a Health Care Directive or Living Will: Patient states has Advance Directive and will bring in a copy to clinic.    Africa Santoro LPN      "

## 2024-08-09 ENCOUNTER — MYC MEDICAL ADVICE (OUTPATIENT)
Dept: PULMONOLOGY | Facility: OTHER | Age: 54
End: 2024-08-09

## 2024-08-12 ENCOUNTER — TELEPHONE (OUTPATIENT)
Dept: CARDIOLOGY | Facility: OTHER | Age: 54
End: 2024-08-12
Payer: COMMERCIAL

## 2024-08-12 ASSESSMENT — SLEEP AND FATIGUE QUESTIONNAIRES
HOW LIKELY ARE YOU TO NOD OFF OR FALL ASLEEP WHEN YOU ARE A PASSENGER IN A CAR FOR AN HOUR WITHOUT A BREAK: WOULD NEVER DOZE
HOW LIKELY ARE YOU TO NOD OFF OR FALL ASLEEP WHILE WATCHING TV: SLIGHT CHANCE OF DOZING
HOW LIKELY ARE YOU TO NOD OFF OR FALL ASLEEP WHILE SITTING AND READING: SLIGHT CHANCE OF DOZING
HOW LIKELY ARE YOU TO NOD OFF OR FALL ASLEEP WHILE SITTING AND TALKING TO SOMEONE: WOULD NEVER DOZE
HOW LIKELY ARE YOU TO NOD OFF OR FALL ASLEEP WHILE SITTING INACTIVE IN A PUBLIC PLACE: WOULD NEVER DOZE
HOW LIKELY ARE YOU TO NOD OFF OR FALL ASLEEP WHILE LYING DOWN TO REST IN THE AFTERNOON WHEN CIRCUMSTANCES PERMIT: SLIGHT CHANCE OF DOZING
HOW LIKELY ARE YOU TO NOD OFF OR FALL ASLEEP WHILE SITTING QUIETLY AFTER LUNCH WITHOUT ALCOHOL: SLIGHT CHANCE OF DOZING
HOW LIKELY ARE YOU TO NOD OFF OR FALL ASLEEP IN A CAR, WHILE STOPPED FOR A FEW MINUTES IN TRAFFIC: WOULD NEVER DOZE

## 2024-08-12 NOTE — TELEPHONE ENCOUNTER
Patient verified .  Reminder call for stress test with instructions given. Emphasis on no caffeine 12 hours before the test. Patient verbalized understanding.

## 2024-08-13 ENCOUNTER — HOSPITAL ENCOUNTER (OUTPATIENT)
Dept: RESPIRATORY THERAPY | Facility: OTHER | Age: 54
Discharge: HOME | End: 2024-08-13
Attending: STUDENT IN AN ORGANIZED HEALTH CARE EDUCATION/TRAINING PROGRAM | Admitting: STUDENT IN AN ORGANIZED HEALTH CARE EDUCATION/TRAINING PROGRAM
Payer: COMMERCIAL

## 2024-08-13 DIAGNOSIS — J45.40 MODERATE PERSISTENT ASTHMA WITHOUT COMPLICATION: ICD-10-CM

## 2024-08-13 PROCEDURE — 94726 PLETHYSMOGRAPHY LUNG VOLUMES: CPT | Mod: 26 | Performed by: INTERNAL MEDICINE

## 2024-08-13 PROCEDURE — 94375 RESPIRATORY FLOW VOLUME LOOP: CPT

## 2024-08-13 PROCEDURE — 999N000157 HC STATISTIC RCP TIME EA 10 MIN

## 2024-08-13 PROCEDURE — 94729 DIFFUSING CAPACITY: CPT

## 2024-08-13 PROCEDURE — 94010 BREATHING CAPACITY TEST: CPT | Mod: 26 | Performed by: INTERNAL MEDICINE

## 2024-08-13 PROCEDURE — 94010 BREATHING CAPACITY TEST: CPT

## 2024-08-13 PROCEDURE — 94729 DIFFUSING CAPACITY: CPT | Mod: 26 | Performed by: INTERNAL MEDICINE

## 2024-08-13 PROCEDURE — 94726 PLETHYSMOGRAPHY LUNG VOLUMES: CPT

## 2024-08-15 ENCOUNTER — HOSPITAL ENCOUNTER (OUTPATIENT)
Dept: NUCLEAR MEDICINE | Facility: OTHER | Age: 54
Discharge: HOME | End: 2024-08-15
Attending: STUDENT IN AN ORGANIZED HEALTH CARE EDUCATION/TRAINING PROGRAM
Payer: COMMERCIAL

## 2024-08-15 VITALS — BODY MASS INDEX: 31.76 KG/M2 | WEIGHT: 186 LBS | HEIGHT: 64 IN

## 2024-08-15 DIAGNOSIS — R06.02 SHORTNESS OF BREATH: ICD-10-CM

## 2024-08-15 LAB
CV BLOOD PRESSURE: 79 MMHG
CV STRESS MAX HR HE: 123
NUC STRESS EJECTION FRACTION: 72 %
RATE PRESSURE PRODUCT: NORMAL
STRESS ECHO BASELINE DIASTOLIC HE: 74
STRESS ECHO BASELINE HR: 65 BPM
STRESS ECHO BASELINE SYSTOLIC BP: 113
STRESS ECHO CALCULATED PERCENT HR: 74 %
STRESS ECHO LAST STRESS DIASTOLIC BP: 82
STRESS ECHO LAST STRESS SYSTOLIC BP: 127
STRESS ECHO POST ESTIMATED WORKLOAD: 1 METS
STRESS ECHO TARGET HR: 166

## 2024-08-15 PROCEDURE — 93017 CV STRESS TEST TRACING ONLY: CPT

## 2024-08-15 PROCEDURE — 93018 CV STRESS TEST I&R ONLY: CPT | Performed by: INTERNAL MEDICINE

## 2024-08-15 PROCEDURE — 343N000001 HC RX 343: Performed by: STUDENT IN AN ORGANIZED HEALTH CARE EDUCATION/TRAINING PROGRAM

## 2024-08-15 PROCEDURE — 250N000011 HC RX IP 250 OP 636: Performed by: INTERNAL MEDICINE

## 2024-08-15 PROCEDURE — 78452 HT MUSCLE IMAGE SPECT MULT: CPT

## 2024-08-15 PROCEDURE — A9500 TC99M SESTAMIBI: HCPCS | Performed by: STUDENT IN AN ORGANIZED HEALTH CARE EDUCATION/TRAINING PROGRAM

## 2024-08-15 RX ORDER — REGADENOSON 0.08 MG/ML
0.4 INJECTION, SOLUTION INTRAVENOUS ONCE
Status: COMPLETED | OUTPATIENT
Start: 2024-08-15 | End: 2024-08-15

## 2024-08-15 RX ADMIN — KIT FOR THE PREPARATION OF TECHNETIUM TC99M SESTAMIBI 8.48 MILLICURIE: 1 INJECTION, POWDER, LYOPHILIZED, FOR SOLUTION PARENTERAL at 07:55

## 2024-08-15 RX ADMIN — KIT FOR THE PREPARATION OF TECHNETIUM TC99M SESTAMIBI 33.5 MILLICURIE: 1 INJECTION, POWDER, LYOPHILIZED, FOR SOLUTION PARENTERAL at 09:00

## 2024-08-15 RX ADMIN — REGADENOSON 0.4 MG: 0.08 INJECTION, SOLUTION INTRAVENOUS at 08:56

## 2024-08-15 NOTE — PROGRESS NOTES
0745The patient arrived for a Lexiscan Cardiolite stress test.  The procedure, risks, and benefits were discussed with the patient ,and the consent was signed.  A saline lock was started,and the Cardiolite was injected by Nuclear Medicine.  The patient was taken to the waiting area, to await resting images at 0800.  0847 The patient returned from Nuclear Medicine and was prepped for the stress test.   AMOS Lipscomb RN arrived, and the patient was administered the Lexiscan per procedure.  The patient tolerated the procedure.  She was given a snack and taken to Nuclear Medicine in stable condition for stress images.  The saline lock will be removed by Nuclear Medicine for proper disposal.  The patient was instructed that the ordering MD will call with results in one to two days.  Please see the chart for the complete test results.

## 2024-08-15 NOTE — PROGRESS NOTES
08/12/24 1152   Reason For Your Visit   Please briefly describe the main reason(s) for your sleep visit Low energy and tired all the time.  Snoring and breathing issues according to my .  Difficult falling asleep, getting back to sleep when I wake up.   Approximately when did this problem start I thought it was my restless legs all my life, but my restless legs are better under control and have been for years.  I would say with in the last 10 years sleeping has got worse.   What are your goals for this visit Hopes of finding out why I have no energy and why I'm tired all the time.  Hopes to find out why falling asleep and staying asleep are difficult for me.   Time in Bed - Work Or School Days   Do you work or go to school No   What time do you usually get into bed 9-10 p.m.   About how long does it take you to fall asleep Hour or longer   How often do you have trouble falling asleep 4   How often do you wake up during the night 2-4 times   What wakes you up at night External stimuli (bed partner, pets, noise, etc);Use the bathroom;Other   Please elaborate SOB   How often do you have trouble falling back to sleep 5 almost every night   About how long does it take to fall back to sleep 1 hour or more   What do you usually do if you have trouble getting back to sleep Toss and turn or sometimes read things on phone.  have  rub back or my legs   What time do you usually get out of bed to start your day 7-8 a.m.   Do you use an alarm No   Time in Bed - Weekends/Non-work Days/All Other Days   What time do you usually get into bed 9-10 p.m.   About how long does it take you to fall asleep hour or more   What time do you usually get out of bed to start your day 7-8 a.m.   Do you use an alarm No   Sleep Need   On average, about how much sleep do you think you get 5-6 hours   About how much sleep do you think you need 8 hours   Sleep Position   Which sleep positions do you prefer Side;Stomach   Do you do any  of the following activities in bed Watch TV;Use phone, computer, or tablet   How often do you take a nap on purpose maybe 1 day a week if at all   About how long are your naps 30 minutes to an hour   Do you feel better after naps No   How often do you doze off unintentionally 0-1   Have you ever had a driving accident or near-miss due to sleepiness/drowsiness No   Sleep Disruptions - Breathing/Snoring   Do you snore Yes   Have you been told you stop breathing in your sleep Yes   Do you have issues with any of the following Morning headaches;Morning mouth dryness;Stuffy nose when you wake up;Heartburn or reflux at night;Getting up to urinate more than once   Sleep Disruptions - Movement   Do you get pain, discomfort, with an urge to move Yes   Does it happen when you are resting Yes   Does it get better if you move around Yes   Does it happen more at night Yes   Have you been told you kick your legs at night Yes   Sleep Disruptions - Behaviours in Sleep   Have you ever experienced any of the following during your sleep Teeth grinding;Kicking or punching   Do you ever experience sudden muscle weakness during the day No   Caffeine, Alcohol and Other Substances   How many caffeinated beverages (coffee, tea, soda, energy drinks) per day 0   What time of day is your last caffeine use n/a   List any prescribed or over the counter stimulants that you take Magnesium, DHEA, Biotic, Vitamin D3, Vitamin C, Cristian Supplement, Digestive Enzymes, Progesterone, Phosphatidyl Serine, Famotidine, Rosuvastatin, Montelukast, Trazodone, Losartan, Albuteral Inhaler, Advair Inhaler, Betaine HCL, Lorazepam, Ondansetron HCL   List previous sleep medications you have tried Ambien and Melatonin   Do you drink alcohol to help you sleep No   Do you drink alcohol near bedtime No   Family History   Has any family member been diagnosed with a sleep disorder Yes   If yes, please indicate My father has not slept well most of his life, not sure if he  has gone in to be diagnosed   In the last TWO WEEKS have you experienced any of the following symptoms?   Fevers No   Night Sweats Yes   Weight Gain No   Pain at Night No   Double Vision No   Changes in Vision No   Difficulty Breathing through Nose Yes   Sore Throat in Morning Yes   Dry Mouth in the Morning Yes   Shortness of Breath Lying Flat Yes   Shortness of Breath With Activity Yes   Awakening with Shortness of Breath Yes   Increased Cough Yes   Heart Racing at Night No   Swelling in Feet or Legs No   Diarrhea at Night No   Heartburn at Night Yes   Urinating More than Once at Night Yes   Losing Control of Urine at Night No   Joint Pains at Night No   Headaches in Morning Yes   Weakness in Arms or Legs No   Depressed Mood No   Anxiety No         8/12/2024    12:06 PM    Newberry Sleepiness Scale ( SARAVANAN Welsh  [id number]<br>ESS - [place]/English - Final version - 21 Nov 07 - [facility] [place].)   Sitting and reading Slight chance of dozing   Watching TV Slight chance of dozing   Sitting, inactive in a public place (e.g. a theatre or a meeting) Would never doze   As a passenger in a car for an hour without a break Would never doze   Lying down to rest in the afternoon when circumstances permit Slight chance of dozing   Sitting and talking to someone Would never doze   Sitting quietly after a lunch without alcohol Slight chance of dozing   In a car, while stopped for a few minutes in traffic Would never doze   Newberry Score (MC) 4   Newberry Score (Sleep) 4         8/12/2024    11:55 AM   Insomnia Severity Index (CHRISTOS)   Difficulty falling asleep 3   Difficulty staying asleep 3   Problems waking up too early 2   How SATISFIED/DISSATISFIED are you with your CURRENT sleep pattern? 4   How NOTICEABLE to others do you think your sleep problem is in terms of impairing the quality of your life? 2   How WORRIED/DISTRESSED are you about your current sleep problem? 4   To what extent do you consider your sleep problem to  INTERFERE with your daily functioning (e.g. daytime fatigue, mood, ability to function at work/daily chores, concentration, memory, mood, etc.) CURRENTLY? 3   CHRISTOS Total Score 21         8/15/2024     7:56 AM   STOP BANG Questionnaire (  2008, the American Society of Anesthesiologists, Inc. [employer]   BMI Clinic: 31.93

## 2024-08-16 NOTE — TELEPHONE ENCOUNTER
Chart review prior to sleep testing.    Patient Summary:  53 year old female who is referred for sleep-disordered breathing.    Patient Active Problem List    Diagnosis Date Noted    Centrilobular emphysema (H) 07/12/2024     Priority: Medium    Benign essential hypertension 10/18/2021     Priority: Medium    ULICES (generalized anxiety disorder) 10/18/2021     Priority: Medium    Moderate persistent asthma without complication 02/16/2018     Priority: Medium     Overview:   Asthma, generally well controlled, history of respiratory arrest requiring   brief mechanical ventilation.      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     Priority: Medium    Anemia 02/16/2018     Priority: Medium     Overview:   mild      Asymptomatic microscopic hematuria 01/24/2018     Priority: Medium    Family history of colon cancer 01/22/2015     Priority: Medium    RLS (restless legs syndrome) 06/20/2014     Priority: Medium    Hip pain 04/18/2014     Priority: Medium    GERD (gastroesophageal reflux disease) 10/25/2012     Priority: Medium    Breast hypertrophy 03/14/2012     Priority: Medium       Current Outpatient Medications   Medication Sig Dispense Refill    famotidine (PEPCID) 40 MG tablet Take 1 tablet (40 mg) by mouth 2 times daily as needed for heartburn 180 tablet 4    fluticasone-salmeterol (ADVAIR DISKUS) 250-50 MCG/ACT inhaler Inhale 1 puff into the lungs 2 times daily 60 each 4    LORazepam (ATIVAN) 0.5 MG tablet TAKE 1 TABLET BY MOUTH EVERY 8 HOURS AS NEEDED FOR ANXIETY 30 tablet 5    losartan (COZAAR) 50 MG tablet Take 1 tablet (50 mg) by mouth daily 90 tablet 4    montelukast (SINGULAIR) 10 MG tablet Take 1 tablet (10 mg) by mouth at bedtime 90 tablet 4    naltrexone (REVIA) 1 mg/mL SOLN Take 1 mg by mouth 2 times daily      ondansetron (ZOFRAN) 4 MG tablet Take 1 tablet (4 mg) by mouth every 8 hours as  "needed for nausea 30 tablet 1    progesterone (PROMETRIUM) 100 MG capsule Take 100 mg by mouth At Bedtime      rosuvastatin (CRESTOR) 5 MG tablet Take 1 tablet (5 mg) by mouth daily 90 tablet 4    traZODone (DESYREL) 100 MG tablet Take 1 tablet (100 mg) by mouth at bedtime 90 tablet 4    VENTOLIN  (90 Base) MCG/ACT inhaler Inhale 2 puffs into the lungs 4 times daily 54 g 11     Current Facility-Administered Medications   Medication Dose Route Frequency Provider Last Rate Last Admin    silver sulfADIAZINE (SILVADENE) 1 % cream   Topical Daily Carter Navarro MD   Given at 10/04/21 1630       Pertinent PMHx of moderate persistent asthma, centrilobular emphysema, HTN, RLS, ULICES, atypical depression.    STOP-BANG score of 5, with unknown neck circumference.  [place] score of 4.  CHRISTOS: 21    BMI of Estimated body mass index is 31.93 kg/m  as calculated from the following:    Height as of 8/15/24: 1.626 m (5' 4\").    Weight as of 8/15/24: 84.4 kg (186 lb).     Chief concern per questionnaire: \"Low energy and tired all the time. Snoring and breathing issues according to my . Difficult falling asleep, getting back to sleep when I wake up. \"    Duration of symptoms:  \"I thought it was my restless legs all my life, but my restless legs are better under control and have been for years. I would say with in the last 10 years sleeping has got worse. \"    Goals for visit per questionnaire: \"Hopes of finding out why I have no energy and why I'm tired all the time. Hopes to find out why falling asleep and staying asleep are difficult for me. \"    Sleep pattern:  Workdays.  9-10pm to 7-8am.  Weekends.  same.  Time to fall asleep: ~60+ minutes.  Awakenings: 2-4 times per night, 60+ minutes to return to sleep.  May toss / turn, read on phone, massage back or legs.  Average total sleep time:  5-6 hours  Napping.  0-1 days per week, 0.5-1 hours per nap.    Yes for TV, phone in bed.    Yes for RLS screen.  No for sleep " walking.  Yes for dream enactment behavior.  Yes for bruxism.    Yes for morning headaches.  Yes for snoring.  Yes for observed apnea.  Yes for FHx of poor sleep in father.    Caffeine use:  No for 3+ per day.  No for within 6 hours of bed.    A/P:    1.)  High likelihood of TONY with STOP-BANG score of 5.   - Would appear to be candidate for either home sleep testing or in-lab PSG.    2.)  History of RLS    Not on medications typically used for RLS.  Ferritin 89 on 6/3/2024.    ---  This note was written with the assistance of the Dragon voice-dictation technology software. The final document, although reviewed, may contain errors. For corrections, please contact the office.    Mario Hunter MD    Sleep Medicine  [place], [place]  Main Office: [phone]  [facility] [address], [address]  Schedule visits: [phone]  Main Office: [phone]  Fax: [phone]

## 2024-09-03 ENCOUNTER — DOCUMENTATION ONLY (OUTPATIENT)
Dept: INTERNAL MEDICINE | Facility: OTHER | Age: 54
End: 2024-09-03
Payer: COMMERCIAL

## 2024-09-03 DIAGNOSIS — R06.83 SNORING: Primary | ICD-10-CM

## 2024-09-03 NOTE — PROGRESS NOTES
The pt has a lab appt for 9/6, but I am not seeing any reason why she would need this appt.  I left a message for the pt to return my call to get more information on why a lab appt may be needed at this time.  Alejandra Obrien LPN on 9/3/2024 at 2:43 PM

## 2024-09-06 ENCOUNTER — LAB (OUTPATIENT)
Dept: LAB | Facility: OTHER | Age: 54
End: 2024-09-06
Payer: COMMERCIAL

## 2024-09-06 DIAGNOSIS — I15.2 ADRENAL HYPERTENSION (H): ICD-10-CM

## 2024-09-06 DIAGNOSIS — E27.9 ADRENAL HYPERTENSION (H): ICD-10-CM

## 2024-09-06 DIAGNOSIS — E53.8 BIOTIN-(PROPIONYL-COA-CARBOXYLASE) LIGASE DEFICIENCY: ICD-10-CM

## 2024-09-06 DIAGNOSIS — Z78.0 MENOPAUSE: Primary | ICD-10-CM

## 2024-09-06 DIAGNOSIS — R79.0 ABNORMAL BLOOD LEVEL OF IRON: ICD-10-CM

## 2024-09-06 LAB
ESTRADIOL SERPL-MCNC: 21 PG/ML
FERRITIN SERPL-MCNC: 56 NG/ML (ref 11–328)
IRON BINDING CAPACITY (ROCHE): 316 UG/DL (ref 240–430)
IRON SATN MFR SERPL: 20 % (ref 15–46)
IRON SERPL-MCNC: 64 UG/DL (ref 37–145)
PROGEST SERPL-MCNC: 0.8 NG/ML
VIT B12 SERPL-MCNC: 806 PG/ML (ref 232–1245)

## 2024-09-06 PROCEDURE — 84144 ASSAY OF PROGESTERONE: CPT | Mod: ZL

## 2024-09-06 PROCEDURE — 83550 IRON BINDING TEST: CPT | Mod: ZL

## 2024-09-06 PROCEDURE — 82728 ASSAY OF FERRITIN: CPT | Mod: ZL

## 2024-09-06 PROCEDURE — 36415 COLL VENOUS BLD VENIPUNCTURE: CPT | Mod: ZL

## 2024-09-06 PROCEDURE — 82607 VITAMIN B-12: CPT | Mod: ZL

## 2024-09-06 PROCEDURE — 82533 TOTAL CORTISOL: CPT | Mod: ZL

## 2024-09-06 PROCEDURE — 84403 ASSAY OF TOTAL TESTOSTERONE: CPT | Mod: ZL

## 2024-09-06 PROCEDURE — 82670 ASSAY OF TOTAL ESTRADIOL: CPT | Mod: ZL

## 2024-09-06 PROCEDURE — 82627 DEHYDROEPIANDROSTERONE: CPT | Mod: ZL

## 2024-09-06 PROCEDURE — 84270 ASSAY OF SEX HORMONE GLOBUL: CPT | Mod: ZL

## 2024-09-07 LAB
CORTIS SERPL-MCNC: 10.3 UG/DL
SHBG SERPL-SCNC: 29 NMOL/L (ref 30–135)

## 2024-09-09 LAB — DHEA-S SERPL-MCNC: 186 UG/DL (ref 35–430)

## 2024-09-10 ENCOUNTER — TELEPHONE (OUTPATIENT)
Dept: INTERNAL MEDICINE | Facility: OTHER | Age: 54
End: 2024-09-10
Payer: COMMERCIAL

## 2024-09-10 LAB — TESTOST SERPL-MCNC: 30 NG/DL (ref 8–60)

## 2024-09-10 ASSESSMENT — ASTHMA QUESTIONNAIRES
ACT_TOTALSCORE: 8
QUESTION_2 LAST FOUR WEEKS HOW OFTEN HAVE YOU HAD SHORTNESS OF BREATH: MORE THAN ONCE A DAY
QUESTION_1 LAST FOUR WEEKS HOW MUCH OF THE TIME DID YOUR ASTHMA KEEP YOU FROM GETTING AS MUCH DONE AT WORK, SCHOOL OR AT HOME: MOST OF THE TIME
QUESTION_5 LAST FOUR WEEKS HOW WOULD YOU RATE YOUR ASTHMA CONTROL: POORLY CONTROLLED
ACT_TOTALSCORE: 8
QUESTION_4 LAST FOUR WEEKS HOW OFTEN HAVE YOU USED YOUR RESCUE INHALER OR NEBULIZER MEDICATION (SUCH AS ALBUTEROL): THREE OR MORE TIMES PER DAY
EMERGENCY_ROOM_LAST_YEAR_TOTAL: ONE
QUESTION_3 LAST FOUR WEEKS HOW OFTEN DID YOUR ASTHMA SYMPTOMS (WHEEZING, COUGHING, SHORTNESS OF BREATH, CHEST TIGHTNESS OR PAIN) WAKE YOU UP AT NIGHT OR EARLIER THAN USUAL IN THE MORNING: TWO OR THREE NIGHTS A WEEK

## 2024-09-10 NOTE — TELEPHONE ENCOUNTER
"Left message for patient to return call and ask for a nurse in unit 3.   Patient has an appointment next week 9/17 with PCP [facility] - appointment note stating \"illness\" writer is requesting more information than that... symptoms, what kind of illness, etc.     Africa Santoro LPN on 9/10/2024 at 12:45 PM   Ext. [phone]    "

## 2024-09-11 NOTE — PROGRESS NOTES
Patient was provided both verbal and written education and instructions on use of Watch PAT device. Watch PAT device has been registered and shipped via [employer] on 9/11/2024. Patient was notified that package was mailed out. Watch PAT serial number: [id number]    Tracking # [id number].

## 2024-09-12 ENCOUNTER — VIRTUAL VISIT (OUTPATIENT)
Dept: SLEEP MEDICINE | Facility: HOSPITAL | Age: 54
End: 2024-09-12
Attending: FAMILY MEDICINE
Payer: COMMERCIAL

## 2024-09-12 DIAGNOSIS — G47.33 OSA (OBSTRUCTIVE SLEEP APNEA): Primary | ICD-10-CM

## 2024-09-12 NOTE — TELEPHONE ENCOUNTER
Writer attempted call to patient for second time to gather more information regarding patient upcoming office visit. Writer sent to [employer], where message was left to return call to nurse in unit 3.     Africa Santoro LPN on 9/12/2024 at 11:15 AM   Ext. [phone]

## 2024-09-17 ENCOUNTER — OFFICE VISIT (OUTPATIENT)
Dept: INTERNAL MEDICINE | Facility: OTHER | Age: 54
End: 2024-09-17
Attending: STUDENT IN AN ORGANIZED HEALTH CARE EDUCATION/TRAINING PROGRAM
Payer: COMMERCIAL

## 2024-09-17 VITALS
RESPIRATION RATE: 18 BRPM | DIASTOLIC BLOOD PRESSURE: 80 MMHG | SYSTOLIC BLOOD PRESSURE: 128 MMHG | BODY MASS INDEX: 32.17 KG/M2 | OXYGEN SATURATION: 96 % | HEIGHT: 64 IN | HEART RATE: 100 BPM | WEIGHT: 188.4 LBS

## 2024-09-17 DIAGNOSIS — E66.01 MORBID OBESITY (H): Primary | ICD-10-CM

## 2024-09-17 DIAGNOSIS — J43.2 CENTRILOBULAR EMPHYSEMA (H): ICD-10-CM

## 2024-09-17 DIAGNOSIS — R06.02 SHORTNESS OF BREATH: ICD-10-CM

## 2024-09-17 DIAGNOSIS — J45.40 MODERATE PERSISTENT ASTHMA WITHOUT COMPLICATION: ICD-10-CM

## 2024-09-17 PROCEDURE — 99214 OFFICE O/P EST MOD 30 MIN: CPT | Performed by: STUDENT IN AN ORGANIZED HEALTH CARE EDUCATION/TRAINING PROGRAM

## 2024-09-17 PROCEDURE — G2211 COMPLEX E/M VISIT ADD ON: HCPCS | Performed by: STUDENT IN AN ORGANIZED HEALTH CARE EDUCATION/TRAINING PROGRAM

## 2024-09-17 ASSESSMENT — PAIN SCALES - GENERAL: PAINLEVEL: NO PAIN (0)

## 2024-09-17 NOTE — PROGRESS NOTES
Assessment & Plan         ICD-10-CM    1. Morbid obesity (H)  E66.01 Semaglutide-Weight Management (WEGOVY) 0.25 MG/0.5ML pen      2. Centrilobular emphysema (H)  J43.2       3. Moderate persistent asthma without complication  J45.40       4. Shortness of breath  R06.02         Shortness of breath: Multifactorial, stress testing has been negative.  PFTs remarkable for obstructive airway disease though mild and she does have emphysematous changes on CT imaging.  Has overlap asthma and emphysema, no longer smoking.  Still having intermittent shortness of breath but does not have any palpitations with this suspect that this is just a small flares of her asthma.  Using her rescue inhaler quite frequently but not as good at using her controller inhaler.  Encouraged regular use of Advair.  She will also work on doing some exercises to improve her stamina basically at home pulmonary rehab.  No significant edema, echocardiogram performed in July was unremarkable.  If episodes of shortness of breath still persist despite adequate management of her COPD/asthma then could pursue Zio patch to ensure she is not having episodes of arrhythmia.  Her anxiety certainly plays a role especially when she gets short of breath.    As far as her morbid obesity she also has some mild overlap of some restrictive lung disease likely due to her body habitus.  Will start Wegovy 0.25 mg weekly and she will contact me if this is covered by her insurance.  She is also working on lifestyle modifications and increasing her exercise.    The longitudinal plan of care for the diagnosis(es)/condition(s) as documented were addressed during this visit. Due to the added complexity in care, I will continue to support Cielo in the subsequent management and with ongoing continuity of care.        No follow-ups on file.    Carter Navarro MD  [facility]    Subjective   Cielo is a 53 year old, presenting for the following health  issues:  Results      9/17/2024    12:59 PM   Additional Questions   Roomed by Alpesh Obrien LPN     History of Present Illness     Asthma:  She presents for follow up of asthma.  She has some cough, no wheezing, and some shortness of breath.  She is using a relief medication every 4 hours. She does not miss any doses of her controller medication throughout the week. Patient is aware of the following triggers: unaware of any triggers. The patient has not had a visit to the Emergency Room, Urgent Care or Hospital due to asthma since the last clinic visit.     COPD:  She presents for follow up of COPD.  Overall, COPD symptoms are stable since last visit.  She has less than usual fatigue or shortness of breath with exertion and less than usual shortness of breath at rest.  She sometimes coughs and does not have change in sputum. No recent fever. She can walk 2-5 blocks without stopping to rest. She can walk 1 flights of stairs without resting. The patient has had no ED, urgent care, or hospital admissions because of COPD since the last visit.     She eats 2-3 servings of fruits and vegetables daily.She consumes 0 sweetened beverage(s) daily.She exercises with enough effort to increase her heart rate 20 to 29 minutes per day.  She exercises with enough effort to increase her heart rate 3 or less days per week.   She is taking medications regularly.       She is accompanied by her  today.  She has questions on her recent studies.  We discussed her CT scan, PFTs, echo, stress test at length today.  We also discussed the overlap of COPD and her asthma.  She is no longer smoking.  She is still short of breath especially with exercise.  She seems to be short of breath particular in the mornings when she first gets out of bed.  She has no chest pain.   no swelling in her hands legs or feet.  She states that her anxiety also plays a role in her shortness of breath as well and when she becomes shortness of breath her  "anxiety makes it worse.  Blood pressure was initially elevated but returned to normal on recheck.          Objective    BP (!) 142/86   Pulse 100   Resp 18   Ht 1.626 m (5' 4\")   Wt 85.5 kg (188 lb 6.4 oz)   LMP 09/20/2015 (Approximate)   SpO2 96%   Breastfeeding No   BMI 32.34 kg/m    Body mass index is 32.34 kg/m .  Physical Exam   General: Pleasant 53-year-old woman sitting clinic no acute distress        Signed Electronically by: Carter Navarro MD    "

## 2024-09-17 NOTE — NURSING NOTE
"Chief Complaint   Patient presents with    Results       Initial BP (!) 142/86   Pulse 100   Resp 18   Ht 1.626 m (5' 4\")   Wt 85.5 kg (188 lb 6.4 oz)   LMP 09/20/2015 (Approximate)   SpO2 96%   Breastfeeding No   BMI 32.34 kg/m   Estimated body mass index is 32.34 kg/m  as calculated from the following:    Height as of this encounter: 1.626 m (5' 4\").    Weight as of this encounter: 85.5 kg (188 lb 6.4 oz).  Medication Review: complete    The next two questions are to help us understand your food security.  If you are feeling you need any assistance in this area, we have resources available to support you today.          8/4/2024   SDOH- Food Insecurity   Within the past 12 months, did you worry that your food would run out before you got money to buy more? N   Within the past 12 months, did the food you bought just not last and you didn t have money to get more? N            Health Care Directive:  Patient does not have a Health Care Directive or Living Will: Discussed advance care planning with patient; however, patient declined at this time.    Alejandra Obrien LPN      "

## 2024-09-17 NOTE — TELEPHONE ENCOUNTER
Pt never returned the call.  Will wait for her appt time today to get further info.  Alejandra Obrien LPN on 9/17/2024 at 10:52 AM

## 2024-10-14 ENCOUNTER — TELEPHONE (OUTPATIENT)
Dept: INTERNAL MEDICINE | Facility: OTHER | Age: 54
End: 2024-10-14
Payer: COMMERCIAL

## 2024-10-14 NOTE — TELEPHONE ENCOUNTER
We received a fax from [employer] on Behalf of [facility] Denying the RX PA Request that was submitted for Wegovy 0.25 MG/0.5ML Solution [employer] stating that the requested weight loss drug is not covered under this patients pharmacy benefit.      For an Appeal,  Call the customer service number on the back of the members/patients ID card   OR  Mail to   [employer] and [facility]  Attention: Appeals and Grievances  [address]  [place] [address]    I faxed the Denial/Appeal information to [employer] to be scanned into the chart.    Kelli Morales on 10/14/2024 at 12:07 PM

## 2024-10-14 NOTE — TELEPHONE ENCOUNTER
Left message for patient to return call and ask for a nurse in unit 3.     Patient needs to call their insurance company and ask what is the formulary drug that they would cover. Or patient can pay out of pocket for medication if they wish.     Africa Santoro LPN on 10/14/2024 at 1:10 PM   Ext. [phone]

## 2024-10-22 NOTE — PROGRESS NOTES
"Virtual Visit Details    Type of service:  Video Visit     Originating Location (pt. Location): Home    Distant Location (provider location):  Off-site  Platform used for Video Visit: Geovany        Cielo Bahena is a 53 year old female who is being evaluated via a billable video visit.       The patient has been notified of following:      \"This video visit will be conducted via a call between you and your physician/provider. We have found that certain health care needs can be provided without the need for an in-person physical exam.  This service lets us provide the care you need with a video conversation.  If a prescription is necessary we can send it directly to your pharmacy.  If lab work is needed we can place an order for that and you can then stop by our lab to have the test done at a later time.     Video visits are billed at different rates depending on your insurance coverage.  Please reach out to your insurance provider with any questions.     If during the course of the call the physician/provider feels a video visit is not appropriate, you will not be charged for this service.\"     Patient has given verbal consent for Video visit? Yes  How would you like to obtain your AVS? Mail a copy  If you are dropped from the video visit, the video invite should be resent to: Text to cell phone: -  Will anyone else be joining your video visit? No  If patient encounters technical issues they should call [phone]      Video-Visit Details     Type of service:  Video Visit     Start Time:  0930  End Time:  0955    Originating Location (pt. Location): Home     Distant Location (provider location):  Off-site, [facility] [place]       Platform used for Video Visit: Geovany    Virtual visit for review of home sleep testing results.     A/P:     1.)  No significant sleep disordered breathing seen on WatchPAT home sleep test (pAHI 1.3 without hypoxemia)  -Primary presenting concerns of significant " "daytime fatigue    Given her elevated STOP-BANG score of 5 and daytime symptoms, we do feel it is appropriate to confirm with diagnostic in-lab PSG.  Orders placed today.    Recommended Sleep Testing/Procedures:    Adult and PSG/Diagnostic (Bed 2 or Bed 1)       2.)  History of RLS     Not on medications typically used for RLS.  Ferritin 56 on 9/6/2024.  Patient reports resolution of symptoms since starting magnesium supplementation.    SUBJECTIVE:  Cielo Bahena is a 53 year old female.    53 year old female who is referred for sleep-disordered breathing.     Pertinent PMHx of moderate persistent asthma, centrilobular emphysema, HTN, RLS, ULICES, atypical depression.     STOP-BANG score of 5, with unknown neck circumference.  [place] score of 4.  CHRISTOS: 21     BMI of Estimated body mass index is 31.93 kg/m  as calculated from the following:    Height as of 8/15/24: 1.626 m (5' 4\").    Weight as of 8/15/24: 84.4 kg (186 lb).      Today -we reviewed her home sleep test results in detail.  Her primary concerns are significant daytime fatigue and occasional times of awakening with a feeling of gasping.  She feels that her restless legs have largely resolved since starting a magnesium supplement.    ---    Chief concern per questionnaire: \"Low energy and tired all the time. Snoring and breathing issues according to my . Difficult falling asleep, getting back to sleep when I wake up. \"     Duration of symptoms:  \"I thought it was my restless legs all my life, but my restless legs are better under control and have been for years. I would say with in the last 10 years sleeping has got worse. \"     Goals for visit per questionnaire: \"Hopes of finding out why I have no energy and why I'm tired all the time. Hopes to find out why falling asleep and staying asleep are difficult for me. \"     Sleep pattern:  Workdays.  9-10pm to 7-8am.  Weekends.  same.  Time to fall asleep: ~60+ minutes.  Awakenings: 2-4 times per night, " "60+ minutes to return to sleep.  May toss / turn, read on phone, massage back or legs.  Average total sleep time:  5-6 hours  Napping.  0-1 days per week, 0.5-1 hours per nap.     Yes for TV, phone in bed.     Yes for RLS screen.  No for sleep walking.  Yes for dream enactment behavior.  Yes for bruxism.     Yes for morning headaches.  Yes for snoring.  Yes for observed apnea.  Yes for FHx of poor sleep in father.     Caffeine use:  No for 3+ per day.  No for within 6 hours of bed.    WatchPAT - HOME SLEEP STUDY INTERPRETATION    Patient: Cielo Bahena  MRN: [id number]  YOB: 1970  Study Date: 9/22/2024  Referring Provider: Carter Navarro  Ordering Provider: Mario Hunter MD, MD    Chain of custody patient verification was not enabled.       Indications for Home Study: Cielo Bahena is a 53 year old female with a history of moderate persistent asthma, centrilobular emphysema, HTN, RLS, ULICES, atypical depression who presents with symptoms suggestive of obstructive sleep apnea.    Estimated body mass index is 32.1 kg/m  as calculated from the following:    Height as of an earlier encounter on 10/24/24: 1.626 m (5' 4\").    Weight as of an earlier encounter on 10/24/24: 84.8 kg (187 lb).  Total score - [place]: 4 (8/12/2024 12:06 PM)  Total Score: 4 (8/12/2024 12:03 PM)    Data: A full night home sleep study was performed recording the standard physiologic parameters including peripheral arterial tonometry (PAT), sound/snoring, body position,  movement, sound, and oxygen saturation by pulse oximetry. Pulse rate was estimated by oximetry recording. Sleep staging (wake, REM, light, and deep sleep) was derived from PAT signal.  This study was considered adequate based on > 4 hours of quality oximetry and respiratory recording. As specified by the AASM Manual for the Scoring of Sleep and Associated events, version 2.3, Rule VIII.D 1B, 4% oxygen desaturation scoring for hypopneas is used as a standard " of care on all home sleep apnea testing.    Total Recording Time: 7 hrs, 51 min  Total Sleep Time: 6 hrs, 51 min  % of Sleep Time REM: 32.1%    Respiratory:  Snoring: Snoring was present.  Respiratory events: The PAT respiratory disturbance index [pRDI] was 4.3 events per hour.  The PAT apnea/hypopnea index [pAHI] was 1.3 events per hour.  JACQUE was 1.2 events per hour.  During REM sleep the pAHI was 0.5.  Sleep Associated Hypoxemia: sustained hypoxemia was not present. Mean oxygen saturation was 94%.  Minimum was 87%.  Time with saturation less than 88% was 0.1 minutes.    Heart Rate: By pulse oximetry normal rate was noted.  Per cardiac rhythm analysis, no atrial fibrillation nor premature beats detected.    Position: Percent of time spent: supine - 25.7%, prone - 39%, on right - 13.6%, on left - 21.6%.  pAHI was 5.1 per hour supine, 0 per hour prone, 0 per hour on right side, and 0 per hour on left side.     Assessment:   No significant obstructive sleep apnea observed.  Sleep associated hypoxemia was not present.  Noted positional component with supine pAHI 5.1 with ~26% of sleep time and nonsupine pAHI of 0.    Recommendations:  Consider diagnostic in-lab PSG if high pretest clinical concern for sleep disordered breathing given potential for nondiagnostic home sleep testing.  Suggest optimizing sleep hygiene and avoiding sleep deprivation.  Weight management.    Diagnosis Code(s): Snoring R06.83    Mario Hunter MD, MD, October 24, 2024   Diplomate, American Board of Family Medicine, Sleep Medicine      Past medical history:    Patient Active Problem List    Diagnosis Date Noted    Centrilobular emphysema (H) 07/12/2024     Priority: Medium    Benign essential hypertension 10/18/2021     Priority: Medium    ULICES (generalized anxiety disorder) 10/18/2021     Priority: Medium    Moderate persistent asthma without complication 02/16/2018     Priority: Medium     Overview:   Asthma, generally well controlled,  history of respiratory arrest requiring   brief mechanical ventilation.      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     Priority: Medium    Anemia 02/16/2018     Priority: Medium     Overview:   mild      Asymptomatic microscopic hematuria 01/24/2018     Priority: Medium    Family history of colon cancer 01/22/2015     Priority: Medium    RLS (restless legs syndrome) 06/20/2014     Priority: Medium    Hip pain 04/18/2014     Priority: Medium    GERD (gastroesophageal reflux disease) 10/25/2012     Priority: Medium    Breast hypertrophy 03/14/2012     Priority: Medium       10 point ROS of systems including Constitutional, Eyes, Respiratory, Cardiovascular, Gastroenterology, Genitourinary, Integumentary, Muscularskeletal, Psychiatric were all negative except for pertinent positives noted in my HPI.    Current Outpatient Medications   Medication Sig Dispense Refill    famotidine (PEPCID) 40 MG tablet Take 1 tablet (40 mg) by mouth 2 times daily as needed for heartburn 180 tablet 4    fluticasone-salmeterol (ADVAIR DISKUS) 250-50 MCG/ACT inhaler Inhale 1 puff into the lungs 2 times daily 60 each 4    LORazepam (ATIVAN) 0.5 MG tablet TAKE 1 TABLET BY MOUTH EVERY 8 HOURS AS NEEDED FOR ANXIETY 30 tablet 5    losartan (COZAAR) 50 MG tablet Take 1 tablet (50 mg) by mouth daily 90 tablet 4    montelukast (SINGULAIR) 10 MG tablet Take 1 tablet (10 mg) by mouth at bedtime 90 tablet 4    naltrexone (REVIA) 1 mg/mL SOLN Take 1 mg by mouth 2 times daily (Patient not taking: Reported on 9/17/2024)      ondansetron (ZOFRAN) 4 MG tablet Take 1 tablet (4 mg) by mouth every 8 hours as needed for nausea 30 tablet 1    progesterone (PROMETRIUM) 100 MG capsule Take 100 mg by mouth At Bedtime      rosuvastatin (CRESTOR) 5 MG tablet Take 1 tablet (5 mg) by mouth daily 90 tablet 4    Semaglutide-Weight Management (WEGOVY) 0.25  MG/0.5ML pen Inject 0.25 mg subcutaneously once a week. 2 mL 0    traZODone (DESYREL) 100 MG tablet Take 1 tablet (100 mg) by mouth at bedtime 90 tablet 4    VENTOLIN  (90 Base) MCG/ACT inhaler Inhale 2 puffs into the lungs 4 times daily 54 g 11       OBJECTIVE:  LMP 09/20/2015 (Approximate)     Physical Exam     ---  This note was written with the assistance of the Dragon voice-dictation technology software. The final document, although reviewed, may contain errors. For corrections, please contact the office.    Total time spent preparing to see the patient, review of chart, obtaining history and physical examination, review of sleep testing, review of treatment options, education, discussion with patient and documenting in Epic / EMR was 30 minutes.  All time involved was spent on the day of service for the patient (the same day as the patient's appointment).    Mario Hunter MD    Sleep Medicine  [place], [place]  Main Office: [phone]  [facility] [place], [place]  [address] [facility], [address]  Schedule visits: [phone]  Main Office: [phone]  Fax: [phone]

## 2024-10-23 NOTE — TELEPHONE ENCOUNTER
Left message for patient to return call.   Wegovy was denied by insurance  Patient needs to call their insurance company and ask what is the formulary drug that they would cover. Or patient can pay out of pocket for medication if they wish.     Karen Lopez LPN on 10/23/2024 at 10:22 AM  EXT. [phone]

## 2024-10-24 ENCOUNTER — VIRTUAL VISIT (OUTPATIENT)
Dept: PULMONOLOGY | Facility: OTHER | Age: 54
End: 2024-10-24
Attending: FAMILY MEDICINE
Payer: COMMERCIAL

## 2024-10-24 ENCOUNTER — DOCUMENTATION ONLY (OUTPATIENT)
Dept: SLEEP MEDICINE | Facility: HOSPITAL | Age: 54
End: 2024-10-24
Attending: FAMILY MEDICINE
Payer: COMMERCIAL

## 2024-10-24 VITALS — WEIGHT: 187 LBS | BODY MASS INDEX: 31.92 KG/M2 | HEIGHT: 64 IN

## 2024-10-24 DIAGNOSIS — R06.83 SNORING: ICD-10-CM

## 2024-10-24 DIAGNOSIS — R06.83 SNORING: Primary | ICD-10-CM

## 2024-10-24 DIAGNOSIS — R53.82 CHRONIC FATIGUE: ICD-10-CM

## 2024-10-24 DIAGNOSIS — R06.81 APNEA: ICD-10-CM

## 2024-10-24 PROCEDURE — 99203 OFFICE O/P NEW LOW 30 MIN: CPT | Mod: 95 | Performed by: FAMILY MEDICINE

## 2024-10-24 PROCEDURE — 95800 SLP STDY UNATTENDED: CPT | Mod: 26 | Performed by: FAMILY MEDICINE

## 2024-10-24 PROCEDURE — 95800 SLP STDY UNATTENDED: CPT

## 2024-10-24 ASSESSMENT — PAIN SCALES - GENERAL: PAINLEVEL_OUTOF10: NO PAIN (0)

## 2024-10-24 NOTE — PROGRESS NOTES
WatchPAT data has been received and has been scored using rule 1B, 4%. Patient to follow up with provider to determine appropriate therapy.    Pat AHI: 1.3    Ordering Provider: Dr Mario Hunter      Sleep Technician/Technologist: Laxmi BROWN

## 2024-10-24 NOTE — NURSING NOTE
Current patient location: [address] [place] [address] [address]  [facility] [address]    Is the patient currently in the state of [place]? YES    Visit mode:VIDEO    If the visit is dropped, the patient can be reconnected by: VIDEO VISIT: Text to cell phone:   Telephone Information:   Mobile [phone]       Will anyone else be joining the visit? NO  (If patient encounters technical issues they should call [phone]    Are changes needed to the allergy or medication list? Pt stated no med changes    Are refills needed on medications prescribed by this physician? NO    Rooming Documentation:  Questionnaire(s) completed    Reason for visit: RECHECK    Dania HOWARD

## 2024-10-24 NOTE — TELEPHONE ENCOUNTER
After proper verification, patient was relayed message from below.   Karen Lopez LPN on 10/24/2024 at 10:21 AM  EXT. [phone]

## 2024-10-24 NOTE — PROCEDURES
"WatchPAT - HOME SLEEP STUDY INTERPRETATION    Patient: Cielo Bahena  MRN: [id number]  YOB: 1970  Study Date: 9/22/2024  Referring Provider: Carter Navarro  Ordering Provider: Mario Hunter MD, MD    Chain of custody patient verification was not enabled.       Indications for Home Study: Cielo Bahena is a 53 year old female with a history of moderate persistent asthma, centrilobular emphysema, HTN, RLS, ULICES, atypical depression who presents with symptoms suggestive of obstructive sleep apnea.    Estimated body mass index is 32.1 kg/m  as calculated from the following:    Height as of an earlier encounter on 10/24/24: 1.626 m (5' 4\").    Weight as of an earlier encounter on 10/24/24: 84.8 kg (187 lb).  Total score - Pinnacle: 4 (8/12/2024 12:06 PM)  Total Score: 4 (8/12/2024 12:03 PM)    Data: A full night home sleep study was performed recording the standard physiologic parameters including peripheral arterial tonometry (PAT), sound/snoring, body position,  movement, sound, and oxygen saturation by pulse oximetry. Pulse rate was estimated by oximetry recording. Sleep staging (wake, REM, light, and deep sleep) was derived from PAT signal.  This study was considered adequate based on > 4 hours of quality oximetry and respiratory recording. As specified by the AASM Manual for the Scoring of Sleep and Associated events, version 2.3, Rule VIII.D 1B, 4% oxygen desaturation scoring for hypopneas is used as a standard of care on all home sleep apnea testing.    Total Recording Time: 7 hrs, 51 min  Total Sleep Time: 6 hrs, 51 min  % of Sleep Time REM: 32.1%    Respiratory:  Snoring: Snoring was present.  Respiratory events: The PAT respiratory disturbance index [pRDI] was 4.3 events per hour.  The PAT apnea/hypopnea index [pAHI] was 1.3 events per hour.  JACQUE was 1.2 events per hour.  During REM sleep the pAHI was 0.5.  Sleep Associated Hypoxemia: sustained hypoxemia was not present. Mean oxygen " saturation was 94%.  Minimum was 87%.  Time with saturation less than 88% was 0.1 minutes.    Heart Rate: By pulse oximetry normal rate was noted.  Per cardiac rhythm analysis, no atrial fibrillation nor premature beats detected.    Position: Percent of time spent: supine - 25.7%, prone - 39%, on right - 13.6%, on left - 21.6%.  pAHI was 5.1 per hour supine, 0 per hour prone, 0 per hour on right side, and 0 per hour on left side.     Assessment:   No significant obstructive sleep apnea observed.  Sleep associated hypoxemia was not present.  Noted positional component with supine pAHI 5.1 with ~26% of sleep time and nonsupine pAHI of 0.    Recommendations:  Consider diagnostic in-lab PSG if high pretest clinical concern for sleep disordered breathing given potential for nondiagnostic home sleep testing.  Suggest optimizing sleep hygiene and avoiding sleep deprivation.  Weight management.    Diagnosis Code(s): Snoring R06.83    Mario Hunter MD, MD, October 24, 2024   Diplomate, American Board of Family Medicine, Sleep Medicine

## 2024-11-12 PROBLEM — F51.01 PRIMARY INSOMNIA: Status: ACTIVE | Noted: 2018-02-16

## 2024-11-13 ENCOUNTER — OFFICE VISIT (OUTPATIENT)
Dept: INTERNAL MEDICINE | Facility: OTHER | Age: 54
End: 2024-11-13
Attending: INTERNAL MEDICINE
Payer: COMMERCIAL

## 2024-11-13 VITALS
HEIGHT: 64 IN | WEIGHT: 199 LBS | SYSTOLIC BLOOD PRESSURE: 124 MMHG | RESPIRATION RATE: 16 BRPM | TEMPERATURE: 98.5 F | HEART RATE: 87 BPM | BODY MASS INDEX: 33.97 KG/M2 | DIASTOLIC BLOOD PRESSURE: 86 MMHG | OXYGEN SATURATION: 98 %

## 2024-11-13 DIAGNOSIS — R11.0 NAUSEA: ICD-10-CM

## 2024-11-13 DIAGNOSIS — J43.2 CENTRILOBULAR EMPHYSEMA (H): ICD-10-CM

## 2024-11-13 DIAGNOSIS — K21.00 GASTROESOPHAGEAL REFLUX DISEASE WITH ESOPHAGITIS WITHOUT HEMORRHAGE: ICD-10-CM

## 2024-11-13 DIAGNOSIS — F51.01 PRIMARY INSOMNIA: ICD-10-CM

## 2024-11-13 DIAGNOSIS — E66.09 CLASS 1 OBESITY DUE TO EXCESS CALORIES WITH SERIOUS COMORBIDITY AND BODY MASS INDEX (BMI) OF 34.0 TO 34.9 IN ADULT: ICD-10-CM

## 2024-11-13 DIAGNOSIS — R53.81 MALAISE AND FATIGUE: ICD-10-CM

## 2024-11-13 DIAGNOSIS — R53.83 MALAISE AND FATIGUE: ICD-10-CM

## 2024-11-13 DIAGNOSIS — I10 BENIGN ESSENTIAL HYPERTENSION: Primary | ICD-10-CM

## 2024-11-13 DIAGNOSIS — E66.811 CLASS 1 OBESITY DUE TO EXCESS CALORIES WITH SERIOUS COMORBIDITY AND BODY MASS INDEX (BMI) OF 34.0 TO 34.9 IN ADULT: ICD-10-CM

## 2024-11-13 DIAGNOSIS — R14.0 ABDOMINAL BLOATING: ICD-10-CM

## 2024-11-13 DIAGNOSIS — J45.30 MILD PERSISTENT ASTHMA WITHOUT COMPLICATION: ICD-10-CM

## 2024-11-13 DIAGNOSIS — N18.31 CKD STAGE 3A, GFR 45-59 ML/MIN (H): ICD-10-CM

## 2024-11-13 RX ORDER — PHENTERMINE HYDROCHLORIDE 37.5 MG/1
37.5 TABLET ORAL
Qty: 30 TABLET | Refills: 1 | Status: SHIPPED | OUTPATIENT
Start: 2024-11-13

## 2024-11-13 ASSESSMENT — ENCOUNTER SYMPTOMS
ANAL BLEEDING: 0
HEARTBURN: 1
VOMITING: 0
CONSTIPATION: 0
BRUISES/BLEEDS EASILY: 0
DIARRHEA: 0
UNEXPECTED WEIGHT CHANGE: 1
HEMATURIA: 0
NAUSEA: 1
CHILLS: 0
SHORTNESS OF BREATH: 1
ABDOMINAL PAIN: 0
FATIGUE: 1
ABDOMINAL DISTENTION: 1
FEVER: 0
HEMATOCHEZIA: 0
SLEEP DISTURBANCE: 1
ARTHRALGIAS: 0

## 2024-11-13 ASSESSMENT — PAIN SCALES - GENERAL: PAINLEVEL_OUTOF10: NO PAIN (0)

## 2024-11-13 NOTE — NURSING NOTE
"Chief Complaint   Patient presents with    COPD    Asthma       Initial /86   Pulse 87   Temp 98.5  F (36.9  C) (Tympanic)   Resp 16   Ht 1.626 m (5' 4\")   Wt 90.3 kg (199 lb)   LMP 09/20/2015 (Approximate)   SpO2 98%   Breastfeeding No   BMI 34.16 kg/m   Estimated body mass index is 34.16 kg/m  as calculated from the following:    Height as of this encounter: 1.626 m (5' 4\").    Weight as of this encounter: 90.3 kg (199 lb).  Medication Review: complete    The next two questions are to help us understand your food security.  If you are feeling you need any assistance in this area, we have resources available to support you today.          8/4/2024   SDOH- Food Insecurity   Within the past 12 months, did you worry that your food would run out before you got money to buy more? N    Within the past 12 months, did the food you bought just not last and you didn t have money to get more? N        Patient-reported         Health Care Directive:  Patient does not have a Health Care Directive: Patient states has Advance Directive and will bring in a copy to clinic.    Africa Santoro LPN      "

## 2024-11-13 NOTE — PROGRESS NOTES
Assessment & Plan     ICD-10-CM    1. Benign essential hypertension  I10       2. Primary insomnia  F51.01       3. Centrilobular emphysema (H) - Trace - CT 7/12/2024  J43.2 umeclidinium-vilanterol (ANORO ELLIPTA) 62.5-25 MCG/ACT oral inhaler      4. Mild persistent asthma without complication  J45.30 umeclidinium-vilanterol (ANORO ELLIPTA) 62.5-25 MCG/ACT oral inhaler      5. Gastroesophageal reflux disease with esophagitis without hemorrhage  K21.00       6. Abdominal bloating  R14.0       7. CKD stage 3a, GFR 45-59 ml/min (H)  N18.31       8. Class 1 obesity due to excess calories with serious comorbidity and body mass index (BMI) of 34.0 to 34.9 in adult  E66.811 phentermine (ADIPEX-P) 37.5 MG tablet    E66.09     Z68.34       9. Malaise and fatigue  R53.81 phentermine (ADIPEX-P) 37.5 MG tablet    R53.83          Patient presents for establish care and evaluation of multiple issues.    States that she has been gaining weight and is quite frustrated.  Tried to get started on Wegovy injections but they are too expensive and/are not covered by insurance.    Patient has ongoing issues with primary insomnia.  Less regular use of lorazepam noted.  She does take trazodone at bedtime, see if she is uncertain how well this is working.    Patient has trace emphysema, mild persistent asthma.  Recommend stopping her Advair.  She does not use the Advair twice daily on regular basis anyway.  Does use her albuterol puffer almost daily.  Recommend switching over to an oral Ellipta or similar LAMA/LABA once daily.  Prescription sent to pharmacy.    Heartburn and reflux, at times feels like she is having some pretty significant esophagitis.  Also been having abdominal bloating.  No notable generalized abdominal pain.  No melena or hematochezia.  Hopefully with some significant dietary changes or food sensitivities, she will be able to avoid these and her abdominal symptoms will improve.  If recommend starting a food journal.   Starting with organic food for about a week.  If this is not helpful to consider going gluten-free/flour free for a week.    Kidney function is declining.  Encourage continued oral hydration.    Weight is elevated and going up.  Start phentermine.  Hopefully this will also help with her fatigue and malaise issues.    HYPERTENSION - Ongoing. Blood pressure is currently well controlled.  Medication side effects: None. Denies syncope or presyncope.  Continue current medications.   Medication list reviewed/updated. Refills completed as needed.      Trace / Minimal Emphysema / COPD - Patient has a longstanding history of COPD. Patient has been doing reasonably well overall noting FARMER and continues on Albuterol + Start Anoro Ellipta inhaler medication regimen without adverse reactions or side effects.      ASTHMA - Patient has a longstanding history of Mild Persistent Asthma . Patient has been doing reasonably well overall noting FARMER.    Start a food journal...     Start organic diet for about 1 week...     Then if needed... add no-carb diet... (no flour products).... fruits, veggies are fine.     Change over to Anoro Ellipta inhaler --- once daily.       Recent normal stress test.   Minimal obstruction on PFTs -- pulmonary function tests.   Trace emphysema on recent Chest CT.     OBESITY - Ongoing.  (See Encounter Diagnosis list above for obesity class / severity).  - Encourage continued maintenance / improvement in diet and exercise.   - Consider Nutrition / Dietician appointment.  - Weight loss would improve Hypertension, Cholesterol.     Hopefully diet, exercise, lifestyle changes / weight loss will make a huge improvement.     Chronic Kidney Disease, Stage 3a (GFR 45-59), chronic, ongoing.  Kidney function had been slowly declining.  Encourage NSAID avoidance.    Recent Labs   Lab Test 08/07/24  1153 07/12/24  0802   CR 1.15* 1.11*   GFRESTIMATED 56* 59*        Vaccine counseling completed.  Encourage routine /  annual vaccinations.    The longitudinal plan of care for the diagnosis(es)/condition(s) as documented were addressed during this visit. Due to the added complexity in care, I will continue to support Cielo in the subsequent management and with ongoing continuity of care.                   Return in about 29 days (around 12/12/2024) for -- follow-up breathing / gut bloating.      Pranay Charlton MD  [facility]    Review of Systems   Constitutional:  Positive for fatigue and unexpected weight change. Negative for chills and fever.   Eyes:  Negative for visual disturbance.   Respiratory:  Positive for shortness of breath.    Gastrointestinal:  Positive for abdominal distention, heartburn and nausea. Negative for abdominal pain, anal bleeding, constipation, diarrhea, hematochezia and vomiting.   Genitourinary:  Negative for hematuria.   Musculoskeletal:  Negative for arthralgias.   Skin:  Negative for rash.   Allergic/Immunologic: Negative for immunocompromised state.   Hematological:  Does not bruise/bleed easily.   Psychiatric/Behavioral:  Positive for sleep disturbance.            Zheng Buck is a 53 year old, presenting for the following health issues:  COPD and Asthma        11/13/2024     2:54 PM   Additional Questions   Roomed by Elvis MARTINEZ LPN     History of Present Illness     Asthma:  She presents for follow up of asthma.  She has no cough, no wheezing, and some shortness of breath.  She is using a relief medication 2-3 times per day. She typically misses taking her controller medication 1 time(s) per week. Patient is aware of the following triggers: emotions and exercise or sports. The patient has not had a visit to the Emergency Room, Urgent Care or Hospital due to asthma since the last clinic visit.     COPD:  She presents for follow up of COPD.   Overall, COPD symptoms are slightly worse since last visit. She has more than usual fatigue or shortness of breath with exertion and  "more than usual shortness of breath at rest.  She often coughs and does have change in sputum. No recent fever. She can walk 2-5 blocks without stopping to rest. She can walk 1 flights of stairs without resting. The patient has had no ED, urgent care, or hospital admissions because of COPD since the last visit.     She eats 0-1 servings of fruits and vegetables daily.She consumes 0 sweetened beverage(s) daily.She exercises with enough effort to increase her heart rate 10 to 19 minutes per day.  She exercises with enough effort to increase her heart rate 3 or less days per week.   She is taking medications regularly.                     Objective    /86   Pulse 87   Temp 98.5  F (36.9  C) (Tympanic)   Resp 16   Ht 1.626 m (5' 4\")   Wt 90.3 kg (199 lb)   LMP 09/20/2015 (Approximate)   SpO2 98%   Breastfeeding No   BMI 34.16 kg/m    Body mass index is 34.16 kg/m .  Physical Exam  Constitutional:       Appearance: Normal appearance. She is obese.   Eyes:      General: No scleral icterus.     Conjunctiva/sclera: Conjunctivae normal.   Cardiovascular:      Rate and Rhythm: Normal rate and regular rhythm.      Pulses: Normal pulses.   Pulmonary:      Effort: Pulmonary effort is normal.      Breath sounds: Normal breath sounds. No wheezing or rhonchi.   Abdominal:      Palpations: Abdomen is soft.      Tenderness: There is no abdominal tenderness.   Musculoskeletal:      Right lower leg: No edema.      Left lower leg: No edema.   Skin:     General: Skin is warm and dry.      Findings: No rash.   Neurological:      Mental Status: She is alert. Mental status is at baseline.                    Signed Electronically by: Pranay Charlton MD    "

## 2024-11-13 NOTE — PATIENT INSTRUCTIONS
Start a food journal...     Start organic diet for about 1 week...     Then if needed... add no-carb diet... (no flour products).... fruits, veggies are fine.           Change over to Anoro Ellipta inhaler --- once daily.       Recent normal stress test.   Minimal obstruction on PFTs -- pulmonary function tests.   Trace emphysema on recent Chest CT.       Hopefully diet, exercise, lifestyle changes / weight loss will make a huge improvement.         Start Phentermine once daily - for diet / help with weight.       Return in approximately 1 month(s), or sooner as needed for follow-up with Dr. Charlton.  -- follow-up breathing / gut bloating    Clinic : [phone]  Appointment line: [phone]

## 2024-11-15 RX ORDER — ONDANSETRON 4 MG/1
4 TABLET, FILM COATED ORAL EVERY 8 HOURS PRN
Qty: 30 TABLET | Refills: 1 | Status: SHIPPED | OUTPATIENT
Start: 2024-11-15

## 2024-11-15 NOTE — TELEPHONE ENCOUNTER
[facility] sent Rx request for the following:      Requested Prescriptions   Pending Prescriptions Disp Refills    ondansetron (ZOFRAN) 4 MG tablet [Pharmacy Med Name: ondansetron HCl 4 mg tablet] 30 tablet 1     Sig: TAKE 1 TABLET BY MOUTH EVERY 8 HOURS AS NEEDED FOR NAUSEA   Last Prescription Date:   7/10/24  Last Fill Qty/Refills:         30, R-1    Last Office Visit:                11/13/24 8/7/24 (Px)    Future Office visit:           12/12/24    Prescription refilled per RN Medication Refill Policy.................... Adelaide Padilla RN ....................  11/15/2024   9:27 AM

## 2024-11-17 ENCOUNTER — MYC MEDICAL ADVICE (OUTPATIENT)
Dept: INTERNAL MEDICINE | Facility: OTHER | Age: 54
End: 2024-11-17
Payer: COMMERCIAL

## 2024-11-18 NOTE — TELEPHONE ENCOUNTER
It looks like this was sent to our pharmacy under the outreach program. If this is not covered through the program- please have the [facility] pharmacist recommend a LAMA/LABA  alternative that is more affordable.

## 2024-11-18 NOTE — TELEPHONE ENCOUNTER
Yes- this is the type of inhaler she should try to use. If she is nervous about it she can go back on advair and talk about it further at her upcoming appointment with .

## 2024-11-18 NOTE — TELEPHONE ENCOUNTER
"Pt wanted to double check that the Umeclidinium-Vilanterol 62.5-25 MCG/ACT is the correct medication you want her to start. She said its not used for Asthma.    Per OV from 11/13:  Patient has trace emphysema, mild persistent asthma. Recommend stopping her Advair. She does not use the Advair twice daily on regular basis anyway. Does use her albuterol puffer almost daily. Recommend switching over to an oral Ellipta or similar LAMA/LABA once daily. Prescription sent to pharmacy.     My thoughts:  This medication is for your Centrilobular Emphysema. It is not recommended to be used in patients for Asthma alone since it is not for \"acute\" breathing problems. This medication helps by relaxing the muscles around your airways, which makes it easier for you to breathe.     Routing to provider to review and respond.  Deon Obando RN on 11/18/2024 at 3:01 PM    "

## 2024-12-10 ENCOUNTER — HOSPITAL ENCOUNTER (EMERGENCY)
Facility: OTHER | Age: 54
Discharge: HOME | End: 2024-12-10
Attending: EMERGENCY MEDICINE
Payer: COMMERCIAL

## 2024-12-10 ENCOUNTER — APPOINTMENT (OUTPATIENT)
Dept: GENERAL RADIOLOGY | Facility: OTHER | Age: 54
End: 2024-12-10
Attending: EMERGENCY MEDICINE
Payer: COMMERCIAL

## 2024-12-10 VITALS
HEIGHT: 64 IN | HEART RATE: 130 BPM | RESPIRATION RATE: 16 BRPM | SYSTOLIC BLOOD PRESSURE: 117 MMHG | TEMPERATURE: 98.7 F | WEIGHT: 185.8 LBS | BODY MASS INDEX: 31.72 KG/M2 | OXYGEN SATURATION: 96 % | DIASTOLIC BLOOD PRESSURE: 74 MMHG

## 2024-12-10 DIAGNOSIS — J98.01 BRONCHOSPASM: ICD-10-CM

## 2024-12-10 DIAGNOSIS — J10.1 INFLUENZA A: ICD-10-CM

## 2024-12-10 LAB
FLUAV RNA SPEC QL NAA+PROBE: POSITIVE
FLUBV RNA RESP QL NAA+PROBE: NEGATIVE
RSV RNA SPEC NAA+PROBE: NEGATIVE
SARS-COV-2 RNA RESP QL NAA+PROBE: NEGATIVE

## 2024-12-10 PROCEDURE — 93010 ELECTROCARDIOGRAM REPORT: CPT | Performed by: INTERNAL MEDICINE

## 2024-12-10 PROCEDURE — 94640 AIRWAY INHALATION TREATMENT: CPT

## 2024-12-10 PROCEDURE — 87637 SARSCOV2&INF A&B&RSV AMP PRB: CPT | Performed by: EMERGENCY MEDICINE

## 2024-12-10 PROCEDURE — 93005 ELECTROCARDIOGRAM TRACING: CPT | Performed by: EMERGENCY MEDICINE

## 2024-12-10 PROCEDURE — 99284 EMERGENCY DEPT VISIT MOD MDM: CPT | Performed by: EMERGENCY MEDICINE

## 2024-12-10 PROCEDURE — 71046 X-RAY EXAM CHEST 2 VIEWS: CPT

## 2024-12-10 PROCEDURE — 99285 EMERGENCY DEPT VISIT HI MDM: CPT | Mod: 25 | Performed by: EMERGENCY MEDICINE

## 2024-12-10 PROCEDURE — 250N000013 HC RX MED GY IP 250 OP 250 PS 637: Performed by: EMERGENCY MEDICINE

## 2024-12-10 PROCEDURE — 999N000157 HC STATISTIC RCP TIME EA 10 MIN

## 2024-12-10 PROCEDURE — 250N000009 HC RX 250: Performed by: EMERGENCY MEDICINE

## 2024-12-10 PROCEDURE — 250N000011 HC RX IP 250 OP 636: Performed by: EMERGENCY MEDICINE

## 2024-12-10 RX ORDER — IPRATROPIUM BROMIDE AND ALBUTEROL SULFATE 2.5; .5 MG/3ML; MG/3ML
3 SOLUTION RESPIRATORY (INHALATION) ONCE
Status: COMPLETED | OUTPATIENT
Start: 2024-12-10 | End: 2024-12-10

## 2024-12-10 RX ORDER — NAPROXEN 250 MG/1
500 TABLET ORAL ONCE
Status: COMPLETED | OUTPATIENT
Start: 2024-12-10 | End: 2024-12-10

## 2024-12-10 RX ORDER — AMOXICILLIN 500 MG/1
500 CAPSULE ORAL 3 TIMES DAILY
Qty: 21 CAPSULE | Refills: 0 | Status: SHIPPED | OUTPATIENT
Start: 2024-12-10

## 2024-12-10 RX ORDER — ONDANSETRON 4 MG/1
4 TABLET, ORALLY DISINTEGRATING ORAL ONCE
Status: COMPLETED | OUTPATIENT
Start: 2024-12-10 | End: 2024-12-10

## 2024-12-10 RX ORDER — ONDANSETRON 4 MG/1
4 TABLET, ORALLY DISINTEGRATING ORAL EVERY 8 HOURS PRN
Qty: 5 TABLET | Refills: 0 | Status: SHIPPED | OUTPATIENT
Start: 2024-12-10

## 2024-12-10 RX ORDER — ACETAMINOPHEN 500 MG
1000 TABLET ORAL ONCE
Status: COMPLETED | OUTPATIENT
Start: 2024-12-10 | End: 2024-12-10

## 2024-12-10 RX ORDER — PREDNISONE 20 MG/1
TABLET ORAL
Qty: 10 TABLET | Refills: 0 | Status: SHIPPED | OUTPATIENT
Start: 2024-12-10 | End: 2024-12-20

## 2024-12-10 RX ADMIN — IPRATROPIUM BROMIDE AND ALBUTEROL SULFATE 3 ML: .5; 3 SOLUTION RESPIRATORY (INHALATION) at 05:57

## 2024-12-10 RX ADMIN — NAPROXEN 500 MG: 250 TABLET ORAL at 05:40

## 2024-12-10 RX ADMIN — ONDANSETRON 4 MG: 4 TABLET, ORALLY DISINTEGRATING ORAL at 05:39

## 2024-12-10 RX ADMIN — ACETAMINOPHEN 1000 MG: 500 TABLET, FILM COATED ORAL at 05:40

## 2024-12-10 ASSESSMENT — ACTIVITIES OF DAILY LIVING (ADL): ADLS_ACUITY_SCORE: 41

## 2024-12-10 ASSESSMENT — ENCOUNTER SYMPTOMS
FATIGUE: 1
RHINORRHEA: 1
CARDIOVASCULAR NEGATIVE: 1
CHILLS: 0
MUSCULOSKELETAL NEGATIVE: 1
WHEEZING: 1
FEVER: 0
SHORTNESS OF BREATH: 1
HEMATOLOGIC/LYMPHATIC NEGATIVE: 1
FACIAL SWELLING: 0
PSYCHIATRIC NEGATIVE: 1
GASTROINTESTINAL NEGATIVE: 1
SORE THROAT: 0
TROUBLE SWALLOWING: 0
HEADACHES: 1
COUGH: 1
SINUS PAIN: 1
SINUS PRESSURE: 1

## 2024-12-10 NOTE — DISCHARGE INSTRUCTIONS
Nasal saline 3-5x daily  Drink plenty of fluids.  Albuterol inhaler 2-3 puffs every 4 hours as needed.  Tylenol 1000mg every 4 hours as needed.,  Ibuprofen 400-600mg every 8 hours as needed.  Rest as you are able.  Start antibiotic and prednisone in 48-72 hours if not improving.  Have a good rest of your week!

## 2024-12-10 NOTE — ED TRIAGE NOTES
Pt comes in with c/o nausea, generalized weakness, a dry cough and congestion. Symptoms have worsened since Sunday and she has not been able to tolerate oral intake for about two days now. States she feels weak and dizzy at times. Does not report SOB, states she has pleuritic pain from her coughing. She is tachycardic in triage, has a nonproductive dry cough.     Triage Assessment (Adult)       Row Name 12/10/24 0502          Triage Assessment    Airway WDL WDL        Respiratory WDL    Respiratory WDL X;cough     Cough Type dry;congested        Skin Circulation/Temperature WDL    Skin Circulation/Temperature WDL WDL        Cardiac WDL    Cardiac WDL X;rhythm     Pulse Rate & Regularity tachycardic        Peripheral/Neurovascular WDL    Peripheral Neurovascular WDL WDL        Cognitive/Neuro/Behavioral WDL    Cognitive/Neuro/Behavioral WDL WDL

## 2024-12-10 NOTE — ED PROVIDER NOTES
History     Chief Complaint   Patient presents with    Cough    Nausea    Nasal Congestion     HPI  54 year old female with a history of asthma and central lobar emphysema, chronic kidney disease stage III, hypertension, generalized anxiety disorder, insomnia, GERD, restless leg syndrome.  Presents tonight with 2-week history of nasal congestion now mild headache and teeth hurt.  She is also complaining of intermittent episodes of shortness of breath.  Has been using her home albuterol inhaler as well as Tylenol and ibuprofen with minimal improvement.  She did early on try some Afrin nasal spray.  She denies any fevers or chills.  She does admit to being quite tired and not able to sleep for the last 3 nights because she has been somewhat nauseated and the nasal congestion and facial pain has worsened.  She denies any neck stiffness.  Patient seen in room over 7 with her .    Allergies:  No Known Allergies    Problem List:    Patient Active Problem List    Diagnosis Date Noted    Centrilobular emphysema (H) - Trace - CT 7/12/2024 11/13/2024     Priority: Medium    CKD stage 3a, GFR 45-59 ml/min (H) 11/13/2024     Priority: Medium    Class 1 obesity due to excess calories with serious comorbidity and body mass index (BMI) of 34.0 to 34.9 in adult 11/13/2024     Priority: Medium    Abdominal bloating 11/13/2024     Priority: Medium    Malaise and fatigue 11/13/2024     Priority: Medium    Benign essential hypertension 10/18/2021     Priority: Medium    ULICES (generalized anxiety disorder) 10/18/2021     Priority: Medium    Mild persistent asthma without complication 02/16/2018     Priority: Medium     Overview:   Asthma, generally well controlled, history of respiratory arrest requiring   brief mechanical ventilation.      Primary insomnia 02/16/2018     Priority: Medium    Genuine stress incontinence, female 02/16/2018     Priority: Medium    Atypical depressive disease 02/16/2018     Priority: Medium     Anemia 02/16/2018     Priority: Medium     Overview:   mild      Asymptomatic microscopic hematuria 01/24/2018     Priority: Medium    Family history of colon cancer 01/22/2015     Priority: Medium    RLS (restless legs syndrome) 06/20/2014     Priority: Medium    Hip pain 04/18/2014     Priority: Medium    GERD (gastroesophageal reflux disease) 10/25/2012     Priority: Medium    Breast hypertrophy 03/14/2012     Priority: Medium        Past Medical History:    Past Medical History:   Diagnosis Date    Family history of malignant neoplasm of digestive organ     Mass of breast     Personal history of other medical treatment (CODE)     Personal history of other specified conditions (CODE)     PONV (postoperative nausea and vomiting)     Restless legs syndrome     Segmental and somatic dysfunction of pelvic region     Uncomplicated asthma        Past Surgical History:    Past Surgical History:   Procedure Laterality Date    BACK SURGERY      Lumbar L4-5    COLONOSCOPY N/A 10/16/2020    normal, f/u 10 years    DILATION AND CURETTAGE      2006,Dilatation and curettage for blighted ovum.    ENDOSCOPY UPPER, COLONOSCOPY, COMBINED      Normal exam, 5 year follow up due to     ESOPHAGOSCOPY, GASTROSCOPY, DUODENOSCOPY (EGD), COMBINED N/A 6/22/2023    Procedure: Esophagoscopy, gastroscopy, duodenoscopy (EGD), combined;  Surgeon: Julian Cervantes MD;  Location: [facility] OR    EXTRACTION(S) DENTAL      Hale tooth extraction    HYSTERECTOMY VAGINAL N/A 03/05/2015    Performed at [facility]. Dr. Lindsay for DUB; negative for malignancy; ovaries remain    HYSTERECTOMY, PAP NO LONGER INDICATED N/A 03/05/2015    Cervix removed per [facility] path report.    MAMMOPLASTY REDUCTION      2012, Bilateral breast reduction, [facility]    MAMMOPLASTY REDUCTION      2004    OTHER SURGICAL HISTORY      79124.9,[place] SUBTALAR ATHROEREISIS,Left foot       Family History:    Family History   Problem Relation Age of Onset    Hypertension Father      Heart Disease Father         CAD with stents    Hyperlipidemia Father     Colon Cancer Maternal Grandmother     Heart Disease Maternal Grandfather         CAD    Emphysema Paternal Grandmother     Heart Disease Paternal Grandfather         CAD    Breast Cancer No family hx of         Cancer-breast       Social History:  Marital Status:   [2]  Social History     Tobacco Use    Smoking status: Former     Current packs/day: 0.00     Average packs/day: 0.5 packs/day for 25.0 years (12.5 ttl pk-yrs)     Types: Cigarettes     Start date: 1980     Quit date: 2005     Years since quittin.0    Smokeless tobacco: Never    Tobacco comments:     on rare occasions   Vaping Use    Vaping status: Never Used   Substance Use Topics    Alcohol use: Yes     Alcohol/week: 2.0 standard drinks of alcohol     Types: 2 Cans of beer per week     Comment: Alcoholic Drinks/day: occassional    Drug use: No        Medications:    amoxicillin (AMOXIL) 500 MG capsule  ondansetron (ZOFRAN ODT) 4 MG ODT tab  predniSONE (DELTASONE) 20 MG tablet  famotidine (PEPCID) 40 MG tablet  LORazepam (ATIVAN) 0.5 MG tablet  losartan (COZAAR) 50 MG tablet  montelukast (SINGULAIR) 10 MG tablet  naltrexone (REVIA) 1 mg/mL SOLN  ondansetron (ZOFRAN) 4 MG tablet  phentermine (ADIPEX-P) 37.5 MG tablet  progesterone (PROMETRIUM) 100 MG capsule  rosuvastatin (CRESTOR) 5 MG tablet  traZODone (DESYREL) 100 MG tablet  umeclidinium-vilanterol (ANORO ELLIPTA) 62.5-25 MCG/ACT oral inhaler  VENTOLIN  (90 Base) MCG/ACT inhaler          Review of Systems   Constitutional:  Positive for fatigue. Negative for chills and fever.   HENT:  Positive for congestion, postnasal drip, rhinorrhea, sinus pressure and sinus pain. Negative for ear pain, facial swelling, sore throat and trouble swallowing.    Respiratory:  Positive for cough, shortness of breath and wheezing.    Cardiovascular: Negative.    Gastrointestinal: Negative.    Genitourinary: Negative.   "  Musculoskeletal: Negative.    Neurological:  Positive for headaches.   Hematological: Negative.    Psychiatric/Behavioral: Negative.         Physical Exam   BP: 117/74  Pulse: (!) 130  Temp: 98.7  F (37.1  C)  Resp: 16  Height: 162.6 cm (5' 4\")  Weight: 84.3 kg (185 lb 12.8 oz)  SpO2: 98 %      Physical Exam  Vitals and nursing note reviewed. Exam conducted with a chaperone present.   Constitutional:       General: She is not in acute distress.     Appearance: Normal appearance. She is not ill-appearing or diaphoretic.      Comments: Tired but nontoxic-appearing she is interactive.   HENT:      Head: Normocephalic and atraumatic.      Nose: Congestion and rhinorrhea present.      Comments: She has both maxillary as well as ethmoid tenderness to palpation.     Mouth/Throat:      Mouth: Mucous membranes are moist.   Eyes:      General: No scleral icterus.     Extraocular Movements: Extraocular movements intact.      Conjunctiva/sclera: Conjunctivae normal.      Pupils: Pupils are equal, round, and reactive to light.   Cardiovascular:      Rate and Rhythm: Tachycardia present.      Pulses: Normal pulses.      Heart sounds: Normal heart sounds.   Pulmonary:      Effort: No respiratory distress.      Breath sounds: Normal breath sounds.   Abdominal:      General: Abdomen is flat.   Musculoskeletal:         General: Normal range of motion.      Cervical back: Normal range of motion and neck supple.   Skin:     General: Skin is warm.      Findings: No rash.   Neurological:      General: No focal deficit present.      Mental Status: She is alert and oriented to person, place, and time.   Psychiatric:         Mood and Affect: Mood normal.         Behavior: Behavior normal.         ED Course   54-year-old female with known history of asthma presents with facial pain teeth pain and nasal congestion for now 2 weeks.  He has not been able to sleep secondary to this.  She is now also complaining of increasing shortness of " breath and feels her asthma has gotten somewhat worse.  She has no current fevers however her heart rate is in the 120s.  Her exam is otherwise nonfocal other than the distinct facial tenderness to palpation.  Inf A positive.   Feeling better after duoneb.  Will discontinue with watch and wait antibiotic and steroid with continued supportive measures and close follow up. Vitals remain stable. Sats 98% on RA.     ED Course as of 12/10/24 0653   Tue Dec 10, 2024   0534 EKG independently reviewed by myself shows sinus tachycardia at 110 without any ST or T wave changes.   0553 X-ray independently reviewed by myself shows no obvious infiltrate or pneumothoraces.     Procedures             Results for orders placed or performed during the hospital encounter of 12/10/24 (from the past 24 hours)   Influenza A/B, RSV and SARS-CoV2 PCR (COVID-19) Nose    Specimen: Nose; Swab   Result Value Ref Range    Influenza A PCR Positive (A) Negative    Influenza B PCR Negative Negative    RSV PCR Negative Negative    SARS CoV2 PCR Negative Negative    Narrative    Testing was performed using the Xpert Xpress CoV2/Flu/RSV Assay on the [employer] GeneXpert Instrument. This test should be ordered for the detection of SARS-CoV2, influenza, and RSV viruses in individuals with signs and symptoms of respiratory tract infection. This test is for in vitro diagnostic use under the [place] FDA for laboratories certified under CLIA to perform high or moderate complexity testing. This test has been [place] FDA cleared. A negative result does not rule out the presence of PCR inhibitors in the specimen or target RNA in concentration below the limit of detection for the assay. If only one viral target is positive but coinfection with multiple targets is suspected, the sample should be re-tested with another FDA cleared, approved, or authorized test, if coninfection would change clinical management. This test was validated by the [facility].  These laboratories are certified under the Clinical Laboratory Improvement Amendments of 1988 (CLIA-88) as qualified to perfom high complexity laboratory testing.   XR Chest 2 Views    Narrative    EXAM: XR CHEST 2 VIEWS  LOCATION: [facility]  DATE: 12/10/2024    INDICATION: cough  COMPARISON: 7/12/2024      Impression    IMPRESSION: Negative chest.       Medications   ipratropium - albuterol 0.5 mg/2.5 mg/3 mL (DUONEB) neb solution 3 mL (3 mLs Nebulization $Given 12/10/24 0557)   ondansetron (ZOFRAN ODT) ODT tab 4 mg (4 mg Oral $Given 12/10/24 0539)   acetaminophen (TYLENOL) tablet 1,000 mg (1,000 mg Oral $Given 12/10/24 0540)   naproxen (NAPROSYN) tablet 500 mg (500 mg Oral $Given 12/10/24 0540)       Assessments & Plan (with Medical Decision Making)     I have reviewed the nursing notes.    I have reviewed the findings, diagnosis, plan and need for follow up with the patient.        New Prescriptions    AMOXICILLIN (AMOXIL) 500 MG CAPSULE    Take 1 capsule (500 mg) by mouth 3 times daily.    ONDANSETRON (ZOFRAN ODT) 4 MG ODT TAB    Take 1 tablet (4 mg) by mouth every 8 hours as needed for nausea.    PREDNISONE (DELTASONE) 20 MG TABLET    Take two tablets (= 40mg) each day for 5 (five) days       Final diagnoses:   Influenza A   Bronchospasm       12/10/2024   [facility]       Roly Tripp DO  12/10/24 0654

## 2024-12-13 LAB
ATRIAL RATE - MUSE: 110 BPM
DIASTOLIC BLOOD PRESSURE - MUSE: NORMAL MMHG
INTERPRETATION ECG - MUSE: NORMAL
P AXIS - MUSE: 38 DEGREES
PR INTERVAL - MUSE: 170 MS
QRS DURATION - MUSE: 70 MS
QT - MUSE: 336 MS
QTC - MUSE: 454 MS
R AXIS - MUSE: 35 DEGREES
SYSTOLIC BLOOD PRESSURE - MUSE: NORMAL MMHG
T AXIS - MUSE: 28 DEGREES
VENTRICULAR RATE- MUSE: 110 BPM

## 2024-12-20 PROBLEM — N18.2 CKD (CHRONIC KIDNEY DISEASE) STAGE 2, GFR 60-89 ML/MIN: Status: ACTIVE | Noted: 2024-11-13

## 2025-01-28 ASSESSMENT — PATIENT HEALTH QUESTIONNAIRE - PHQ9
SUM OF ALL RESPONSES TO PHQ QUESTIONS 1-9: 17
10. IF YOU CHECKED OFF ANY PROBLEMS, HOW DIFFICULT HAVE THESE PROBLEMS MADE IT FOR YOU TO DO YOUR WORK, TAKE CARE OF THINGS AT HOME, OR GET ALONG WITH OTHER PEOPLE: VERY DIFFICULT
SUM OF ALL RESPONSES TO PHQ QUESTIONS 1-9: 17

## 2025-01-29 ENCOUNTER — MYC MEDICAL ADVICE (OUTPATIENT)
Dept: INTERNAL MEDICINE | Facility: OTHER | Age: 55
End: 2025-01-29

## 2025-01-29 ENCOUNTER — OFFICE VISIT (OUTPATIENT)
Dept: INTERNAL MEDICINE | Facility: OTHER | Age: 55
End: 2025-01-29
Attending: INTERNAL MEDICINE
Payer: COMMERCIAL

## 2025-01-29 VITALS
DIASTOLIC BLOOD PRESSURE: 86 MMHG | HEIGHT: 64 IN | HEART RATE: 90 BPM | WEIGHT: 186.4 LBS | RESPIRATION RATE: 16 BRPM | SYSTOLIC BLOOD PRESSURE: 124 MMHG | OXYGEN SATURATION: 97 % | TEMPERATURE: 98.2 F | BODY MASS INDEX: 31.82 KG/M2

## 2025-01-29 DIAGNOSIS — E66.811 CLASS 1 OBESITY DUE TO EXCESS CALORIES WITH SERIOUS COMORBIDITY AND BODY MASS INDEX (BMI) OF 31.0 TO 31.9 IN ADULT: ICD-10-CM

## 2025-01-29 DIAGNOSIS — F33.8 SEASONAL AFFECTIVE DISORDER: ICD-10-CM

## 2025-01-29 DIAGNOSIS — J39.2 THROAT IRRITATION: Primary | ICD-10-CM

## 2025-01-29 DIAGNOSIS — J45.30 MILD PERSISTENT ASTHMA WITHOUT COMPLICATION: ICD-10-CM

## 2025-01-29 DIAGNOSIS — N18.2 CKD (CHRONIC KIDNEY DISEASE) STAGE 2, GFR 60-89 ML/MIN: ICD-10-CM

## 2025-01-29 DIAGNOSIS — E66.09 CLASS 1 OBESITY DUE TO EXCESS CALORIES WITH SERIOUS COMORBIDITY AND BODY MASS INDEX (BMI) OF 31.0 TO 31.9 IN ADULT: ICD-10-CM

## 2025-01-29 DIAGNOSIS — J43.2 CENTRILOBULAR EMPHYSEMA (H): ICD-10-CM

## 2025-01-29 RX ORDER — BUPROPION HYDROCHLORIDE 75 MG/1
75 TABLET ORAL 2 TIMES DAILY
Qty: 60 TABLET | Refills: 3 | Status: SHIPPED | OUTPATIENT
Start: 2025-01-29

## 2025-01-29 RX ORDER — FLUTICASONE PROPIONATE AND SALMETEROL XINAFOATE 115; 21 UG/1; UG/1
2 AEROSOL, METERED RESPIRATORY (INHALATION) 2 TIMES DAILY
Qty: 36 G | Refills: 4 | Status: SHIPPED | OUTPATIENT
Start: 2025-01-29

## 2025-01-29 ASSESSMENT — ENCOUNTER SYMPTOMS
SHORTNESS OF BREATH: 0
SINUS PRESSURE: 1
COUGH: 1
SORE THROAT: 1
FEVER: 0
RHINORRHEA: 1
CHILLS: 0

## 2025-01-29 ASSESSMENT — PAIN SCALES - GENERAL: PAINLEVEL_OUTOF10: NO PAIN (0)

## 2025-01-29 NOTE — NURSING NOTE
"Chief Complaint   Patient presents with    Recheck Medication     Initial /86   Pulse 90   Temp 98.2  F (36.8  C)   Resp 16   Ht 1.626 m (5' 4\")   Wt 84.6 kg (186 lb 6.4 oz)   LMP 09/20/2015 (Approximate)   SpO2 97%   Breastfeeding No   BMI 32.00 kg/m   Estimated body mass index is 32 kg/m  as calculated from the following:    Height as of this encounter: 1.626 m (5' 4\").    Weight as of this encounter: 84.6 kg (186 lb 6.4 oz).  Medication Reconciliation: complete    Africa Santoro LPN  "

## 2025-01-29 NOTE — PROGRESS NOTES
Assessment & Plan     ICD-10-CM    1. Throat irritation  J39.2       2. Centrilobular emphysema (H) - Trace - CT 7/12/2024  J43.2 fluticasone-salmeterol (ADVAIR HFA) 115-21 MCG/ACT inhaler      3. Mild persistent asthma without complication  J45.30 fluticasone-salmeterol (ADVAIR HFA) 115-21 MCG/ACT inhaler      4. Seasonal affective disorder  F33.8 buPROPion (WELLBUTRIN) 75 MG tablet      5. CKD (chronic kidney disease) stage 2, GFR 60-89 ml/min  N18.2       6. Class 1 obesity due to excess calories with serious comorbidity and body mass index (BMI) of 31.0 to 31.9 in adult  E66.811     E66.09     Z68.31          Patient presents for follow-up multiple issues.    Took her new inhaler for about a week but then got influenza A.  Stop taking the new inhaler up until about 2 weeks ago.  Started using the Anoro Ellipta inhaler and since that time has now been having issues with sore throats, throat irritation, rhinitis, congestion, phlegm, postnasal drip.  She did not have any of the symptoms with her Advair.  Recommend stopping the Anoro Ellipta and switching back over to Advair inhaler.  She does not like the dry powder inhalers and would like to get a prescription for Advair HFA.  She has underlying trace emphysema and mild persistent asthma.    Unfortunately she actually felt that the LAMA/LABA inhaler was helpful for her breathing but she is somehow intolerant of the URI side effects.    Seasonal affective disorder.  States that she is having a difficult time with the idea of trying to leave the house.  She wants to stay in the house and not leave.  She is feeling like her seasonal mood issues are doing much worse recently.  Was previously on antidepressants in the past.  She would like to get some help in regards to improving her mood.  She was using trazodone at bedtime for a while but discontinued use.    We discussed treatment options.  Start trial of bupropion/Wellbutrin 75 mg twice daily.  Dose adjustment  "pending clinical response.  Advised that we can switch her over to the extended release version once we figure out at baseline dosing for her.  -Has clinic follow-up in approximately 4 weeks.    Stage II chronic kidney disease, kidney function did improve with last lab work.  Encouraged adequate oral hydration, NSAID avoidance.    OBESITY - Ongoing.  (See Encounter Diagnosis list above for obesity class / severity).  - Encourage continued maintenance / improvement in diet and exercise.   - Consider Nutrition / Dietician appointment.  - Weight loss would improve Hypertension.      The longitudinal plan of care for the diagnosis(es)/condition(s) as documented were addressed during this visit. Due to the added complexity in care, I will continue to support Cielo in the subsequent management and with ongoing continuity of care.             BMI  Estimated body mass index is 32 kg/m  as calculated from the following:    Height as of this encounter: 1.626 m (5' 4\").    Weight as of this encounter: 84.6 kg (186 lb 6.4 oz).       Depression Screening Follow Up        1/28/2025     9:12 AM   PHQ   PHQ-9 Total Score 17    Q9: Thoughts of better off dead/self-harm past 2 weeks Not at all       Patient-reported           Follow Up Actions Taken  Started patient on anti-depressant.     Regular exercise    Return in about 1 month (around 2/28/2025) for Follow-up Breathing and Mood.      Pranay Charlton MD  [facility]    Review of Systems   Constitutional:  Negative for chills and fever.   HENT:  Positive for congestion, postnasal drip, rhinorrhea, sinus pressure and sore throat.    Respiratory:  Positive for cough. Negative for shortness of breath.    Allergic/Immunologic: Negative for immunocompromised state.   Psychiatric/Behavioral:  Positive for mood changes (Mood is down.... doesn't want to leave the house...).          Subjective   Cielo is a 54 year old, presenting for the following health " "issues:  Recheck Medication        1/29/2025    10:06 AM   Additional Questions   Roomed by Elvis MARTINEZ LPN     History of Present Illness       Reason for visit:  Regarding effects of new inhaler   She is taking medications regularly.                     Objective    /86   Pulse 90   Temp 98.2  F (36.8  C)   Resp 16   Ht 1.626 m (5' 4\")   Wt 84.6 kg (186 lb 6.4 oz)   LMP 09/20/2015 (Approximate)   SpO2 97%   Breastfeeding No   BMI 32.00 kg/m    Body mass index is 32 kg/m .  Physical Exam  Constitutional:       Appearance: Normal appearance.   HENT:      Nose: Congestion and rhinorrhea present.      Mouth/Throat:      Mouth: Mucous membranes are moist.      Pharynx: Oropharynx is clear. No oropharyngeal exudate or posterior oropharyngeal erythema.   Eyes:      General: No scleral icterus.     Conjunctiva/sclera: Conjunctivae normal.   Cardiovascular:      Rate and Rhythm: Normal rate and regular rhythm.      Pulses: Normal pulses.   Pulmonary:      Effort: Pulmonary effort is normal.   Skin:     Findings: No rash.   Neurological:      Mental Status: She is alert. Mental status is at baseline.                    Signed Electronically by: Pranay Charlton MD    "

## 2025-01-29 NOTE — PATIENT INSTRUCTIONS
Sound like an intolerance to the Umeclidinium... -- LAMA.     Change back to Advair -- HFA rx sent to pharmacy.         - Start Wellbutrin 75 mg twice daily - for mood.       - Start some regular walking / exercise - Yoga... etc.       Return in approximately 4 week(s), or sooner as needed for follow-up with Dr. Charlton.    -- Follow-up Breathing and Mood     Clinic : [phone]  Appointment line: [phone]

## 2025-02-19 DIAGNOSIS — R11.0 NAUSEA: ICD-10-CM

## 2025-02-24 RX ORDER — ONDANSETRON 4 MG/1
4 TABLET, FILM COATED ORAL EVERY 8 HOURS PRN
Qty: 30 TABLET | Refills: 1 | Status: SHIPPED | OUTPATIENT
Start: 2025-02-24

## 2025-02-24 NOTE — TELEPHONE ENCOUNTER
[facility] sent Rx request for the following:      Requested Prescriptions   Pending Prescriptions Disp Refills    ondansetron (ZOFRAN) 4 MG tablet [Pharmacy Med Name: ondansetron HCl 4 mg tablet] 30 tablet 1     Sig: TAKE 1 TABLET BY MOUTH EVERY 8 HOURS AS NEEDED FOR NAUSEA        Last Prescription Date:   11/15/2024  Last Fill Qty/Refills:         30, R-1    Last Office Visit:              12/20/24 (nausea not discussed)  Future Office visit:           2/28/2025    Next 5 appointments (look out 90 days)      Feb 28, 2025 1:20 PM  (Arrive by 1:05 PM)  Provider Visit with Pranay Charlton MD  [facility] [address]  [place] [address]  [phone]     Mar 19, 2025 10:40 AM  (Arrive by 10:25 AM)  Provider Visit with Pranay Charlton MD  [facility] [address]   [place] [address]  [phone]     Mar 26, 2025 8:00 AM  (Arrive by 7:45 AM)  Provider Visit with Pranay Charlton MD  [facility] ([facility] [address]  [place] [address]  [phone]   Ke Hunter RN on 2/24/2025 at 2:52 PM

## 2025-02-27 ASSESSMENT — ANXIETY QUESTIONNAIRES
GAD7 TOTAL SCORE: 10
GAD7 TOTAL SCORE: 10
1. FEELING NERVOUS, ANXIOUS, OR ON EDGE: MORE THAN HALF THE DAYS
7. FEELING AFRAID AS IF SOMETHING AWFUL MIGHT HAPPEN: SEVERAL DAYS
4. TROUBLE RELAXING: MORE THAN HALF THE DAYS
2. NOT BEING ABLE TO STOP OR CONTROL WORRYING: SEVERAL DAYS
5. BEING SO RESTLESS THAT IT IS HARD TO SIT STILL: NOT AT ALL
7. FEELING AFRAID AS IF SOMETHING AWFUL MIGHT HAPPEN: SEVERAL DAYS
3. WORRYING TOO MUCH ABOUT DIFFERENT THINGS: NEARLY EVERY DAY
8. IF YOU CHECKED OFF ANY PROBLEMS, HOW DIFFICULT HAVE THESE MADE IT FOR YOU TO DO YOUR WORK, TAKE CARE OF THINGS AT HOME, OR GET ALONG WITH OTHER PEOPLE?: SOMEWHAT DIFFICULT
GAD7 TOTAL SCORE: 10
IF YOU CHECKED OFF ANY PROBLEMS ON THIS QUESTIONNAIRE, HOW DIFFICULT HAVE THESE PROBLEMS MADE IT FOR YOU TO DO YOUR WORK, TAKE CARE OF THINGS AT HOME, OR GET ALONG WITH OTHER PEOPLE: SOMEWHAT DIFFICULT
6. BECOMING EASILY ANNOYED OR IRRITABLE: SEVERAL DAYS

## 2025-02-27 ASSESSMENT — PATIENT HEALTH QUESTIONNAIRE - PHQ9
10. IF YOU CHECKED OFF ANY PROBLEMS, HOW DIFFICULT HAVE THESE PROBLEMS MADE IT FOR YOU TO DO YOUR WORK, TAKE CARE OF THINGS AT HOME, OR GET ALONG WITH OTHER PEOPLE: SOMEWHAT DIFFICULT
SUM OF ALL RESPONSES TO PHQ QUESTIONS 1-9: 13
SUM OF ALL RESPONSES TO PHQ QUESTIONS 1-9: 13

## 2025-02-28 ENCOUNTER — OFFICE VISIT (OUTPATIENT)
Dept: INTERNAL MEDICINE | Facility: OTHER | Age: 55
End: 2025-02-28
Attending: INTERNAL MEDICINE
Payer: COMMERCIAL

## 2025-02-28 VITALS
SYSTOLIC BLOOD PRESSURE: 118 MMHG | DIASTOLIC BLOOD PRESSURE: 81 MMHG | RESPIRATION RATE: 12 BRPM | TEMPERATURE: 97.9 F | BODY MASS INDEX: 31.99 KG/M2 | HEIGHT: 64 IN | WEIGHT: 187.4 LBS | OXYGEN SATURATION: 95 % | HEART RATE: 86 BPM

## 2025-02-28 DIAGNOSIS — Z80.0 FAMILY HISTORY OF COLON CANCER: ICD-10-CM

## 2025-02-28 DIAGNOSIS — M79.675 PAIN OF LEFT GREAT TOE: ICD-10-CM

## 2025-02-28 DIAGNOSIS — F33.8 SEASONAL AFFECTIVE DISORDER: Primary | ICD-10-CM

## 2025-02-28 PROCEDURE — 1126F AMNT PAIN NOTED NONE PRSNT: CPT | Performed by: INTERNAL MEDICINE

## 2025-02-28 PROCEDURE — 3074F SYST BP LT 130 MM HG: CPT | Performed by: INTERNAL MEDICINE

## 2025-02-28 PROCEDURE — 99214 OFFICE O/P EST MOD 30 MIN: CPT | Performed by: INTERNAL MEDICINE

## 2025-02-28 PROCEDURE — 3079F DIAST BP 80-89 MM HG: CPT | Performed by: INTERNAL MEDICINE

## 2025-02-28 PROCEDURE — G2211 COMPLEX E/M VISIT ADD ON: HCPCS | Performed by: INTERNAL MEDICINE

## 2025-02-28 RX ORDER — BUPROPION HYDROCHLORIDE 75 MG/1
75-110 TABLET ORAL 2 TIMES DAILY
Qty: 270 TABLET | Refills: 4 | Status: SHIPPED | OUTPATIENT
Start: 2025-02-28

## 2025-02-28 ASSESSMENT — ENCOUNTER SYMPTOMS
MYALGIAS: 0
COUGH: 0
DIARRHEA: 0
HEMATURIA: 0
CONFUSION: 0
DYSURIA: 0
SHORTNESS OF BREATH: 0
SLEEP DISTURBANCE: 1
CHILLS: 0
LIGHT-HEADEDNESS: 0
DIZZINESS: 0
ARTHRALGIAS: 0
BRUISES/BLEEDS EASILY: 0
AGITATION: 0
WHEEZING: 0
FEVER: 0
VOMITING: 0
WOUND: 0
NAUSEA: 0
ABDOMINAL PAIN: 0

## 2025-02-28 ASSESSMENT — PAIN SCALES - GENERAL: PAINLEVEL_OUTOF10: NO PAIN (0)

## 2025-02-28 NOTE — PROGRESS NOTES
Assessment & Plan     ICD-10-CM    1. Seasonal affective disorder  F33.8 buPROPion (WELLBUTRIN) 75 MG tablet      2. Pain of left great toe  M79.675       3. Family history of colon cancer  Z80.0          Patient presents for follow-up multiple issues.    States that she gets some pain down into her left great toe intermittently.  Same distribution that she had prior to having issues with her back or back related injury.  Has been having intermittent pain for a number of years.  At this time suspect that this is probably still related to back/lumbar spine issues and there is likely not any process that could help this pain associated with her foot/toe.  States that she does rub the foot and toe at times.  Does not seem to cause any worsening of pain.  The skin itself is not sensitive or hypersensitive.  Fairly random short-lived pain episodes.    Encouraged regular core strengthening, yoga, summer chi versus other weightbearing exercise.    Family history of colon cancer, encouraged regular colonoscopy screenings.    Seasonal affective disorder, mood is doing tremendously better with Wellbutrin/bupropion.  Her attitude and spirits have all improved.  Unfortunately she is getting a bit of insomnia from the medication if taken too late in the evening.  Advised that she try moving the evening dose earlier in the day possibly near noon, even later morning if necessary.  She does feel that slight dose increase might be beneficial.  We will continue with the 75 mg twice daily tablet for now.  Dose increase as tolerated.  Advised to limit dose increased to 1 time every 7 days, monitor for change in symptoms or improvement in symptoms for approximately 7 days after dosing increase or decrease.    States that her fatigue and malaise have substantially improved.    The longitudinal plan of care for the diagnosis(es)/condition(s) as documented were addressed during this visit. Due to the added complexity in care, I will continue  "to support Cielo in the subsequent management and with ongoing continuity of care.            BMI  Estimated body mass index is 32.17 kg/m  as calculated from the following:    Height as of this encounter: 1.626 m (5' 4\").    Weight as of this encounter: 85 kg (187 lb 6.4 oz).     Depression Screening Follow Up        2/27/2025     2:47 PM   PHQ   PHQ-9 Total Score 13    Q9: Thoughts of better off dead/self-harm past 2 weeks Not at all       Patient-reported           Follow Up Actions Taken  Patient counseled, no additional follow up at this time.         Return for follow-up as needed with Dr. Charlton., Appointments: [phone].      Pranay Charlton MD  [facility]    Review of Systems   Constitutional:  Negative for chills and fever.   HENT:  Negative for congestion and hearing loss.    Eyes:  Negative for visual disturbance.   Respiratory:  Negative for cough, shortness of breath and wheezing.    Cardiovascular:  Negative for chest pain.   Gastrointestinal:  Negative for abdominal pain, diarrhea, nausea and vomiting.   Endocrine: Negative for cold intolerance and heat intolerance.   Genitourinary:  Negative for dysuria and hematuria.   Musculoskeletal:  Negative for arthralgias and myalgias.   Skin:  Negative for rash and wound.   Allergic/Immunologic: Negative for immunocompromised state.   Neurological:  Negative for dizziness and light-headedness.   Hematological:  Does not bruise/bleed easily.   Psychiatric/Behavioral:  Positive for mood changes (Doing much better with Wellbutrin) and sleep disturbance (Some insomnia since starting Wellbutrin). Negative for agitation and confusion.          Subjective   Cielo is a 54 year old, presenting for the following health issues:  Recheck Medication (Wellbutrion)        2/28/2025     1:02 PM   Additional Questions   Roomed by Renee DÍAZ   Accompanied by self     History of Present Illness       Mental Health Follow-up:  Patient presents to " "follow-up on Depression & Anxiety.Patient's depression since last visit has been:  Medium  The patient is not having other symptoms associated with depression.  Patient's anxiety since last visit has been:  Medium  The patient is not having other symptoms associated with anxiety.  Any significant life events: No  Patient is not feeling anxious or having panic attacks.  Patient has no concerns about alcohol or drug use.    She eats 0-1 servings of fruits and vegetables daily.She consumes 0 sweetened beverage(s) daily.She exercises with enough effort to increase her heart rate 9 or less minutes per day.  She exercises with enough effort to increase her heart rate 3 or less days per week.   She is taking medications regularly.                      Objective    /81 (BP Location: Right arm, Patient Position: Sitting, Cuff Size: Adult Regular)   Pulse 86   Temp 97.9  F (36.6  C) (Temporal)   Resp 12   Ht 1.626 m (5' 4\")   Wt 85 kg (187 lb 6.4 oz)   LMP 09/20/2015 (Approximate)   SpO2 95%   Breastfeeding No   BMI 32.17 kg/m    Body mass index is 32.17 kg/m .  Physical Exam  Constitutional:       Appearance: Normal appearance.   Eyes:      General: No scleral icterus.     Conjunctiva/sclera: Conjunctivae normal.   Neurological:      Mental Status: She is alert. Mental status is at baseline.   Psychiatric:         Mood and Affect: Mood normal.         Behavior: Behavior normal.                    Signed Electronically by: Pranay Charlton MD    "

## 2025-02-28 NOTE — PATIENT INSTRUCTIONS
Glad the wellbutrin is working... okay to adjust dose as tolerated.     -->   If dose change -- wait about 1 week, to see how that is going.     Try to move the Evening dose, more towards Noon.,... or earlier morning. To help with sleeping at night.           Start some free Yoga videos on YouTube....  Core strengthening... can help with some of the nerve pain and low back pain into the legs.         Return as needed for follow-up for new / worsening symptoms.    Clinic : [phone]  Appointment line: [phone]

## 2025-02-28 NOTE — NURSING NOTE
"Chief Complaint   Patient presents with    Recheck Medication       Initial /81 (BP Location: Right arm, Patient Position: Sitting, Cuff Size: Adult Regular)   Pulse 86   Temp 97.9  F (36.6  C) (Temporal)   Resp 12   Ht 1.626 m (5' 4\")   Wt 85 kg (187 lb 6.4 oz)   LMP 09/20/2015 (Approximate)   SpO2 95%   Breastfeeding No   BMI 32.17 kg/m   Estimated body mass index is 32.17 kg/m  as calculated from the following:    Height as of this encounter: 1.626 m (5' 4\").    Weight as of this encounter: 85 kg (187 lb 6.4 oz).  Medication Review: complete    The next two questions are to help us understand your food security.  If you are feeling you need any assistance in this area, we have resources available to support you today.          8/4/2024   SDOH- Food Insecurity   Within the past 12 months, did you worry that your food would run out before you got money to buy more? N   Within the past 12 months, did the food you bought just not last and you didn t have money to get more? N         Health Care Directive:  Patient does not have a Health Care Directive: Patient states has Advance Directive and will bring in a copy to clinic.    Renee Carpio      " independent

## 2025-03-23 ASSESSMENT — PATIENT HEALTH QUESTIONNAIRE - PHQ9
7. TROUBLE CONCENTRATING ON THINGS, SUCH AS READING THE NEWSPAPER OR WATCHING TELEVISION: SEVERAL DAYS
SUM OF ALL RESPONSES TO PHQ QUESTIONS 1-9: 12
1. LITTLE INTEREST OR PLEASURE IN DOING THINGS: SEVERAL DAYS
9. THOUGHTS THAT YOU WOULD BE BETTER OFF DEAD, OR OF HURTING YOURSELF: NOT AT ALL
3. TROUBLE FALLING OR STAYING ASLEEP OR SLEEPING TOO MUCH: NEARLY EVERY DAY
5. POOR APPETITE OR OVEREATING: MORE THAN HALF THE DAYS
SUM OF ALL RESPONSES TO PHQ QUESTIONS 1-9: 12
10. IF YOU CHECKED OFF ANY PROBLEMS, HOW DIFFICULT HAVE THESE PROBLEMS MADE IT FOR YOU TO DO YOUR WORK, TAKE CARE OF THINGS AT HOME, OR GET ALONG WITH OTHER PEOPLE: SOMEWHAT DIFFICULT
SUM OF ALL RESPONSES TO PHQ QUESTIONS 1-9: 12
10. IF YOU CHECKED OFF ANY PROBLEMS, HOW DIFFICULT HAVE THESE PROBLEMS MADE IT FOR YOU TO DO YOUR WORK, TAKE CARE OF THINGS AT HOME, OR GET ALONG WITH OTHER PEOPLE: SOMEWHAT DIFFICULT
4. FEELING TIRED OR HAVING LITTLE ENERGY: MORE THAN HALF THE DAYS
2. FEELING DOWN, DEPRESSED, IRRITABLE, OR HOPELESS: SEVERAL DAYS

## 2025-03-23 ASSESSMENT — ANXIETY QUESTIONNAIRES
IF YOU CHECKED OFF ANY PROBLEMS ON THIS QUESTIONNAIRE, HOW DIFFICULT HAVE THESE PROBLEMS MADE IT FOR YOU TO DO YOUR WORK, TAKE CARE OF THINGS AT HOME, OR GET ALONG WITH OTHER PEOPLE: SOMEWHAT DIFFICULT
1. FEELING NERVOUS, ANXIOUS, OR ON EDGE: SEVERAL DAYS
7. FEELING AFRAID AS IF SOMETHING AWFUL MIGHT HAPPEN: MORE THAN HALF THE DAYS
GAD7 TOTAL SCORE: 7
GAD7 TOTAL SCORE: 7
6. BECOMING EASILY ANNOYED OR IRRITABLE: SEVERAL DAYS
5. BEING SO RESTLESS THAT IT IS HARD TO SIT STILL: NOT AT ALL
7. FEELING AFRAID AS IF SOMETHING AWFUL MIGHT HAPPEN: MORE THAN HALF THE DAYS
8. IF YOU CHECKED OFF ANY PROBLEMS, HOW DIFFICULT HAVE THESE MADE IT FOR YOU TO DO YOUR WORK, TAKE CARE OF THINGS AT HOME, OR GET ALONG WITH OTHER PEOPLE?: SOMEWHAT DIFFICULT
3. WORRYING TOO MUCH ABOUT DIFFERENT THINGS: SEVERAL DAYS
GAD7 TOTAL SCORE: 7
2. NOT BEING ABLE TO STOP OR CONTROL WORRYING: SEVERAL DAYS
4. TROUBLE RELAXING: SEVERAL DAYS

## 2025-03-26 ENCOUNTER — OFFICE VISIT (OUTPATIENT)
Dept: INTERNAL MEDICINE | Facility: OTHER | Age: 55
End: 2025-03-26
Attending: INTERNAL MEDICINE
Payer: COMMERCIAL

## 2025-03-26 VITALS
TEMPERATURE: 98.2 F | RESPIRATION RATE: 16 BRPM | OXYGEN SATURATION: 98 % | DIASTOLIC BLOOD PRESSURE: 78 MMHG | HEART RATE: 90 BPM | WEIGHT: 185 LBS | SYSTOLIC BLOOD PRESSURE: 112 MMHG | HEIGHT: 64 IN | BODY MASS INDEX: 31.58 KG/M2

## 2025-03-26 DIAGNOSIS — M75.21 BICEPS TENDONITIS, RIGHT: ICD-10-CM

## 2025-03-26 DIAGNOSIS — F41.1 GAD (GENERALIZED ANXIETY DISORDER): ICD-10-CM

## 2025-03-26 DIAGNOSIS — E66.09 CLASS 1 OBESITY DUE TO EXCESS CALORIES WITH SERIOUS COMORBIDITY AND BODY MASS INDEX (BMI) OF 31.0 TO 31.9 IN ADULT: ICD-10-CM

## 2025-03-26 DIAGNOSIS — E66.811 CLASS 1 OBESITY DUE TO EXCESS CALORIES WITH SERIOUS COMORBIDITY AND BODY MASS INDEX (BMI) OF 31.0 TO 31.9 IN ADULT: ICD-10-CM

## 2025-03-26 DIAGNOSIS — F33.8 SEASONAL AFFECTIVE DISORDER: Primary | ICD-10-CM

## 2025-03-26 DIAGNOSIS — M75.51 SUBACROMIAL BURSITIS OF RIGHT SHOULDER JOINT: ICD-10-CM

## 2025-03-26 PROBLEM — M25.511 CHRONIC RIGHT SHOULDER PAIN: Status: ACTIVE | Noted: 2025-03-26

## 2025-03-26 PROBLEM — G89.29 CHRONIC RIGHT SHOULDER PAIN: Status: ACTIVE | Noted: 2025-03-26

## 2025-03-26 RX ORDER — PHENTERMINE HYDROCHLORIDE 37.5 MG/1
37.5 TABLET ORAL
Qty: 30 TABLET | Refills: 5 | Status: SHIPPED | OUTPATIENT
Start: 2025-03-26

## 2025-03-26 ASSESSMENT — ENCOUNTER SYMPTOMS
SLEEP DISTURBANCE: 0
NERVOUS/ANXIOUS: 1

## 2025-03-26 ASSESSMENT — PAIN SCALES - GENERAL: PAINLEVEL_OUTOF10: MILD PAIN (3)

## 2025-03-26 NOTE — PROGRESS NOTES
Assessment & Plan     ICD-10-CM    1. Seasonal affective disorder  F33.8       2. ULICES (generalized anxiety disorder)  F41.1       3. Class 1 obesity due to excess calories with serious comorbidity and body mass index (BMI) of 31.0 to 31.9 in adult  E66.811 phentermine (ADIPEX-P) 37.5 MG tablet    E66.09     Z68.31       4. Subacromial bursitis of right shoulder joint  M75.51       5. Biceps tendonitis, right  M75.21          Patient presents for follow-up multiple issues.    Anxiety and seasonal affective disorder symptoms have improved significantly.  Currently taking Wellbutrin 150 mg in the morning.  She was having significant difficulty with any type of Wellbutrin dosing later in the evening or even at midday.    She plans to increase the dose up to 175 mg in the morning and approximately 1 week.  Was having some difficulty with sleeping even at the 150 mg dosing in the morning but this is improving.  Mental health and emotional health are significantly improved.  She was feeling quite trapped like she could not leave the house but is now doing much better.    Obesity with BMI of 31.7.  Weight is down 14 pounds since end of November.  She is congratulated on this.  Doing well with phentermine.  No side effects reported.  Fatigue and malaise symptoms also seem to have been improved.  Continue current dosing.    Right shoulder pain.  States this has been bothering her for a number of months.  Certain movements especially right shoulder flexion to about shoulder level are just before shoulder height, primarily anteriorly.  Does not seem to have the same pain or discomfort with more lateralization of her right shoulder.  Certain activities worsen the pain such as vacuuming.  Palpation of the coracoid process is tender.  Does have some discomfort in the subacromial area as well.    We reviewed numerous pulm exercises and range of motion processes that should help with her shoulder.  She plans to start some home  "physical therapy based on these exercises and stretches and range of motion processes.  Home care instructions provided.  Formal physical therapy consultation can be requested if desired.  Otherwise start warm or cold packs, gentle range of motion exercises, ibuprofen and Tylenol combination if needed.  Avoid excessive use, Try to limit painful activities.    The longitudinal plan of care for the diagnosis(es)/condition(s) as documented were addressed during this visit. Due to the added complexity in care, I will continue to support Cielo in the subsequent management and with ongoing continuity of care.            BMI  Estimated body mass index is 31.76 kg/m  as calculated from the following:    Height as of this encounter: 1.626 m (5' 4\").    Weight as of this encounter: 83.9 kg (185 lb).   Wt Readings from Last 5 Encounters:   03/26/25 83.9 kg (185 lb)   02/28/25 85 kg (187 lb 6.4 oz)   01/29/25 84.6 kg (186 lb 6.4 oz)   12/20/24 84.1 kg (185 lb 6.4 oz)   12/10/24 84.3 kg (185 lb 12.8 oz)        --> Weight is down about 14 pounds from Nov 2024.           Depression Screening Follow Up        3/23/2025     8:14 AM   PHQ   PHQ-9 Total Score 12    Q9: Thoughts of better off dead/self-harm past 2 weeks Not at all       Patient-reported           Follow Up Actions Taken  Patient counseled, no additional follow up at this time.         Return for follow-up as needed with Dr. Charlton., Appointments: [phone].      Pranay Charlton MD  [facility]    Review of Systems   Musculoskeletal:         Right shoulder pain with certain movements   Psychiatric/Behavioral:  Negative for sleep disturbance (Improving with change of day dosing of Wellbutrin). The patient is nervous/anxious.          Zheng Buck is a 54 year old, presenting for the following health issues:  Depression, Anxiety, and Musculoskeletal Problem (Right shoulder pain)        3/26/2025     7:48 AM   Additional Questions   Roomed " "by MARIN Rosenthal   Accompanied by Self         3/26/2025     7:48 AM   Patient Reported Additional Medications   Patient reports taking the following new medications N/A     History of Present Illness       Reason for visit:  Right shoulder pain for a few months.  Symptom onset:  More than a month  Symptoms include:  Pain in shoulder when moving arm side to side or pushing up.  Symptom intensity:  Moderate  Symptom progression:  Staying the same  Had these symptoms before:  No  What makes it worse:  Use of arm.  What makes it better:  Not usung arm as much. Rest from using arm.   She is taking medications regularly.                      Objective    /78   Pulse 90   Temp 98.2  F (36.8  C) (Temporal)   Resp 16   Ht 1.626 m (5' 4\")   Wt 83.9 kg (185 lb)   LMP 09/20/2015 (Approximate)   SpO2 98%   BMI 31.76 kg/m    Body mass index is 31.76 kg/m .  Physical Exam  Constitutional:       Appearance: Normal appearance.   Eyes:      General: No scleral icterus.     Conjunctiva/sclera: Conjunctivae normal.   Cardiovascular:      Rate and Rhythm: Normal rate and regular rhythm.      Pulses: Normal pulses.   Pulmonary:      Effort: Pulmonary effort is normal.   Musculoskeletal:         General: Tenderness (Right shoulder - subacromial bursitis and coracoid process tenderness to palpation) and signs of injury present. No deformity.   Skin:     General: Skin is warm.   Neurological:      Mental Status: She is alert. Mental status is at baseline.   Psychiatric:         Mood and Affect: Mood normal.                    Signed Electronically by: Pranay Charlton MD    "

## 2025-03-26 NOTE — NURSING NOTE
"Chief Complaint   Patient presents with    Depression    Anxiety    Musculoskeletal Problem     Right shoulder pain       Initial /78   Pulse 90   Temp 98.2  F (36.8  C) (Temporal)   Resp 16   LMP 09/20/2015 (Approximate)   SpO2 98%  Estimated body mass index is 32.17 kg/m  as calculated from the following:    Height as of 2/28/25: 1.626 m (5' 4\").    Weight as of 2/28/25: 85 kg (187 lb 6.4 oz).  Medication Reconciliation: complete          "

## 2025-05-04 DIAGNOSIS — R11.0 NAUSEA: ICD-10-CM

## 2025-05-05 ENCOUNTER — DOCUMENTATION ONLY (OUTPATIENT)
Dept: INTERNAL MEDICINE | Facility: OTHER | Age: 55
End: 2025-05-05
Payer: COMMERCIAL

## 2025-05-05 DIAGNOSIS — N18.2 CKD (CHRONIC KIDNEY DISEASE) STAGE 2, GFR 60-89 ML/MIN: Primary | ICD-10-CM

## 2025-05-05 DIAGNOSIS — E78.2 MIXED HYPERLIPIDEMIA: ICD-10-CM

## 2025-05-05 NOTE — PROGRESS NOTES
Cielo Bahena has an upcoming lab appointment and there are no orders available. Please review and place future orders, as appropriate.    Gi Sibley

## 2025-05-07 RX ORDER — ONDANSETRON 4 MG/1
4 TABLET, FILM COATED ORAL EVERY 8 HOURS PRN
Qty: 30 TABLET | Refills: 1 | Status: SHIPPED | OUTPATIENT
Start: 2025-05-07

## 2025-05-07 NOTE — TELEPHONE ENCOUNTER
[facility] sent Rx request for the following:      Requested Prescriptions   Pending Prescriptions Disp Refills    ondansetron (ZOFRAN) 4 MG tablet [Pharmacy Med Name: ondansetron HCl 4 mg tablet] 30 tablet 1     Sig: TAKE 1 TABLET BY MOUTH EVERY 8 HOURS AS NEEDED FOR NAUSEA          Last Prescription Date:   2/24/25  Last Fill Qty/Refills:         30, R-1    Last Office Visit:              1/29/25 (Nausea not discussed)   Future Office visit:           8/8/25 - Annual Physical  Next 5 appointments (look out 90 days)      May 28, 2025 9:00 AM  (Arrive by 8:45 AM)  Provider Visit with GARRETT Sevilla [facility] ([facility]) [address]  [place] [id number] [phone]           Unable to complete prescription refill per RN Medication Refill Policy.   Routing to provider for refill consideration  Alyce Simental RN on 5/7/2025 at 10:42 AM

## 2025-05-12 ENCOUNTER — LAB (OUTPATIENT)
Dept: LAB | Facility: OTHER | Age: 55
End: 2025-05-12
Attending: NURSE PRACTITIONER
Payer: COMMERCIAL

## 2025-05-12 DIAGNOSIS — N18.2 CKD (CHRONIC KIDNEY DISEASE) STAGE 2, GFR 60-89 ML/MIN: ICD-10-CM

## 2025-05-12 DIAGNOSIS — N95.9 UNSPECIFIED MENOPAUSAL AND PERIMENOPAUSAL DISORDER: Primary | ICD-10-CM

## 2025-05-12 DIAGNOSIS — E03.9 HYPOTHYROIDISM, UNSPECIFIED TYPE: ICD-10-CM

## 2025-05-12 DIAGNOSIS — R79.0 ABNORMAL LEVEL OF BLOOD MINERAL: ICD-10-CM

## 2025-05-12 DIAGNOSIS — E27.9 DISORDER OF ADRENAL GLAND, UNSPECIFIED: ICD-10-CM

## 2025-05-12 DIAGNOSIS — E78.2 MIXED HYPERLIPIDEMIA: ICD-10-CM

## 2025-05-12 DIAGNOSIS — E55.9 VITAMIN D DEFICIENCY, UNSPECIFIED: ICD-10-CM

## 2025-05-12 LAB
CORTIS SERPL-MCNC: 10.1 UG/DL
ESTRADIOL SERPL-MCNC: 13 PG/ML
FERRITIN SERPL-MCNC: 98 NG/ML (ref 11–328)
IRON BINDING CAPACITY (ROCHE): 298 UG/DL (ref 240–430)
IRON SATN MFR SERPL: 31 % (ref 15–46)
IRON SERPL-MCNC: 92 UG/DL (ref 37–145)
PROGEST SERPL-MCNC: 1 NG/ML
SHBG SERPL-SCNC: 32 NMOL/L (ref 30–135)
T3FREE SERPL-MCNC: 3.3 PG/ML (ref 2–4.4)
T4 FREE SERPL-MCNC: 1.02 NG/DL (ref 0.9–1.7)
TSH SERPL DL<=0.005 MIU/L-ACNC: 1.17 UIU/ML (ref 0.3–4.2)
VIT D+METAB SERPL-MCNC: 80 NG/ML (ref 20–50)

## 2025-05-12 PROCEDURE — 82627 DEHYDROEPIANDROSTERONE: CPT | Mod: ZL

## 2025-05-12 PROCEDURE — 36415 COLL VENOUS BLD VENIPUNCTURE: CPT | Mod: ZL

## 2025-05-12 PROCEDURE — 84443 ASSAY THYROID STIM HORMONE: CPT | Mod: ZL

## 2025-05-12 PROCEDURE — 84481 FREE ASSAY (FT-3): CPT | Mod: ZL

## 2025-05-12 PROCEDURE — 82670 ASSAY OF TOTAL ESTRADIOL: CPT | Mod: ZL

## 2025-05-12 PROCEDURE — 84270 ASSAY OF SEX HORMONE GLOBUL: CPT | Mod: ZL

## 2025-05-12 PROCEDURE — 82533 TOTAL CORTISOL: CPT | Mod: ZL

## 2025-05-12 PROCEDURE — 84403 ASSAY OF TOTAL TESTOSTERONE: CPT | Mod: ZL

## 2025-05-12 PROCEDURE — 84144 ASSAY OF PROGESTERONE: CPT | Mod: ZL

## 2025-05-12 PROCEDURE — 84439 ASSAY OF FREE THYROXINE: CPT | Mod: ZL

## 2025-05-12 PROCEDURE — 82306 VITAMIN D 25 HYDROXY: CPT | Mod: ZL

## 2025-05-12 PROCEDURE — 82728 ASSAY OF FERRITIN: CPT | Mod: ZL

## 2025-05-12 PROCEDURE — 83550 IRON BINDING TEST: CPT | Mod: ZL

## 2025-05-13 LAB — DHEA-S SERPL-MCNC: 183 UG/DL (ref 35–430)

## 2025-05-15 LAB
TESTOST FREE SERPL-MCNC: 0.36 NG/DL
TESTOST SERPL-MCNC: 20 NG/DL (ref 8–60)

## 2025-06-27 NOTE — PROGRESS NOTES
Assessment & Plan     Great toe pain, left  Differential includes related to known bunion, neuropathy, radiculopathy, underlying arthritis, infection, etc.  Vitals and exam stable.  Reviewed foot x-ray from 9/2023 that showed left bunion as well as some mild arthritis in the first MTP joints, otherwise unremarkable.  Discussed options today including updating imaging versus referral.  Patient wanting to meet with podiatry, referral as below.  - Orthopedic  Referral; Future    Bunion, left  Visible bunion to left foot seen on imaging in 9/2023.  Discussed options including updated imaging versus podiatry referral.  Patient is going to meet with podiatry for further evaluation of this as well as her additional foot concerns.  Referral placed.  - Orthopedic  Referral; Future    Mass of left foot  Chronic, persistent slightly greater than pea-sized lump to mid foot on plantar aspect.  Agree with previous diagnosis of ganglion cyst versus fibroma.  Intermittently bothersome.  Patient would like to discuss this further with podiatry, referral placed.  - Orthopedic  Referral; Future    Subjective   Cielo is a 54 year old, presenting for the following health issues:  Foot Problems (Left foot lump and pain)    History of Present Illness       Reason for visit:  Pain left big toe, heel, and lump on bottom of left foot   She is taking medications regularly.      Here for evaluation of pain in her left great toe that has been on and off for several years.  Patient reports the pain will sometimes wake her up at night.  Symptoms will typically last.  A couple hours and then resolved.  Does not seem to be associated with activity or overuse.  Patient does have known bunion of this foot that has been present for quite some time.  She is unsure if it seems to be changing.  She also has a lump on the plantar aspect of her midfoot that has been present for couple years.  She was evaluated in clinic for  this in 2023 with foot x-ray updated that was largely unrevealing.  Diagnosed with plantar fascia ganglion cyst versus fibroma.  Patient does report some discomfort with this lump.  Wearing supportive shoes.  Not noticing any significant overlying skin changes, numbness, or weakness.  Does have known history of chronic lumbar difficulties for which she had surgery several years ago.      PAST MEDICAL HISTORY:   Past Medical History:   Diagnosis Date    Family history of malignant neoplasm of digestive organ     2015    Mass of breast     ,Soft breast mass lump, right breast    Personal history of other medical treatment (CODE)     2011,, 1 SAB    Personal history of other specified conditions (CODE)     abnormal Pap smear prior to , subsequent yearly Pap smears have been normal    PONV (postoperative nausea and vomiting)     Restless legs syndrome     No Comments Provided    Segmental and somatic dysfunction of pelvic region     2013    Uncomplicated asthma     generally well controlled, history of respiratory arrest requiring brief mechanical ventilation.       PAST SURGICAL HISTORY:   Past Surgical History:   Procedure Laterality Date    BACK SURGERY      Lumbar L4-5    COLONOSCOPY N/A 10/16/2020    normal, f/u 10 years    DILATION AND CURETTAGE      ,Dilatation and curettage for blighted ovum.    ENDOSCOPY UPPER, COLONOSCOPY, COMBINED      Normal exam, 5 year follow up due to     ESOPHAGOSCOPY, GASTROSCOPY, DUODENOSCOPY (EGD), COMBINED N/A 2023    Procedure: Esophagoscopy, gastroscopy, duodenoscopy (EGD), combined;  Surgeon: Julian Cervantes MD;  Location: [facility] OR    EXTRACTION(S) DENTAL      [place] tooth extraction    HYSTERECTOMY VAGINAL N/A 2015    Performed at [facility]. Dr. Lindsay for DUB; negative for malignancy; ovaries remain    HYSTERECTOMY, PAP NO LONGER INDICATED N/A 2015    Cervix removed per [facility] path report.    MAMMOPLASTY REDUCTION       2012, Bilateral breast reduction, [facility]    MAMMOPLASTY REDUCTION          OTHER SURGICAL HISTORY      53685.9,[place] SUBTALAR ATHROEREISIS,Left foot       FAMILY HISTORY:   Family History   Problem Relation Age of Onset    Hypertension Father     Heart Disease Father         CAD with stents    Hyperlipidemia Father     Colon Cancer Maternal Grandmother     Heart Disease Maternal Grandfather         CAD    Emphysema Paternal Grandmother     Heart Disease Paternal Grandfather         CAD    Breast Cancer No family hx of         Cancer-breast       SOCIAL HISTORY:   Social History     Tobacco Use    Smoking status: Former     Current packs/day: 0.00     Average packs/day: 0.5 packs/day for 25.0 years (12.5 ttl pk-yrs)     Types: Cigarettes     Start date: 1980     Quit date: 2005     Years since quittin.6    Smokeless tobacco: Never    Tobacco comments:     on rare occasions   Substance Use Topics    Alcohol use: Not Currently        Allergies   Allergen Reactions    Umeclidinium Other (See Comments)     Sore throat, sinus drainage and pressure, cough, rhinitis     Current Outpatient Medications   Medication Sig Dispense Refill    famotidine (PEPCID) 40 MG tablet Take 1 tablet (40 mg) by mouth 2 times daily as needed for heartburn 180 tablet 4    fluticasone-salmeterol (ADVAIR HFA) 115-21 MCG/ACT inhaler Inhale 2 puffs into the lungs 2 times daily. - Rinse mouth well after use to prevent Thrush    --- STOP Anoro Ellipta 36 g 4    LORazepam (ATIVAN) 0.5 MG tablet TAKE 1 TABLET BY MOUTH EVERY 8 HOURS AS NEEDED FOR ANXIETY 30 tablet 5    losartan (COZAAR) 50 MG tablet Take 1 tablet (50 mg) by mouth daily 90 tablet 4    montelukast (SINGULAIR) 10 MG tablet Take 1 tablet (10 mg) by mouth at bedtime 90 tablet 4    ondansetron (ZOFRAN ODT) 4 MG ODT tab Take 1 tablet (4 mg) by mouth every 8 hours as needed for nausea. 5 tablet 0    ondansetron (ZOFRAN) 4 MG tablet TAKE 1 TABLET BY MOUTH EVERY 8 HOURS AS  NEEDED FOR NAUSEA 30 tablet 1    phentermine (ADIPEX-P) 37.5 MG tablet Take 1 tablet (37.5 mg) by mouth every morning (before breakfast). 30 tablet 5    progesterone (PROMETRIUM) 100 MG capsule Take 100 mg by mouth At Bedtime      rosuvastatin (CRESTOR) 5 MG tablet Take 1 tablet (5 mg) by mouth daily 90 tablet 4    VENTOLIN  (90 Base) MCG/ACT inhaler Inhale 2 puffs into the lungs 4 times daily 54 g 11    amoxicillin (AMOXIL) 500 MG capsule Take 1 capsule (500 mg) by mouth 3 times daily. (Patient not taking: Reported on 6/30/2025) 21 capsule 0    buPROPion (WELLBUTRIN) 100 MG tablet Take 0.5-1 tablets ( mg) by mouth 2 times daily. + Use 75 mg tablet daily -- use lowest effective dose daily for mood -- (Patient not taking: Reported on 6/30/2025) 180 tablet 4    buPROPion (WELLBUTRIN) 75 MG tablet Take 0.5-1 tablets (37.5-75 mg) by mouth 2 times daily. + Use 100 mg tablet -- use lowest effective dose daily for mood -- (Patient not taking: Reported on 6/30/2025) 180 tablet 4     Current Facility-Administered Medications   Medication Dose Route Frequency Provider Last Rate Last Admin    silver sulfADIAZINE (SILVADENE) 1 % cream   Topical Daily Carter Navarro MD   Given at 10/04/21 1630           Objective    /80   Pulse 86   Temp 98.3  F (36.8  C) (Tympanic)   Resp 18   LMP 09/20/2015 (Approximate)   SpO2 97%   There is no height or weight on file to calculate BMI.  Physical Exam   General: Pleasant, in no apparent distress.  Musculoskeletal: Slight tenderness to palpation throughout distal left first toe.  Significant hallux valgus angulation to left first MTP joint with some overlying erythema without warmth.  No significant limited range of motion of toes, foot, ankle.  Slightly larger than pea-sized soft movable lump to midfoot on plantar aspect without significant tenderness palpation.  Nonantalgic gait in clinic.  Skin: No jaundice, pallor, rashes, or lesions.  Psych: Appropriate mood and  affect.      Signed Electronically by: Corinne Darden PA-C

## 2025-06-30 ENCOUNTER — OFFICE VISIT (OUTPATIENT)
Dept: FAMILY MEDICINE | Facility: OTHER | Age: 55
End: 2025-06-30
Attending: PHYSICIAN ASSISTANT
Payer: COMMERCIAL

## 2025-06-30 VITALS
RESPIRATION RATE: 18 BRPM | HEART RATE: 86 BPM | OXYGEN SATURATION: 97 % | SYSTOLIC BLOOD PRESSURE: 126 MMHG | DIASTOLIC BLOOD PRESSURE: 80 MMHG | TEMPERATURE: 98.3 F

## 2025-06-30 DIAGNOSIS — M21.612 BUNION, LEFT: ICD-10-CM

## 2025-06-30 DIAGNOSIS — M79.675 GREAT TOE PAIN, LEFT: Primary | ICD-10-CM

## 2025-06-30 DIAGNOSIS — R22.42 MASS OF LEFT FOOT: ICD-10-CM

## 2025-06-30 ASSESSMENT — PAIN SCALES - GENERAL: PAINLEVEL_OUTOF10: MILD PAIN (3)

## 2025-06-30 NOTE — NURSING NOTE
"Chief Complaint   Patient presents with    Foot Problems     Left foot lump and pain       Initial /80   Pulse 86   Temp 98.3  F (36.8  C) (Tympanic)   Resp 18   LMP 09/20/2015 (Approximate)   SpO2 97%  Estimated body mass index is 31.76 kg/m  as calculated from the following:    Height as of 3/26/25: 1.626 m (5' 4\").    Weight as of 3/26/25: 83.9 kg (185 lb).  Medication Review: complete    The next two questions are to help us understand your food security.  If you are feeling you need any assistance in this area, we have resources available to support you today.          8/4/2024   SDOH- Food Insecurity   Within the past 12 months, did you worry that your food would run out before you got money to buy more? N   Within the past 12 months, did the food you bought just not last and you didn t have money to get more? N        Data saved with a previous flowsheet row definition         Health Care Directive:  Patient does not have a Health Care Directive: Discussed advance care planning with patient; however, patient declined at this time.    Ruth Ann Sharp LPN      "

## 2025-07-22 ENCOUNTER — TRANSFERRED RECORDS (OUTPATIENT)
Dept: HEALTH INFORMATION MANAGEMENT | Facility: OTHER | Age: 55
End: 2025-07-22
Payer: COMMERCIAL

## 2025-08-04 SDOH — HEALTH STABILITY: PHYSICAL HEALTH: ON AVERAGE, HOW MANY DAYS PER WEEK DO YOU ENGAGE IN MODERATE TO STRENUOUS EXERCISE (LIKE A BRISK WALK)?: 1 DAY

## 2025-08-04 ASSESSMENT — SOCIAL DETERMINANTS OF HEALTH (SDOH): HOW OFTEN DO YOU GET TOGETHER WITH FRIENDS OR RELATIVES?: NEVER

## 2025-08-08 ENCOUNTER — OFFICE VISIT (OUTPATIENT)
Dept: INTERNAL MEDICINE | Facility: OTHER | Age: 55
End: 2025-08-08
Attending: INTERNAL MEDICINE
Payer: COMMERCIAL

## 2025-08-08 VITALS
OXYGEN SATURATION: 96 % | WEIGHT: 192.6 LBS | HEIGHT: 65 IN | TEMPERATURE: 97 F | BODY MASS INDEX: 32.09 KG/M2 | RESPIRATION RATE: 16 BRPM | SYSTOLIC BLOOD PRESSURE: 108 MMHG | HEART RATE: 87 BPM | DIASTOLIC BLOOD PRESSURE: 60 MMHG

## 2025-08-08 DIAGNOSIS — I10 BENIGN ESSENTIAL HYPERTENSION: ICD-10-CM

## 2025-08-08 DIAGNOSIS — J45.30 MILD PERSISTENT ASTHMA WITHOUT COMPLICATION: ICD-10-CM

## 2025-08-08 DIAGNOSIS — F33.8 SEASONAL AFFECTIVE DISORDER: ICD-10-CM

## 2025-08-08 DIAGNOSIS — Z71.85 VACCINE COUNSELING: ICD-10-CM

## 2025-08-08 DIAGNOSIS — M75.21 BICEPS TENDONITIS, RIGHT: ICD-10-CM

## 2025-08-08 DIAGNOSIS — K21.9 GASTROESOPHAGEAL REFLUX DISEASE WITHOUT ESOPHAGITIS: ICD-10-CM

## 2025-08-08 DIAGNOSIS — F51.01 PRIMARY INSOMNIA: ICD-10-CM

## 2025-08-08 DIAGNOSIS — E66.09 CLASS 1 OBESITY DUE TO EXCESS CALORIES WITH SERIOUS COMORBIDITY AND BODY MASS INDEX (BMI) OF 32.0 TO 32.9 IN ADULT: ICD-10-CM

## 2025-08-08 DIAGNOSIS — N95.8 GENITOURINARY SYNDROME OF MENOPAUSE: ICD-10-CM

## 2025-08-08 DIAGNOSIS — E66.811 CLASS 1 OBESITY DUE TO EXCESS CALORIES WITH SERIOUS COMORBIDITY AND BODY MASS INDEX (BMI) OF 32.0 TO 32.9 IN ADULT: ICD-10-CM

## 2025-08-08 DIAGNOSIS — E78.2 MIXED HYPERLIPIDEMIA: ICD-10-CM

## 2025-08-08 DIAGNOSIS — F41.1 GAD (GENERALIZED ANXIETY DISORDER): ICD-10-CM

## 2025-08-08 DIAGNOSIS — Z00.00 ROUTINE GENERAL MEDICAL EXAMINATION AT A HEALTH CARE FACILITY: Primary | ICD-10-CM

## 2025-08-08 DIAGNOSIS — J43.2 CENTRILOBULAR EMPHYSEMA (H): ICD-10-CM

## 2025-08-08 PROBLEM — J45.20 MILD INTERMITTENT ASTHMA WITHOUT COMPLICATION: Status: ACTIVE | Noted: 2025-08-08

## 2025-08-08 LAB
ALBUMIN SERPL BCG-MCNC: 4.3 G/DL (ref 3.5–5.2)
ALP SERPL-CCNC: 97 U/L (ref 40–150)
ALT SERPL W P-5'-P-CCNC: 24 U/L (ref 0–50)
ANION GAP SERPL CALCULATED.3IONS-SCNC: 10 MMOL/L (ref 7–15)
AST SERPL W P-5'-P-CCNC: 22 U/L (ref 0–45)
BASOPHILS # BLD AUTO: 0 10E3/UL (ref 0–0.2)
BASOPHILS NFR BLD AUTO: 1 %
BILIRUB SERPL-MCNC: 0.3 MG/DL
BUN SERPL-MCNC: 16.1 MG/DL (ref 6–20)
CALCIUM SERPL-MCNC: 9.2 MG/DL (ref 8.8–10.4)
CHLORIDE SERPL-SCNC: 105 MMOL/L (ref 98–107)
CHOLEST SERPL-MCNC: 147 MG/DL
CREAT SERPL-MCNC: 1.07 MG/DL (ref 0.51–0.95)
EGFRCR SERPLBLD CKD-EPI 2021: 61 ML/MIN/1.73M2
EOSINOPHIL # BLD AUTO: 0.2 10E3/UL (ref 0–0.7)
EOSINOPHIL NFR BLD AUTO: 4 %
ERYTHROCYTE [DISTWIDTH] IN BLOOD BY AUTOMATED COUNT: 11.8 % (ref 10–15)
FASTING STATUS PATIENT QL REPORTED: ABNORMAL
FASTING STATUS PATIENT QL REPORTED: NORMAL
GLUCOSE SERPL-MCNC: 103 MG/DL (ref 70–99)
HCO3 SERPL-SCNC: 26 MMOL/L (ref 22–29)
HCT VFR BLD AUTO: 42.9 % (ref 35–47)
HDLC SERPL-MCNC: 57 MG/DL
HGB BLD-MCNC: 14.4 G/DL (ref 11.7–15.7)
IMM GRANULOCYTES # BLD: 0 10E3/UL
IMM GRANULOCYTES NFR BLD: 0 %
LDLC SERPL CALC-MCNC: 75 MG/DL
LYMPHOCYTES # BLD AUTO: 1.3 10E3/UL (ref 0.8–5.3)
LYMPHOCYTES NFR BLD AUTO: 20 %
MCH RBC QN AUTO: 29.6 PG (ref 26.5–33)
MCHC RBC AUTO-ENTMCNC: 33.6 G/DL (ref 31.5–36.5)
MCV RBC AUTO: 88 FL (ref 78–100)
MONOCYTES # BLD AUTO: 0.5 10E3/UL (ref 0–1.3)
MONOCYTES NFR BLD AUTO: 8 %
NEUTROPHILS # BLD AUTO: 4.2 10E3/UL (ref 1.6–8.3)
NEUTROPHILS NFR BLD AUTO: 67 %
NONHDLC SERPL-MCNC: 90 MG/DL
NRBC # BLD AUTO: 0 10E3/UL
NRBC BLD AUTO-RTO: 0 /100
PLATELET # BLD AUTO: 232 10E3/UL (ref 150–450)
POTASSIUM SERPL-SCNC: 4.9 MMOL/L (ref 3.4–5.3)
PROT SERPL-MCNC: 7.1 G/DL (ref 6.4–8.3)
RBC # BLD AUTO: 4.86 10E6/UL (ref 3.8–5.2)
SODIUM SERPL-SCNC: 141 MMOL/L (ref 135–145)
TRIGL SERPL-MCNC: 75 MG/DL
WBC # BLD AUTO: 6.3 10E3/UL (ref 4–11)

## 2025-08-08 PROCEDURE — 82465 ASSAY BLD/SERUM CHOLESTEROL: CPT | Mod: ZL | Performed by: INTERNAL MEDICINE

## 2025-08-08 PROCEDURE — 36415 COLL VENOUS BLD VENIPUNCTURE: CPT | Mod: ZL | Performed by: INTERNAL MEDICINE

## 2025-08-08 PROCEDURE — 80053 COMPREHEN METABOLIC PANEL: CPT | Mod: ZL | Performed by: INTERNAL MEDICINE

## 2025-08-08 PROCEDURE — 85041 AUTOMATED RBC COUNT: CPT | Mod: ZL | Performed by: INTERNAL MEDICINE

## 2025-08-08 RX ORDER — FAMOTIDINE 40 MG/1
40 TABLET, FILM COATED ORAL 2 TIMES DAILY PRN
Qty: 180 TABLET | Refills: 4 | Status: SHIPPED | OUTPATIENT
Start: 2025-08-08

## 2025-08-08 RX ORDER — LORAZEPAM 0.5 MG/1
TABLET ORAL
Qty: 30 TABLET | Refills: 5 | Status: CANCELLED | OUTPATIENT
Start: 2025-08-08

## 2025-08-08 RX ORDER — PHENTERMINE AND TOPIRAMATE EXTENDED-RELEASE 11.25; 69 MG/1; MG/1
1 CAPSULE ORAL EVERY MORNING
Qty: 14 CAPSULE | Refills: 0 | Status: SHIPPED | OUTPATIENT
Start: 2025-08-22

## 2025-08-08 RX ORDER — ROSUVASTATIN CALCIUM 5 MG/1
5 TABLET, COATED ORAL DAILY
Qty: 90 TABLET | Refills: 4 | Status: SHIPPED | OUTPATIENT
Start: 2025-08-08

## 2025-08-08 RX ORDER — MONTELUKAST SODIUM 10 MG/1
1 TABLET ORAL AT BEDTIME
Qty: 90 TABLET | Refills: 4 | Status: SHIPPED | OUTPATIENT
Start: 2025-08-08

## 2025-08-08 RX ORDER — PHENTERMINE AND TOPIRAMATE EXTENDED-RELEASE 3.75; 23 MG/1; MG/1
1 CAPSULE ORAL EVERY MORNING
Qty: 14 CAPSULE | Refills: 0 | Status: SHIPPED | OUTPATIENT
Start: 2025-08-08

## 2025-08-08 RX ORDER — ALBUTEROL SULFATE 90 UG/1
2 INHALANT RESPIRATORY (INHALATION) 4 TIMES DAILY
Qty: 54 G | Refills: 11 | Status: SHIPPED | OUTPATIENT
Start: 2025-08-08

## 2025-08-08 RX ORDER — TRAZODONE HYDROCHLORIDE 100 MG/1
100 TABLET ORAL AT BEDTIME
Qty: 90 TABLET | Refills: 4 | Status: SHIPPED | OUTPATIENT
Start: 2025-08-08

## 2025-08-08 RX ORDER — FLUTICASONE PROPIONATE AND SALMETEROL XINAFOATE 115; 21 UG/1; UG/1
2 AEROSOL, METERED RESPIRATORY (INHALATION) 2 TIMES DAILY
Qty: 36 G | Refills: 4 | Status: SHIPPED | OUTPATIENT
Start: 2025-08-08

## 2025-08-08 RX ORDER — PHENTERMINE AND TOPIRAMATE EXTENDED-RELEASE 7.5; 46 MG/1; MG/1
1 CAPSULE ORAL EVERY MORNING
Qty: 30 CAPSULE | Refills: 2 | Status: SHIPPED | OUTPATIENT
Start: 2025-09-07

## 2025-08-08 RX ORDER — PROGESTERONE 100 MG/1
100 CAPSULE ORAL AT BEDTIME
COMMUNITY
Start: 2025-08-08

## 2025-08-08 RX ORDER — PHENTERMINE AND TOPIRAMATE EXTENDED-RELEASE 15; 92 MG/1; MG/1
1 CAPSULE ORAL EVERY MORNING
Qty: 30 CAPSULE | Refills: 2 | Status: SHIPPED | OUTPATIENT
Start: 2025-09-07

## 2025-08-08 RX ORDER — LOSARTAN POTASSIUM 50 MG/1
50 TABLET ORAL DAILY
Qty: 90 TABLET | Refills: 4 | Status: SHIPPED | OUTPATIENT
Start: 2025-08-08

## 2025-08-08 ASSESSMENT — ENCOUNTER SYMPTOMS
NERVOUS/ANXIOUS: 1
DYSURIA: 0
NAUSEA: 0
DIARRHEA: 0
ARTHRALGIAS: 1
MYALGIAS: 0
CONFUSION: 0
SLEEP DISTURBANCE: 0
AGITATION: 0
COUGH: 0
CHILLS: 0
BRUISES/BLEEDS EASILY: 0
WHEEZING: 0
WOUND: 0
DIZZINESS: 0
VOMITING: 0
LIGHT-HEADEDNESS: 0
HEMATURIA: 0
SHORTNESS OF BREATH: 0
FEVER: 0
ABDOMINAL PAIN: 0

## 2025-08-08 ASSESSMENT — PAIN SCALES - GENERAL: PAINLEVEL_OUTOF10: MILD PAIN (3)

## 2025-08-11 ENCOUNTER — MYC MEDICAL ADVICE (OUTPATIENT)
Dept: INTERNAL MEDICINE | Facility: OTHER | Age: 55
End: 2025-08-11
Payer: COMMERCIAL

## 2025-08-11 DIAGNOSIS — E66.811 CLASS 1 OBESITY DUE TO EXCESS CALORIES WITH SERIOUS COMORBIDITY AND BODY MASS INDEX (BMI) OF 32.0 TO 32.9 IN ADULT: Primary | ICD-10-CM

## 2025-08-11 DIAGNOSIS — E66.09 CLASS 1 OBESITY DUE TO EXCESS CALORIES WITH SERIOUS COMORBIDITY AND BODY MASS INDEX (BMI) OF 32.0 TO 32.9 IN ADULT: Primary | ICD-10-CM

## 2025-08-11 RX ORDER — TOPIRAMATE 25 MG/1
25 TABLET, FILM COATED ORAL 2 TIMES DAILY
Qty: 30 TABLET | Refills: 0 | Status: SHIPPED | OUTPATIENT
Start: 2025-08-11

## 2025-08-11 RX ORDER — TOPIRAMATE 50 MG/1
50 TABLET, FILM COATED ORAL 2 TIMES DAILY
Qty: 30 TABLET | Refills: 0 | Status: SHIPPED | OUTPATIENT
Start: 2025-09-10

## 2025-08-19 ENCOUNTER — MYC MEDICAL ADVICE (OUTPATIENT)
Dept: INTERNAL MEDICINE | Facility: OTHER | Age: 55
End: 2025-08-19
Payer: COMMERCIAL

## (undated) DEVICE — ENDO KIT COMPLIANCE DYKENDOCMPLY

## (undated) DEVICE — SYR 50ML LL W/O NDL 309653

## (undated) DEVICE — ENDO BRUSH CHANNEL MASTER CLEANING 2-4.2MM BW-412T

## (undated) DEVICE — SOL WATER 1500ML

## (undated) DEVICE — TUBING SUCTION 10'X3/16" N510

## (undated) DEVICE — Device

## (undated) DEVICE — ENDO BITE BLOCK 60 MAXI LF 00712804

## (undated) DEVICE — ENDO FORCEP ENDOJAW BIOPSY 2.8MMX230CM FB-220U

## (undated) DEVICE — SUCTION MANIFOLD NEPTUNE 2 SYS 4 PORT 0702-020-000

## (undated) RX ORDER — PROPOFOL 10 MG/ML
INJECTION, EMULSION INTRAVENOUS
Status: DISPENSED
Start: 2023-06-22

## (undated) RX ORDER — SODIUM CHLORIDE 9 MG/ML
INJECTION, SOLUTION INTRAVENOUS
Status: DISPENSED
Start: 2018-12-11

## (undated) RX ORDER — IPRATROPIUM BROMIDE AND ALBUTEROL SULFATE 2.5; .5 MG/3ML; MG/3ML
SOLUTION RESPIRATORY (INHALATION)
Status: DISPENSED
Start: 2024-12-10

## (undated) RX ORDER — REGADENOSON 0.08 MG/ML
INJECTION, SOLUTION INTRAVENOUS
Status: DISPENSED
Start: 2024-08-15

## (undated) RX ORDER — ONDANSETRON 4 MG/1
TABLET, ORALLY DISINTEGRATING ORAL
Status: DISPENSED
Start: 2024-12-10

## (undated) RX ORDER — PROPOFOL 10 MG/ML
INJECTION, EMULSION INTRAVENOUS
Status: DISPENSED
Start: 2020-10-16

## (undated) RX ORDER — NAPROXEN 250 MG/1
TABLET ORAL
Status: DISPENSED
Start: 2024-12-10

## (undated) RX ORDER — SILVER SULFADIAZINE 10 MG/G
CREAM TOPICAL
Status: DISPENSED
Start: 2021-10-04

## (undated) RX ORDER — ACETAMINOPHEN 500 MG
TABLET ORAL
Status: DISPENSED
Start: 2024-12-10